# Patient Record
Sex: MALE | Race: WHITE | NOT HISPANIC OR LATINO | Employment: UNEMPLOYED | URBAN - NONMETROPOLITAN AREA
[De-identification: names, ages, dates, MRNs, and addresses within clinical notes are randomized per-mention and may not be internally consistent; named-entity substitution may affect disease eponyms.]

---

## 2017-01-31 ENCOUNTER — APPOINTMENT (OUTPATIENT)
Dept: CT IMAGING | Facility: HOSPITAL | Age: 32
End: 2017-01-31

## 2017-01-31 ENCOUNTER — APPOINTMENT (OUTPATIENT)
Dept: GENERAL RADIOLOGY | Facility: HOSPITAL | Age: 32
End: 2017-01-31

## 2017-01-31 ENCOUNTER — HOSPITAL ENCOUNTER (EMERGENCY)
Facility: HOSPITAL | Age: 32
Discharge: HOME | End: 2017-01-31
Admitting: EMERGENCY MEDICINE

## 2017-01-31 VITALS
RESPIRATION RATE: 20 BRPM | BODY MASS INDEX: 17.71 KG/M2 | SYSTOLIC BLOOD PRESSURE: 105 MMHG | HEIGHT: 77 IN | WEIGHT: 150 LBS | DIASTOLIC BLOOD PRESSURE: 58 MMHG | HEART RATE: 77 BPM | OXYGEN SATURATION: 100 % | TEMPERATURE: 98.1 F

## 2017-01-31 DIAGNOSIS — K59.00 CONSTIPATION, UNSPECIFIED CONSTIPATION TYPE: Primary | ICD-10-CM

## 2017-01-31 LAB
AMPHET+METHAMPHET UR QL: NEGATIVE
AMYLASE SERPL-CCNC: 60 U/L (ref 30–110)
APTT PPP: 29.6 SECONDS (ref 24.1–34.8)
BARBITURATES UR QL SCN: NEGATIVE
BASOPHILS # BLD AUTO: 0.04 10*3/MM3 (ref 0–0.2)
BASOPHILS NFR BLD AUTO: 0.7 % (ref 0–2)
BENZODIAZ UR QL SCN: NEGATIVE
BILIRUB UR QL STRIP: NEGATIVE
CANNABINOIDS SERPL QL: POSITIVE
CLARITY UR: CLEAR
COCAINE UR QL: NEGATIVE
COLOR UR: YELLOW
CREAT BLDA-MCNC: 0.9 MG/DL (ref 0.6–1.3)
CRP SERPL-MCNC: <0.5 MG/DL (ref 0–0.99)
DEPRECATED RDW RBC AUTO: 42.8 FL (ref 40–54)
EOSINOPHIL # BLD AUTO: 0.31 10*3/MM3 (ref 0–0.7)
EOSINOPHIL NFR BLD AUTO: 5.1 % (ref 0–4)
ERYTHROCYTE [DISTWIDTH] IN BLOOD BY AUTOMATED COUNT: 13.2 % (ref 12–15)
FLUAV AG NPH QL: NEGATIVE
FLUBV AG NPH QL IA: NEGATIVE
GLUCOSE UR STRIP-MCNC: NEGATIVE MG/DL
HCT VFR BLD AUTO: 45.4 % (ref 40–52)
HGB BLD-MCNC: 15.7 G/DL (ref 14–18)
HGB UR QL STRIP.AUTO: NEGATIVE
IMM GRANULOCYTES # BLD: 0.04 10*3/MM3 (ref 0–0.03)
IMM GRANULOCYTES NFR BLD: 0.7 % (ref 0–5)
INR PPP: 1.03 (ref 0.91–1.09)
KETONES UR QL STRIP: NEGATIVE
LEUKOCYTE ESTERASE UR QL STRIP.AUTO: NEGATIVE
LIPASE SERPL-CCNC: 36 U/L (ref 23–203)
LYMPHOCYTES # BLD AUTO: 1.47 10*3/MM3 (ref 0.72–4.86)
LYMPHOCYTES NFR BLD AUTO: 24.2 % (ref 15–45)
MCH RBC QN AUTO: 31 PG (ref 28–32)
MCHC RBC AUTO-ENTMCNC: 34.6 G/DL (ref 33–36)
MCV RBC AUTO: 89.5 FL (ref 82–95)
METHADONE UR QL SCN: NEGATIVE
MONOCYTES # BLD AUTO: 0.61 10*3/MM3 (ref 0.19–1.3)
MONOCYTES NFR BLD AUTO: 10 % (ref 4–12)
NEUTROPHILS # BLD AUTO: 3.61 10*3/MM3 (ref 1.87–8.4)
NEUTROPHILS NFR BLD AUTO: 59.3 % (ref 39–78)
NITRITE UR QL STRIP: NEGATIVE
OPIATES UR QL: NEGATIVE
PCP UR QL SCN: NEGATIVE
PH UR STRIP.AUTO: <=5 [PH] (ref 5–8)
PLATELET # BLD AUTO: 224 10*3/MM3 (ref 130–400)
PMV BLD AUTO: 10.8 FL (ref 6–12)
PROT UR QL STRIP: NEGATIVE
PROTHROMBIN TIME: 13.8 SECONDS (ref 11.9–14.6)
RBC # BLD AUTO: 5.07 10*6/MM3 (ref 4.8–5.9)
SP GR UR STRIP: >1.03 (ref 1–1.03)
TROPONIN I SERPL-MCNC: 0 NG/ML (ref 0–0.07)
UROBILINOGEN UR QL STRIP: ABNORMAL
WBC NRBC COR # BLD: 6.08 10*3/MM3 (ref 4.8–10.8)

## 2017-01-31 PROCEDURE — 74177 CT ABD & PELVIS W/CONTRAST: CPT

## 2017-01-31 PROCEDURE — 93005 ELECTROCARDIOGRAM TRACING: CPT | Performed by: NURSE PRACTITIONER

## 2017-01-31 PROCEDURE — 84484 ASSAY OF TROPONIN QUANT: CPT

## 2017-01-31 PROCEDURE — 80307 DRUG TEST PRSMV CHEM ANLYZR: CPT | Performed by: NURSE PRACTITIONER

## 2017-01-31 PROCEDURE — 0 IOPAMIDOL PER 1 ML: Performed by: NURSE PRACTITIONER

## 2017-01-31 PROCEDURE — 82150 ASSAY OF AMYLASE: CPT | Performed by: NURSE PRACTITIONER

## 2017-01-31 PROCEDURE — 85025 COMPLETE CBC W/AUTO DIFF WBC: CPT | Performed by: NURSE PRACTITIONER

## 2017-01-31 PROCEDURE — 96376 TX/PRO/DX INJ SAME DRUG ADON: CPT

## 2017-01-31 PROCEDURE — 82565 ASSAY OF CREATININE: CPT

## 2017-01-31 PROCEDURE — 96374 THER/PROPH/DIAG INJ IV PUSH: CPT

## 2017-01-31 PROCEDURE — 85610 PROTHROMBIN TIME: CPT | Performed by: NURSE PRACTITIONER

## 2017-01-31 PROCEDURE — 71010 HC CHEST PA OR AP: CPT

## 2017-01-31 PROCEDURE — 87804 INFLUENZA ASSAY W/OPTIC: CPT | Performed by: NURSE PRACTITIONER

## 2017-01-31 PROCEDURE — 71275 CT ANGIOGRAPHY CHEST: CPT

## 2017-01-31 PROCEDURE — 81003 URINALYSIS AUTO W/O SCOPE: CPT | Performed by: NURSE PRACTITIONER

## 2017-01-31 PROCEDURE — 25010000002 HYDROMORPHONE PER 4 MG: Performed by: NURSE PRACTITIONER

## 2017-01-31 PROCEDURE — 85730 THROMBOPLASTIN TIME PARTIAL: CPT | Performed by: NURSE PRACTITIONER

## 2017-01-31 PROCEDURE — 86140 C-REACTIVE PROTEIN: CPT | Performed by: NURSE PRACTITIONER

## 2017-01-31 PROCEDURE — 96361 HYDRATE IV INFUSION ADD-ON: CPT

## 2017-01-31 PROCEDURE — 93010 ELECTROCARDIOGRAM REPORT: CPT | Performed by: INTERNAL MEDICINE

## 2017-01-31 PROCEDURE — 96375 TX/PRO/DX INJ NEW DRUG ADDON: CPT

## 2017-01-31 PROCEDURE — 99284 EMERGENCY DEPT VISIT MOD MDM: CPT

## 2017-01-31 PROCEDURE — 25010000002 ONDANSETRON PER 1 MG: Performed by: NURSE PRACTITIONER

## 2017-01-31 PROCEDURE — 83690 ASSAY OF LIPASE: CPT | Performed by: NURSE PRACTITIONER

## 2017-01-31 RX ORDER — ONDANSETRON 2 MG/ML
4 INJECTION INTRAMUSCULAR; INTRAVENOUS ONCE
Status: COMPLETED | OUTPATIENT
Start: 2017-01-31 | End: 2017-01-31

## 2017-01-31 RX ORDER — SODIUM CHLORIDE 0.9 % (FLUSH) 0.9 %
10 SYRINGE (ML) INJECTION AS NEEDED
Status: DISCONTINUED | OUTPATIENT
Start: 2017-01-31 | End: 2017-01-31 | Stop reason: HOSPADM

## 2017-01-31 RX ORDER — POLYETHYLENE GLYCOL 3350 17 G/17G
17 POWDER, FOR SOLUTION ORAL DAILY
Qty: 1 EACH | Refills: 0 | Status: SHIPPED | OUTPATIENT
Start: 2017-01-31 | End: 2017-02-07 | Stop reason: HOSPADM

## 2017-01-31 RX ADMIN — SODIUM CHLORIDE 1000 ML: 9 INJECTION, SOLUTION INTRAVENOUS at 14:37

## 2017-01-31 RX ADMIN — HYDROMORPHONE HYDROCHLORIDE 0.5 MG: 1 INJECTION, SOLUTION INTRAMUSCULAR; INTRAVENOUS; SUBCUTANEOUS at 18:21

## 2017-01-31 RX ADMIN — IOPAMIDOL 75 ML: 755 INJECTION, SOLUTION INTRAVENOUS at 14:44

## 2017-01-31 RX ADMIN — ONDANSETRON 4 MG: 2 INJECTION INTRAMUSCULAR; INTRAVENOUS at 14:36

## 2017-01-31 RX ADMIN — HYDROMORPHONE HYDROCHLORIDE 0.5 MG: 1 INJECTION, SOLUTION INTRAMUSCULAR; INTRAVENOUS; SUBCUTANEOUS at 14:35

## 2017-01-31 RX ADMIN — SODIUM CHLORIDE 1000 ML: 9 INJECTION, SOLUTION INTRAVENOUS at 18:19

## 2017-02-01 ENCOUNTER — APPOINTMENT (OUTPATIENT)
Dept: GENERAL RADIOLOGY | Facility: HOSPITAL | Age: 32
End: 2017-02-01

## 2017-02-01 ENCOUNTER — HOSPITAL ENCOUNTER (INPATIENT)
Facility: HOSPITAL | Age: 32
LOS: 6 days | Discharge: HOME | End: 2017-02-07
Attending: INTERNAL MEDICINE | Admitting: INTERNAL MEDICINE

## 2017-02-01 DIAGNOSIS — J93.12 SECONDARY SPONTANEOUS PNEUMOTHORAX: Primary | ICD-10-CM

## 2017-02-01 PROBLEM — J93.9 PNEUMOTHORAX: Status: ACTIVE | Noted: 2017-02-01

## 2017-02-01 PROCEDURE — 71010 HC CHEST PA OR AP: CPT

## 2017-02-01 PROCEDURE — 25010000002 HYDROMORPHONE PER 4 MG: Performed by: INTERNAL MEDICINE

## 2017-02-01 RX ORDER — ONDANSETRON 2 MG/ML
4 INJECTION INTRAMUSCULAR; INTRAVENOUS EVERY 6 HOURS PRN
Status: DISCONTINUED | OUTPATIENT
Start: 2017-02-01 | End: 2017-02-03 | Stop reason: HOSPADM

## 2017-02-01 RX ORDER — NICOTINE 21 MG/24HR
1 PATCH, TRANSDERMAL 24 HOURS TRANSDERMAL NIGHTLY
Status: DISCONTINUED | OUTPATIENT
Start: 2017-02-01 | End: 2017-02-02 | Stop reason: DRUGHIGH

## 2017-02-01 RX ORDER — PANTOPRAZOLE SODIUM 40 MG/10ML
40 INJECTION, POWDER, LYOPHILIZED, FOR SOLUTION INTRAVENOUS
Status: DISCONTINUED | OUTPATIENT
Start: 2017-02-01 | End: 2017-02-03 | Stop reason: HOSPADM

## 2017-02-01 RX ADMIN — NICOTINE 1 PATCH: 14 PATCH, EXTENDED RELEASE TRANSDERMAL at 23:06

## 2017-02-01 RX ADMIN — HYDROMORPHONE HYDROCHLORIDE 1 MG: 1 INJECTION, SOLUTION INTRAMUSCULAR; INTRAVENOUS; SUBCUTANEOUS at 22:20

## 2017-02-01 RX ADMIN — PANTOPRAZOLE SODIUM 40 MG: 40 INJECTION, POWDER, FOR SOLUTION INTRAVENOUS at 22:20

## 2017-02-01 NOTE — DISCHARGE INSTRUCTIONS
Follow-up to primary care provider in one to 2 days for recheck.  Please return to the ER if any new or worsening symptoms.

## 2017-02-02 ENCOUNTER — APPOINTMENT (OUTPATIENT)
Dept: GENERAL RADIOLOGY | Facility: HOSPITAL | Age: 32
End: 2017-02-02

## 2017-02-02 PROBLEM — J43.9 EMPHYSEMA OF LUNG: Chronic | Status: ACTIVE | Noted: 2017-02-02

## 2017-02-02 PROBLEM — F17.200 SMOKING: Chronic | Status: ACTIVE | Noted: 2017-02-02

## 2017-02-02 PROBLEM — IMO0001 SMOKING: Chronic | Status: ACTIVE | Noted: 2017-02-02

## 2017-02-02 LAB
ABO GROUP BLD: NORMAL
ANION GAP SERPL CALCULATED.3IONS-SCNC: 9 MMOL/L (ref 4–13)
APTT PPP: 30.7 SECONDS (ref 24.1–34.8)
BLD GP AB SCN SERPL QL: NEGATIVE
BUN BLD-MCNC: 11 MG/DL (ref 5–21)
BUN/CREAT SERPL: 11.6 (ref 7–25)
CALCIUM SPEC-SCNC: 8.9 MG/DL (ref 8.4–10.4)
CHLORIDE SERPL-SCNC: 102 MMOL/L (ref 98–110)
CO2 SERPL-SCNC: 28 MMOL/L (ref 24–31)
CREAT BLD-MCNC: 0.95 MG/DL (ref 0.5–1.4)
DEPRECATED RDW RBC AUTO: 44.2 FL (ref 40–54)
ERYTHROCYTE [DISTWIDTH] IN BLOOD BY AUTOMATED COUNT: 13.2 % (ref 12–15)
GFR SERPL CREATININE-BSD FRML MDRD: 93 ML/MIN/1.73
GLUCOSE BLD-MCNC: 80 MG/DL (ref 70–100)
GLUCOSE BLDC GLUCOMTR-MCNC: 130 MG/DL (ref 70–130)
GLUCOSE BLDC GLUCOMTR-MCNC: 142 MG/DL (ref 70–130)
HCT VFR BLD AUTO: 42.6 % (ref 40–52)
HGB BLD-MCNC: 14.3 G/DL (ref 14–18)
INR PPP: 1.04 (ref 0.91–1.09)
MCH RBC QN AUTO: 30.4 PG (ref 28–32)
MCHC RBC AUTO-ENTMCNC: 33.6 G/DL (ref 33–36)
MCV RBC AUTO: 90.6 FL (ref 82–95)
PLATELET # BLD AUTO: 195 10*3/MM3 (ref 130–400)
PMV BLD AUTO: 10.9 FL (ref 6–12)
POTASSIUM BLD-SCNC: 3.3 MMOL/L (ref 3.5–5.3)
PROTHROMBIN TIME: 13.9 SECONDS (ref 11.9–14.6)
RBC # BLD AUTO: 4.7 10*6/MM3 (ref 4.8–5.9)
RH BLD: POSITIVE
SODIUM BLD-SCNC: 139 MMOL/L (ref 135–145)
WBC NRBC COR # BLD: 6.45 10*3/MM3 (ref 4.8–10.8)

## 2017-02-02 PROCEDURE — 86900 BLOOD TYPING SEROLOGIC ABO: CPT

## 2017-02-02 PROCEDURE — 82962 GLUCOSE BLOOD TEST: CPT

## 2017-02-02 PROCEDURE — 86901 BLOOD TYPING SEROLOGIC RH(D): CPT

## 2017-02-02 PROCEDURE — 94640 AIRWAY INHALATION TREATMENT: CPT

## 2017-02-02 PROCEDURE — 82103 ALPHA-1-ANTITRYPSIN TOTAL: CPT | Performed by: FAMILY MEDICINE

## 2017-02-02 PROCEDURE — 25010000002 ONDANSETRON PER 1 MG: Performed by: INTERNAL MEDICINE

## 2017-02-02 PROCEDURE — 85730 THROMBOPLASTIN TIME PARTIAL: CPT | Performed by: NURSE PRACTITIONER

## 2017-02-02 PROCEDURE — 85027 COMPLETE CBC AUTOMATED: CPT | Performed by: NURSE PRACTITIONER

## 2017-02-02 PROCEDURE — 94799 UNLISTED PULMONARY SVC/PX: CPT

## 2017-02-02 PROCEDURE — 80048 BASIC METABOLIC PNL TOTAL CA: CPT | Performed by: NURSE PRACTITIONER

## 2017-02-02 PROCEDURE — 86850 RBC ANTIBODY SCREEN: CPT

## 2017-02-02 PROCEDURE — 71010 HC CHEST PA OR AP: CPT

## 2017-02-02 PROCEDURE — 93005 ELECTROCARDIOGRAM TRACING: CPT | Performed by: NURSE PRACTITIONER

## 2017-02-02 PROCEDURE — 25010000002 HYDROMORPHONE PER 4 MG: Performed by: INTERNAL MEDICINE

## 2017-02-02 PROCEDURE — 85610 PROTHROMBIN TIME: CPT | Performed by: NURSE PRACTITIONER

## 2017-02-02 PROCEDURE — 25010000002 HYDROMORPHONE 1 MG/ML SOLUTION: Performed by: FAMILY MEDICINE

## 2017-02-02 RX ORDER — ALBUTEROL SULFATE 1.25 MG/3ML
1.25 SOLUTION RESPIRATORY (INHALATION)
Status: DISCONTINUED | OUTPATIENT
Start: 2017-02-02 | End: 2017-02-03 | Stop reason: HOSPADM

## 2017-02-02 RX ORDER — NICOTINE 21 MG/24HR
1 PATCH, TRANSDERMAL 24 HOURS TRANSDERMAL EVERY 24 HOURS
Status: DISCONTINUED | OUTPATIENT
Start: 2017-02-02 | End: 2017-02-04 | Stop reason: HOSPADM

## 2017-02-02 RX ORDER — NALOXONE HCL 0.4 MG/ML
0.1 VIAL (ML) INJECTION
Status: DISCONTINUED | OUTPATIENT
Start: 2017-02-02 | End: 2017-02-03 | Stop reason: HOSPADM

## 2017-02-02 RX ADMIN — ONDANSETRON HYDROCHLORIDE 4 MG: 2 SOLUTION INTRAMUSCULAR; INTRAVENOUS at 18:59

## 2017-02-02 RX ADMIN — HYDROMORPHONE HYDROCHLORIDE 1 MG: 1 INJECTION, SOLUTION INTRAMUSCULAR; INTRAVENOUS; SUBCUTANEOUS at 02:20

## 2017-02-02 RX ADMIN — HYDROMORPHONE HYDROCHLORIDE 1 MG: 1 INJECTION, SOLUTION INTRAMUSCULAR; INTRAVENOUS; SUBCUTANEOUS at 07:27

## 2017-02-02 RX ADMIN — ALBUTEROL SULFATE 1.25 MG: 1.25 SOLUTION RESPIRATORY (INHALATION) at 20:55

## 2017-02-02 RX ADMIN — PANTOPRAZOLE SODIUM 40 MG: 40 INJECTION, POWDER, FOR SOLUTION INTRAVENOUS at 07:26

## 2017-02-02 RX ADMIN — ONDANSETRON HYDROCHLORIDE 4 MG: 2 SOLUTION INTRAMUSCULAR; INTRAVENOUS at 02:36

## 2017-02-02 RX ADMIN — Medication: at 10:55

## 2017-02-03 ENCOUNTER — APPOINTMENT (OUTPATIENT)
Dept: GENERAL RADIOLOGY | Facility: HOSPITAL | Age: 32
End: 2017-02-03

## 2017-02-03 ENCOUNTER — ANESTHESIA (OUTPATIENT)
Dept: PERIOP | Facility: HOSPITAL | Age: 32
End: 2017-02-03

## 2017-02-03 ENCOUNTER — ANESTHESIA EVENT (OUTPATIENT)
Dept: PERIOP | Facility: HOSPITAL | Age: 32
End: 2017-02-03

## 2017-02-03 LAB
A1AT SERPL-MCNC: 169 MG/DL (ref 90–200)
ANION GAP SERPL CALCULATED.3IONS-SCNC: 7 MMOL/L (ref 4–13)
ARTERIAL PATENCY WRIST A: ABNORMAL
ATMOSPHERIC PRESS: ABNORMAL MMHG
BASE EXCESS BLDA CALC-SCNC: -1.2 MMOL/L (ref -2–2)
BDY SITE: ABNORMAL
BUN BLD-MCNC: 11 MG/DL (ref 5–21)
BUN/CREAT SERPL: 12.4 (ref 7–25)
CALCIUM SPEC-SCNC: 8.4 MG/DL (ref 8.4–10.4)
CHLORIDE SERPL-SCNC: 101 MMOL/L (ref 98–110)
CO2 SERPL-SCNC: 25 MMOL/L (ref 24–31)
CREAT BLD-MCNC: 0.89 MG/DL (ref 0.5–1.4)
DEPRECATED RDW RBC AUTO: 43.5 FL (ref 40–54)
ERYTHROCYTE [DISTWIDTH] IN BLOOD BY AUTOMATED COUNT: 13.2 % (ref 12–15)
GFR SERPL CREATININE-BSD FRML MDRD: 100 ML/MIN/1.73
GLUCOSE BLD-MCNC: 144 MG/DL (ref 70–100)
HCO3 BLDA-SCNC: 22.2 MMOL/L (ref 22–26)
HCT VFR BLD AUTO: 38.3 % (ref 40–52)
HGB BLD-MCNC: 12.8 G/DL (ref 14–18)
MCH RBC QN AUTO: 30.1 PG (ref 28–32)
MCHC RBC AUTO-ENTMCNC: 33.4 G/DL (ref 33–36)
MCV RBC AUTO: 90.1 FL (ref 82–95)
MODALITY: ABNORMAL
PCO2 BLDA: 33.7 MM HG (ref 35–45)
PH BLDA: 7.44 PH UNITS (ref 7.35–7.45)
PLATELET # BLD AUTO: 171 10*3/MM3 (ref 130–400)
PMV BLD AUTO: 10.3 FL (ref 6–12)
PO2 BLDA: 72.9 MM HG (ref 80–100)
POTASSIUM BLD-SCNC: 3.6 MMOL/L (ref 3.5–5.3)
RBC # BLD AUTO: 4.25 10*6/MM3 (ref 4.8–5.9)
SAO2 % BLDCOA: 95.3 % (ref 94–100)
SAO2 % BLDCOA: 95.3 % (ref 94–100)
SODIUM BLD-SCNC: 133 MMOL/L (ref 135–145)
WBC NRBC COR # BLD: 12.76 10*3/MM3 (ref 4.8–10.8)

## 2017-02-03 PROCEDURE — 25010000002 HYDROMORPHONE PER 4 MG: Performed by: NURSE ANESTHETIST, CERTIFIED REGISTERED

## 2017-02-03 PROCEDURE — 71010 HC CHEST PA OR AP: CPT

## 2017-02-03 PROCEDURE — 32666 THORACOSCOPY W/WEDGE RESECT: CPT | Performed by: THORACIC SURGERY (CARDIOTHORACIC VASCULAR SURGERY)

## 2017-02-03 PROCEDURE — 94640 AIRWAY INHALATION TREATMENT: CPT

## 2017-02-03 PROCEDURE — 0BBC4ZZ EXCISION OF RIGHT UPPER LUNG LOBE, PERCUTANEOUS ENDOSCOPIC APPROACH: ICD-10-PCS | Performed by: THORACIC SURGERY (CARDIOTHORACIC VASCULAR SURGERY)

## 2017-02-03 PROCEDURE — 85027 COMPLETE CBC AUTOMATED: CPT | Performed by: THORACIC SURGERY (CARDIOTHORACIC VASCULAR SURGERY)

## 2017-02-03 PROCEDURE — C1729 CATH, DRAINAGE: HCPCS | Performed by: THORACIC SURGERY (CARDIOTHORACIC VASCULAR SURGERY)

## 2017-02-03 PROCEDURE — 25010000002 PROPOFOL 10 MG/ML EMULSION: Performed by: NURSE ANESTHETIST, CERTIFIED REGISTERED

## 2017-02-03 PROCEDURE — 0B5N4ZZ DESTRUCTION OF RIGHT PLEURA, PERCUTANEOUS ENDOSCOPIC APPROACH: ICD-10-PCS | Performed by: THORACIC SURGERY (CARDIOTHORACIC VASCULAR SURGERY)

## 2017-02-03 PROCEDURE — 25010000002 ONDANSETRON PER 1 MG: Performed by: ANESTHESIOLOGY

## 2017-02-03 PROCEDURE — 25010000002 DEXAMETHASONE PER 1 MG: Performed by: NURSE ANESTHETIST, CERTIFIED REGISTERED

## 2017-02-03 PROCEDURE — 87205 SMEAR GRAM STAIN: CPT | Performed by: THORACIC SURGERY (CARDIOTHORACIC VASCULAR SURGERY)

## 2017-02-03 PROCEDURE — 82803 BLOOD GASES ANY COMBINATION: CPT

## 2017-02-03 PROCEDURE — 31622 DX BRONCHOSCOPE/WASH: CPT | Performed by: THORACIC SURGERY (CARDIOTHORACIC VASCULAR SURGERY)

## 2017-02-03 PROCEDURE — 25010000002 FENTANYL CITRATE (PF) 100 MCG/2ML SOLUTION: Performed by: ANESTHESIOLOGY

## 2017-02-03 PROCEDURE — 32667 THORACOSCOPY W/W RESECT ADDL: CPT | Performed by: THORACIC SURGERY (CARDIOTHORACIC VASCULAR SURGERY)

## 2017-02-03 PROCEDURE — 36600 WITHDRAWAL OF ARTERIAL BLOOD: CPT

## 2017-02-03 PROCEDURE — 25010000002 ONDANSETRON PER 1 MG: Performed by: THORACIC SURGERY (CARDIOTHORACIC VASCULAR SURGERY)

## 2017-02-03 PROCEDURE — 87075 CULTR BACTERIA EXCEPT BLOOD: CPT | Performed by: THORACIC SURGERY (CARDIOTHORACIC VASCULAR SURGERY)

## 2017-02-03 PROCEDURE — 25010000002 NEOSTIGMINE PER 0.5 MG: Performed by: NURSE ANESTHETIST, CERTIFIED REGISTERED

## 2017-02-03 PROCEDURE — 25010000002 HYDROMORPHONE 1 MG/ML SOLUTION: Performed by: THORACIC SURGERY (CARDIOTHORACIC VASCULAR SURGERY)

## 2017-02-03 PROCEDURE — 94799 UNLISTED PULMONARY SVC/PX: CPT

## 2017-02-03 PROCEDURE — 87070 CULTURE OTHR SPECIMN AEROBIC: CPT | Performed by: THORACIC SURGERY (CARDIOTHORACIC VASCULAR SURGERY)

## 2017-02-03 PROCEDURE — 32650 THORACOSCOPY W/PLEURODESIS: CPT | Performed by: THORACIC SURGERY (CARDIOTHORACIC VASCULAR SURGERY)

## 2017-02-03 PROCEDURE — 25010000002 ONDANSETRON PER 1 MG: Performed by: INTERNAL MEDICINE

## 2017-02-03 PROCEDURE — 88307 TISSUE EXAM BY PATHOLOGIST: CPT | Performed by: THORACIC SURGERY (CARDIOTHORACIC VASCULAR SURGERY)

## 2017-02-03 PROCEDURE — 87015 SPECIMEN INFECT AGNT CONCNTJ: CPT | Performed by: THORACIC SURGERY (CARDIOTHORACIC VASCULAR SURGERY)

## 2017-02-03 PROCEDURE — 25010000002 HYDROMORPHONE PER 4 MG: Performed by: FAMILY MEDICINE

## 2017-02-03 PROCEDURE — 25010000003 MORPHINE PER 10 MG: Performed by: THORACIC SURGERY (CARDIOTHORACIC VASCULAR SURGERY)

## 2017-02-03 PROCEDURE — 25010000002 KETOROLAC TROMETHAMINE PER 15 MG: Performed by: NURSE ANESTHETIST, CERTIFIED REGISTERED

## 2017-02-03 PROCEDURE — 80048 BASIC METABOLIC PNL TOTAL CA: CPT | Performed by: THORACIC SURGERY (CARDIOTHORACIC VASCULAR SURGERY)

## 2017-02-03 PROCEDURE — 25010000002 MIDAZOLAM PER 1 MG: Performed by: ANESTHESIOLOGY

## 2017-02-03 PROCEDURE — 0BB64ZZ EXCISION OF RIGHT LOWER LOBE BRONCHUS, PERCUTANEOUS ENDOSCOPIC APPROACH: ICD-10-PCS | Performed by: THORACIC SURGERY (CARDIOTHORACIC VASCULAR SURGERY)

## 2017-02-03 RX ORDER — BUPIVACAINE HCL/0.9 % NACL/PF 0.1 %
2 PLASTIC BAG, INJECTION (ML) EPIDURAL EVERY 8 HOURS
Status: COMPLETED | OUTPATIENT
Start: 2017-02-03 | End: 2017-02-04

## 2017-02-03 RX ORDER — FENTANYL CITRATE 50 UG/ML
25 INJECTION, SOLUTION INTRAMUSCULAR; INTRAVENOUS AS NEEDED
Status: DISCONTINUED | OUTPATIENT
Start: 2017-02-03 | End: 2017-02-03 | Stop reason: HOSPADM

## 2017-02-03 RX ORDER — ONDANSETRON 2 MG/ML
4 INJECTION INTRAMUSCULAR; INTRAVENOUS AS NEEDED
Status: DISCONTINUED | OUTPATIENT
Start: 2017-02-03 | End: 2017-02-03 | Stop reason: HOSPADM

## 2017-02-03 RX ORDER — FENTANYL CITRATE 50 UG/ML
25 INJECTION, SOLUTION INTRAMUSCULAR; INTRAVENOUS
Status: DISCONTINUED | OUTPATIENT
Start: 2017-02-03 | End: 2017-02-03 | Stop reason: HOSPADM

## 2017-02-03 RX ORDER — DOCUSATE SODIUM 100 MG/1
100 CAPSULE, LIQUID FILLED ORAL 2 TIMES DAILY
Status: DISCONTINUED | OUTPATIENT
Start: 2017-02-03 | End: 2017-02-07 | Stop reason: HOSPADM

## 2017-02-03 RX ORDER — MORPHINE SULFATE 1 MG/ML
INJECTION, SOLUTION INTRAVENOUS CONTINUOUS
Status: DISCONTINUED | OUTPATIENT
Start: 2017-02-03 | End: 2017-02-04

## 2017-02-03 RX ORDER — POLYETHYLENE GLYCOL 3350 17 G/17G
17 POWDER, FOR SOLUTION ORAL DAILY
Status: DISCONTINUED | OUTPATIENT
Start: 2017-02-03 | End: 2017-02-07 | Stop reason: HOSPADM

## 2017-02-03 RX ORDER — METOCLOPRAMIDE HYDROCHLORIDE 5 MG/ML
5 INJECTION INTRAMUSCULAR; INTRAVENOUS
Status: DISCONTINUED | OUTPATIENT
Start: 2017-02-03 | End: 2017-02-03 | Stop reason: HOSPADM

## 2017-02-03 RX ORDER — PREGABALIN 25 MG/1
25 CAPSULE ORAL EVERY 12 HOURS SCHEDULED
Status: DISCONTINUED | OUTPATIENT
Start: 2017-02-03 | End: 2017-02-07 | Stop reason: HOSPADM

## 2017-02-03 RX ORDER — ATRACURIUM BESYLATE 10 MG/ML
INJECTION, SOLUTION INTRAVENOUS AS NEEDED
Status: DISCONTINUED | OUTPATIENT
Start: 2017-02-03 | End: 2017-02-03 | Stop reason: SURG

## 2017-02-03 RX ORDER — LIDOCAINE 50 MG/G
2 PATCH TOPICAL EVERY 24 HOURS
Status: DISCONTINUED | OUTPATIENT
Start: 2017-02-03 | End: 2017-02-07 | Stop reason: HOSPADM

## 2017-02-03 RX ORDER — KETOROLAC TROMETHAMINE 30 MG/ML
INJECTION, SOLUTION INTRAMUSCULAR; INTRAVENOUS AS NEEDED
Status: DISCONTINUED | OUTPATIENT
Start: 2017-02-03 | End: 2017-02-03 | Stop reason: SURG

## 2017-02-03 RX ORDER — DIPHENHYDRAMINE HYDROCHLORIDE 50 MG/ML
12.5 INJECTION INTRAMUSCULAR; INTRAVENOUS EVERY 4 HOURS PRN
Status: DISCONTINUED | OUTPATIENT
Start: 2017-02-03 | End: 2017-02-07 | Stop reason: HOSPADM

## 2017-02-03 RX ORDER — SODIUM CHLORIDE, SODIUM LACTATE, POTASSIUM CHLORIDE, CALCIUM CHLORIDE 600; 310; 30; 20 MG/100ML; MG/100ML; MG/100ML; MG/100ML
30 INJECTION, SOLUTION INTRAVENOUS CONTINUOUS
Status: DISCONTINUED | OUTPATIENT
Start: 2017-02-03 | End: 2017-02-05

## 2017-02-03 RX ORDER — OXYCODONE HYDROCHLORIDE AND ACETAMINOPHEN 5; 325 MG/1; MG/1
1 TABLET ORAL
Status: DISCONTINUED | OUTPATIENT
Start: 2017-02-03 | End: 2017-02-05

## 2017-02-03 RX ORDER — FLUMAZENIL 0.1 MG/ML
0.2 INJECTION INTRAVENOUS AS NEEDED
Status: DISCONTINUED | OUTPATIENT
Start: 2017-02-03 | End: 2017-02-03 | Stop reason: HOSPADM

## 2017-02-03 RX ORDER — SODIUM CHLORIDE 0.9 % (FLUSH) 0.9 %
1-10 SYRINGE (ML) INJECTION AS NEEDED
Status: DISCONTINUED | OUTPATIENT
Start: 2017-02-03 | End: 2017-02-03 | Stop reason: HOSPADM

## 2017-02-03 RX ORDER — ACETYLCYSTEINE 200 MG/ML
3 SOLUTION ORAL; RESPIRATORY (INHALATION)
Status: DISPENSED | OUTPATIENT
Start: 2017-02-03 | End: 2017-02-05

## 2017-02-03 RX ORDER — SODIUM CHLORIDE, SODIUM LACTATE, POTASSIUM CHLORIDE, CALCIUM CHLORIDE 600; 310; 30; 20 MG/100ML; MG/100ML; MG/100ML; MG/100ML
30 INJECTION, SOLUTION INTRAVENOUS CONTINUOUS
Status: DISCONTINUED | OUTPATIENT
Start: 2017-02-03 | End: 2017-02-03 | Stop reason: HOSPADM

## 2017-02-03 RX ORDER — PROPOFOL 10 MG/ML
VIAL (ML) INTRAVENOUS AS NEEDED
Status: DISCONTINUED | OUTPATIENT
Start: 2017-02-03 | End: 2017-02-03 | Stop reason: SURG

## 2017-02-03 RX ORDER — ONDANSETRON 2 MG/ML
4 INJECTION INTRAMUSCULAR; INTRAVENOUS EVERY 6 HOURS PRN
Status: DISCONTINUED | OUTPATIENT
Start: 2017-02-03 | End: 2017-02-07 | Stop reason: HOSPADM

## 2017-02-03 RX ORDER — HYDROMORPHONE HCL 110MG/55ML
PATIENT CONTROLLED ANALGESIA SYRINGE INTRAVENOUS AS NEEDED
Status: DISCONTINUED | OUTPATIENT
Start: 2017-02-03 | End: 2017-02-03 | Stop reason: SURG

## 2017-02-03 RX ORDER — NALOXONE HCL 0.4 MG/ML
0.1 VIAL (ML) INJECTION
Status: DISCONTINUED | OUTPATIENT
Start: 2017-02-03 | End: 2017-02-07 | Stop reason: HOSPADM

## 2017-02-03 RX ORDER — ONDANSETRON 2 MG/ML
4 INJECTION INTRAMUSCULAR; INTRAVENOUS ONCE AS NEEDED
Status: COMPLETED | OUTPATIENT
Start: 2017-02-03 | End: 2017-02-03

## 2017-02-03 RX ORDER — FAMOTIDINE 10 MG/ML
20 INJECTION, SOLUTION INTRAVENOUS ONCE
Status: COMPLETED | OUTPATIENT
Start: 2017-02-03 | End: 2017-02-03

## 2017-02-03 RX ORDER — GLYCOPYRROLATE 0.2 MG/ML
INJECTION INTRAMUSCULAR; INTRAVENOUS AS NEEDED
Status: DISCONTINUED | OUTPATIENT
Start: 2017-02-03 | End: 2017-02-03 | Stop reason: SURG

## 2017-02-03 RX ORDER — MIDAZOLAM HYDROCHLORIDE 1 MG/ML
2 INJECTION INTRAMUSCULAR; INTRAVENOUS
Status: DISCONTINUED | OUTPATIENT
Start: 2017-02-03 | End: 2017-02-03 | Stop reason: HOSPADM

## 2017-02-03 RX ORDER — IPRATROPIUM BROMIDE AND ALBUTEROL SULFATE 2.5; .5 MG/3ML; MG/3ML
3 SOLUTION RESPIRATORY (INHALATION) ONCE AS NEEDED
Status: DISCONTINUED | OUTPATIENT
Start: 2017-02-03 | End: 2017-02-03 | Stop reason: HOSPADM

## 2017-02-03 RX ORDER — MIDAZOLAM HYDROCHLORIDE 1 MG/ML
1 INJECTION INTRAMUSCULAR; INTRAVENOUS
Status: DISCONTINUED | OUTPATIENT
Start: 2017-02-03 | End: 2017-02-03 | Stop reason: HOSPADM

## 2017-02-03 RX ORDER — ONDANSETRON 4 MG/1
4 TABLET, FILM COATED ORAL EVERY 6 HOURS PRN
Status: DISCONTINUED | OUTPATIENT
Start: 2017-02-03 | End: 2017-02-07 | Stop reason: HOSPADM

## 2017-02-03 RX ORDER — DEXAMETHASONE SODIUM PHOSPHATE 4 MG/ML
INJECTION, SOLUTION INTRA-ARTICULAR; INTRALESIONAL; INTRAMUSCULAR; INTRAVENOUS; SOFT TISSUE AS NEEDED
Status: DISCONTINUED | OUTPATIENT
Start: 2017-02-03 | End: 2017-02-03 | Stop reason: SURG

## 2017-02-03 RX ORDER — SUFENTANIL CITRATE 50 UG/ML
INJECTION EPIDURAL; INTRAVENOUS AS NEEDED
Status: DISCONTINUED | OUTPATIENT
Start: 2017-02-03 | End: 2017-02-03 | Stop reason: SURG

## 2017-02-03 RX ORDER — MORPHINE SULFATE 4 MG/ML
4 INJECTION, SOLUTION INTRAMUSCULAR; INTRAVENOUS
Status: DISCONTINUED | OUTPATIENT
Start: 2017-02-03 | End: 2017-02-03 | Stop reason: HOSPADM

## 2017-02-03 RX ORDER — LABETALOL HYDROCHLORIDE 5 MG/ML
5 INJECTION, SOLUTION INTRAVENOUS
Status: DISCONTINUED | OUTPATIENT
Start: 2017-02-03 | End: 2017-02-03 | Stop reason: HOSPADM

## 2017-02-03 RX ORDER — MAGNESIUM HYDROXIDE 1200 MG/15ML
LIQUID ORAL AS NEEDED
Status: DISCONTINUED | OUTPATIENT
Start: 2017-02-03 | End: 2017-02-03 | Stop reason: HOSPADM

## 2017-02-03 RX ORDER — SODIUM CHLORIDE, SODIUM LACTATE, POTASSIUM CHLORIDE, CALCIUM CHLORIDE 600; 310; 30; 20 MG/100ML; MG/100ML; MG/100ML; MG/100ML
100 INJECTION, SOLUTION INTRAVENOUS CONTINUOUS
Status: DISCONTINUED | OUTPATIENT
Start: 2017-02-03 | End: 2017-02-03 | Stop reason: HOSPADM

## 2017-02-03 RX ORDER — BISACODYL 10 MG
10 SUPPOSITORY, RECTAL RECTAL DAILY PRN
Status: DISCONTINUED | OUTPATIENT
Start: 2017-02-03 | End: 2017-02-07 | Stop reason: HOSPADM

## 2017-02-03 RX ORDER — KETAMINE HYDROCHLORIDE 50 MG/ML
INJECTION, SOLUTION, CONCENTRATE INTRAMUSCULAR; INTRAVENOUS AS NEEDED
Status: DISCONTINUED | OUTPATIENT
Start: 2017-02-03 | End: 2017-02-03 | Stop reason: SURG

## 2017-02-03 RX ORDER — HYDRALAZINE HYDROCHLORIDE 20 MG/ML
5 INJECTION INTRAMUSCULAR; INTRAVENOUS
Status: DISCONTINUED | OUTPATIENT
Start: 2017-02-03 | End: 2017-02-03 | Stop reason: HOSPADM

## 2017-02-03 RX ORDER — IPRATROPIUM BROMIDE AND ALBUTEROL SULFATE 2.5; .5 MG/3ML; MG/3ML
3 SOLUTION RESPIRATORY (INHALATION)
Status: COMPLETED | OUTPATIENT
Start: 2017-02-03 | End: 2017-02-05

## 2017-02-03 RX ORDER — ONDANSETRON 4 MG/1
4 TABLET, ORALLY DISINTEGRATING ORAL EVERY 6 HOURS PRN
Status: DISCONTINUED | OUTPATIENT
Start: 2017-02-03 | End: 2017-02-07 | Stop reason: HOSPADM

## 2017-02-03 RX ORDER — SODIUM CHLORIDE 9 MG/ML
80 INJECTION, SOLUTION INTRAVENOUS CONTINUOUS
Status: DISCONTINUED | OUTPATIENT
Start: 2017-02-03 | End: 2017-02-07 | Stop reason: HOSPADM

## 2017-02-03 RX ORDER — LIDOCAINE HYDROCHLORIDE 20 MG/ML
INJECTION, SOLUTION INFILTRATION; PERINEURAL AS NEEDED
Status: DISCONTINUED | OUTPATIENT
Start: 2017-02-03 | End: 2017-02-03 | Stop reason: SURG

## 2017-02-03 RX ADMIN — Medication: at 13:36

## 2017-02-03 RX ADMIN — ATRACURIUM BESYLATE 25 MG: 10 INJECTION, SOLUTION INTRAVENOUS at 07:47

## 2017-02-03 RX ADMIN — PROPOFOL 150 MG: 10 INJECTION, EMULSION INTRAVENOUS at 07:43

## 2017-02-03 RX ADMIN — ATRACURIUM BESYLATE 10 MG: 10 INJECTION, SOLUTION INTRAVENOUS at 09:05

## 2017-02-03 RX ADMIN — GLYCOPYRROLATE 0.4 MG: 0.2 INJECTION, SOLUTION INTRAMUSCULAR; INTRAVENOUS at 09:40

## 2017-02-03 RX ADMIN — PREGABALIN 25 MG: 25 CAPSULE ORAL at 20:54

## 2017-02-03 RX ADMIN — METOPROLOL TARTRATE 12.5 MG: 25 TABLET, FILM COATED ORAL at 13:39

## 2017-02-03 RX ADMIN — Medication 2 G: at 16:57

## 2017-02-03 RX ADMIN — LIDOCAINE 2 PATCH: 50 PATCH CUTANEOUS at 18:35

## 2017-02-03 RX ADMIN — DOCUSATE SODIUM 100 MG: 100 CAPSULE ORAL at 18:35

## 2017-02-03 RX ADMIN — FAMOTIDINE 20 MG: 10 INJECTION, SOLUTION INTRAVENOUS at 07:04

## 2017-02-03 RX ADMIN — IPRATROPIUM BROMIDE AND ALBUTEROL SULFATE 3 ML: .5; 3 SOLUTION RESPIRATORY (INHALATION) at 23:11

## 2017-02-03 RX ADMIN — SODIUM CHLORIDE, POTASSIUM CHLORIDE, SODIUM LACTATE AND CALCIUM CHLORIDE: 600; 310; 30; 20 INJECTION, SOLUTION INTRAVENOUS at 10:15

## 2017-02-03 RX ADMIN — NICOTINE 1 PATCH: 21 PATCH, EXTENDED RELEASE TRANSDERMAL at 00:01

## 2017-02-03 RX ADMIN — KETAMINE HYDROCHLORIDE 10 MG: 50 INJECTION, SOLUTION, CONCENTRATE INTRAMUSCULAR; INTRAVENOUS at 08:00

## 2017-02-03 RX ADMIN — ACETYLCYSTEINE 4 ML: 200 SOLUTION ORAL; RESPIRATORY (INHALATION) at 23:12

## 2017-02-03 RX ADMIN — ATRACURIUM BESYLATE 10 MG: 10 INJECTION, SOLUTION INTRAVENOUS at 08:46

## 2017-02-03 RX ADMIN — LIDOCAINE HYDROCHLORIDE 0.5 ML: 10 INJECTION, SOLUTION EPIDURAL; INFILTRATION; INTRACAUDAL; PERINEURAL at 07:04

## 2017-02-03 RX ADMIN — METOPROLOL TARTRATE 12.5 MG: 25 TABLET, FILM COATED ORAL at 20:54

## 2017-02-03 RX ADMIN — LIDOCAINE HYDROCHLORIDE 100 MG: 20 INJECTION, SOLUTION INFILTRATION; PERINEURAL at 07:43

## 2017-02-03 RX ADMIN — Medication: at 17:36

## 2017-02-03 RX ADMIN — SUFENTANIL CITRATE 25 MCG: 50 INJECTION, SOLUTION EPIDURAL; INTRAVENOUS at 08:42

## 2017-02-03 RX ADMIN — ONDANSETRON HYDROCHLORIDE 4 MG: 2 SOLUTION INTRAMUSCULAR; INTRAVENOUS at 09:42

## 2017-02-03 RX ADMIN — SODIUM CHLORIDE, POTASSIUM CHLORIDE, SODIUM LACTATE AND CALCIUM CHLORIDE 30 ML/HR: 600; 310; 30; 20 INJECTION, SOLUTION INTRAVENOUS at 07:07

## 2017-02-03 RX ADMIN — IPRATROPIUM BROMIDE AND ALBUTEROL SULFATE 3 ML: .5; 3 SOLUTION RESPIRATORY (INHALATION) at 15:03

## 2017-02-03 RX ADMIN — SODIUM CHLORIDE 80 ML/HR: 9 INJECTION, SOLUTION INTRAVENOUS at 12:49

## 2017-02-03 RX ADMIN — MIDAZOLAM HYDROCHLORIDE 2 MG: 1 INJECTION, SOLUTION INTRAMUSCULAR; INTRAVENOUS at 07:05

## 2017-02-03 RX ADMIN — ONDANSETRON 4 MG: 2 INJECTION INTRAMUSCULAR; INTRAVENOUS at 07:05

## 2017-02-03 RX ADMIN — ATRACURIUM BESYLATE 5 MG: 10 INJECTION, SOLUTION INTRAVENOUS at 07:43

## 2017-02-03 RX ADMIN — DOCUSATE SODIUM 100 MG: 100 CAPSULE ORAL at 13:39

## 2017-02-03 RX ADMIN — ACETYLCYSTEINE 3 ML: 200 SOLUTION ORAL; RESPIRATORY (INHALATION) at 15:03

## 2017-02-03 RX ADMIN — SUFENTANIL CITRATE 30 MCG: 50 INJECTION, SOLUTION EPIDURAL; INTRAVENOUS at 07:43

## 2017-02-03 RX ADMIN — HYDROMORPHONE HYDROCHLORIDE 1 MG: 2 INJECTION, SOLUTION INTRAMUSCULAR; INTRAVENOUS; SUBCUTANEOUS at 10:10

## 2017-02-03 RX ADMIN — ONDANSETRON 4 MG: 2 INJECTION INTRAMUSCULAR; INTRAVENOUS at 14:53

## 2017-02-03 RX ADMIN — SUFENTANIL CITRATE 20 MCG: 50 INJECTION, SOLUTION EPIDURAL; INTRAVENOUS at 07:47

## 2017-02-03 RX ADMIN — DEXAMETHASONE SODIUM PHOSPHATE 4 MG: 4 INJECTION, SOLUTION INTRAMUSCULAR; INTRAVENOUS at 09:42

## 2017-02-03 RX ADMIN — KETAMINE HYDROCHLORIDE 10 MG: 50 INJECTION, SOLUTION, CONCENTRATE INTRAMUSCULAR; INTRAVENOUS at 09:32

## 2017-02-03 RX ADMIN — Medication 4 MG: at 09:40

## 2017-02-03 RX ADMIN — OXYCODONE HYDROCHLORIDE AND ACETAMINOPHEN 1 TABLET: 5; 325 TABLET ORAL at 16:57

## 2017-02-03 RX ADMIN — FENTANYL CITRATE 50 MCG: 50 INJECTION, SOLUTION INTRAMUSCULAR; INTRAVENOUS at 07:05

## 2017-02-03 RX ADMIN — KETAMINE HYDROCHLORIDE 30 MG: 50 INJECTION, SOLUTION, CONCENTRATE INTRAMUSCULAR; INTRAVENOUS at 07:50

## 2017-02-03 RX ADMIN — KETOROLAC TROMETHAMINE 60 MG: 30 INJECTION, SOLUTION INTRAMUSCULAR at 09:56

## 2017-02-03 RX ADMIN — SUFENTANIL CITRATE 25 MCG: 50 INJECTION, SOLUTION EPIDURAL; INTRAVENOUS at 08:00

## 2017-02-03 RX ADMIN — HYDROMORPHONE HYDROCHLORIDE 1 MG: 2 INJECTION, SOLUTION INTRAMUSCULAR; INTRAVENOUS; SUBCUTANEOUS at 10:05

## 2017-02-03 RX ADMIN — PANTOPRAZOLE SODIUM 40 MG: 40 INJECTION, POWDER, FOR SOLUTION INTRAVENOUS at 05:38

## 2017-02-03 RX ADMIN — HYDROMORPHONE HYDROCHLORIDE 0.25 MG: 1 INJECTION, SOLUTION INTRAMUSCULAR; INTRAVENOUS; SUBCUTANEOUS at 00:58

## 2017-02-03 RX ADMIN — PROPOFOL 50 MG: 10 INJECTION, EMULSION INTRAVENOUS at 07:44

## 2017-02-03 RX ADMIN — HYDROMORPHONE HYDROCHLORIDE 0.5 MG: 1 INJECTION, SOLUTION INTRAMUSCULAR; INTRAVENOUS; SUBCUTANEOUS at 11:06

## 2017-02-03 RX ADMIN — SODIUM CHLORIDE, POTASSIUM CHLORIDE, SODIUM LACTATE AND CALCIUM CHLORIDE 100 ML/HR: 600; 310; 30; 20 INJECTION, SOLUTION INTRAVENOUS at 07:04

## 2017-02-03 NOTE — ANESTHESIA POSTPROCEDURE EVALUATION
Patient: Ross Platt    Procedure Summary     Date Anesthesia Start Anesthesia Stop Room / Location    02/03/17 0733 1018 [facility] 15 / [facility]       Procedure Diagnosis Surgeon Provider    BRONCHOSCOPY, THORACOSCOPY RIGHT CHEST,  WEDGE RESECTION RIGHT UPPER AND LOWER LOBE OF LUNG, MECHANICAL PLEURODESIS (N/A Chest) Secondary spontaneous pneumothorax  (Secondary spontaneous pneumothorax [J93.12]) MD Sid Conn, MYNOR          Anesthesia Type: general  Last vitals  /77 (02/03/17 1035)    Temp 97.4 °F (36.3 °C) (02/03/17 1005)    Pulse 58 (02/03/17 1035)   Resp 10 (02/03/17 1035)    SpO2 98 % (02/03/17 1035)      Post Anesthesia Care and Evaluation    Patient location during evaluation: PACU  Patient participation: complete - patient participated  Level of consciousness: sleepy but conscious  Pain score: 0  Pain management: adequate  Airway patency: patent  Anesthetic complications: No anesthetic complications  PONV Status: none  Cardiovascular status: stable  Respiratory status: room air  Hydration status: stable

## 2017-02-03 NOTE — ANESTHESIA PROCEDURE NOTES
Airway  Urgency: elective    Airway not difficult    General Information and Staff    Patient location during procedure: OR  CRNA: NATALIE JIMENEZ    Indications and Patient Condition  Indications for airway management: airway protection    Preoxygenated: yes  Mask difficulty assessment: 1 - vent by mask    Final Airway Details  Final airway type: endotracheal airway      Successful airway: ETT and ETT - double lumen left  Cuffed: yes   Successful intubation technique: direct laryngoscopy  Endotracheal tube insertion site: oral  Blade: Núñez  Blade size: #2  ETT DL size: 41 fr  Cormack-Lehane Classification: grade IIa - partial view of glottis  Placement verified by: chest auscultation and capnometry   Measured from: lips  ETT to lips (cm): 29  Number of attempts at approach: 1    Additional Comments  Atraumatic intubation

## 2017-02-03 NOTE — ANESTHESIA PREPROCEDURE EVALUATION
Anesthesia Evaluation     Patient summary reviewed and Nursing notes reviewed    No history of anesthetic complications   Airway   Mallampati: II  TM distance: >3 FB  Neck ROM: full  no difficulty expected  Dental    (+) poor dentation        Pulmonary    (+) hx of smoking, COPD moderate, decreased breath sounds,     ROS comment: Spontaneous pneumothorax  Cardiovascular - normal exam  Exercise tolerance: excellent (>7 METS)    ECG reviewed  Rhythm: regular  Rate: normal  ROS comment: Marfan syndrome    Neuro/Psych  (+) seizures well controlled, headaches, dizziness/light headedness, psychiatric history Anxiety,    GI/Hepatic/Renal/Endo    (+)  GERD well controlled,     Musculoskeletal     (+) back pain,   Abdominal   (+) scaphoid    Abdomen: soft.  Bowel sounds: normal.   Substance History - negative use     OB/GYN          Other                           Anesthesia Plan    ASA 2     general   (A-line, double lumen ETT)  intravenous induction   Anesthetic plan and risks discussed with patient.  Use of blood products discussed with patient  Consented to blood products.

## 2017-02-04 ENCOUNTER — APPOINTMENT (OUTPATIENT)
Dept: GENERAL RADIOLOGY | Facility: HOSPITAL | Age: 32
End: 2017-02-04

## 2017-02-04 LAB
ANION GAP SERPL CALCULATED.3IONS-SCNC: 8 MMOL/L (ref 4–13)
BUN BLD-MCNC: 11 MG/DL (ref 5–21)
BUN/CREAT SERPL: 13.3 (ref 7–25)
CALCIUM SPEC-SCNC: 9.2 MG/DL (ref 8.4–10.4)
CHLORIDE SERPL-SCNC: 103 MMOL/L (ref 98–110)
CO2 SERPL-SCNC: 27 MMOL/L (ref 24–31)
CREAT BLD-MCNC: 0.83 MG/DL (ref 0.5–1.4)
DEPRECATED RDW RBC AUTO: 42.2 FL (ref 40–54)
ERYTHROCYTE [DISTWIDTH] IN BLOOD BY AUTOMATED COUNT: 13 % (ref 12–15)
GFR SERPL CREATININE-BSD FRML MDRD: 109 ML/MIN/1.73
GLUCOSE BLD-MCNC: 105 MG/DL (ref 70–100)
HCT VFR BLD AUTO: 38.5 % (ref 40–52)
HGB BLD-MCNC: 13.2 G/DL (ref 14–18)
MCH RBC QN AUTO: 30.5 PG (ref 28–32)
MCHC RBC AUTO-ENTMCNC: 34.3 G/DL (ref 33–36)
MCV RBC AUTO: 88.9 FL (ref 82–95)
PLATELET # BLD AUTO: 184 10*3/MM3 (ref 130–400)
PMV BLD AUTO: 10.8 FL (ref 6–12)
POTASSIUM BLD-SCNC: 4.1 MMOL/L (ref 3.5–5.3)
RBC # BLD AUTO: 4.33 10*6/MM3 (ref 4.8–5.9)
SODIUM BLD-SCNC: 138 MMOL/L (ref 135–145)
WBC NRBC COR # BLD: 7.94 10*3/MM3 (ref 4.8–10.8)

## 2017-02-04 PROCEDURE — 94762 N-INVAS EAR/PLS OXIMTRY CONT: CPT

## 2017-02-04 PROCEDURE — 85027 COMPLETE CBC AUTOMATED: CPT | Performed by: THORACIC SURGERY (CARDIOTHORACIC VASCULAR SURGERY)

## 2017-02-04 PROCEDURE — 80048 BASIC METABOLIC PNL TOTAL CA: CPT | Performed by: THORACIC SURGERY (CARDIOTHORACIC VASCULAR SURGERY)

## 2017-02-04 PROCEDURE — 25010000002 ENOXAPARIN PER 10 MG: Performed by: THORACIC SURGERY (CARDIOTHORACIC VASCULAR SURGERY)

## 2017-02-04 PROCEDURE — 94640 AIRWAY INHALATION TREATMENT: CPT

## 2017-02-04 PROCEDURE — 25010000002 ONDANSETRON PER 1 MG: Performed by: THORACIC SURGERY (CARDIOTHORACIC VASCULAR SURGERY)

## 2017-02-04 PROCEDURE — 99024 POSTOP FOLLOW-UP VISIT: CPT | Performed by: THORACIC SURGERY (CARDIOTHORACIC VASCULAR SURGERY)

## 2017-02-04 PROCEDURE — 71010 HC CHEST PA OR AP: CPT

## 2017-02-04 PROCEDURE — 94760 N-INVAS EAR/PLS OXIMETRY 1: CPT

## 2017-02-04 RX ORDER — MORPHINE SULFATE 1 MG/ML
INJECTION, SOLUTION INTRAVENOUS CONTINUOUS
Status: DISCONTINUED | OUTPATIENT
Start: 2017-02-04 | End: 2017-02-05

## 2017-02-04 RX ORDER — NICOTINE 21 MG/24HR
1 PATCH, TRANSDERMAL 24 HOURS TRANSDERMAL
Status: DISCONTINUED | OUTPATIENT
Start: 2017-02-04 | End: 2017-02-07 | Stop reason: HOSPADM

## 2017-02-04 RX ADMIN — OXYCODONE HYDROCHLORIDE AND ACETAMINOPHEN 1 TABLET: 5; 325 TABLET ORAL at 17:09

## 2017-02-04 RX ADMIN — IPRATROPIUM BROMIDE AND ALBUTEROL SULFATE 3 ML: .5; 3 SOLUTION RESPIRATORY (INHALATION) at 06:15

## 2017-02-04 RX ADMIN — NICOTINE 1 PATCH: 21 PATCH, EXTENDED RELEASE TRANSDERMAL at 06:48

## 2017-02-04 RX ADMIN — DOCUSATE SODIUM 100 MG: 100 CAPSULE ORAL at 17:09

## 2017-02-04 RX ADMIN — METOPROLOL TARTRATE 12.5 MG: 25 TABLET, FILM COATED ORAL at 08:20

## 2017-02-04 RX ADMIN — METOPROLOL TARTRATE 12.5 MG: 25 TABLET, FILM COATED ORAL at 20:21

## 2017-02-04 RX ADMIN — DOCUSATE SODIUM 100 MG: 100 CAPSULE ORAL at 08:20

## 2017-02-04 RX ADMIN — ACETYLCYSTEINE 3 ML: 200 SOLUTION ORAL; RESPIRATORY (INHALATION) at 22:59

## 2017-02-04 RX ADMIN — ENOXAPARIN SODIUM 30 MG: 30 INJECTION SUBCUTANEOUS at 20:22

## 2017-02-04 RX ADMIN — SODIUM CHLORIDE 80 ML/HR: 9 INJECTION, SOLUTION INTRAVENOUS at 14:10

## 2017-02-04 RX ADMIN — IPRATROPIUM BROMIDE AND ALBUTEROL SULFATE 3 ML: .5; 3 SOLUTION RESPIRATORY (INHALATION) at 15:09

## 2017-02-04 RX ADMIN — ENOXAPARIN SODIUM 30 MG: 30 INJECTION SUBCUTANEOUS at 08:19

## 2017-02-04 RX ADMIN — PREGABALIN 25 MG: 25 CAPSULE ORAL at 08:20

## 2017-02-04 RX ADMIN — LIDOCAINE 2 PATCH: 50 PATCH CUTANEOUS at 17:10

## 2017-02-04 RX ADMIN — SODIUM CHLORIDE 80 ML/HR: 9 INJECTION, SOLUTION INTRAVENOUS at 01:45

## 2017-02-04 RX ADMIN — OXYCODONE HYDROCHLORIDE AND ACETAMINOPHEN 1 TABLET: 5; 325 TABLET ORAL at 20:45

## 2017-02-04 RX ADMIN — ONDANSETRON 4 MG: 2 INJECTION INTRAMUSCULAR; INTRAVENOUS at 08:34

## 2017-02-04 RX ADMIN — PREGABALIN 25 MG: 25 CAPSULE ORAL at 20:23

## 2017-02-04 RX ADMIN — POLYETHYLENE GLYCOL 3350 17 G: 17 POWDER, FOR SOLUTION ORAL at 08:20

## 2017-02-04 RX ADMIN — IPRATROPIUM BROMIDE AND ALBUTEROL SULFATE 3 ML: .5; 3 SOLUTION RESPIRATORY (INHALATION) at 22:59

## 2017-02-04 RX ADMIN — Medication 2 G: at 00:28

## 2017-02-05 ENCOUNTER — APPOINTMENT (OUTPATIENT)
Dept: GENERAL RADIOLOGY | Facility: HOSPITAL | Age: 32
End: 2017-02-05

## 2017-02-05 PROCEDURE — 99024 POSTOP FOLLOW-UP VISIT: CPT | Performed by: THORACIC SURGERY (CARDIOTHORACIC VASCULAR SURGERY)

## 2017-02-05 PROCEDURE — 71010 HC CHEST PA OR AP: CPT

## 2017-02-05 PROCEDURE — 94760 N-INVAS EAR/PLS OXIMETRY 1: CPT

## 2017-02-05 PROCEDURE — 94799 UNLISTED PULMONARY SVC/PX: CPT

## 2017-02-05 PROCEDURE — 25010000003 MORPHINE PER 10 MG: Performed by: THORACIC SURGERY (CARDIOTHORACIC VASCULAR SURGERY)

## 2017-02-05 PROCEDURE — 25010000002 ENOXAPARIN PER 10 MG: Performed by: THORACIC SURGERY (CARDIOTHORACIC VASCULAR SURGERY)

## 2017-02-05 PROCEDURE — 94640 AIRWAY INHALATION TREATMENT: CPT

## 2017-02-05 PROCEDURE — 25010000002 ONDANSETRON PER 1 MG: Performed by: THORACIC SURGERY (CARDIOTHORACIC VASCULAR SURGERY)

## 2017-02-05 RX ORDER — OXYCODONE AND ACETAMINOPHEN 10; 325 MG/1; MG/1
1 TABLET ORAL EVERY 4 HOURS PRN
Status: DISCONTINUED | OUTPATIENT
Start: 2017-02-05 | End: 2017-02-07 | Stop reason: HOSPADM

## 2017-02-05 RX ADMIN — PREGABALIN 25 MG: 25 CAPSULE ORAL at 08:32

## 2017-02-05 RX ADMIN — NICOTINE 1 PATCH: 21 PATCH, EXTENDED RELEASE TRANSDERMAL at 08:32

## 2017-02-05 RX ADMIN — OXYCODONE HYDROCHLORIDE AND ACETAMINOPHEN 1 TABLET: 5; 325 TABLET ORAL at 11:54

## 2017-02-05 RX ADMIN — SODIUM CHLORIDE 80 ML/HR: 9 INJECTION, SOLUTION INTRAVENOUS at 02:25

## 2017-02-05 RX ADMIN — ACETYLCYSTEINE 3 ML: 200 SOLUTION ORAL; RESPIRATORY (INHALATION) at 06:30

## 2017-02-05 RX ADMIN — DOCUSATE SODIUM 100 MG: 100 CAPSULE ORAL at 17:14

## 2017-02-05 RX ADMIN — METOPROLOL TARTRATE 12.5 MG: 25 TABLET, FILM COATED ORAL at 08:33

## 2017-02-05 RX ADMIN — OXYCODONE HYDROCHLORIDE AND ACETAMINOPHEN 1 TABLET: 10; 325 TABLET ORAL at 18:16

## 2017-02-05 RX ADMIN — OXYCODONE HYDROCHLORIDE AND ACETAMINOPHEN 1 TABLET: 5; 325 TABLET ORAL at 04:19

## 2017-02-05 RX ADMIN — METOPROLOL TARTRATE 12.5 MG: 25 TABLET, FILM COATED ORAL at 20:30

## 2017-02-05 RX ADMIN — ONDANSETRON 4 MG: 2 INJECTION INTRAMUSCULAR; INTRAVENOUS at 19:10

## 2017-02-05 RX ADMIN — POLYETHYLENE GLYCOL 3350 17 G: 17 POWDER, FOR SOLUTION ORAL at 08:34

## 2017-02-05 RX ADMIN — LIDOCAINE 2 PATCH: 50 PATCH CUTANEOUS at 17:15

## 2017-02-05 RX ADMIN — ONDANSETRON 4 MG: 2 INJECTION INTRAMUSCULAR; INTRAVENOUS at 14:00

## 2017-02-05 RX ADMIN — ONDANSETRON 4 MG: 2 INJECTION INTRAMUSCULAR; INTRAVENOUS at 07:44

## 2017-02-05 RX ADMIN — IPRATROPIUM BROMIDE AND ALBUTEROL SULFATE 3 ML: .5; 3 SOLUTION RESPIRATORY (INHALATION) at 06:29

## 2017-02-05 RX ADMIN — OXYCODONE HYDROCHLORIDE AND ACETAMINOPHEN 1 TABLET: 5; 325 TABLET ORAL at 15:24

## 2017-02-05 RX ADMIN — ENOXAPARIN SODIUM 30 MG: 30 INJECTION SUBCUTANEOUS at 20:31

## 2017-02-05 RX ADMIN — OXYCODONE HYDROCHLORIDE AND ACETAMINOPHEN 1 TABLET: 10; 325 TABLET ORAL at 22:43

## 2017-02-05 RX ADMIN — PREGABALIN 25 MG: 25 CAPSULE ORAL at 20:31

## 2017-02-05 RX ADMIN — Medication: at 09:49

## 2017-02-05 RX ADMIN — DOCUSATE SODIUM 100 MG: 100 CAPSULE ORAL at 08:33

## 2017-02-05 RX ADMIN — ENOXAPARIN SODIUM 30 MG: 30 INJECTION SUBCUTANEOUS at 08:33

## 2017-02-06 ENCOUNTER — APPOINTMENT (OUTPATIENT)
Dept: GENERAL RADIOLOGY | Facility: HOSPITAL | Age: 32
End: 2017-02-06

## 2017-02-06 LAB
CYTO UR: NORMAL
LAB AP CASE REPORT: NORMAL
LAB AP CLINICAL INFORMATION: NORMAL
Lab: NORMAL
PATH REPORT.FINAL DX SPEC: NORMAL
PATH REPORT.GROSS SPEC: NORMAL

## 2017-02-06 PROCEDURE — 25010000002 ONDANSETRON PER 1 MG: Performed by: THORACIC SURGERY (CARDIOTHORACIC VASCULAR SURGERY)

## 2017-02-06 PROCEDURE — 71020 HC CHEST PA AND LATERAL: CPT

## 2017-02-06 PROCEDURE — 25010000002 ENOXAPARIN PER 10 MG: Performed by: THORACIC SURGERY (CARDIOTHORACIC VASCULAR SURGERY)

## 2017-02-06 PROCEDURE — 25010000002 HYDROMORPHONE PER 4 MG: Performed by: FAMILY MEDICINE

## 2017-02-06 PROCEDURE — 71010 HC CHEST PA OR AP: CPT

## 2017-02-06 PROCEDURE — 99024 POSTOP FOLLOW-UP VISIT: CPT | Performed by: NURSE PRACTITIONER

## 2017-02-06 RX ADMIN — POLYETHYLENE GLYCOL 3350 17 G: 17 POWDER, FOR SOLUTION ORAL at 08:11

## 2017-02-06 RX ADMIN — HYDROMORPHONE HYDROCHLORIDE 0.5 MG: 1 INJECTION, SOLUTION INTRAMUSCULAR; INTRAVENOUS; SUBCUTANEOUS at 10:06

## 2017-02-06 RX ADMIN — ONDANSETRON 4 MG: 4 TABLET, FILM COATED ORAL at 16:29

## 2017-02-06 RX ADMIN — PREGABALIN 25 MG: 25 CAPSULE ORAL at 20:32

## 2017-02-06 RX ADMIN — HYDROMORPHONE HYDROCHLORIDE 0.5 MG: 1 INJECTION, SOLUTION INTRAMUSCULAR; INTRAVENOUS; SUBCUTANEOUS at 00:48

## 2017-02-06 RX ADMIN — ENOXAPARIN SODIUM 30 MG: 30 INJECTION SUBCUTANEOUS at 20:32

## 2017-02-06 RX ADMIN — ENOXAPARIN SODIUM 30 MG: 30 INJECTION SUBCUTANEOUS at 08:11

## 2017-02-06 RX ADMIN — OXYCODONE HYDROCHLORIDE AND ACETAMINOPHEN 1 TABLET: 10; 325 TABLET ORAL at 22:10

## 2017-02-06 RX ADMIN — DOCUSATE SODIUM 100 MG: 100 CAPSULE ORAL at 17:17

## 2017-02-06 RX ADMIN — ONDANSETRON 4 MG: 2 INJECTION INTRAMUSCULAR; INTRAVENOUS at 06:41

## 2017-02-06 RX ADMIN — PREGABALIN 25 MG: 25 CAPSULE ORAL at 08:11

## 2017-02-06 RX ADMIN — OXYCODONE HYDROCHLORIDE AND ACETAMINOPHEN 1 TABLET: 10; 325 TABLET ORAL at 06:41

## 2017-02-06 RX ADMIN — ONDANSETRON 4 MG: 2 INJECTION INTRAMUSCULAR; INTRAVENOUS at 00:49

## 2017-02-06 RX ADMIN — LIDOCAINE 2 PATCH: 50 PATCH CUTANEOUS at 17:17

## 2017-02-06 RX ADMIN — OXYCODONE HYDROCHLORIDE AND ACETAMINOPHEN 1 TABLET: 10; 325 TABLET ORAL at 11:13

## 2017-02-06 RX ADMIN — OXYCODONE HYDROCHLORIDE AND ACETAMINOPHEN 1 TABLET: 10; 325 TABLET ORAL at 16:29

## 2017-02-06 RX ADMIN — DOCUSATE SODIUM 100 MG: 100 CAPSULE ORAL at 08:11

## 2017-02-06 RX ADMIN — ONDANSETRON 4 MG: 2 INJECTION INTRAMUSCULAR; INTRAVENOUS at 22:10

## 2017-02-06 RX ADMIN — NICOTINE 1 PATCH: 21 PATCH, EXTENDED RELEASE TRANSDERMAL at 08:11

## 2017-02-06 RX ADMIN — METOPROLOL TARTRATE 12.5 MG: 25 TABLET, FILM COATED ORAL at 08:12

## 2017-02-07 ENCOUNTER — APPOINTMENT (OUTPATIENT)
Dept: GENERAL RADIOLOGY | Facility: HOSPITAL | Age: 32
End: 2017-02-07

## 2017-02-07 VITALS
RESPIRATION RATE: 17 BRPM | BODY MASS INDEX: 17.52 KG/M2 | HEIGHT: 77 IN | OXYGEN SATURATION: 99 % | WEIGHT: 148.4 LBS | SYSTOLIC BLOOD PRESSURE: 103 MMHG | HEART RATE: 87 BPM | DIASTOLIC BLOOD PRESSURE: 67 MMHG | TEMPERATURE: 98.3 F

## 2017-02-07 PROBLEM — Q87.40 MARFAN SYNDROME: Status: ACTIVE | Noted: 2017-02-07

## 2017-02-07 PROCEDURE — 25010000002 ENOXAPARIN PER 10 MG: Performed by: THORACIC SURGERY (CARDIOTHORACIC VASCULAR SURGERY)

## 2017-02-07 PROCEDURE — 99024 POSTOP FOLLOW-UP VISIT: CPT | Performed by: NURSE PRACTITIONER

## 2017-02-07 PROCEDURE — 71020 HC CHEST PA AND LATERAL: CPT

## 2017-02-07 RX ORDER — PREGABALIN 25 MG/1
25 CAPSULE ORAL 2 TIMES DAILY
Qty: 14 CAPSULE | Refills: 0 | Status: SHIPPED | OUTPATIENT
Start: 2017-02-07 | End: 2017-06-20

## 2017-02-07 RX ORDER — OXYCODONE AND ACETAMINOPHEN 10; 325 MG/1; MG/1
1 TABLET ORAL EVERY 6 HOURS PRN
Qty: 20 TABLET | Refills: 0 | Status: SHIPPED | OUTPATIENT
Start: 2017-02-07 | End: 2017-02-07 | Stop reason: HOSPADM

## 2017-02-07 RX ORDER — NICOTINE 21 MG/24HR
1 PATCH, TRANSDERMAL 24 HOURS TRANSDERMAL
Qty: 28 PATCH | Refills: 0 | Status: SHIPPED | OUTPATIENT
Start: 2017-02-07 | End: 2017-06-20

## 2017-02-07 RX ORDER — OXYCODONE HYDROCHLORIDE AND ACETAMINOPHEN 5; 325 MG/1; MG/1
1 TABLET ORAL EVERY 6 HOURS PRN
Qty: 40 TABLET | Refills: 0 | Status: SHIPPED | OUTPATIENT
Start: 2017-02-07 | End: 2017-06-20

## 2017-02-07 RX ADMIN — ENOXAPARIN SODIUM 30 MG: 30 INJECTION SUBCUTANEOUS at 08:44

## 2017-02-07 RX ADMIN — NICOTINE 1 PATCH: 21 PATCH, EXTENDED RELEASE TRANSDERMAL at 08:27

## 2017-02-07 RX ADMIN — METOPROLOL TARTRATE 12.5 MG: 25 TABLET, FILM COATED ORAL at 08:27

## 2017-02-07 RX ADMIN — PREGABALIN 25 MG: 25 CAPSULE ORAL at 08:29

## 2017-02-07 RX ADMIN — DOCUSATE SODIUM 100 MG: 100 CAPSULE ORAL at 08:27

## 2017-02-07 RX ADMIN — POLYETHYLENE GLYCOL 3350 17 G: 17 POWDER, FOR SOLUTION ORAL at 08:27

## 2017-02-07 RX ADMIN — OXYCODONE HYDROCHLORIDE AND ACETAMINOPHEN 1 TABLET: 10; 325 TABLET ORAL at 11:14

## 2017-02-07 NOTE — ADDENDUM NOTE
Addendum  created 02/07/17 0900 by Sid Dickens CRNA    Visit Navigator Flowsheet section accepted

## 2017-02-08 ENCOUNTER — TELEPHONE (OUTPATIENT)
Dept: CARDIAC SURGERY | Facility: CLINIC | Age: 32
End: 2017-02-08

## 2017-02-08 LAB
BACTERIA FLD CULT: NORMAL
BACTERIA SPEC ANAEROBE CULT: NORMAL
GRAM STN SPEC: NORMAL
GRAM STN SPEC: NORMAL

## 2017-02-08 NOTE — TELEPHONE ENCOUNTER
I have prior auth form filled out just waiting on dr michaud to get out of surgery to sign so we can fax it

## 2017-02-09 NOTE — TELEPHONE ENCOUNTER
Dr michaud spoke with with patient ans recommended the use of OTC lidocain patches if this does not work then patient is to call us back and we will reassess

## 2017-02-09 NOTE — TELEPHONE ENCOUNTER
Update had to fax to another facility passport turned their PA's over to new company was sent his morning

## 2017-03-05 NOTE — OP NOTE
Pre-op diagnosis:   1. Right pneumothorax, secondary  2. Bullous emphysema  3. Chronic tobacco use  4. Marfan's disease    Post-op diagnosis:   same    Procedure:  1. Bronchoscopy  2. Right thoracoscopy  3. Right upper lobe wedge resection (blebectomy)  4. Right lower lobe wedge resection (blebectomy)  5. Mechanical pleurodesis       Surgeon: Kyle Heath M.D.    Anesthesia: GETA- double lumen    EBL: min    Specimens:  Right upper lobe wedge resection  Right lower lobe wedge resection     Operative indications:    Mr. Platt presents with chest pain in the setting of chronic tobacco use and a diagnosis of Marfan's disease.   His workup identified a right pneumothorax with bullous emphysema.  Given a previous pneumothorax on the left and his underlying bullous disease, recommendation was made to proceed forward with blebectomy and mechanical pleurodesis.  He has decided to now proceed forward with lung surgery.       Operative findings:  Extensive bullous emphysema      Operative description in detail:  The patient was taken to the operative suite where he was placed in a supine position. Induction of general anesthesia and placement of a double lumen endotracheal tube was placed by anesthesia. A timeout was performed. Perioperative antibiotics were administered.  With that bronchoscopy was performed demonstrating no endobronchial lesion, normal mucosa, and standard tracheobronchial anatomy. The double lumen was positioned appropriately. With that he was positioned in left lateral decubitus position. All pressure points are protected. Single lung ventilation was initiated following confirmation of a satisfactory position of the double lumen endotracheal tube once again. He was then prepped and draped in the usual and sterile fashion.    A limited incision was made at approximately the seventh intercostal space was made sharply. The chest was accessed.   With that, the thoracoport and then subsequently the  thoracoscope were advanced to surveyed the chest. To that end additional ports were placed just anterior to the anterior border of the latissimus dorsi at approximately the fifth intercostal space as well as  at approximately the auscultatory triangle inferior to the scapula. Thoracoscopy was performed.  He had extensive bullous emphysema involving the entirety of the lung to varying degrees.  Saline was instilled into the chest.  An airleak was identified involving the right upper lobe posteriorly to a discrete severely diseased area.  Using Ethicon staple loads a wedge resection was performed.  This was removed from the chest and submitted labelled as right upper lobe wedge resection.  Anteriorly, a discrete prominent bleb formation was identified and wedged out as well from the right lower lobe.  During this time we surveyed the staple lines and they were hemostatic and pneumostatic. Each access site was hemostatic.To that end, I then performed mechanical pleurodesis to the chest wall superiorly, laterally, anteriorly, and posteriorly.  The mediastinal and diaphragmatic surfaces were left untouched.  To that end I drained the chest with pleural chest tubes.  All access sites were closed in layered fashion with absorbable suture.  Instruments, sharps, and sponge counts were reported as correct at the completion of the case. Hemostasis was pristine.  Complications: None  Disposition: Transfer to PACU extubated and in stable condition.

## 2017-03-14 ENCOUNTER — HOSPITAL ENCOUNTER (OUTPATIENT)
Dept: GENERAL RADIOLOGY | Facility: HOSPITAL | Age: 32
Discharge: HOME | End: 2017-03-14
Admitting: NURSE PRACTITIONER

## 2017-03-14 ENCOUNTER — OFFICE VISIT (OUTPATIENT)
Dept: CARDIAC SURGERY | Facility: CLINIC | Age: 32
End: 2017-03-14

## 2017-03-14 VITALS
OXYGEN SATURATION: 95 % | WEIGHT: 156 LBS | HEART RATE: 70 BPM | BODY MASS INDEX: 18.5 KG/M2 | DIASTOLIC BLOOD PRESSURE: 70 MMHG | SYSTOLIC BLOOD PRESSURE: 115 MMHG

## 2017-03-14 DIAGNOSIS — J93.12 SECONDARY SPONTANEOUS PNEUMOTHORAX: ICD-10-CM

## 2017-03-14 DIAGNOSIS — J43.9 PULMONARY EMPHYSEMA, UNSPECIFIED EMPHYSEMA TYPE (HCC): Chronic | ICD-10-CM

## 2017-03-14 DIAGNOSIS — J93.12 SECONDARY SPONTANEOUS PNEUMOTHORAX: Primary | ICD-10-CM

## 2017-03-14 PROCEDURE — 71020 HC CHEST PA AND LATERAL: CPT

## 2017-03-14 PROCEDURE — 99024 POSTOP FOLLOW-UP VISIT: CPT | Performed by: THORACIC SURGERY (CARDIOTHORACIC VASCULAR SURGERY)

## 2017-03-27 NOTE — PROGRESS NOTES
Subjective   1. Patient ID: Ross Platt is a 31 y.o. male who is here for follow-up having had Bronchoscopy  2. Right thoracoscopy  3. Right upper lobe wedge resection (blebectomy)  4. Right lower lobe wedge resection (blebectomy)  Mechanical pleurodesis  On 2/3/2017      History of Present Illness  Post operative recovery was unevent.  Sleep habits are normal.  Pain control has been good.  No fevers/sweats/chills.   No drainage from incisions.  No sternal clicks.  No chest pain or shortness of breath.   His appetite is back to normal.      The following portions of the patient's history were reviewed and updated as appropriate: allergies, current medications, past family history, past medical history, past social history, past surgical history and problem list.         Objective   /70 (BP Location: Left arm, Patient Position: Sitting, Cuff Size: Adult)  Pulse 70  Wt 156 lb (70.8 kg)  SpO2 95%  BMI 18.5 kg/m2    Physical Exam   Constitutional: He is oriented to person, place, and time. He appears well-developed.   HENT:   Head: Normocephalic and atraumatic.   Mouth/Throat: Oropharynx is clear and moist.   Eyes: EOM are normal. Pupils are equal, round, and reactive to light.   Neck: Normal range of motion. Neck supple. No JVD present. No tracheal deviation present. No thyromegaly present.   Cardiovascular: Normal rate, regular rhythm, normal heart sounds and intact distal pulses.  Exam reveals no gallop and no friction rub.    No murmur heard.  Pulmonary/Chest: Effort normal and breath sounds normal. No respiratory distress. He has no wheezes. He has no rales. He exhibits no tenderness.   Thoracoscopy incisions are well healed.     Abdominal: Soft. He exhibits no distension. There is no tenderness.   Musculoskeletal: Normal range of motion. He exhibits no edema.   Lymphadenopathy:     He has no cervical adenopathy.   Neurological: He is alert and oriented to person, place, and time. No cranial nerve  deficit.   Skin: Skin is warm and dry.   Psychiatric: He has a normal mood and affect.         Xr Chest 2 View    Result Date: 3/14/2017  Narrative: EXAMINATION: XR CHEST 2 VW-  3/14/2017 8:50 AM CDT  HISTORY: pneumothorax; J93.12-Secondary spontaneous pneumothorax  2 view chest x-ray compared with 02/07/2017.  Normal heart and mediastinum.  Hyperexpanded lungs with chronic interstitial prominence.  Interval resolution of the small right basilar pneumothorax.  No pleural air is seen at this time.  Summary: 1. No acute abnormality.   This report was finalized on 03/14/2017 09:06 by Dr. Rob Sen MD.      Ross was seen today for post-op follow-up.    Diagnoses and all orders for this visit:    Secondary spontaneous pneumothorax    Pulmonary emphysema, unspecified emphysema type          Assessment/Plan        Overall, Ross Platt is doing well.  We discussed his restrictions of thoracoscopy.  Dietary education was provided today and all questions were answered.  We discussed his weight is acceptable for his age and height.  He was congratulated on his success.  We discussed this is not a diet but rather a change in lifestyle.  I have congratulated Ross Platt on successful smoking cessation.  We discussed the potential value of contralateral thoracoscopy, mechannical pleurodesis +/- blebectomy as able.  He is agreeable to this. Will give him time to recover and plan for surgery..   RTC 1 month.

## 2017-05-08 ENCOUNTER — HOSPITAL ENCOUNTER (EMERGENCY)
Facility: HOSPITAL | Age: 32
Discharge: HOME | End: 2017-05-09
Attending: EMERGENCY MEDICINE | Admitting: EMERGENCY MEDICINE

## 2017-05-08 ENCOUNTER — APPOINTMENT (OUTPATIENT)
Dept: GENERAL RADIOLOGY | Facility: HOSPITAL | Age: 32
End: 2017-05-08

## 2017-05-08 DIAGNOSIS — R09.1 PLEURISY: Primary | ICD-10-CM

## 2017-05-08 LAB
ALBUMIN SERPL-MCNC: 4.3 G/DL (ref 3.5–5)
ALBUMIN/GLOB SERPL: 1.4 G/DL (ref 1.1–2.5)
ALP SERPL-CCNC: 87 U/L (ref 24–120)
ALT SERPL W P-5'-P-CCNC: 17 U/L (ref 0–54)
ANION GAP SERPL CALCULATED.3IONS-SCNC: 14 MMOL/L (ref 4–13)
APTT PPP: 29.6 SECONDS (ref 24.1–34.8)
AST SERPL-CCNC: 41 U/L (ref 7–45)
BASOPHILS # BLD AUTO: 0.04 10*3/MM3 (ref 0–0.2)
BASOPHILS NFR BLD AUTO: 0.4 % (ref 0–2)
BILIRUB SERPL-MCNC: 0.5 MG/DL (ref 0.1–1)
BUN BLD-MCNC: 15 MG/DL (ref 5–21)
BUN/CREAT SERPL: 13.8 (ref 7–25)
CALCIUM SPEC-SCNC: 9.5 MG/DL (ref 8.4–10.4)
CHLORIDE SERPL-SCNC: 104 MMOL/L (ref 98–110)
CO2 SERPL-SCNC: 23 MMOL/L (ref 24–31)
CREAT BLD-MCNC: 1.09 MG/DL (ref 0.5–1.4)
D DIMER PPP FEU-MCNC: <0.22 MG/L (FEU) (ref 0–0.5)
DEPRECATED RDW RBC AUTO: 41.4 FL (ref 40–54)
EOSINOPHIL # BLD AUTO: 0.27 10*3/MM3 (ref 0–0.7)
EOSINOPHIL NFR BLD AUTO: 2.6 % (ref 0–4)
ERYTHROCYTE [DISTWIDTH] IN BLOOD BY AUTOMATED COUNT: 13 % (ref 12–15)
GFR SERPL CREATININE-BSD FRML MDRD: 79 ML/MIN/1.73
GLOBULIN UR ELPH-MCNC: 3.1 GM/DL
GLUCOSE BLD-MCNC: 86 MG/DL (ref 70–100)
HCT VFR BLD AUTO: 40.7 % (ref 40–52)
HGB BLD-MCNC: 14.3 G/DL (ref 14–18)
IMM GRANULOCYTES # BLD: 0.04 10*3/MM3 (ref 0–0.03)
IMM GRANULOCYTES NFR BLD: 0.4 % (ref 0–5)
INR PPP: 1.01 (ref 0.91–1.09)
LYMPHOCYTES # BLD AUTO: 1.96 10*3/MM3 (ref 0.72–4.86)
LYMPHOCYTES NFR BLD AUTO: 18.8 % (ref 15–45)
MCH RBC QN AUTO: 30.5 PG (ref 28–32)
MCHC RBC AUTO-ENTMCNC: 35.1 G/DL (ref 33–36)
MCV RBC AUTO: 86.8 FL (ref 82–95)
MONOCYTES # BLD AUTO: 0.88 10*3/MM3 (ref 0.19–1.3)
MONOCYTES NFR BLD AUTO: 8.4 % (ref 4–12)
NEUTROPHILS # BLD AUTO: 7.25 10*3/MM3 (ref 1.87–8.4)
NEUTROPHILS NFR BLD AUTO: 69.4 % (ref 39–78)
PLATELET # BLD AUTO: 233 10*3/MM3 (ref 130–400)
PMV BLD AUTO: 10.4 FL (ref 6–12)
POTASSIUM BLD-SCNC: 3.6 MMOL/L (ref 3.5–5.3)
PROT SERPL-MCNC: 7.4 G/DL (ref 6.3–8.7)
PROTHROMBIN TIME: 13.6 SECONDS (ref 11.9–14.6)
RBC # BLD AUTO: 4.69 10*6/MM3 (ref 4.8–5.9)
SODIUM BLD-SCNC: 141 MMOL/L (ref 135–145)
TROPONIN I SERPL-MCNC: 0 NG/ML (ref 0–0.07)
WBC NRBC COR # BLD: 10.44 10*3/MM3 (ref 4.8–10.8)

## 2017-05-08 PROCEDURE — 87040 BLOOD CULTURE FOR BACTERIA: CPT | Performed by: NURSE PRACTITIONER

## 2017-05-08 PROCEDURE — 85379 FIBRIN DEGRADATION QUANT: CPT | Performed by: NURSE PRACTITIONER

## 2017-05-08 PROCEDURE — 71010 HC CHEST PA OR AP: CPT

## 2017-05-08 PROCEDURE — 96375 TX/PRO/DX INJ NEW DRUG ADDON: CPT

## 2017-05-08 PROCEDURE — 80053 COMPREHEN METABOLIC PANEL: CPT | Performed by: NURSE PRACTITIONER

## 2017-05-08 PROCEDURE — 84484 ASSAY OF TROPONIN QUANT: CPT

## 2017-05-08 PROCEDURE — 25010000002 ONDANSETRON PER 1 MG: Performed by: NURSE PRACTITIONER

## 2017-05-08 PROCEDURE — 25010000002 HYDROMORPHONE PER 4 MG: Performed by: NURSE PRACTITIONER

## 2017-05-08 PROCEDURE — 85730 THROMBOPLASTIN TIME PARTIAL: CPT | Performed by: NURSE PRACTITIONER

## 2017-05-08 PROCEDURE — 85025 COMPLETE CBC W/AUTO DIFF WBC: CPT | Performed by: NURSE PRACTITIONER

## 2017-05-08 PROCEDURE — 96374 THER/PROPH/DIAG INJ IV PUSH: CPT

## 2017-05-08 PROCEDURE — 93005 ELECTROCARDIOGRAM TRACING: CPT | Performed by: NURSE PRACTITIONER

## 2017-05-08 PROCEDURE — 93010 ELECTROCARDIOGRAM REPORT: CPT | Performed by: INTERNAL MEDICINE

## 2017-05-08 PROCEDURE — 99284 EMERGENCY DEPT VISIT MOD MDM: CPT

## 2017-05-08 PROCEDURE — 85610 PROTHROMBIN TIME: CPT | Performed by: NURSE PRACTITIONER

## 2017-05-08 RX ORDER — ONDANSETRON 2 MG/ML
4 INJECTION INTRAMUSCULAR; INTRAVENOUS ONCE
Status: COMPLETED | OUTPATIENT
Start: 2017-05-08 | End: 2017-05-08

## 2017-05-08 RX ADMIN — ONDANSETRON 4 MG: 2 INJECTION INTRAMUSCULAR; INTRAVENOUS at 22:58

## 2017-05-08 RX ADMIN — HYDROMORPHONE HYDROCHLORIDE 1 MG: 1 INJECTION, SOLUTION INTRAMUSCULAR; INTRAVENOUS; SUBCUTANEOUS at 22:58

## 2017-05-09 VITALS
DIASTOLIC BLOOD PRESSURE: 59 MMHG | HEIGHT: 78 IN | BODY MASS INDEX: 17.36 KG/M2 | TEMPERATURE: 97.6 F | HEART RATE: 72 BPM | SYSTOLIC BLOOD PRESSURE: 108 MMHG | RESPIRATION RATE: 18 BRPM | OXYGEN SATURATION: 98 % | WEIGHT: 150 LBS

## 2017-05-09 LAB — TROPONIN I SERPL-MCNC: 0 NG/ML (ref 0–0.07)

## 2017-05-09 PROCEDURE — 25010000002 HYDROMORPHONE PER 4 MG: Performed by: EMERGENCY MEDICINE

## 2017-05-09 PROCEDURE — 93005 ELECTROCARDIOGRAM TRACING: CPT | Performed by: NURSE PRACTITIONER

## 2017-05-09 PROCEDURE — 84484 ASSAY OF TROPONIN QUANT: CPT

## 2017-05-09 PROCEDURE — 96376 TX/PRO/DX INJ SAME DRUG ADON: CPT

## 2017-05-09 PROCEDURE — 25010000002 ONDANSETRON PER 1 MG: Performed by: EMERGENCY MEDICINE

## 2017-05-09 RX ORDER — ONDANSETRON 2 MG/ML
4 INJECTION INTRAMUSCULAR; INTRAVENOUS ONCE
Status: COMPLETED | OUTPATIENT
Start: 2017-05-09 | End: 2017-05-09

## 2017-05-09 RX ORDER — INDOMETHACIN 50 MG/1
50 CAPSULE ORAL
Qty: 30 CAPSULE | Refills: 0 | Status: SHIPPED | OUTPATIENT
Start: 2017-05-09 | End: 2017-06-20

## 2017-05-09 RX ORDER — HYDROCODONE BITARTRATE AND ACETAMINOPHEN 7.5; 325 MG/1; MG/1
1 TABLET ORAL EVERY 4 HOURS PRN
Qty: 20 TABLET | Refills: 0 | Status: SHIPPED | OUTPATIENT
Start: 2017-05-09 | End: 2017-06-20

## 2017-05-09 RX ADMIN — ONDANSETRON 4 MG: 2 INJECTION INTRAMUSCULAR; INTRAVENOUS at 02:23

## 2017-05-09 RX ADMIN — HYDROMORPHONE HYDROCHLORIDE 1 MG: 1 INJECTION, SOLUTION INTRAMUSCULAR; INTRAVENOUS; SUBCUTANEOUS at 01:23

## 2017-05-10 ENCOUNTER — TELEPHONE (OUTPATIENT)
Dept: CARDIAC SURGERY | Facility: CLINIC | Age: 32
End: 2017-05-10

## 2017-05-13 LAB — BACTERIA SPEC AEROBE CULT: NORMAL

## 2017-05-14 LAB — BACTERIA SPEC AEROBE CULT: NORMAL

## 2017-06-20 ENCOUNTER — HOSPITAL ENCOUNTER (EMERGENCY)
Facility: HOSPITAL | Age: 32
Discharge: HOME | End: 2017-06-20
Attending: FAMILY MEDICINE | Admitting: FAMILY MEDICINE

## 2017-06-20 ENCOUNTER — APPOINTMENT (OUTPATIENT)
Dept: CT IMAGING | Facility: HOSPITAL | Age: 32
End: 2017-06-20

## 2017-06-20 VITALS
SYSTOLIC BLOOD PRESSURE: 96 MMHG | HEIGHT: 77 IN | BODY MASS INDEX: 17.78 KG/M2 | DIASTOLIC BLOOD PRESSURE: 61 MMHG | HEART RATE: 57 BPM | TEMPERATURE: 97.7 F | RESPIRATION RATE: 20 BRPM | OXYGEN SATURATION: 100 % | WEIGHT: 150.6 LBS

## 2017-06-20 DIAGNOSIS — J40 BRONCHITIS: ICD-10-CM

## 2017-06-20 DIAGNOSIS — R09.1 PLEURISY: Primary | ICD-10-CM

## 2017-06-20 LAB
ALBUMIN SERPL-MCNC: 4.4 G/DL (ref 3.5–5)
ALBUMIN/GLOB SERPL: 1.5 G/DL (ref 1.1–2.5)
ALP SERPL-CCNC: 82 U/L (ref 24–120)
ALT SERPL W P-5'-P-CCNC: 32 U/L (ref 0–54)
ANION GAP SERPL CALCULATED.3IONS-SCNC: 10 MMOL/L (ref 4–13)
AST SERPL-CCNC: 24 U/L (ref 7–45)
BASOPHILS # BLD AUTO: 0.07 10*3/MM3 (ref 0–0.2)
BASOPHILS NFR BLD AUTO: 1.3 % (ref 0–2)
BILIRUB SERPL-MCNC: 0.8 MG/DL (ref 0.1–1)
BUN BLD-MCNC: 10 MG/DL (ref 5–21)
BUN/CREAT SERPL: 10.6 (ref 7–25)
CALCIUM SPEC-SCNC: 9.9 MG/DL (ref 8.4–10.4)
CHLORIDE SERPL-SCNC: 104 MMOL/L (ref 98–110)
CK MB SERPL-CCNC: 0.32 NG/ML (ref 0–5)
CO2 SERPL-SCNC: 27 MMOL/L (ref 24–31)
CREAT BLD-MCNC: 0.94 MG/DL (ref 0.5–1.4)
DEPRECATED RDW RBC AUTO: 42.3 FL (ref 40–54)
EOSINOPHIL # BLD AUTO: 0.6 10*3/MM3 (ref 0–0.7)
EOSINOPHIL NFR BLD AUTO: 11 % (ref 0–4)
ERYTHROCYTE [DISTWIDTH] IN BLOOD BY AUTOMATED COUNT: 13 % (ref 12–15)
GFR SERPL CREATININE-BSD FRML MDRD: 94 ML/MIN/1.73
GLOBULIN UR ELPH-MCNC: 3 GM/DL
GLUCOSE BLD-MCNC: 92 MG/DL (ref 70–100)
HCT VFR BLD AUTO: 43.7 % (ref 40–52)
HGB BLD-MCNC: 15.1 G/DL (ref 14–18)
IMM GRANULOCYTES # BLD: 0.02 10*3/MM3 (ref 0–0.03)
IMM GRANULOCYTES NFR BLD: 0.4 % (ref 0–5)
LYMPHOCYTES # BLD AUTO: 1.59 10*3/MM3 (ref 0.72–4.86)
LYMPHOCYTES NFR BLD AUTO: 29.1 % (ref 15–45)
MCH RBC QN AUTO: 30.6 PG (ref 28–32)
MCHC RBC AUTO-ENTMCNC: 34.6 G/DL (ref 33–36)
MCV RBC AUTO: 88.5 FL (ref 82–95)
MONOCYTES # BLD AUTO: 0.55 10*3/MM3 (ref 0.19–1.3)
MONOCYTES NFR BLD AUTO: 10.1 % (ref 4–12)
MYOGLOBIN SERPL-MCNC: 35.3 NG/ML (ref 0–110)
NEUTROPHILS # BLD AUTO: 2.64 10*3/MM3 (ref 1.87–8.4)
NEUTROPHILS NFR BLD AUTO: 48.1 % (ref 39–78)
PLATELET # BLD AUTO: 221 10*3/MM3 (ref 130–400)
PMV BLD AUTO: 10.2 FL (ref 6–12)
POTASSIUM BLD-SCNC: 4.6 MMOL/L (ref 3.5–5.3)
PROT SERPL-MCNC: 7.4 G/DL (ref 6.3–8.7)
RBC # BLD AUTO: 4.94 10*6/MM3 (ref 4.8–5.9)
SODIUM BLD-SCNC: 141 MMOL/L (ref 135–145)
TROPONIN I SERPL-MCNC: 0 NG/ML (ref 0–0.07)
WBC NRBC COR # BLD: 5.47 10*3/MM3 (ref 4.8–10.8)

## 2017-06-20 PROCEDURE — 93010 ELECTROCARDIOGRAM REPORT: CPT | Performed by: INTERNAL MEDICINE

## 2017-06-20 PROCEDURE — 0 IOPAMIDOL PER 1 ML: Performed by: FAMILY MEDICINE

## 2017-06-20 PROCEDURE — 80053 COMPREHEN METABOLIC PANEL: CPT | Performed by: FAMILY MEDICINE

## 2017-06-20 PROCEDURE — 71275 CT ANGIOGRAPHY CHEST: CPT

## 2017-06-20 PROCEDURE — 96374 THER/PROPH/DIAG INJ IV PUSH: CPT

## 2017-06-20 PROCEDURE — 83874 ASSAY OF MYOGLOBIN: CPT | Performed by: FAMILY MEDICINE

## 2017-06-20 PROCEDURE — 82553 CREATINE MB FRACTION: CPT | Performed by: FAMILY MEDICINE

## 2017-06-20 PROCEDURE — 96361 HYDRATE IV INFUSION ADD-ON: CPT

## 2017-06-20 PROCEDURE — 36415 COLL VENOUS BLD VENIPUNCTURE: CPT

## 2017-06-20 PROCEDURE — 84484 ASSAY OF TROPONIN QUANT: CPT

## 2017-06-20 PROCEDURE — 85025 COMPLETE CBC W/AUTO DIFF WBC: CPT | Performed by: FAMILY MEDICINE

## 2017-06-20 PROCEDURE — 25010000002 ONDANSETRON PER 1 MG: Performed by: FAMILY MEDICINE

## 2017-06-20 PROCEDURE — 93005 ELECTROCARDIOGRAM TRACING: CPT | Performed by: FAMILY MEDICINE

## 2017-06-20 PROCEDURE — 96375 TX/PRO/DX INJ NEW DRUG ADDON: CPT

## 2017-06-20 PROCEDURE — 25010000002 HYDROMORPHONE PER 4 MG: Performed by: FAMILY MEDICINE

## 2017-06-20 PROCEDURE — 99284 EMERGENCY DEPT VISIT MOD MDM: CPT

## 2017-06-20 RX ORDER — BENZONATATE 100 MG/1
100 CAPSULE ORAL 3 TIMES DAILY PRN
Qty: 15 CAPSULE | Refills: 0 | Status: SHIPPED | OUTPATIENT
Start: 2017-06-20 | End: 2017-07-12

## 2017-06-20 RX ORDER — HYDROCODONE BITARTRATE AND ACETAMINOPHEN 7.5; 325 MG/1; MG/1
1 TABLET ORAL EVERY 4 HOURS PRN
Qty: 20 TABLET | Refills: 0 | Status: SHIPPED | OUTPATIENT
Start: 2017-06-20 | End: 2017-07-12

## 2017-06-20 RX ORDER — ONDANSETRON 2 MG/ML
4 INJECTION INTRAMUSCULAR; INTRAVENOUS ONCE
Status: COMPLETED | OUTPATIENT
Start: 2017-06-20 | End: 2017-06-20

## 2017-06-20 RX ORDER — AZITHROMYCIN 250 MG/1
250 TABLET, FILM COATED ORAL DAILY
Qty: 6 TABLET | Refills: 0 | Status: SHIPPED | OUTPATIENT
Start: 2017-06-20 | End: 2017-07-12

## 2017-06-20 RX ORDER — ALBUTEROL SULFATE 90 UG/1
1 AEROSOL, METERED RESPIRATORY (INHALATION) EVERY 4 HOURS PRN
Qty: 1 INHALER | Refills: 0 | Status: SHIPPED | OUTPATIENT
Start: 2017-06-20 | End: 2018-10-05

## 2017-06-20 RX ORDER — ONDANSETRON 4 MG/1
4 TABLET, ORALLY DISINTEGRATING ORAL 4 TIMES DAILY PRN
Qty: 20 TABLET | Refills: 0 | Status: SHIPPED | OUTPATIENT
Start: 2017-06-20 | End: 2017-07-12

## 2017-06-20 RX ORDER — SODIUM CHLORIDE 9 MG/ML
125 INJECTION, SOLUTION INTRAVENOUS CONTINUOUS
Status: DISCONTINUED | OUTPATIENT
Start: 2017-06-20 | End: 2017-06-20 | Stop reason: HOSPADM

## 2017-06-20 RX ADMIN — SODIUM CHLORIDE 125 ML/HR: 9 INJECTION, SOLUTION INTRAVENOUS at 14:04

## 2017-06-20 RX ADMIN — ONDANSETRON 4 MG: 2 SOLUTION INTRAMUSCULAR; INTRAVENOUS at 14:06

## 2017-06-20 RX ADMIN — HYDROMORPHONE HYDROCHLORIDE 1 MG: 1 INJECTION, SOLUTION INTRAMUSCULAR; INTRAVENOUS; SUBCUTANEOUS at 14:07

## 2017-06-20 RX ADMIN — IOPAMIDOL 150 ML: 755 INJECTION, SOLUTION INTRAVENOUS at 14:13

## 2017-06-20 NOTE — ED PROVIDER NOTES
Subjective   Patient is a 32 y.o. male presenting with shortness of breath.   Shortness of Breath   Severity:  Moderate  Onset quality:  Sudden  Duration:  15 hours  Timing:  Intermittent  Progression:  Waxing and waning  Chronicity:  Recurrent  Relieved by:  Nothing  Worsened by:  Deep breathing  Ineffective treatments:  Rest  Associated symptoms: chest pain and cough    Associated symptoms: no abdominal pain, no claudication, no diaphoresis, no ear pain, no fever, no headaches, no hemoptysis, no neck pain, no PND, no rash, no sore throat, no sputum production, no syncope, no swollen glands, no vomiting and no wheezing        Review of Systems   Constitutional: Negative for activity change, appetite change, chills, diaphoresis, fatigue and fever.   HENT: Negative for congestion, ear pain, nosebleeds, postnasal drip, sinus pressure and sore throat.    Eyes: Negative for photophobia and pain.   Respiratory: Positive for cough and shortness of breath. Negative for hemoptysis, sputum production, chest tightness and wheezing.    Cardiovascular: Positive for chest pain. Negative for claudication, syncope and PND.   Gastrointestinal: Negative for abdominal pain, blood in stool, diarrhea, nausea and vomiting.   Endocrine: Negative for cold intolerance and heat intolerance.   Genitourinary: Negative for difficulty urinating, dysuria and flank pain.   Musculoskeletal: Negative for arthralgias, back pain, neck pain and neck stiffness.   Skin: Negative for color change and rash.   Neurological: Negative for dizziness, weakness and headaches.   Hematological: Negative for adenopathy. Does not bruise/bleed easily.   Psychiatric/Behavioral: Negative for confusion and sleep disturbance. The patient is not nervous/anxious.        Past Medical History:   Diagnosis Date   • Back pain    • Chest pain    • Claustrophobia    • COPD (chronic obstructive pulmonary disease)    • Lung disease    • Marfan syndrome    • Pneumothorax    •  Seizures     when he was a child    • Smoking     3/4 pack a day   • Tobacco abuse        Allergies   Allergen Reactions   • Medrol [Methylprednisolone] Other (See Comments)     Patient states he gets violent on this medication        Past Surgical History:   Procedure Laterality Date   • LUNG LOBECTOMY Right 02/01/2017   • THORACOSCOPY N/A 2/3/2017    Procedure: BRONCHOSCOPY, THORACOSCOPY RIGHT CHEST,  WEDGE RESECTION RIGHT UPPER AND LOWER LOBE OF LUNG, MECHANICAL PLEURODESIS;  Surgeon: Kyle Heath MD;  Location: [facility];  Service:    • TYMPANOSTOMY  05/12/2016    Recorded       Family History   Problem Relation Age of Onset   • Heart attack Father    • Stroke Father    • Cancer Maternal Grandmother        Social History     Social History   • Marital status: Single     Spouse name: N/A   • Number of children: N/A   • Years of education: N/A     Social History Main Topics   • Smoking status: Current Every Day Smoker     Packs/day: 0.50     Years: 17.00     Types: Cigarettes   • Smokeless tobacco: Former User     Types: Chew      Comment: quit when he went to hospital   • Alcohol use No   • Drug use: No   • Sexual activity: Defer     Other Topics Concern   • None     Social History Narrative   • None           Objective   Physical Exam   Constitutional: He is oriented to person, place, and time. He appears well-developed and well-nourished. No distress.   HENT:   Head: Atraumatic.   Nose: Nose normal.   Mouth/Throat: Oropharynx is clear and moist.   Eyes: Conjunctivae are normal. Pupils are equal, round, and reactive to light. No scleral icterus.   Neck: Normal range of motion. Neck supple. No JVD present. No thyromegaly present.   Cardiovascular: Normal rate, regular rhythm, normal heart sounds and intact distal pulses.    No murmur heard.  Pulmonary/Chest: Effort normal. No respiratory distress. He has decreased breath sounds. He has no wheezes. He has no rales. He exhibits tenderness.       Abdominal:  Soft. Bowel sounds are normal. He exhibits no distension and no mass. There is no tenderness. There is no rebound and no guarding.   Musculoskeletal: Normal range of motion. He exhibits no edema.   Lymphadenopathy:     He has no cervical adenopathy.   Neurological: He is alert and oriented to person, place, and time. He has normal reflexes. No cranial nerve deficit. Coordination normal.   Skin: Skin is warm and dry. No rash noted. He is not diaphoretic. No erythema. No pallor.   Psychiatric: He has a normal mood and affect. His behavior is normal. Judgment and thought content normal.   Nursing note and vitals reviewed.      Procedures         ED Course  ED Course                  MDM  Number of Diagnoses or Management Options  Bronchitis: new and requires workup  Pleurisy: new and requires workup     Amount and/or Complexity of Data Reviewed  Clinical lab tests: ordered and reviewed  Tests in the radiology section of CPT®: ordered and reviewed  Decide to obtain previous medical records or to obtain history from someone other than the patient: yes  Review and summarize past medical records: yes  Independent visualization of images, tracings, or specimens: yes    Risk of Complications, Morbidity, and/or Mortality  Presenting problems: high  Diagnostic procedures: high  Management options: high    Patient Progress  Patient progress: improved      Final diagnoses:   Pleurisy   Bronchitis            Dar Proctor MD  06/20/17 9389

## 2017-06-21 NOTE — PROGRESS NOTES
Continued Stay Note   [place]     Patient Name: Ross Platt  MRN: [id number]  Today's Date: 6/21/2017    Admit Date: 6/20/2017          Discharge Plan       06/21/17 1044    Case Management/Social Work Plan    Additional Comments Pt has no PCP and will like to establish one.  Pt set up with   [facility] in [place].  Time is tomorrow 6/22 at 1430.  Pt informed and says he will make appt. [phone].              Discharge Codes     None            BHARATH Thomas

## 2017-06-22 ENCOUNTER — OFFICE VISIT (OUTPATIENT)
Dept: FAMILY MEDICINE CLINIC | Facility: CLINIC | Age: 32
End: 2017-06-22

## 2017-06-22 VITALS
DIASTOLIC BLOOD PRESSURE: 60 MMHG | WEIGHT: 151 LBS | BODY MASS INDEX: 17.83 KG/M2 | SYSTOLIC BLOOD PRESSURE: 92 MMHG | HEART RATE: 81 BPM | TEMPERATURE: 97.9 F | RESPIRATION RATE: 16 BRPM | HEIGHT: 77 IN | OXYGEN SATURATION: 96 %

## 2017-06-22 DIAGNOSIS — F17.200 SMOKING: Chronic | ICD-10-CM

## 2017-06-22 DIAGNOSIS — J43.9 PULMONARY EMPHYSEMA, UNSPECIFIED EMPHYSEMA TYPE (HCC): Chronic | ICD-10-CM

## 2017-06-22 DIAGNOSIS — R07.9 CHEST PAIN, UNSPECIFIED TYPE: ICD-10-CM

## 2017-06-22 DIAGNOSIS — Q87.40 MARFAN SYNDROME: ICD-10-CM

## 2017-06-22 DIAGNOSIS — M94.0 COSTOCHONDRITIS: Primary | ICD-10-CM

## 2017-06-22 PROCEDURE — 99406 BEHAV CHNG SMOKING 3-10 MIN: CPT | Performed by: FAMILY MEDICINE

## 2017-06-22 PROCEDURE — 99204 OFFICE O/P NEW MOD 45 MIN: CPT | Performed by: FAMILY MEDICINE

## 2017-06-22 RX ORDER — PREDNISONE 10 MG/1
10 TABLET ORAL DAILY
Qty: 7 TABLET | Refills: 0 | Status: SHIPPED | OUTPATIENT
Start: 2017-06-22 | End: 2017-07-12

## 2017-06-22 NOTE — PROGRESS NOTES
Chief Complaint   Patient presents with   • Pain With Breathing     ED follow up        History:  Ross Platt is a 32 y.o. male presents who today for evaluation of the above problems.  PCP currently listed as No Known Provider.   Present with Shu Perdomo, Girlfriend.  Permission to speak freely discussed and patient agreed.  Went to ED on 6/20/17 and referred here.  Dx with bronchitis and pleurisy.  Moderate pain, sudden onset 15 hours prior to Er evaluation, recurrent sx, intermittent, worse with deep breaths.  SOA and orthopnea.  He has intermittent PND.  He does smoke 1/2 ppd working on cessation.  He had ches tpain in the Er.   no abdominal pain, no claudication, no diaphoresis, no ear pain, no fever, no headaches, no hemoptysis, no neck pain, no PND, no rash, no sore throat, no sputum production, no syncope, no swollen glands, no vomiting and no wheezing.  The pt had CT angio and found No CT angiographic evidence of pulmonary embolus or acute aortic pathology. . Moderate to severe pulmonary emphysema with bullous changes. CXR reviewed from 02/2017 and normal.  Normal from 05/2017.   troponins were negative, CKMB was negative, myoglobin negative. Metabolic panel reviewed.  1. Kidney function is stable. Normal creatinine and GFR within acceptable range.  2. Liver enzymes stable and normal.  3. Electrolytes to include sodium, potassium, Calcium normal range.  Normal study.  CBC: Normal Study. Normal WBC, normal Hgb w/o evidence of anemia, normal platelets.    CTA essentially rules out thoracic aortic aneurysm at this point.  He is tender anterior chest 5/10.     He had lobectomy in 02/2017 form his marfan process.  He had collapsed lung and had to go in and fix the area.  He has had no issues with eyes so far.  He was given norco, tessalon, z jennifer and prednisone and albuterol.  On day 2 of z jennifer.  Pain is not getting any better. No steroids were used.  Pain in the outer part of the chest.  He has never had  stress test.  He has never had echocardiogram.        Ross Platt  has a past medical history of Back pain; Chest pain; Claustrophobia; COPD (chronic obstructive pulmonary disease); Lung disease; Marfan syndrome; Pneumothorax; Seizures; Smoking; and Tobacco abuse.    Allergies   Allergen Reactions   • Medrol [Methylprednisolone] Other (See Comments)     Patient states he gets violent on this medication      Past Medical History:   Diagnosis Date   • Back pain    • Chest pain    • Claustrophobia    • COPD (chronic obstructive pulmonary disease)    • Lung disease    • Marfan syndrome    • Pneumothorax    • Seizures     when he was a child    • Smoking     3/4 pack a day   • Tobacco abuse      Past Surgical History:   Procedure Laterality Date   • LUNG LOBECTOMY Right 02/01/2017   • THORACOSCOPY N/A 2/3/2017    Procedure: BRONCHOSCOPY, THORACOSCOPY RIGHT CHEST,  WEDGE RESECTION RIGHT UPPER AND LOWER LOBE OF LUNG, MECHANICAL PLEURODESIS;  Surgeon: Kyle Heath MD;  Location: [facility];  Service:    • TYMPANOSTOMY  05/12/2016    Recorded     Family History   Problem Relation Age of Onset   • Heart attack Father    • Stroke Father    • Cancer Maternal Grandmother    • No Known Problems Daughter        Current Outpatient Prescriptions on File Prior to Visit   Medication Sig Dispense Refill   • benzonatate (TESSALON) 100 MG capsule Take 1 capsule by mouth 3 (Three) Times a Day As Needed for Cough for up to 15 doses. 15 capsule 0   • HYDROcodone-acetaminophen (NORCO) 7.5-325 MG per tablet Take 1 tablet by mouth Every 4 (Four) Hours As Needed for Moderate Pain (4-6) for up to 20 doses. 20 tablet 0   • ondansetron ODT (ZOFRAN-ODT) 4 MG disintegrating tablet Take 1 tablet by mouth 4 (Four) Times a Day As Needed for Nausea or Vomiting. 20 tablet 0   • albuterol (PROVENTIL HFA;VENTOLIN HFA) 108 (90 BASE) MCG/ACT inhaler Inhale 1 puff Every 4 (Four) Hours As Needed for Wheezing. 1 inhaler 0   • azithromycin (ZITHROMAX)  "250 MG tablet Take 1 tablet by mouth Daily. 6 tablet 0     No current facility-administered medications on file prior to visit.        Family history, surgical history, past medical history, Allergies and meds reviewed with patient today and updated in [facility] EMR.     ROS:  Review of Systems   Constitutional: Negative for activity change, appetite change, chills, diaphoresis, fatigue and fever.   HENT: Negative for congestion, ear discharge, ear pain, facial swelling, hearing loss, rhinorrhea, sinus pressure, sneezing, sore throat and tinnitus.    Eyes: Negative for pain, discharge, redness and visual disturbance.   Respiratory: Positive for cough, chest tightness, shortness of breath and wheezing. Negative for apnea and choking.    Cardiovascular: Positive for chest pain. Negative for palpitations and leg swelling.   Gastrointestinal: Negative for abdominal pain, constipation, diarrhea, nausea and vomiting.   Genitourinary: Negative for difficulty urinating, flank pain, frequency, penile pain and urgency.   Musculoskeletal: Negative for arthralgias, back pain, gait problem, joint swelling, myalgias and neck pain.   Skin: Negative for color change, pallor and rash.   Allergic/Immunologic: Negative for environmental allergies and food allergies.   Neurological: Negative for dizziness, seizures, weakness, numbness and headaches.   Hematological: Negative for adenopathy. Does not bruise/bleed easily.   Psychiatric/Behavioral: Negative for agitation, behavioral problems, confusion, hallucinations, self-injury, sleep disturbance and suicidal ideas.       OBJECTIVE:  Vitals:    06/22/17 1424   BP: 92/60   Pulse: 81   Resp: 16   Temp: 97.9 °F (36.6 °C)   SpO2: 96%   Weight: 151 lb (68.5 kg)   Height: 77\" (195.6 cm)     Physical Exam   Constitutional: He is oriented to person, place, and time. He appears well-developed and well-nourished.   HENT:   Head: Normocephalic and atraumatic.   Right Ear: External ear normal.   Left " Ear: External ear normal.   Nose: Nose normal.   Mouth/Throat: Oropharynx is clear and moist.   Eyes: Conjunctivae and EOM are normal. Pupils are equal, round, and reactive to light.   Neck: Normal range of motion. Neck supple. No thyromegaly present.   Cardiovascular: Normal rate, regular rhythm and intact distal pulses.    Murmur heard.   Systolic murmur is present with a grade of 2/6   Pulmonary/Chest: Effort normal and breath sounds normal. No respiratory distress. He has no wheezes. He exhibits tenderness.   No pectus deformity.  He has tenderness about the enterie sternum, mostly ribs 3-6 on left along costochondral junction.   Abdominal: Soft. Bowel sounds are normal. There is no tenderness. There is no rebound and no guarding.   Musculoskeletal:        Right shoulder: He exhibits normal range of motion, no tenderness and no deformity.        Left shoulder: He exhibits normal range of motion, no tenderness and no deformity.        Right elbow: He exhibits normal range of motion and no swelling. No medial epicondyle, no lateral epicondyle and no olecranon process tenderness noted.        Left elbow: He exhibits normal range of motion and no swelling. No medial epicondyle, no lateral epicondyle and no olecranon process tenderness noted.        Right hip: He exhibits decreased range of motion. He exhibits normal strength, no tenderness, no bony tenderness and no swelling.        Left hip: He exhibits decreased range of motion. He exhibits normal strength, no tenderness and no bony tenderness.        Cervical back: He exhibits tenderness, pain and spasm. He exhibits no bony tenderness.        Thoracic back: He exhibits normal range of motion, no tenderness, no bony tenderness, no pain and no spasm.        Lumbar back: He exhibits decreased range of motion, tenderness, pain and spasm. He exhibits no bony tenderness.   Thumb sign negative, wrist sign +.  Arm length equal to or longer than height.  Consistent with  marfan.    Lymphadenopathy:     He has no cervical adenopathy.     He has no axillary adenopathy.        Right: No inguinal adenopathy present.        Left: No inguinal adenopathy present.   Neurological: He is alert and oriented to person, place, and time. He has normal strength. No cranial nerve deficit. Coordination and gait normal.   Skin: Skin is warm and dry. No rash noted.   Psychiatric: He has a normal mood and affect. His behavior is normal. Judgment and thought content normal.   Nursing note and vitals reviewed.      Assessment/Plan:  Costochondritis:  DDX for chest pain is vast.  We discussed typical examination in history findings for chest pain and heart attack today.  We discussed that multiple organ systems can be the cause for this complaint.  We reviewed possible causes to include cardiac etiologies: ACS, pericarditis, pericardial effusion, respiratory issues to include bronchitis/asthma/COPD/bronchospasm, PE (No SOA, no IZQUIERDO, LOW LIKELIHOOD OF PE/DVT based on Wells score 0 (1.3%)), GI problems to include esophageal spasm/achalasia, Hiatal hernia, GERD, PUD, Liver disease/pancreatic disease, renal disease.  Will check labs as listed.   We discussed risks and benefits to getting EKG today, we reviewed aspirin and nitroglycerin. His symptoms are reproducible and pain along chest wall is present.  Discussed medications with him and will use topical pain reliever as well as prednisone.   · Orders as listed below  -     NON FORMULARY; ibuprofren 5%, baclofen 2%, gabapentin 6%, lidocaine 2.5%, sarapin 5%.  -     predniSONE (DELTASONE) 10 MG tablet; Take 1 tablet by mouth Daily.    Smoking: Tobacco abuse: Tobacco Cessation discussed today for 4 minutes.   Ready to quit: no. The risks and hazards of continued tobacco abuse were discussed with the patient today and total tobacco cessation as recommended. It was clearly and unambiguously explained that continued tobacco usage will adversely affect overall  morbidity and mortality of the patient.  Patient was informed that tobacco use can lead to numerous cancers, worsening of cardiovascular and pulmonary systems and that lung damage is often permanent and irreversible. As tobacco addiction is often severe, cessation often seems insurmountable to many patients.  I discussed an incremental decrease in usage over time, with the ultimate goal of cessation.  I have offered Smoking Cessation classes through  for assistance.  I have discussed the possibility of lifestyle modifications to enhance the likelihood of successful tobacco cessation. Patient is aware of these services.  I advised the patient to inform me if any further assistance is requested, as we can offer counseling services, nicotine replacement inhaled, patch, lozenge, gum, or prescription medications to include Chantix or Wellbutrin for assistance.  I will reassess the interest in tobacco cessation at the next and all subsequent visits.  · Handouts were offered to the patient regarding tobacco cessation.   · Recommended to pick a quit date  · Lifestyle choices discussed.    · We discussed benefits/risks of oral medications to include Chantix/Wellbutrin.   · Discussed benefits and risks to nicotine patches, gum, lozenges, inhaler.   · Discussed no benefit and no recommendation at this time to switch to E. Cigarettes as health hazards are not yet known.    · Discussed  cessation class and handout offered to patient today.    · Discussed to consider ration technique to quit with time (Each day set out the number of cigarettes that you allow yourself to smoke.  Each day decrease this number by 1 cigarrette until you get to a point that you feel you need another cigarette.  You can hold at that level x 1 week.  Continue to decrease until you either are tobacco free or until you cannot decline any further). When you cannot decrease this any further you can return and we can discussed medication options again.   Initial goal with reduction technique is 25% in 3 months.   · http://smokefree.gov/smokefreetxt/  · www.heart.org Smoking cessation resources  · 1 800 QUIT NOW number d/w patient.     Marfan syndrome  -    Echocardiogram stress test; Future  -    Adult Transthoracic Echo Complete; Future    Pulmonary emphysema, unspecified emphysema type: Alpha 1 antitrypsin testing to be done today.  I will provide sample of incruse.  Return in 1 month for spirometry.  R/B/A to meds d/w patient, SE reviewed, handout offered regarding medications listed.    We talked about smoking cesstion.   -     Collection Capillary Blood Specimen  -     Umeclidinium Bromide (INCRUSE ELLIPTA) 62.5 MCG/INH aerosol powder ; Inhale 1 inhaler Daily.    Chest pain, unspecified type  -     Adult Transthoracic Echo Complete; Future    Risks/benefits of current and new medications discussed with the patient and or family today.  The patient/family are aware and accept that if there any side effects they should call or return to clinic as soon as possible.  Appropriate F/U discussed for topics addressed today. All questions were answered to the satisfactory state of patient/family.  Should symptoms fail to improve or worsen they agree to call or return to clinic or to go to the ER. Education handouts were offered on any new Rx if requested.  Discussed the importance of following up with any needed screening tests/labs/specialist appointments and any requested follow-up recommended by me today.  Importance of maintaining follow-up discussed and patient accepts that missed appointments can delay diagnosis and potentially lead to worsening of conditions.    An After Visit Summary was printed and given to the patient at discharge.  Follow-up: Return in about 1 week (around 6/29/2017).         Junior Cabello M.D. 6/22/2017

## 2017-06-22 NOTE — PROGRESS NOTES
Subjective   Ross Platt is a 32 y.o. male. Presents today for follow up from ER visit on 6/20/17for Pleurisy and bronchitis.  He does not have a PCP and needs to establish. Rates pain today 4/10.    History of Present Illness     {Common H&P Review Areas:42329}    Review of Systems    Objective   Physical Exam    Assessment/Plan   {Assess/PlanSmartLinks:01332}         [id number]

## 2017-06-28 ENCOUNTER — HOSPITAL ENCOUNTER (OUTPATIENT)
Dept: CARDIOLOGY | Facility: HOSPITAL | Age: 32
Discharge: HOME | End: 2017-06-28
Attending: FAMILY MEDICINE | Admitting: FAMILY MEDICINE

## 2017-06-28 ENCOUNTER — HOSPITAL ENCOUNTER (OUTPATIENT)
Dept: CARDIOLOGY | Facility: HOSPITAL | Age: 32
Discharge: HOME | End: 2017-06-28
Attending: FAMILY MEDICINE

## 2017-06-28 VITALS
SYSTOLIC BLOOD PRESSURE: 91 MMHG | WEIGHT: 151 LBS | HEIGHT: 77 IN | BODY MASS INDEX: 17.83 KG/M2 | DIASTOLIC BLOOD PRESSURE: 52 MMHG

## 2017-06-28 VITALS — DIASTOLIC BLOOD PRESSURE: 65 MMHG | SYSTOLIC BLOOD PRESSURE: 112 MMHG | HEART RATE: 60 BPM

## 2017-06-28 DIAGNOSIS — Q87.40 MARFAN SYNDROME: ICD-10-CM

## 2017-06-28 DIAGNOSIS — R07.9 CHEST PAIN, UNSPECIFIED TYPE: ICD-10-CM

## 2017-06-28 PROCEDURE — 25010000002 PERFLUTREN (DEFINITY) 8.476 MG IN SODIUM CHLORIDE 10 ML INJECTION: Performed by: INTERNAL MEDICINE

## 2017-06-28 PROCEDURE — 93350 STRESS TTE ONLY: CPT | Performed by: INTERNAL MEDICINE

## 2017-06-28 PROCEDURE — 93306 TTE W/DOPPLER COMPLETE: CPT | Performed by: INTERNAL MEDICINE

## 2017-06-28 PROCEDURE — C8928 TTE W OR W/O FOL W/CON,STRES: HCPCS

## 2017-06-28 PROCEDURE — 93018 CV STRESS TEST I&R ONLY: CPT | Performed by: INTERNAL MEDICINE

## 2017-06-28 PROCEDURE — 93017 CV STRESS TEST TRACING ONLY: CPT

## 2017-06-28 PROCEDURE — 93352 ADMIN ECG CONTRAST AGENT: CPT | Performed by: INTERNAL MEDICINE

## 2017-06-28 PROCEDURE — 93306 TTE W/DOPPLER COMPLETE: CPT

## 2017-06-28 RX ADMIN — SODIUM CHLORIDE 10 ML: 9 INJECTION INTRAMUSCULAR; INTRAVENOUS; SUBCUTANEOUS at 13:24

## 2017-06-29 LAB
BH CV ECHO MEAS - AO ROOT AREA (BSA CORRECTED): 1.7
BH CV ECHO MEAS - AO ROOT AREA: 9.1 CM^2
BH CV ECHO MEAS - AO ROOT DIAM: 3.4 CM
BH CV ECHO MEAS - BSA(HAYCOCK): 1.9 M^2
BH CV ECHO MEAS - BSA(HAYCOCK): 1.9 M^2
BH CV ECHO MEAS - BSA: 2 M^2
BH CV ECHO MEAS - BSA: 2 M^2
BH CV ECHO MEAS - BZI_BMI: 17.3 KILOGRAMS/M^2
BH CV ECHO MEAS - BZI_BMI: 17.9 KILOGRAMS/M^2
BH CV ECHO MEAS - BZI_METRIC_HEIGHT: 195.6 CM
BH CV ECHO MEAS - BZI_METRIC_HEIGHT: 198.1 CM
BH CV ECHO MEAS - BZI_METRIC_WEIGHT: 68 KG
BH CV ECHO MEAS - BZI_METRIC_WEIGHT: 68.5 KG
BH CV ECHO MEAS - EDV(CUBED): 68.9 ML
BH CV ECHO MEAS - EDV(TEICH): 74.2 ML
BH CV ECHO MEAS - EF(CUBED): 60.8 %
BH CV ECHO MEAS - EF(TEICH): 52.8 %
BH CV ECHO MEAS - ESV(CUBED): 27 ML
BH CV ECHO MEAS - ESV(TEICH): 35 ML
BH CV ECHO MEAS - FS: 26.8 %
BH CV ECHO MEAS - IVS/LVPW: 0.88
BH CV ECHO MEAS - IVSD: 0.7 CM
BH CV ECHO MEAS - LA DIMENSION: 2.7 CM
BH CV ECHO MEAS - LA/AO: 0.79
BH CV ECHO MEAS - LAT PEAK E' VEL: 11.6 CM/SEC
BH CV ECHO MEAS - LV MASS(C)D: 89.4 GRAMS
BH CV ECHO MEAS - LV MASS(C)DI: 45 GRAMS/M^2
BH CV ECHO MEAS - LVIDD: 4.1 CM
BH CV ECHO MEAS - LVIDS: 3 CM
BH CV ECHO MEAS - LVOT AREA (M): 3.1 CM^2
BH CV ECHO MEAS - LVOT AREA: 3.1 CM^2
BH CV ECHO MEAS - LVOT DIAM: 2 CM
BH CV ECHO MEAS - LVPWD: 0.8 CM
BH CV ECHO MEAS - MED PEAK E' VEL: 11.7 CM/SEC
BH CV ECHO MEAS - MV A MAX VEL: 40.3 CM/SEC
BH CV ECHO MEAS - MV DEC TIME: 0.3 SEC
BH CV ECHO MEAS - MV E MAX VEL: 66.7 CM/SEC
BH CV ECHO MEAS - MV E/A: 1.7
BH CV ECHO MEAS - RAP SYSTOLE: 5 MMHG
BH CV ECHO MEAS - RVSP: 16.3 MMHG
BH CV ECHO MEAS - SI(CUBED): 21.1 ML/M^2
BH CV ECHO MEAS - SI(TEICH): 19.8 ML/M^2
BH CV ECHO MEAS - SV(CUBED): 41.9 ML
BH CV ECHO MEAS - SV(TEICH): 39.2 ML
BH CV ECHO MEAS - TR MAX VEL: 168 CM/SEC
BH CV STRESS BP STAGE 1: NORMAL
BH CV STRESS BP STAGE 2: NORMAL
BH CV STRESS BP STAGE 3: NORMAL
BH CV STRESS DURATION MIN STAGE 1: 3
BH CV STRESS DURATION MIN STAGE 2: 3
BH CV STRESS DURATION MIN STAGE 3: 3
BH CV STRESS DURATION MIN STAGE 4: 0
BH CV STRESS DURATION SEC STAGE 1: 0
BH CV STRESS DURATION SEC STAGE 2: 0
BH CV STRESS DURATION SEC STAGE 3: 0
BH CV STRESS DURATION SEC STAGE 4: 40
BH CV STRESS GRADE STAGE 1: 10
BH CV STRESS GRADE STAGE 2: 12
BH CV STRESS GRADE STAGE 3: 14
BH CV STRESS GRADE STAGE 4: 16
BH CV STRESS HR STAGE 1: 92
BH CV STRESS HR STAGE 2: 109
BH CV STRESS HR STAGE 3: 131
BH CV STRESS HR STAGE 4: 156
BH CV STRESS METS STAGE 1: 5
BH CV STRESS METS STAGE 2: 7.5
BH CV STRESS METS STAGE 3: 10
BH CV STRESS METS STAGE 4: 13.5
BH CV STRESS PROTOCOL 1: NORMAL
BH CV STRESS RECOVERY BP: NORMAL MMHG
BH CV STRESS RECOVERY HR: 68 BPM
BH CV STRESS SPEED STAGE 1: 1.7
BH CV STRESS SPEED STAGE 2: 2.5
BH CV STRESS SPEED STAGE 3: 3.4
BH CV STRESS SPEED STAGE 4: 4.2
BH CV STRESS STAGE 1: 1
BH CV STRESS STAGE 2: 2
BH CV STRESS STAGE 3: 3
BH CV STRESS STAGE 4: 4
E/E' RATIO: 5.8
LEFT ATRIUM VOLUME INDEX: 23.8 ML/M2
LEFT ATRIUM VOLUME: 47.1 CM3
LV EF 2D ECHO EST: 60 %
MAXIMAL PREDICTED HEART RATE: 189 BPM
PERCENT MAX PREDICTED HR: 82.54 %
STRESS BASELINE BP: NORMAL MMHG
STRESS BASELINE HR: 60 BPM
STRESS PERCENT HR: 97 %
STRESS POST EXERCISE DUR MIN: 9 MIN
STRESS POST EXERCISE DUR SEC: 40 SEC
STRESS POST PEAK BP: NORMAL MMHG
STRESS POST PEAK HR: 156 BPM
STRESS TARGET HR: 161 BPM

## 2017-07-06 ENCOUNTER — TELEPHONE (OUTPATIENT)
Dept: FAMILY MEDICINE CLINIC | Facility: CLINIC | Age: 32
End: 2017-07-06

## 2017-07-12 ENCOUNTER — OFFICE VISIT (OUTPATIENT)
Dept: CARDIAC SURGERY | Facility: CLINIC | Age: 32
End: 2017-07-12

## 2017-07-12 VITALS
HEIGHT: 77 IN | SYSTOLIC BLOOD PRESSURE: 104 MMHG | OXYGEN SATURATION: 99 % | WEIGHT: 155 LBS | DIASTOLIC BLOOD PRESSURE: 54 MMHG | HEART RATE: 99 BPM | BODY MASS INDEX: 18.3 KG/M2

## 2017-07-12 DIAGNOSIS — F17.200 SMOKING: Chronic | ICD-10-CM

## 2017-07-12 DIAGNOSIS — Q87.40 MARFAN SYNDROME: ICD-10-CM

## 2017-07-12 DIAGNOSIS — J43.9 CHRONIC BULLOUS EMPHYSEMA (HCC): Primary | ICD-10-CM

## 2017-07-12 DIAGNOSIS — J93.12 SECONDARY SPONTANEOUS PNEUMOTHORAX: ICD-10-CM

## 2017-07-12 PROCEDURE — 99214 OFFICE O/P EST MOD 30 MIN: CPT | Performed by: THORACIC SURGERY (CARDIOTHORACIC VASCULAR SURGERY)

## 2017-07-26 ENCOUNTER — OFFICE VISIT (OUTPATIENT)
Dept: CARDIAC SURGERY | Facility: CLINIC | Age: 32
End: 2017-07-26

## 2017-07-26 ENCOUNTER — HOSPITAL ENCOUNTER (OUTPATIENT)
Dept: CT IMAGING | Facility: HOSPITAL | Age: 32
Discharge: HOME | End: 2017-07-26
Attending: THORACIC SURGERY (CARDIOTHORACIC VASCULAR SURGERY)

## 2017-07-26 ENCOUNTER — HOSPITAL ENCOUNTER (OUTPATIENT)
Dept: PULMONOLOGY | Facility: HOSPITAL | Age: 32
Discharge: HOME | End: 2017-07-26
Attending: THORACIC SURGERY (CARDIOTHORACIC VASCULAR SURGERY) | Admitting: THORACIC SURGERY (CARDIOTHORACIC VASCULAR SURGERY)

## 2017-07-26 VITALS
HEIGHT: 76 IN | DIASTOLIC BLOOD PRESSURE: 60 MMHG | SYSTOLIC BLOOD PRESSURE: 104 MMHG | HEART RATE: 95 BPM | BODY MASS INDEX: 18.39 KG/M2 | WEIGHT: 151 LBS | OXYGEN SATURATION: 100 %

## 2017-07-26 VITALS — BODY MASS INDEX: 18.27 KG/M2 | HEIGHT: 76 IN | WEIGHT: 150 LBS

## 2017-07-26 DIAGNOSIS — F17.200 SMOKING: Chronic | ICD-10-CM

## 2017-07-26 DIAGNOSIS — J43.9 CHRONIC BULLOUS EMPHYSEMA (HCC): ICD-10-CM

## 2017-07-26 DIAGNOSIS — J43.9 BULLOUS EMPHYSEMA (HCC): ICD-10-CM

## 2017-07-26 DIAGNOSIS — F17.200 TOBACCO USE DISORDER: ICD-10-CM

## 2017-07-26 DIAGNOSIS — Q87.40 MARFAN SYNDROME: ICD-10-CM

## 2017-07-26 DIAGNOSIS — R91.1 SOLITARY PULMONARY NODULE: Primary | ICD-10-CM

## 2017-07-26 LAB
ARTERIAL PATENCY WRIST A: ABNORMAL
ATMOSPHERIC PRESS: ABNORMAL MMHG
BASE EXCESS BLDA CALC-SCNC: -1.3 MMOL/L (ref -2–2)
BDY SITE: ABNORMAL
CREAT BLDA-MCNC: 1 MG/DL (ref 0.6–1.3)
HCO3 BLDA-SCNC: 22.8 MMOL/L (ref 22–26)
MODALITY: ABNORMAL
PCO2 BLDA: 36.3 MM HG (ref 35–45)
PH BLDA: 7.42 PH UNITS (ref 7.35–7.45)
PO2 BLDA: 103.8 MM HG (ref 80–100)
SAO2 % BLDCOA: 97.9 % (ref 94–100)
SAO2 % BLDCOA: 97.9 % (ref 94–100)

## 2017-07-26 PROCEDURE — 36600 WITHDRAWAL OF ARTERIAL BLOOD: CPT

## 2017-07-26 PROCEDURE — 0 IOPAMIDOL PER 1 ML: Performed by: THORACIC SURGERY (CARDIOTHORACIC VASCULAR SURGERY)

## 2017-07-26 PROCEDURE — 99213 OFFICE O/P EST LOW 20 MIN: CPT | Performed by: THORACIC SURGERY (CARDIOTHORACIC VASCULAR SURGERY)

## 2017-07-26 PROCEDURE — 82803 BLOOD GASES ANY COMBINATION: CPT

## 2017-07-26 PROCEDURE — 94726 PLETHYSMOGRAPHY LUNG VOLUMES: CPT

## 2017-07-26 PROCEDURE — 71275 CT ANGIOGRAPHY CHEST: CPT

## 2017-07-26 PROCEDURE — 82565 ASSAY OF CREATININE: CPT

## 2017-07-26 PROCEDURE — 94060 EVALUATION OF WHEEZING: CPT

## 2017-07-26 PROCEDURE — 94729 DIFFUSING CAPACITY: CPT

## 2017-07-26 RX ORDER — ALBUTEROL SULFATE 2.5 MG/3ML
2.5 SOLUTION RESPIRATORY (INHALATION) ONCE
Status: COMPLETED | OUTPATIENT
Start: 2017-07-26 | End: 2017-07-26

## 2017-07-26 RX ADMIN — IOPAMIDOL 150 ML: 755 INJECTION, SOLUTION INTRAVENOUS at 11:30

## 2017-07-26 RX ADMIN — ALBUTEROL SULFATE 2.5 MG: 2.5 SOLUTION RESPIRATORY (INHALATION) at 09:56

## 2017-09-07 PROBLEM — J43.9 BULLOUS EMPHYSEMA: Status: ACTIVE | Noted: 2017-09-07

## 2017-09-07 PROBLEM — R91.1 SOLITARY PULMONARY NODULE: Status: ACTIVE | Noted: 2017-09-07

## 2017-09-07 PROBLEM — F17.200 TOBACCO USE DISORDER: Status: ACTIVE | Noted: 2017-09-07

## 2017-09-07 NOTE — PROGRESS NOTES
"Subjective   Patient ID: Ross Platt is a 32 y.o. male who is here to discuss left blebectomy and mechanical pleurodesis.    Operation: Bronchoscopy, right thoracoscopy, right upper and right lower lobe wedge resection for the performance of blebectomy with mechanical pleurodesis performed on 02/03/2017      Chief Complaint   Patient presents with   • Pre-op Exam     patient states he is here to discduss surgery     History of Present Illness    Since his last office visit, he is making an effort to quit smoking but does continue to do so at this time.  He does continue to have some shortness of breath.  He is currently not working.  A CT scan the chest has not obtained to evaluate the left hemithorax status and his candidacy for possible left blebectomy and mechanical pleurodesis.  Additionally he has a history of Marfan disorder with this CAT scan also to evaluate the development of any possible aneurysmal disease.  His had no chest pain.  He denies unintended weight loss, hoarseness of voice, chest pain, hemoptysis, history of pneumonia, or cough.    The following portions of the patient's history were reviewed and updated as appropriate: allergies, current medications, past family history, past medical history, past social history, past surgical history and problem list.       Objective    Visit Vitals   • /60 (BP Location: Right arm, Patient Position: Sitting, Cuff Size: Adult)   • Pulse 95   • Ht 76\" (193 cm)   • Wt 151 lb (68.5 kg)   • SpO2 100%   • BMI 18.38 kg/m2         Physical Exam   Constitutional: He is oriented to person, place, and time. He appears well-developed.   HENT:   Head: Normocephalic and atraumatic.   Mouth/Throat: Oropharynx is clear and moist.   Eyes: EOM are normal. Pupils are equal, round, and reactive to light.   Neck: Normal range of motion. Neck supple. No JVD present. No tracheal deviation present. No thyromegaly present.   Cardiovascular: Normal rate, regular rhythm, " normal heart sounds and intact distal pulses.  Exam reveals no gallop and no friction rub.    No murmur heard.  Pulmonary/Chest: Effort normal and breath sounds normal. No respiratory distress. He has no wheezes. He has no rales. He exhibits no tenderness.   Abdominal: Soft. He exhibits no distension. There is no tenderness.   Musculoskeletal: Normal range of motion. He exhibits no edema.   Lymphadenopathy:     He has no cervical adenopathy.   Neurological: He is alert and oriented to person, place, and time. No cranial nerve deficit.   Skin: Skin is warm and dry.   Psychiatric: He has a normal mood and affect.     EXAMINATION: CT ANGIOGRAM CHEST W WO CONTRAST-  7/26/2017 1:47 PM CDT      HISTORY:Marfan's syndrome.      COMPARISON: 06/20/2017 CT angiogram chest      TECHNIQUE:      CT evaluation of the chest was performed with intravenous contrast. 2 mm  transaxial images were obtained. 3-D reconstruction angiographic images  were generated using a volume rendering technique. Coronal  reconstruction maximum intensity projection images of the pulmonary  arteries and aorta were also generated.      Radiation dose equals  mGy-cm.  Automated exposure control dose  reduction technique was implemented.          FINDINGS:      The aorta is normal in caliber without aneurysm or dissection.      The pulmonary arteries are adequately opacified with iodinated contrast  without CT angiographic evidence of pulmonary embolus.      There is no mediastinal or hilar lymphadenopathy observed.      Minimal pericardial thickening versus effusion observed measured  anteriorly near the apex, 5 mm in thickness.      This moderate to severe pulmonary emphysema appreciated with an upper  lobe predominance with a large bulla appreciated in the right middle  lobe.      In the right lung base there is a 13 mm rounded nodular opacity that was  not observed previously in focal area of atelectasis or inflammatory  change suspected.  Follow-up suggested to assure benignity.      Linear opacities again identified in the right lung base anterolaterally  were observed previously compatible with scarring chronic change.      No additional parenchymal infiltrates observed.      The abdominal aorta appreciated on this examination is normal in caliber  without aneurysm or dissection.      Limited imaging of the upper abdomen is unremarkable.      The bony structures are intact.      IMPRESSION:      1. Negative CT angiography of the chest without aortic aneurysm or  dissection.  2. No CT angiographic evidence of pulmonary embolus.  3. Bullous pulmonary emphysema.  4. 13 mm Nodular opacity right lung base not observed on the recent  study, small area of inflammatory change or atelectasis considered.  Follow-up suggested to assure benign changes.  This report was finalized on 07/26/2017 13:52 by Dr. Isidoro Fernandez MD.            Ross was seen today for pre-op exam.    Diagnoses and all orders for this visit:    Solitary pulmonary nodule  -     CT Chest With & Without Contrast; Future    Bullous emphysema    Tobacco use disorder          Assessment/Plan       We discussed the differential diagnoses possible given a lung nodule.    We discussed options for care including continued surveillance verses efforts towards diagnosis and treatment.  We discussed options for diagnosis including bronchoscopy, CT-guided needle biopsy, or surgical biopsy with either cervical mediastinoscopy or Thoracoscopy with resection.  Surveillance is probably the best option at this time.  I favor infectious etiology.  Based Fleischner criteria, it is recommended 3 months follow with CT scan of the chest.  The possibility of delay of diagnosis was discussed.  Pros/cons of each reviewed. He is agreeable to surveillance.  We discussed his weight is acceptable for his age and height.  He was congratulated on his success.  I did  extensively on smoking cessation, and the  patient was advised of the continued risks of smoking. I provided  <10 mintutes counseling on this matter. This was all discussed in full with complete understanding.  RTC 3 months with CT scan of the chest

## 2017-09-17 ENCOUNTER — HOSPITAL ENCOUNTER (EMERGENCY)
Age: 32
Discharge: HOME | End: 2017-09-18
Attending: EMERGENCY MEDICINE
Payer: COMMERCIAL

## 2017-09-17 ENCOUNTER — APPOINTMENT (OUTPATIENT)
Dept: GENERAL RADIOLOGY | Age: 32
End: 2017-09-17
Payer: COMMERCIAL

## 2017-09-17 ENCOUNTER — APPOINTMENT (OUTPATIENT)
Dept: CT IMAGING | Age: 32
End: 2017-09-17
Payer: COMMERCIAL

## 2017-09-17 DIAGNOSIS — R07.89 ATYPICAL CHEST PAIN: Primary | ICD-10-CM

## 2017-09-17 LAB
ALBUMIN SERPL-MCNC: 4.1 G/DL (ref 3.5–5.2)
ALP BLD-CCNC: 76 U/L (ref 40–130)
ALT SERPL-CCNC: 23 U/L (ref 5–41)
ANION GAP SERPL CALCULATED.3IONS-SCNC: 14 MMOL/L (ref 7–19)
AST SERPL-CCNC: 25 U/L (ref 5–40)
BASOPHILS ABSOLUTE: 0.1 K/UL (ref 0–0.2)
BASOPHILS RELATIVE PERCENT: 0.7 % (ref 0–1)
BILIRUB SERPL-MCNC: <0.2 MG/DL (ref 0.2–1.2)
BUN BLDV-MCNC: 11 MG/DL (ref 6–20)
CALCIUM SERPL-MCNC: 9.1 MG/DL (ref 8.6–10)
CHLORIDE BLD-SCNC: 103 MMOL/L (ref 98–111)
CO2: 25 MMOL/L (ref 22–29)
CREAT SERPL-MCNC: 0.8 MG/DL (ref 0.5–1.2)
D DIMER: <0.27 UG/ML FEU (ref 0–0.48)
EOSINOPHILS ABSOLUTE: 0.3 K/UL (ref 0–0.6)
EOSINOPHILS RELATIVE PERCENT: 3.7 % (ref 0–5)
GFR NON-AFRICAN AMERICAN: >60
GLUCOSE BLD-MCNC: 99 MG/DL (ref 74–109)
HCT VFR BLD CALC: 45.3 % (ref 42–52)
HEMOGLOBIN: 14.7 G/DL (ref 14–18)
LYMPHOCYTES ABSOLUTE: 2.4 K/UL (ref 1.1–4.5)
LYMPHOCYTES RELATIVE PERCENT: 31.6 % (ref 20–40)
MCH RBC QN AUTO: 30.4 PG (ref 27–31)
MCHC RBC AUTO-ENTMCNC: 32.5 G/DL (ref 33–37)
MCV RBC AUTO: 93.8 FL (ref 80–94)
MONOCYTES ABSOLUTE: 0.7 K/UL (ref 0–0.9)
MONOCYTES RELATIVE PERCENT: 9.3 % (ref 0–10)
NEUTROPHILS ABSOLUTE: 4.1 K/UL (ref 1.5–7.5)
NEUTROPHILS RELATIVE PERCENT: 53.9 % (ref 50–65)
PDW BLD-RTO: 13.2 % (ref 11.5–14.5)
PERFORMED ON: NORMAL
PLATELET # BLD: 218 K/UL (ref 130–400)
PMV BLD AUTO: 10 FL (ref 9.4–12.4)
POC TROPONIN I: 0 NG/ML (ref 0–0.08)
POTASSIUM SERPL-SCNC: 3.9 MMOL/L (ref 3.5–5)
PRO-BNP: 285 PG/ML (ref 0–450)
RBC # BLD: 4.83 M/UL (ref 4.7–6.1)
SODIUM BLD-SCNC: 142 MMOL/L (ref 136–145)
TOTAL PROTEIN: 6.8 G/DL (ref 6.6–8.7)
WBC # BLD: 7.5 K/UL (ref 4.8–10.8)

## 2017-09-17 PROCEDURE — 84484 ASSAY OF TROPONIN QUANT: CPT

## 2017-09-17 PROCEDURE — 71010 XR CHEST PORTABLE: CPT

## 2017-09-17 PROCEDURE — 71275 CT ANGIOGRAPHY CHEST: CPT

## 2017-09-17 PROCEDURE — 99285 EMERGENCY DEPT VISIT HI MDM: CPT

## 2017-09-17 PROCEDURE — 36415 COLL VENOUS BLD VENIPUNCTURE: CPT

## 2017-09-17 PROCEDURE — 80053 COMPREHEN METABOLIC PANEL: CPT

## 2017-09-17 PROCEDURE — 85379 FIBRIN DEGRADATION QUANT: CPT

## 2017-09-17 PROCEDURE — 85025 COMPLETE CBC W/AUTO DIFF WBC: CPT

## 2017-09-17 PROCEDURE — 83880 ASSAY OF NATRIURETIC PEPTIDE: CPT

## 2017-09-17 PROCEDURE — 96374 THER/PROPH/DIAG INJ IV PUSH: CPT

## 2017-09-17 PROCEDURE — 93005 ELECTROCARDIOGRAM TRACING: CPT

## 2017-09-17 PROCEDURE — 6360000004 HC RX CONTRAST MEDICATION: Performed by: EMERGENCY MEDICINE

## 2017-09-17 PROCEDURE — 6360000002 HC RX W HCPCS: Performed by: EMERGENCY MEDICINE

## 2017-09-17 RX ORDER — MORPHINE SULFATE 4 MG/ML
2 INJECTION, SOLUTION INTRAMUSCULAR; INTRAVENOUS ONCE
Status: COMPLETED | OUTPATIENT
Start: 2017-09-17 | End: 2017-09-17

## 2017-09-17 RX ADMIN — IOPAMIDOL 90 ML: 755 INJECTION, SOLUTION INTRAVENOUS at 23:09

## 2017-09-17 RX ADMIN — MORPHINE SULFATE 2 MG: 4 INJECTION, SOLUTION INTRAMUSCULAR; INTRAVENOUS at 23:57

## 2017-09-17 ASSESSMENT — PAIN DESCRIPTION - DESCRIPTORS: DESCRIPTORS: PRESSURE

## 2017-09-17 ASSESSMENT — PAIN DESCRIPTION - ORIENTATION: ORIENTATION: MID;LEFT

## 2017-09-17 ASSESSMENT — PAIN DESCRIPTION - LOCATION: LOCATION: CHEST

## 2017-09-17 ASSESSMENT — ENCOUNTER SYMPTOMS
ABDOMINAL PAIN: 0
SHORTNESS OF BREATH: 0
COUGH: 0
CHEST TIGHTNESS: 1

## 2017-09-17 ASSESSMENT — PAIN SCALES - GENERAL: PAINLEVEL_OUTOF10: 6

## 2017-09-18 VITALS
WEIGHT: 150 LBS | SYSTOLIC BLOOD PRESSURE: 96 MMHG | HEART RATE: 69 BPM | DIASTOLIC BLOOD PRESSURE: 68 MMHG | BODY MASS INDEX: 18.27 KG/M2 | TEMPERATURE: 97.7 F | OXYGEN SATURATION: 100 % | RESPIRATION RATE: 16 BRPM | HEIGHT: 76 IN

## 2017-09-18 LAB
EKG P AXIS: 75 DEGREES
EKG P AXIS: 77 DEGREES
EKG P-R INTERVAL: 160 MS
EKG P-R INTERVAL: 186 MS
EKG Q-T INTERVAL: 338 MS
EKG Q-T INTERVAL: 376 MS
EKG QRS DURATION: 74 MS
EKG QRS DURATION: 84 MS
EKG QTC CALCULATION (BAZETT): 384 MS
EKG QTC CALCULATION (BAZETT): 392 MS
EKG T AXIS: 82 DEGREES
EKG T AXIS: 83 DEGREES
PERFORMED ON: NORMAL
POC TROPONIN I: 0 NG/ML (ref 0–0.08)

## 2017-09-18 PROCEDURE — 99284 EMERGENCY DEPT VISIT MOD MDM: CPT | Performed by: EMERGENCY MEDICINE

## 2017-09-18 PROCEDURE — 96375 TX/PRO/DX INJ NEW DRUG ADDON: CPT

## 2017-09-18 PROCEDURE — 84484 ASSAY OF TROPONIN QUANT: CPT

## 2017-09-18 PROCEDURE — 6360000002 HC RX W HCPCS: Performed by: EMERGENCY MEDICINE

## 2017-09-18 RX ORDER — KETOROLAC TROMETHAMINE 30 MG/ML
30 INJECTION, SOLUTION INTRAMUSCULAR; INTRAVENOUS ONCE
Status: COMPLETED | OUTPATIENT
Start: 2017-09-18 | End: 2017-09-18

## 2017-09-18 RX ADMIN — KETOROLAC TROMETHAMINE 30 MG: 30 INJECTION, SOLUTION INTRAMUSCULAR at 02:25

## 2017-09-18 ASSESSMENT — PAIN SCALES - GENERAL
PAINLEVEL_OUTOF10: 5
PAINLEVEL_OUTOF10: 2

## 2017-10-05 ENCOUNTER — TELEPHONE (OUTPATIENT)
Dept: CARDIAC SURGERY | Facility: CLINIC | Age: 32
End: 2017-10-05

## 2017-10-05 NOTE — TELEPHONE ENCOUNTER
Tried calling patient to inform him he has an appt for a CT at main entrance of [facility] on 10/23/2017.  Needs to arrive at 12 for 12:30 appt.  Has follow up with Dr. Heath the next day.  Insurance wouldn't cover [facility] doing the CT for some reason so it had to be changed to the day before his follow up appt.

## 2017-10-10 PROCEDURE — 99283 EMERGENCY DEPT VISIT LOW MDM: CPT

## 2017-10-11 ENCOUNTER — TELEPHONE (OUTPATIENT)
Dept: CARDIAC SURGERY | Facility: CLINIC | Age: 32
End: 2017-10-11

## 2017-10-11 ENCOUNTER — HOSPITAL ENCOUNTER (EMERGENCY)
Facility: HOSPITAL | Age: 32
Discharge: HOME | End: 2017-10-11
Attending: EMERGENCY MEDICINE | Admitting: EMERGENCY MEDICINE

## 2017-10-11 ENCOUNTER — APPOINTMENT (OUTPATIENT)
Dept: CT IMAGING | Facility: HOSPITAL | Age: 32
End: 2017-10-11

## 2017-10-11 VITALS
WEIGHT: 150 LBS | HEART RATE: 98 BPM | OXYGEN SATURATION: 100 % | TEMPERATURE: 98 F | BODY MASS INDEX: 18.27 KG/M2 | SYSTOLIC BLOOD PRESSURE: 127 MMHG | DIASTOLIC BLOOD PRESSURE: 80 MMHG | HEIGHT: 76 IN | RESPIRATION RATE: 18 BRPM

## 2017-10-11 VITALS
DIASTOLIC BLOOD PRESSURE: 78 MMHG | OXYGEN SATURATION: 100 % | HEIGHT: 76 IN | BODY MASS INDEX: 18.27 KG/M2 | WEIGHT: 150 LBS | RESPIRATION RATE: 20 BRPM | TEMPERATURE: 98.1 F | SYSTOLIC BLOOD PRESSURE: 102 MMHG | HEART RATE: 78 BPM

## 2017-10-11 DIAGNOSIS — M62.830 MUSCLE SPASM OF BACK: Primary | ICD-10-CM

## 2017-10-11 DIAGNOSIS — M62.830 BACK MUSCLE SPASM: Primary | ICD-10-CM

## 2017-10-11 LAB
ALBUMIN SERPL-MCNC: 4.8 G/DL (ref 3.5–5)
ALBUMIN/GLOB SERPL: 1.6 G/DL (ref 1.1–2.5)
ALP SERPL-CCNC: 70 U/L (ref 24–120)
ALT SERPL W P-5'-P-CCNC: 28 U/L (ref 0–54)
ANION GAP SERPL CALCULATED.3IONS-SCNC: 13 MMOL/L (ref 4–13)
AST SERPL-CCNC: 23 U/L (ref 7–45)
BASOPHILS # BLD AUTO: 0.03 10*3/MM3 (ref 0–0.2)
BASOPHILS NFR BLD AUTO: 0.3 % (ref 0–2)
BILIRUB SERPL-MCNC: 0.4 MG/DL (ref 0.1–1)
BUN BLD-MCNC: 14 MG/DL (ref 5–21)
BUN/CREAT SERPL: 15.1 (ref 7–25)
CALCIUM SPEC-SCNC: 9.9 MG/DL (ref 8.4–10.4)
CHLORIDE SERPL-SCNC: 104 MMOL/L (ref 98–110)
CO2 SERPL-SCNC: 24 MMOL/L (ref 24–31)
CREAT BLD-MCNC: 0.93 MG/DL (ref 0.5–1.4)
DEPRECATED RDW RBC AUTO: 43 FL (ref 40–54)
EOSINOPHIL # BLD AUTO: 0.1 10*3/MM3 (ref 0–0.7)
EOSINOPHIL NFR BLD AUTO: 1 % (ref 0–4)
ERYTHROCYTE [DISTWIDTH] IN BLOOD BY AUTOMATED COUNT: 13.3 % (ref 12–15)
GFR SERPL CREATININE-BSD FRML MDRD: 95 ML/MIN/1.73
GLOBULIN UR ELPH-MCNC: 3 GM/DL
GLUCOSE BLD-MCNC: 102 MG/DL (ref 70–100)
HCT VFR BLD AUTO: 43.5 % (ref 40–52)
HGB BLD-MCNC: 14.9 G/DL (ref 14–18)
IMM GRANULOCYTES # BLD: 0.05 10*3/MM3 (ref 0–0.03)
IMM GRANULOCYTES NFR BLD: 0.5 % (ref 0–5)
LYMPHOCYTES # BLD AUTO: 1.8 10*3/MM3 (ref 0.72–4.86)
LYMPHOCYTES NFR BLD AUTO: 17.1 % (ref 15–45)
MCH RBC QN AUTO: 30.6 PG (ref 28–32)
MCHC RBC AUTO-ENTMCNC: 34.3 G/DL (ref 33–36)
MCV RBC AUTO: 89.3 FL (ref 82–95)
MONOCYTES # BLD AUTO: 1 10*3/MM3 (ref 0.19–1.3)
MONOCYTES NFR BLD AUTO: 9.5 % (ref 4–12)
NEUTROPHILS # BLD AUTO: 7.52 10*3/MM3 (ref 1.87–8.4)
NEUTROPHILS NFR BLD AUTO: 71.6 % (ref 39–78)
PLATELET # BLD AUTO: 230 10*3/MM3 (ref 130–400)
PMV BLD AUTO: 10 FL (ref 6–12)
POTASSIUM BLD-SCNC: 4.2 MMOL/L (ref 3.5–5.3)
PROT SERPL-MCNC: 7.8 G/DL (ref 6.3–8.7)
RBC # BLD AUTO: 4.87 10*6/MM3 (ref 4.8–5.9)
SODIUM BLD-SCNC: 141 MMOL/L (ref 135–145)
WBC NRBC COR # BLD: 10.5 10*3/MM3 (ref 4.8–10.8)

## 2017-10-11 PROCEDURE — 25010000002 MORPHINE PER 10 MG: Performed by: EMERGENCY MEDICINE

## 2017-10-11 PROCEDURE — 96375 TX/PRO/DX INJ NEW DRUG ADDON: CPT

## 2017-10-11 PROCEDURE — 85025 COMPLETE CBC W/AUTO DIFF WBC: CPT | Performed by: EMERGENCY MEDICINE

## 2017-10-11 PROCEDURE — 80053 COMPREHEN METABOLIC PANEL: CPT | Performed by: EMERGENCY MEDICINE

## 2017-10-11 PROCEDURE — 99284 EMERGENCY DEPT VISIT MOD MDM: CPT

## 2017-10-11 PROCEDURE — 74177 CT ABD & PELVIS W/CONTRAST: CPT

## 2017-10-11 PROCEDURE — 96374 THER/PROPH/DIAG INJ IV PUSH: CPT

## 2017-10-11 PROCEDURE — 25010000002 KETOROLAC TROMETHAMINE PER 15 MG

## 2017-10-11 PROCEDURE — 96372 THER/PROPH/DIAG INJ SC/IM: CPT

## 2017-10-11 PROCEDURE — 0 IOPAMIDOL 61 % SOLUTION: Performed by: EMERGENCY MEDICINE

## 2017-10-11 PROCEDURE — 25010000002 DIPHENHYDRAMINE PER 50 MG: Performed by: EMERGENCY MEDICINE

## 2017-10-11 RX ORDER — DIAZEPAM 5 MG/ML
10 INJECTION, SOLUTION INTRAMUSCULAR; INTRAVENOUS ONCE
Status: DISCONTINUED | OUTPATIENT
Start: 2017-10-11 | End: 2017-10-11

## 2017-10-11 RX ORDER — MORPHINE SULFATE 4 MG/ML
4 INJECTION, SOLUTION INTRAMUSCULAR; INTRAVENOUS ONCE
Status: COMPLETED | OUTPATIENT
Start: 2017-10-11 | End: 2017-10-11

## 2017-10-11 RX ORDER — HYDROCODONE BITARTRATE AND ACETAMINOPHEN 10; 325 MG/1; MG/1
1 TABLET ORAL ONCE
Status: COMPLETED | OUTPATIENT
Start: 2017-10-11 | End: 2017-10-11

## 2017-10-11 RX ORDER — HYDROCODONE BITARTRATE AND ACETAMINOPHEN 10; 325 MG/1; MG/1
1 TABLET ORAL EVERY 4 HOURS PRN
Qty: 18 TABLET | Refills: 0 | Status: SHIPPED | OUTPATIENT
Start: 2017-10-11 | End: 2017-10-30

## 2017-10-11 RX ORDER — KETOROLAC TROMETHAMINE 15 MG/ML
60 INJECTION, SOLUTION INTRAMUSCULAR; INTRAVENOUS ONCE
Status: DISCONTINUED | OUTPATIENT
Start: 2017-10-11 | End: 2017-10-11 | Stop reason: HOSPADM

## 2017-10-11 RX ORDER — DIAZEPAM 10 MG/1
10 TABLET ORAL ONCE
Status: COMPLETED | OUTPATIENT
Start: 2017-10-11 | End: 2017-10-11

## 2017-10-11 RX ORDER — DIAZEPAM 5 MG/1
10 TABLET ORAL NIGHTLY PRN
Qty: 10 TABLET | Refills: 0 | Status: SHIPPED | OUTPATIENT
Start: 2017-10-11 | End: 2017-10-30

## 2017-10-11 RX ORDER — DIPHENHYDRAMINE HYDROCHLORIDE 50 MG/ML
25 INJECTION INTRAMUSCULAR; INTRAVENOUS ONCE
Status: COMPLETED | OUTPATIENT
Start: 2017-10-11 | End: 2017-10-11

## 2017-10-11 RX ORDER — KETOROLAC TROMETHAMINE 10 MG/1
10 TABLET, FILM COATED ORAL EVERY 6 HOURS PRN
Qty: 12 TABLET | Refills: 0 | Status: SHIPPED | OUTPATIENT
Start: 2017-10-11 | End: 2017-10-30

## 2017-10-11 RX ORDER — KETOROLAC TROMETHAMINE 30 MG/ML
INJECTION, SOLUTION INTRAMUSCULAR; INTRAVENOUS
Status: COMPLETED
Start: 2017-10-11 | End: 2017-10-11

## 2017-10-11 RX ORDER — HYDROCODONE BITARTRATE AND ACETAMINOPHEN 7.5; 325 MG/1; MG/1
1 TABLET ORAL ONCE
Status: COMPLETED | OUTPATIENT
Start: 2017-10-11 | End: 2017-10-11

## 2017-10-11 RX ADMIN — HYDROCODONE BITARTRATE AND ACETAMINOPHEN 1 TABLET: 7.5; 325 TABLET ORAL at 01:18

## 2017-10-11 RX ADMIN — DIAZEPAM 10 MG: 10 TABLET ORAL at 01:19

## 2017-10-11 RX ADMIN — IOPAMIDOL 100 ML: 612 INJECTION, SOLUTION INTRAVENOUS at 19:59

## 2017-10-11 RX ADMIN — KETOROLAC TROMETHAMINE 60 MG: 30 INJECTION, SOLUTION INTRAMUSCULAR at 01:19

## 2017-10-11 RX ADMIN — HYDROCODONE BITARTRATE AND ACETAMINOPHEN 1 TABLET: 10; 325 TABLET ORAL at 20:55

## 2017-10-11 RX ADMIN — MORPHINE SULFATE 4 MG: 4 INJECTION, SOLUTION INTRAMUSCULAR; INTRAVENOUS at 19:33

## 2017-10-11 RX ADMIN — DIPHENHYDRAMINE HYDROCHLORIDE 25 MG: 50 INJECTION, SOLUTION INTRAMUSCULAR; INTRAVENOUS at 19:40

## 2017-10-11 NOTE — TELEPHONE ENCOUNTER
Patient called and stated [facility] and they would not give him injections for the pain due to them believing the lower back pain could be contributed to a dissection in aorta.     Dr. Heath stated to call patient back and send him to ER due to the history of Marfans and back pain.    Called patient states he went to ER last night and that is how [facility] was [facility] and they told him to call us to handle the situation due to his aorta.    Dr. Heath told me to call patient and tell him option one is go to ER to be evaluated, he did not have a Ct scan yesterday so he cant be for sure if it is aortic but if that is what Orthopedic thinks it is and why they wont give him injections he needs to be evaluated. If patient refused we would set up an outpatient scan but it would take awhile due to the approval process of insurance. And he shouldn't wait due to his history as if it was Aorta it could be bad outcome.     Patient states he will go to ER and be evaluated and let them know that the [facility] did evaluate him today and would not give him injections for his back based on his history of Marfans and Aorta.

## 2017-10-11 NOTE — ED PROVIDER NOTES
Subjective   HPI Comments: Patient returns this evening for a lower back pain.  He states that he went to the orthopedic [facility] today where he had x-rays performed.  Because of the intensity of his pain he states that they were going to give him an injection, but he was advised to return to the ED for further evaluation.  The orthopedic institute is concerned that due to patient's history of Marfan's that he is having a aortic dissection as to the source of his lower back pain.  Patient states that he tried to the physical therapy techniques as well as the medication I gave him last night in an attempt to help relive the back pain.      History provided by:  Patient      Review of Systems   Constitutional: Negative for activity change, appetite change, chills, diaphoresis, fatigue and fever.   HENT: Negative for congestion, ear pain, nosebleeds, postnasal drip and sinus pressure.    Eyes: Negative for photophobia and pain.   Respiratory: Negative for cough, chest tightness, shortness of breath and wheezing.    Cardiovascular: Negative for chest pain.   Gastrointestinal: Negative for abdominal pain, blood in stool, diarrhea, nausea and vomiting.   Endocrine: Negative for cold intolerance and heat intolerance.   Genitourinary: Negative for difficulty urinating, dysuria and flank pain.   Musculoskeletal: Positive for back pain. Negative for arthralgias, neck pain and neck stiffness.   Skin: Negative for color change and rash.   Neurological: Negative for dizziness, weakness and headaches.   Hematological: Negative for adenopathy. Does not bruise/bleed easily.   Psychiatric/Behavioral: Negative for confusion and sleep disturbance. The patient is not nervous/anxious.        Past Medical History:   Diagnosis Date   • Back pain    • Chest pain    • Claustrophobia    • COPD (chronic obstructive pulmonary disease)    • Lung disease    • Marfan syndrome    • Pneumothorax    • Seizures     when he was a child    • Smoking      3/4 pack a day   • Tobacco abuse    • Underweight        Allergies   Allergen Reactions   • Medrol [Methylprednisolone] Other (See Comments)     Patient states he gets violent on this medication        Past Surgical History:   Procedure Laterality Date   • LUNG LOBECTOMY Right 02/01/2017   • THORACOSCOPY N/A 2/3/2017    Procedure: BRONCHOSCOPY, THORACOSCOPY RIGHT CHEST,  WEDGE RESECTION RIGHT UPPER AND LOWER LOBE OF LUNG, MECHANICAL PLEURODESIS;  Surgeon: Kyle Heath MD;  Location: [facility];  Service:    • TYMPANOSTOMY  05/12/2016    Recorded       Family History   Problem Relation Age of Onset   • Heart attack Father    • Stroke Father    • No Known Problems Mother    • Cancer Maternal Grandmother      breast   • Breast cancer Maternal Grandmother    • No Known Problems Daughter    • Prostate cancer Neg Hx    • Colon cancer Neg Hx    • Alcohol abuse Neg Hx    • Drug abuse Neg Hx    • Diabetes Neg Hx    • Thyroid cancer Neg Hx        Social History     Social History   • Marital status: Single     Spouse name: N/A   • Number of children: N/A   • Years of education: N/A     Social History Main Topics   • Smoking status: Current Every Day Smoker     Packs/day: 0.25     Years: 17.00     Types: Cigarettes   • Smokeless tobacco: Former User     Types: Chew   • Alcohol use No   • Drug use: No   • Sexual activity: Yes     Partners: Female     Other Topics Concern   • None     Social History Narrative    Trying for disability.            Objective   Physical Exam   Constitutional: He is oriented to person, place, and time. He appears well-developed and well-nourished. No distress.   HENT:   Head: Normocephalic and atraumatic.   Mouth/Throat: Oropharynx is clear and moist.   Eyes: Conjunctivae and EOM are normal. Pupils are equal, round, and reactive to light.   Neck: Normal range of motion. Neck supple.   Cardiovascular: Normal rate, regular rhythm and normal heart sounds.  Exam reveals no friction rub.    No  murmur heard.  Pulmonary/Chest: Effort normal and breath sounds normal. He has no wheezes. He has no rales.   Abdominal: Soft. Bowel sounds are normal. He exhibits no distension. There is no tenderness.   Musculoskeletal: He exhibits tenderness.        Thoracic back: He exhibits tenderness, pain and spasm. He exhibits no bony tenderness.        Lumbar back: He exhibits tenderness, pain and spasm. He exhibits no bony tenderness.   Patient has tenderness to palpation along the bilateral paraspinous region as well as along the lumbar sacral region in the same place that he was hurting last night.   Lymphadenopathy:     He has no cervical adenopathy.   Neurological: He is alert and oriented to person, place, and time. No cranial nerve deficit.   Skin: Skin is warm and dry. No erythema.   Psychiatric: He has a normal mood and affect. His behavior is normal. Judgment and thought content normal.   Nursing note and vitals reviewed.      Procedures         ED Course  ED Course      Lab Results (last 24 hours)     Procedure Component Value Units Date/Time    CBC & Differential [[id number]] Collected:  10/11/17 1932    Specimen:  Blood Updated:  10/11/17 1941    Narrative:       The following orders were created for panel order CBC & Differential.  Procedure                               Abnormality         Status                     ---------                               -----------         ------                     CBC Auto Differential[[id number]]        Abnormal            Final result                 Please view results for these tests on the individual orders.    Comprehensive Metabolic Panel [[id number]]  (Abnormal) Collected:  10/11/17 1932    Specimen:  Blood Updated:  10/11/17 1953     Glucose 102 (H) mg/dL      BUN 14 mg/dL      Creatinine 0.93 mg/dL      Sodium 141 mmol/L      Potassium 4.2 mmol/L      Chloride 104 mmol/L      CO2 24.0 mmol/L      Calcium 9.9 mg/dL      Total Protein 7.8 g/dL      Albumin 4.80 g/dL       ALT (SGPT) 28 U/L      AST (SGOT) 23 U/L      Alkaline Phosphatase 70 U/L      Total Bilirubin 0.4 mg/dL      eGFR Non African Amer 95 mL/min/1.73      Globulin 3.0 gm/dL      A/G Ratio 1.6 g/dL      BUN/Creatinine Ratio 15.1     Anion Gap 13.0 mmol/L     CBC Auto Differential [[id number]]  (Abnormal) Collected:  10/11/17 1932    Specimen:  Blood Updated:  10/11/17 1941     WBC 10.50 10*3/mm3      RBC 4.87 10*6/mm3      Hemoglobin 14.9 g/dL      Hematocrit 43.5 %      MCV 89.3 fL      MCH 30.6 pg      MCHC 34.3 g/dL      RDW 13.3 %      RDW-SD 43.0 fl      MPV 10.0 fL      Platelets 230 10*3/mm3      Neutrophil % 71.6 %      Lymphocyte % 17.1 %      Monocyte % 9.5 %      Eosinophil % 1.0 %      Basophil % 0.3 %      Immature Grans % 0.5 %      Neutrophils, Absolute 7.52 10*3/mm3      Lymphocytes, Absolute 1.80 10*3/mm3      Monocytes, Absolute 1.00 10*3/mm3      Eosinophils, Absolute 0.10 10*3/mm3      Basophils, Absolute 0.03 10*3/mm3      Immature Grans, Absolute 0.05 (H) 10*3/mm3                 MDM  Number of Diagnoses or Management Options  Back muscle spasm:   Diagnosis management comments: I discussed with patient about the results of labs and imaging.  His back pain appears to be more musculoskeletal in nature.  He was given anti-inflammatories and muscle relaxers last night.  We will add Lortab this evening.  I continue to use a physical therapy techniques explained to him that these muscle spasms or not something that and in 24 hours and may take several weeks.  We will have him follow-up with his primary care.  Told to return to the ED if he has any further issues or new complaints.       Amount and/or Complexity of Data Reviewed  Clinical lab tests: ordered and reviewed  Tests in the radiology section of CPT®: ordered and reviewed  Independent visualization of images, tracings, or specimens: yes    Risk of Complications, Morbidity, and/or Mortality  Presenting problems: moderate  Diagnostic  procedures: moderate  Management options: moderate    Patient Progress  Patient progress: stable      Final diagnoses:   Back muscle spasm            Andrea Hernandez MD  10/12/17 0612

## 2017-10-11 NOTE — ED PROVIDER NOTES
Subjective   HPI Comments: Patient complaining of lower back pain.  He is unsure as to what happened to cause the pain.  [facility] given a steroid shot and Zanaflex.  He states that the muscle relaxer has not significantly helped.  Patient was told that he needed to come back fast-paced or come to the ED to be further evaluated since fast-paced closed patient came here for evaluation.  He is not having any issues with bowel or bladder.  He is not having trouble walking.  He is not having weakness in extremity.      History provided by:  Patient      Review of Systems   Constitutional: Negative for activity change, appetite change, chills, diaphoresis, fatigue and fever.   HENT: Negative for congestion, ear pain, nosebleeds, postnasal drip and sinus pressure.    Eyes: Negative for photophobia and pain.   Respiratory: Negative for cough, chest tightness, shortness of breath and wheezing.    Cardiovascular: Negative for chest pain.   Gastrointestinal: Negative for abdominal pain, blood in stool, diarrhea, nausea and vomiting.   Endocrine: Negative for cold intolerance and heat intolerance.   Genitourinary: Negative for difficulty urinating, dysuria and flank pain.   Musculoskeletal: Positive for back pain. Negative for arthralgias, neck pain and neck stiffness.   Skin: Negative for color change and rash.   Neurological: Negative for dizziness, weakness and headaches.   Hematological: Negative for adenopathy. Does not bruise/bleed easily.   Psychiatric/Behavioral: Negative for confusion and sleep disturbance. The patient is not nervous/anxious.        Past Medical History:   Diagnosis Date   • Back pain    • Chest pain    • Claustrophobia    • COPD (chronic obstructive pulmonary disease)    • Lung disease    • Marfan syndrome    • Pneumothorax    • Seizures     when he was a child    • Smoking     3/4 pack a day   • Tobacco abuse    • Underweight        Allergies   Allergen  Reactions   • Medrol [Methylprednisolone] Other (See Comments)     Patient states he gets violent on this medication        Past Surgical History:   Procedure Laterality Date   • LUNG LOBECTOMY Right 02/01/2017   • THORACOSCOPY N/A 2/3/2017    Procedure: BRONCHOSCOPY, THORACOSCOPY RIGHT CHEST,  WEDGE RESECTION RIGHT UPPER AND LOWER LOBE OF LUNG, MECHANICAL PLEURODESIS;  Surgeon: Kyle Heath MD;  Location: [facility];  Service:    • TYMPANOSTOMY  05/12/2016    Recorded       Family History   Problem Relation Age of Onset   • Heart attack Father    • Stroke Father    • No Known Problems Mother    • Cancer Maternal Grandmother      breast   • Breast cancer Maternal Grandmother    • No Known Problems Daughter    • Prostate cancer Neg Hx    • Colon cancer Neg Hx    • Alcohol abuse Neg Hx    • Drug abuse Neg Hx    • Diabetes Neg Hx    • Thyroid cancer Neg Hx        Social History     Social History   • Marital status: Single     Spouse name: N/A   • Number of children: N/A   • Years of education: N/A     Social History Main Topics   • Smoking status: Current Every Day Smoker     Packs/day: 0.25     Years: 17.00     Types: Cigarettes   • Smokeless tobacco: Former User     Types: Chew   • Alcohol use No   • Drug use: No   • Sexual activity: Yes     Partners: Female     Other Topics Concern   • None     Social History Narrative    Trying for disability.            Objective   Physical Exam   Constitutional: He is oriented to person, place, and time. He appears well-developed and well-nourished. No distress.   Musculoskeletal: He exhibits tenderness.        Lumbar back: He exhibits tenderness, pain and spasm. He exhibits normal range of motion and no bony tenderness.   Patient has palpable spasm over the lower left back.  There is no midline tenderness.   Neurological: He is alert and oriented to person, place, and time. No cranial nerve deficit.   Skin: Skin is warm and dry. No erythema.   Psychiatric: He has a normal  mood and affect. His behavior is normal. Judgment and thought content normal.   Nursing note and vitals reviewed.      Procedures         ED Course  ED Course              MDM  Number of Diagnoses or Management Options  Muscle spasm of back: new and requires workup  Diagnosis management comments: Pt has muscle spasm.  Will start him on a different muscle relaxer and NSAID.  Instructed on a few PT techniques.  Told to return if he has any further issues or new complaints.     Risk of Complications, Morbidity, and/or Mortality  Presenting problems: low  Diagnostic procedures: low  Management options: low    Patient Progress  Patient progress: stable      Final diagnoses:   Muscle spasm of back            Andrea Hernandez MD  10/11/17 0102

## 2017-10-23 ENCOUNTER — HOSPITAL ENCOUNTER (OUTPATIENT)
Dept: CT IMAGING | Facility: HOSPITAL | Age: 32
Discharge: HOME | End: 2017-10-23
Attending: THORACIC SURGERY (CARDIOTHORACIC VASCULAR SURGERY) | Admitting: THORACIC SURGERY (CARDIOTHORACIC VASCULAR SURGERY)

## 2017-10-23 DIAGNOSIS — R91.1 SOLITARY PULMONARY NODULE: ICD-10-CM

## 2017-10-23 PROCEDURE — 0 IOPAMIDOL PER 1 ML: Performed by: THORACIC SURGERY (CARDIOTHORACIC VASCULAR SURGERY)

## 2017-10-23 PROCEDURE — 71270 CT THORAX DX C-/C+: CPT

## 2017-10-23 RX ADMIN — IOPAMIDOL 100 ML: 755 INJECTION, SOLUTION INTRAVENOUS at 12:30

## 2017-10-24 ENCOUNTER — OFFICE VISIT (OUTPATIENT)
Dept: CARDIAC SURGERY | Facility: CLINIC | Age: 32
End: 2017-10-24

## 2017-10-24 VITALS
SYSTOLIC BLOOD PRESSURE: 110 MMHG | DIASTOLIC BLOOD PRESSURE: 64 MMHG | HEIGHT: 76 IN | OXYGEN SATURATION: 100 % | WEIGHT: 155 LBS | HEART RATE: 85 BPM | BODY MASS INDEX: 18.88 KG/M2

## 2017-10-24 DIAGNOSIS — F17.200 SMOKING: Chronic | ICD-10-CM

## 2017-10-24 DIAGNOSIS — Q87.40 MARFAN SYNDROME: ICD-10-CM

## 2017-10-24 DIAGNOSIS — J43.9 BULLOUS EMPHYSEMA (HCC): Primary | ICD-10-CM

## 2017-10-24 PROCEDURE — 99213 OFFICE O/P EST LOW 20 MIN: CPT | Performed by: THORACIC SURGERY (CARDIOTHORACIC VASCULAR SURGERY)

## 2017-10-30 ENCOUNTER — OFFICE VISIT (OUTPATIENT)
Dept: NEUROSURGERY | Facility: CLINIC | Age: 32
End: 2017-10-30

## 2017-10-30 VITALS
BODY MASS INDEX: 17.71 KG/M2 | WEIGHT: 150 LBS | HEIGHT: 77 IN | SYSTOLIC BLOOD PRESSURE: 98 MMHG | DIASTOLIC BLOOD PRESSURE: 65 MMHG

## 2017-10-30 DIAGNOSIS — F17.200 SMOKING: Chronic | ICD-10-CM

## 2017-10-30 DIAGNOSIS — M51.26 DISPLACEMENT OF LUMBAR INTERVERTEBRAL DISC WITHOUT MYELOPATHY: Primary | ICD-10-CM

## 2017-10-30 DIAGNOSIS — R63.6 UNDERWEIGHT: ICD-10-CM

## 2017-10-30 PROCEDURE — 99204 OFFICE O/P NEW MOD 45 MIN: CPT | Performed by: NURSE PRACTITIONER

## 2017-10-30 RX ORDER — CYCLOBENZAPRINE HCL 10 MG
10 TABLET ORAL 3 TIMES DAILY PRN
Qty: 90 TABLET | Refills: 2 | Status: SHIPPED | OUTPATIENT
Start: 2017-10-30 | End: 2018-10-05

## 2017-10-30 RX ORDER — DICLOFENAC SODIUM 75 MG/1
75 TABLET, DELAYED RELEASE ORAL 2 TIMES DAILY
Qty: 60 TABLET | Refills: 2 | Status: SHIPPED | OUTPATIENT
Start: 2017-10-30 | End: 2018-10-05

## 2017-10-30 NOTE — PROGRESS NOTES
Chief complaint:   Chief Complaint   Patient presents with   • Back Pain     Patient is referred here today with back pain, he has some left leg pain, but not all the time. He has not brought his films with him today for review, but states he thinks he has had CT scan about 3 weeks ago.  He also states he has no physical therapy.        Subjective     HPI: This is a 32-year-old male gentleman who was referred to us by the emergency room for back and leg pain.  He is here to be evaluated today.  He says the pain is back is been going on for several years.  There was no accident or injury associated with this.  Says the pain in his back is constant.  He says it is better when he is lying down  And its worse when is walking..  He has pain that radiates down into his left lower extremity in a radicular fashion to his knee.  This pain is intermittent.  Its outer with heat.  Its worse when walking.  He says the pain in his back is the biggest issue.  Denies any bowel or bladder issues.  He has not done any recent physical therapy, chiropractic care, or pain management injections.  Rates the pain on scale 0-10 at a 9.  He states that he is not working as he is applying for disability for his Marfan syndrome.    Review of Systems   Constitutional: Positive for chills.   Respiratory: Positive for shortness of breath.    Genitourinary: Positive for frequency.   Musculoskeletal: Positive for arthralgias and back pain.   All other systems reviewed and are negative.       Past Medical History:   Diagnosis Date   • Back pain    • Chest pain    • Claustrophobia    • COPD (chronic obstructive pulmonary disease)    • Lung disease    • Marfan syndrome    • Pneumothorax    • Seizures     when he was a child    • Smoking     3/4 pack a day   • Tobacco abuse    • Underweight      Past Surgical History:   Procedure Laterality Date   • LUNG LOBECTOMY Right 02/01/2017   • THORACOSCOPY N/A 2/3/2017    Procedure: BRONCHOSCOPY,  "THORACOSCOPY RIGHT CHEST,  WEDGE RESECTION RIGHT UPPER AND LOWER LOBE OF LUNG, MECHANICAL PLEURODESIS;  Surgeon: Kyle Heath MD;  Location: [facility];  Service:    • TYMPANOSTOMY  05/12/2016    Recorded     Family History   Problem Relation Age of Onset   • Heart attack Father    • Stroke Father    • No Known Problems Mother    • Cancer Maternal Grandmother      breast   • Breast cancer Maternal Grandmother    • No Known Problems Daughter    • Prostate cancer Neg Hx    • Colon cancer Neg Hx    • Alcohol abuse Neg Hx    • Drug abuse Neg Hx    • Diabetes Neg Hx    • Thyroid cancer Neg Hx      Social History   Substance Use Topics   • Smoking status: Current Every Day Smoker     Packs/day: 0.25     Years: 17.00     Types: Cigarettes   • Smokeless tobacco: Former User     Types: Chew   • Alcohol use No       (Not in a hospital admission)  Allergies:  Medrol [methylprednisolone]    Objective      Vital Signs  BP 98/65 (BP Location: Right arm, Patient Position: Sitting)  Ht 77\" (195.6 cm)  Wt 150 lb (68 kg)  BMI 17.79 kg/m2    Physical Exam   Constitutional: He is oriented to person, place, and time. He appears well-developed and well-nourished.   HENT:   Head: Normocephalic.   Eyes: Conjunctivae, EOM and lids are normal. Pupils are equal, round, and reactive to light.   Neck: Normal range of motion.   Cardiovascular: Normal rate, regular rhythm and normal heart sounds.    Pulmonary/Chest: Effort normal and breath sounds normal.   Abdominal: Normal appearance.   Musculoskeletal: Normal range of motion.        Lumbar back: He exhibits pain.   Neurological: He is alert and oriented to person, place, and time. He has normal strength. He displays normal reflexes. No cranial nerve deficit or sensory deficit. GCS eye subscore is 4. GCS verbal subscore is 5. GCS motor subscore is 6.   Reflex Scores:       Patellar reflexes are 3+ on the right side and 2+ on the left side.       Achilles reflexes are 2+ on the right side " and 2+ on the left side.  Skin: Skin is warm.   Psychiatric: He has a normal mood and affect. His speech is normal and behavior is normal. Thought content normal. Cognition and memory are normal.       Results Review: CT scan of abdomen and pelvis did reveal that there does appear to be a small disc herniation at L5-S1.  It is not sure if there is pressure on the exiting nerve root or not.  No disc degeneration or fracture that I could appreciate.          Assessment/Plan: At this point the patient is here to have a lumbar disc displacement.  At this point I am going to start him in a dedicated course of physical therapy consisting of 2-3 times a week for 4-6 weeks.  I will also start him on diclofenac and Flexeril to see how this will help with his pain.  We will follow-up again with him in 6 weeks to see how is doing.  Should he not have any improvement in his pain we will consider sending the patient for an MRI of his lumbar spine.  BMI shows that is under weight.  BMI chart was given the patient.  He is a smoker.  Smoking cessation classes were given to the patient.      Ross was seen today for back pain.    Diagnoses and all orders for this visit:    Displacement of lumbar intervertebral disc without myelopathy  -     Ambulatory Referral to Physical Therapy Evaluate and treat    Smoking    Underweight    Other orders  -     cyclobenzaprine (FLEXERIL) 10 MG tablet; Take 1 tablet by mouth 3 (Three) Times a Day As Needed for Muscle Spasms.  -     diclofenac (VOLTAREN) 75 MG EC tablet; Take 1 tablet by mouth 2 (Two) Times a Day.        I discussed the patients findings and my recommendations with patient    Junior Javier, JANESSA  10/30/17  10:18 AM

## 2017-11-13 DIAGNOSIS — M51.26 DISPLACEMENT OF LUMBAR INTERVERTEBRAL DISC WITHOUT MYELOPATHY: Primary | ICD-10-CM

## 2017-11-13 NOTE — PROGRESS NOTES
Patient has lost order from PT and has also moved to the [place] area. Have created new order for [facility] to contact patient with appointment date/time.

## 2017-11-14 ENCOUNTER — APPOINTMENT (OUTPATIENT)
Dept: PHYSICAL THERAPY | Facility: HOSPITAL | Age: 32
End: 2017-11-14

## 2017-11-17 ENCOUNTER — HOSPITAL ENCOUNTER (EMERGENCY)
Facility: HOSPITAL | Age: 32
Discharge: HOME | End: 2017-11-17
Attending: EMERGENCY MEDICINE | Admitting: EMERGENCY MEDICINE

## 2017-11-17 VITALS
HEIGHT: 77 IN | OXYGEN SATURATION: 96 % | RESPIRATION RATE: 18 BRPM | WEIGHT: 162 LBS | SYSTOLIC BLOOD PRESSURE: 111 MMHG | BODY MASS INDEX: 19.13 KG/M2 | TEMPERATURE: 97.7 F | HEART RATE: 86 BPM | DIASTOLIC BLOOD PRESSURE: 66 MMHG

## 2017-11-17 DIAGNOSIS — K08.89 PAIN, DENTAL: Primary | ICD-10-CM

## 2017-11-17 PROCEDURE — 25010000002 KETOROLAC TROMETHAMINE PER 15 MG: Performed by: PHYSICIAN ASSISTANT

## 2017-11-17 PROCEDURE — 99283 EMERGENCY DEPT VISIT LOW MDM: CPT

## 2017-11-17 PROCEDURE — 96372 THER/PROPH/DIAG INJ SC/IM: CPT

## 2017-11-17 RX ORDER — KETOROLAC TROMETHAMINE 30 MG/ML
30 INJECTION, SOLUTION INTRAMUSCULAR; INTRAVENOUS ONCE
Status: COMPLETED | OUTPATIENT
Start: 2017-11-17 | End: 2017-11-17

## 2017-11-17 RX ORDER — DICLOFENAC SODIUM 75 MG/1
75 TABLET, DELAYED RELEASE ORAL 2 TIMES DAILY
Qty: 14 TABLET | Refills: 0 | Status: SHIPPED | OUTPATIENT
Start: 2017-11-17 | End: 2018-10-05

## 2017-11-17 RX ORDER — BUPIVACAINE HYDROCHLORIDE 2.5 MG/ML
10 INJECTION, SOLUTION INFILTRATION; PERINEURAL ONCE
Status: COMPLETED | OUTPATIENT
Start: 2017-11-17 | End: 2017-11-17

## 2017-11-17 RX ADMIN — KETOROLAC TROMETHAMINE 30 MG: 30 INJECTION, SOLUTION INTRAMUSCULAR at 01:44

## 2017-11-17 RX ADMIN — BUPIVACAINE HYDROCHLORIDE 10 ML: 2.5 INJECTION, SOLUTION INFILTRATION; PERINEURAL at 01:14

## 2017-11-17 RX ADMIN — LIDOCAINE HYDROCHLORIDE 10 ML: 20 SOLUTION ORAL; TOPICAL at 01:45

## 2017-11-17 NOTE — ED PROVIDER NOTES
Subjective   History of Present Illness  32-year-old male presents a chief complaint of tooth pain since mid afternoon today.  Patient reports his symptoms have progressively got worse throughout the day and reports this emergency room for relief.  He had called his dentist today and is unable to be seen for 2-1/2 weeks.  Denies fevers chills nausea vomiting diarrhea chest pain or shortness of breath  Review of Systems   All other systems reviewed and are negative.      Past Medical History:   Diagnosis Date   • Back pain    • Chest pain    • Claustrophobia    • COPD (chronic obstructive pulmonary disease)    • Lung disease    • Marfan syndrome    • Pneumothorax    • Seizures     when he was a child    • Smoking     3/4 pack a day   • Tobacco abuse    • Underweight        Allergies   Allergen Reactions   • Medrol [Methylprednisolone] Other (See Comments)     Patient states he gets violent on this medication        Past Surgical History:   Procedure Laterality Date   • LUNG LOBECTOMY Right 02/01/2017   • THORACOSCOPY N/A 2/3/2017    Procedure: BRONCHOSCOPY, THORACOSCOPY RIGHT CHEST,  WEDGE RESECTION RIGHT UPPER AND LOWER LOBE OF LUNG, MECHANICAL PLEURODESIS;  Surgeon: Kyle Heath MD;  Location: [facility] OR;  Service:    • TYMPANOSTOMY  05/12/2016    Recorded       Family History   Problem Relation Age of Onset   • Heart attack Father    • Stroke Father    • No Known Problems Mother    • Cancer Maternal Grandmother      breast   • Breast cancer Maternal Grandmother    • No Known Problems Daughter    • Prostate cancer Neg Hx    • Colon cancer Neg Hx    • Alcohol abuse Neg Hx    • Drug abuse Neg Hx    • Diabetes Neg Hx    • Thyroid cancer Neg Hx        Social History     Social History   • Marital status: Single     Spouse name: N/A   • Number of children: N/A   • Years of education: N/A     Social History Main Topics   • Smoking status: Current Every Day Smoker     Packs/day: 0.50     Years: 17.00     Types:  Cigarettes   • Smokeless tobacco: Former User     Types: Chew   • Alcohol use No   • Drug use: No   • Sexual activity: Yes     Partners: Female     Other Topics Concern   • None     Social History Narrative    Trying for disability.            Objective   Physical Exam   Constitutional: He is oriented to person, place, and time. He appears well-developed and well-nourished.   HENT:   Head: Normocephalic.   Left lateral incisor with notable decay no evidence of erythema or swelling, there is a small ulcer noted superior to left lateral incisor   Eyes: Pupils are equal, round, and reactive to light.   Neck: Normal range of motion.   Cardiovascular: Normal rate and regular rhythm.    Pulmonary/Chest: Effort normal.   Musculoskeletal: Normal range of motion.   Lymphadenopathy:     He has no cervical adenopathy.   Neurological: He is alert and oriented to person, place, and time.   Skin: Skin is warm.   Psychiatric: He has a normal mood and affect. His behavior is normal.   Nursing note and vitals reviewed.      Nerve Block  Date/Time: 11/17/2017 1:20 AM  Performed by: YOBANI CLIFFORD  Authorized by: ALAN BARROSO     Consent:     Consent obtained:  Verbal    Alternatives discussed:  No treatment  Indications:     Indications:  Pain relief  Location:     Body area:  Head    Laterality:  Left  Skin anesthesia (see MAR for exact dosages):     Skin anesthesia method:  None  Procedure details (see MAR for exact dosages):     Block needle gauge:  25 G    Anesthetic injected:  Bupivacaine 0.25% w/o epi    Steroid injected:  None    Additive injected:  None    Injection procedure:  Anatomic landmarks identified  Post-procedure details:     Dressing:  None    Outcome:  Pain improved    Patient tolerance of procedure:  Tolerated well, no immediate complications  Comments:      Interiors superior alveolar nerve block performed.  Patient tolerated well             ED Course  ED Course                  MDM  Number of  Diagnoses or Management Options  Diagnosis management comments: We will recommend this patient to outpatient dentist office clinic in [facility].  Patient will follow up there.  No evidence of abscess.    Patient had mild improvement in symptoms, pain went from an 8 to a 6.    Risk of Complications, Morbidity, and/or Mortality  Presenting problems: moderate  Diagnostic procedures: moderate  Management options: moderate    Patient Progress  Patient progress: improved      Final diagnoses:   Pain, dental            Travis George Cordova PA-C  11/17/17 0228

## 2017-11-18 ENCOUNTER — HOSPITAL ENCOUNTER (EMERGENCY)
Age: 32
Discharge: HOME | End: 2017-11-18
Payer: COMMERCIAL

## 2017-11-18 VITALS
BODY MASS INDEX: 18.3 KG/M2 | HEART RATE: 126 BPM | WEIGHT: 155 LBS | RESPIRATION RATE: 18 BRPM | TEMPERATURE: 98.1 F | HEIGHT: 77 IN | OXYGEN SATURATION: 98 % | SYSTOLIC BLOOD PRESSURE: 127 MMHG | DIASTOLIC BLOOD PRESSURE: 74 MMHG

## 2017-11-18 DIAGNOSIS — K08.89 PAIN, DENTAL: Primary | ICD-10-CM

## 2017-11-18 PROCEDURE — 99282 EMERGENCY DEPT VISIT SF MDM: CPT

## 2017-11-18 PROCEDURE — 64400 NJX AA&/STRD TRIGEMINAL NRV: CPT

## 2017-11-18 PROCEDURE — 64400 NJX AA&/STRD TRIGEMINAL NRV: CPT | Performed by: NURSE PRACTITIONER

## 2017-11-18 RX ORDER — HYDROCODONE BITARTRATE AND ACETAMINOPHEN 5; 325 MG/1; MG/1
1 TABLET ORAL EVERY 6 HOURS PRN
Qty: 10 TABLET | Refills: 0 | Status: SHIPPED | OUTPATIENT
Start: 2017-11-18 | End: 2017-12-09 | Stop reason: ALTCHOICE

## 2017-11-18 RX ORDER — PENICILLIN V POTASSIUM 500 MG/1
500 TABLET ORAL 3 TIMES DAILY
Qty: 30 TABLET | Refills: 0 | Status: SHIPPED | OUTPATIENT
Start: 2017-11-18 | End: 2017-11-28

## 2017-11-18 NOTE — ED PROVIDER NOTES
hours on, 12 hours off.     methylPREDNISolone 4 MG tablet  Commonly known as:  MEDROL DOSEPACK     naproxen 500 MG tablet  Commonly known as:  NAPROSYN  Take 1 tablet by mouth 2 times daily (with meals)     ondansetron 4 MG disintegrating tablet  Commonly known as:  ZOFRAN ODT  Take 1 tablet by mouth every 8 hours as needed for Nausea or Vomiting           Where to Get Your Medications      You can get these medications from any pharmacy    Bring a paper prescription for each of these medications  · HYDROcodone-acetaminophen 5-325 MG per tablet  · penicillin v potassium 500 MG tablet           (Please note that portions of this note were completed with a voice recognition program.  Efforts were made to edit the dictations but occasionally words are mis-transcribed.)              Blanca Arenas, APRN  11/18/17 4475

## 2017-12-09 ENCOUNTER — HOSPITAL ENCOUNTER (EMERGENCY)
Age: 32
Discharge: HOME | End: 2017-12-09
Attending: EMERGENCY MEDICINE
Payer: COMMERCIAL

## 2017-12-09 VITALS
DIASTOLIC BLOOD PRESSURE: 80 MMHG | BODY MASS INDEX: 18.27 KG/M2 | TEMPERATURE: 98.1 F | HEART RATE: 79 BPM | WEIGHT: 150 LBS | OXYGEN SATURATION: 98 % | RESPIRATION RATE: 16 BRPM | SYSTOLIC BLOOD PRESSURE: 119 MMHG | HEIGHT: 76 IN

## 2017-12-09 DIAGNOSIS — K02.9 DENTAL CARIES: Primary | ICD-10-CM

## 2017-12-09 PROCEDURE — 96372 THER/PROPH/DIAG INJ SC/IM: CPT

## 2017-12-09 PROCEDURE — 99282 EMERGENCY DEPT VISIT SF MDM: CPT

## 2017-12-09 PROCEDURE — 2500000003 HC RX 250 WO HCPCS

## 2017-12-09 PROCEDURE — 64400 NJX AA&/STRD TRIGEMINAL NRV: CPT | Performed by: EMERGENCY MEDICINE

## 2017-12-09 PROCEDURE — 64400 NJX AA&/STRD TRIGEMINAL NRV: CPT

## 2017-12-09 PROCEDURE — 6360000002 HC RX W HCPCS: Performed by: EMERGENCY MEDICINE

## 2017-12-09 RX ORDER — LIDOCAINE HYDROCHLORIDE 10 MG/ML
INJECTION, SOLUTION INFILTRATION; PERINEURAL
Status: COMPLETED
Start: 2017-12-09 | End: 2017-12-09

## 2017-12-09 RX ORDER — MORPHINE SULFATE 10 MG/ML
4 INJECTION, SOLUTION INTRAMUSCULAR; INTRAVENOUS ONCE
Status: COMPLETED | OUTPATIENT
Start: 2017-12-09 | End: 2017-12-09

## 2017-12-09 RX ORDER — LIDOCAINE HYDROCHLORIDE 10 MG/ML
5 INJECTION, SOLUTION INFILTRATION; PERINEURAL ONCE
Status: COMPLETED | OUTPATIENT
Start: 2017-12-09 | End: 2017-12-09

## 2017-12-09 RX ADMIN — LIDOCAINE HYDROCHLORIDE 5 ML: 10 INJECTION, SOLUTION INFILTRATION; PERINEURAL at 04:58

## 2017-12-09 RX ADMIN — MORPHINE SULFATE 4 MG: 10 INJECTION INTRAVENOUS at 03:20

## 2017-12-09 ASSESSMENT — PAIN SCALES - GENERAL
PAINLEVEL_OUTOF10: 10
PAINLEVEL_OUTOF10: 6
PAINLEVEL_OUTOF10: 6

## 2017-12-09 ASSESSMENT — PAIN DESCRIPTION - LOCATION: LOCATION: TEETH

## 2017-12-09 ASSESSMENT — PAIN DESCRIPTION - ORIENTATION: ORIENTATION: LEFT;UPPER

## 2017-12-09 NOTE — ED NOTES
Pt stating the shot has not helped yet. Pt requesting a dental block. MD notified and verbal order for Lidocaine 1%.      Lucita Roa RN  12/09/17 3654

## 2017-12-09 NOTE — ED NOTES
Assessment:    Pt respirations even and unlabored, skin color within normal limits. Skin is warm and dry. Capillary refill less than 2 seconds. Alert and oriented x 4. Pt is in no acute distress. Pain to left upper molar. Pt has dental decay. Pain radiates to left jaw and ear.             Isaías Roy RN  12/09/17 0279

## 2017-12-09 NOTE — ED PROVIDER NOTES
[facility] EMERGENCY DEPT  eMERGENCY dEPARTMENT eNCOUnter      Pt Name: Marisol Pinto  MRN: [id number]  [place] 1985  Date of evaluation: 12/9/2017  Provider: Jarek Armenta MD    CHIEF COMPLAINT       Chief Complaint   Patient presents with    Dental Pain     since yesterday to left upper molar. pt is suppose to get the teeth removed but not till Dec 28th. HISTORY OF PRESENT ILLNESS   (Location/Symptom, Timing/Onset, Context/Setting, Quality, Duration, Modifying Factors, Severity)  Note limiting factors. Marisol Pitno is a 33 y.o. male who presents to the emergency department For evaluation of dental pain. Patient reports that the symptoms began acutely yesterday, located over his left upper molars. He reports that he has had previous difficulty with these teeth and he is anxious scheduled to have them extracted however this is not scheduled until December 28 he reports that he has not had any recent fevers, chills or vomiting. States that he has not experienced any recent change in voice or difficulty swallowing or controlling secretions. HPI    Nursing Notes were reviewed. REVIEW OF SYSTEMS    (2-9 systems for level 4, 10 or more for level 5)     Review of Systems   Constitutional: Negative for chills and fever. PAST MEDICAL HISTORY     Past Medical History:   Diagnosis Date    Marfan's disease     Pneumothorax          SURGICAL HISTORY     History reviewed. No pertinent surgical history. CURRENT MEDICATIONS       There are no discharge medications for this patient. ALLERGIES     Review of patient's allergies indicates no known allergies. FAMILY HISTORY     History reviewed. No pertinent family history.        SOCIAL HISTORY       Social History     Social History    Marital status: Single     Spouse name: N/A    Number of children: N/A    Years of education: N/A     Social History Main Topics    Smoking status: Current Every Day Smoker     Packs/day: 1.00     Years:

## 2018-01-24 RX ORDER — CYCLOBENZAPRINE HCL 10 MG
TABLET ORAL
Qty: 90 TABLET | Refills: 0 | OUTPATIENT
Start: 2018-01-24

## 2018-01-24 NOTE — TELEPHONE ENCOUNTER
Patient hasn't been seen since Oct 2017 and cancelled follow up b/c he hasn't done PT.  I have notified Angely and spoke w/Juanis.  She is going to make a note that we have denied and patient will have to be seen before refills can be given.    georgina faria CMA

## 2018-02-17 ENCOUNTER — APPOINTMENT (OUTPATIENT)
Dept: GENERAL RADIOLOGY | Facility: HOSPITAL | Age: 33
End: 2018-02-17

## 2018-02-17 ENCOUNTER — HOSPITAL ENCOUNTER (EMERGENCY)
Facility: HOSPITAL | Age: 33
Discharge: HOME | End: 2018-02-17
Admitting: NURSE PRACTITIONER

## 2018-02-17 VITALS
BODY MASS INDEX: 18.65 KG/M2 | WEIGHT: 150 LBS | HEIGHT: 75 IN | DIASTOLIC BLOOD PRESSURE: 68 MMHG | OXYGEN SATURATION: 98 % | TEMPERATURE: 98.2 F | RESPIRATION RATE: 16 BRPM | SYSTOLIC BLOOD PRESSURE: 122 MMHG | HEART RATE: 91 BPM

## 2018-02-17 DIAGNOSIS — S86.911A STRAIN OF RIGHT KNEE, INITIAL ENCOUNTER: Primary | ICD-10-CM

## 2018-02-17 PROCEDURE — 73562 X-RAY EXAM OF KNEE 3: CPT

## 2018-02-17 PROCEDURE — 99283 EMERGENCY DEPT VISIT LOW MDM: CPT

## 2018-02-17 RX ORDER — NAPROXEN 500 MG/1
500 TABLET ORAL 2 TIMES DAILY PRN
Qty: 10 TABLET | Refills: 0 | Status: SHIPPED | OUTPATIENT
Start: 2018-02-17 | End: 2018-02-22

## 2018-02-17 RX ORDER — HYDROCODONE BITARTRATE AND ACETAMINOPHEN 5; 325 MG/1; MG/1
1 TABLET ORAL ONCE
Status: COMPLETED | OUTPATIENT
Start: 2018-02-17 | End: 2018-02-17

## 2018-02-17 RX ADMIN — HYDROCODONE BITARTRATE AND ACETAMINOPHEN 1 TABLET: 5; 325 TABLET ORAL at 21:35

## 2018-02-18 NOTE — ED PROVIDER NOTES
Subjective   HPI Comments: Patient is a 32-year-old  male who presents ER today with complaint of right knee pain.  Patient reports that yesterday he was walking into the gas station when he slipped on the floor and fell.  He states he landed on his right knee.  Patient presents she has had pain to the anterior aspect of the right knee since that time.  Patient presents ER today for further evaluation.    Patient is a 32 y.o. male presenting with knee pain.   History provided by:  Patient   used: No    Knee Pain   Location:  Knee  Time since incident:  1 day  Injury: yes    Knee location:  R knee  Chronicity:  New  Dislocation: no    Foreign body present:  No foreign bodies  Prior injury to area:  No  Relieved by:  Nothing  Worsened by:  Nothing  Ineffective treatments:  None tried  Associated symptoms: stiffness    Associated symptoms: no back pain, no decreased ROM, no fatigue, no fever, no itching, no muscle weakness, no neck pain, no numbness, no swelling and no tingling    Risk factors: no concern for non-accidental trauma, no frequent fractures, no known bone disorder, no obesity and no recent illness        Review of Systems   Constitutional: Negative for fatigue and fever.   Musculoskeletal: Positive for stiffness. Negative for back pain and neck pain.   Skin: Negative for itching.   All other systems reviewed and are negative.      Past Medical History:   Diagnosis Date   • Back pain    • Chest pain    • Claustrophobia    • COPD (chronic obstructive pulmonary disease)    • Lung disease    • Marfan syndrome    • Pneumothorax    • Seizures     when he was a child    • Smoking     3/4 pack a day   • Tobacco abuse    • Underweight        Allergies   Allergen Reactions   • Medrol [Methylprednisolone] Other (See Comments)     Patient states he gets violent on this medication        Past Surgical History:   Procedure Laterality Date   • LUNG LOBECTOMY Right 02/01/2017   • THORACOSCOPY  N/A 2/3/2017    Procedure: BRONCHOSCOPY, THORACOSCOPY RIGHT CHEST,  WEDGE RESECTION RIGHT UPPER AND LOWER LOBE OF LUNG, MECHANICAL PLEURODESIS;  Surgeon: Kyle Heath MD;  Location: [facility] OR;  Service:    • TYMPANOSTOMY  05/12/2016    Recorded       Family History   Problem Relation Age of Onset   • Heart attack Father    • Stroke Father    • No Known Problems Mother    • Cancer Maternal Grandmother      breast   • Breast cancer Maternal Grandmother    • No Known Problems Daughter    • Prostate cancer Neg Hx    • Colon cancer Neg Hx    • Alcohol abuse Neg Hx    • Drug abuse Neg Hx    • Diabetes Neg Hx    • Thyroid cancer Neg Hx        Social History     Social History   • Marital status: Single     Spouse name: N/A   • Number of children: N/A   • Years of education: N/A     Social History Main Topics   • Smoking status: Current Every Day Smoker     Packs/day: 0.50     Years: 17.00     Types: Cigarettes   • Smokeless tobacco: Former User     Types: Chew   • Alcohol use No   • Drug use: No   • Sexual activity: Yes     Partners: Female     Other Topics Concern   • None     Social History Narrative    Trying for disability.            Objective   Physical Exam   Constitutional: He is oriented to person, place, and time.   Cardiovascular: Normal rate.    Pulmonary/Chest: Effort normal.   Musculoskeletal:        Legs:  Neurological: He is alert and oriented to person, place, and time.   Skin: Skin is warm and dry.   Psychiatric: He has a normal mood and affect.   Nursing note and vitals reviewed.      Procedures         ED Course  ED Course   Comment By Time   Xray is unremarkable; pt will be placed in knee immobilizer; given crutches. Advised to f/u with orthopedics next week. Pt able to ambulate on RLE without difficulty. Pt will be DC home at this time in stable cond, advised to r/t to the ER as needed. Wen Mitchell, APRN 02/17 3476        XR Knee 3 View Right   Final Result   1. No acute bony abnormality.    This report was finalized on 02/17/2018 21:31 by Dr. Isidoro Fernandez MD.                  MDM  Number of Diagnoses or Management Options  Strain of right knee, initial encounter: new and requires workup     Amount and/or Complexity of Data Reviewed  Tests in the radiology section of CPT®: ordered and reviewed    Patient Progress  Patient progress: stable      Final diagnoses:   Strain of right knee, initial encounter            Wen Mitchell, APRN  02/17/18 2133

## 2018-02-19 NOTE — ED NOTES
"ED Call Back Questions    1. How are you doing since leaving the Emergency Department?    Knee is hurting bad  2. Do you have any questions about your discharge instructions? No     3. Have you filled your new prescriptions yet? Yes   a. Do you have any questions about those medications? No     4. Were you able to make a follow-up appointment with the physician? Yes     5. Do you have a primary care physician? No   a. If No, would you like for me to set you up with one? N/A  i. If Yes, “I will have our ED  give you a call right back at this number to work with you on the best time for an appointment.”    6. We are always looking to get better at what we do. Do you have any suggestions for what we can do to be even better? N/A  a. If Yes, \"Thank you for sharing your concerns. I apologize. I will follow up with our manager and patient . Would you like someone to call you back?\" No     7. Is there anything else I can do for you? No   Visit was good     Nirav Obrien  02/19/18 9308    "

## 2018-02-20 ENCOUNTER — HOSPITAL ENCOUNTER (EMERGENCY)
Facility: HOSPITAL | Age: 33
Discharge: HOME | End: 2018-02-20
Admitting: NURSE PRACTITIONER

## 2018-02-20 ENCOUNTER — APPOINTMENT (OUTPATIENT)
Dept: GENERAL RADIOLOGY | Facility: HOSPITAL | Age: 33
End: 2018-02-20

## 2018-02-20 VITALS
HEART RATE: 103 BPM | WEIGHT: 150 LBS | SYSTOLIC BLOOD PRESSURE: 141 MMHG | BODY MASS INDEX: 18.27 KG/M2 | HEIGHT: 76 IN | OXYGEN SATURATION: 100 % | TEMPERATURE: 97.8 F | RESPIRATION RATE: 16 BRPM | DIASTOLIC BLOOD PRESSURE: 79 MMHG

## 2018-02-20 DIAGNOSIS — S86.911S KNEE STRAIN, RIGHT, SEQUELA: Primary | ICD-10-CM

## 2018-02-20 PROCEDURE — 73562 X-RAY EXAM OF KNEE 3: CPT

## 2018-02-20 PROCEDURE — 99283 EMERGENCY DEPT VISIT LOW MDM: CPT

## 2018-02-20 RX ORDER — MELOXICAM 15 MG/1
15 TABLET ORAL DAILY
Qty: 30 TABLET | Refills: 0 | Status: SHIPPED | OUTPATIENT
Start: 2018-02-20 | End: 2018-10-05

## 2018-02-20 NOTE — ED NOTES
C/O Sunday slipped in the rain falling directly onto right knee and when he went to stand up he felt a pop. Vascular checks intact to RLE. Very tender to palpation to knee and posterior knee.     Alycia Manrique RN  02/20/18 9505

## 2018-02-20 NOTE — DISCHARGE INSTRUCTIONS
Return to ER if symptoms worsen   Ice to for 24 hours, then moist heat compresses three times a day for 15-20 min intervals   Wear brace that was provided for you with last er visit

## 2018-05-06 ENCOUNTER — HOSPITAL ENCOUNTER (EMERGENCY)
Facility: HOSPITAL | Age: 33
Discharge: HOME | End: 2018-05-06
Admitting: EMERGENCY MEDICINE

## 2018-05-06 ENCOUNTER — HOSPITAL ENCOUNTER (EMERGENCY)
Age: 33
Discharge: HOME | End: 2018-05-06
Payer: COMMERCIAL

## 2018-05-06 ENCOUNTER — APPOINTMENT (OUTPATIENT)
Dept: GENERAL RADIOLOGY | Age: 33
End: 2018-05-06

## 2018-05-06 VITALS
HEIGHT: 78 IN | BODY MASS INDEX: 17.93 KG/M2 | HEART RATE: 104 BPM | TEMPERATURE: 98.7 F | SYSTOLIC BLOOD PRESSURE: 115 MMHG | WEIGHT: 155 LBS | OXYGEN SATURATION: 99 % | DIASTOLIC BLOOD PRESSURE: 72 MMHG | RESPIRATION RATE: 18 BRPM

## 2018-05-06 VITALS
TEMPERATURE: 98.2 F | RESPIRATION RATE: 15 BRPM | OXYGEN SATURATION: 99 % | SYSTOLIC BLOOD PRESSURE: 103 MMHG | WEIGHT: 156 LBS | DIASTOLIC BLOOD PRESSURE: 61 MMHG | BODY MASS INDEX: 18.05 KG/M2 | HEIGHT: 78 IN | HEART RATE: 87 BPM

## 2018-05-06 DIAGNOSIS — S83.92XA SPRAIN OF LEFT KNEE, UNSPECIFIED LIGAMENT, INITIAL ENCOUNTER: Primary | ICD-10-CM

## 2018-05-06 DIAGNOSIS — M25.562 ACUTE PAIN OF LEFT KNEE: Primary | ICD-10-CM

## 2018-05-06 PROCEDURE — 73560 X-RAY EXAM OF KNEE 1 OR 2: CPT

## 2018-05-06 PROCEDURE — 6370000000 HC RX 637 (ALT 250 FOR IP): Performed by: NURSE PRACTITIONER

## 2018-05-06 PROCEDURE — 99283 EMERGENCY DEPT VISIT LOW MDM: CPT | Performed by: NURSE PRACTITIONER

## 2018-05-06 PROCEDURE — 99283 EMERGENCY DEPT VISIT LOW MDM: CPT

## 2018-05-06 RX ORDER — HYDROCODONE BITARTRATE AND ACETAMINOPHEN 5; 325 MG/1; MG/1
1 TABLET ORAL EVERY 6 HOURS PRN
Qty: 10 TABLET | Refills: 0 | Status: SHIPPED | OUTPATIENT
Start: 2018-05-06 | End: 2018-05-09

## 2018-05-06 RX ORDER — HYDROCODONE BITARTRATE AND ACETAMINOPHEN 7.5; 325 MG/1; MG/1
1 TABLET ORAL ONCE
Status: COMPLETED | OUTPATIENT
Start: 2018-05-06 | End: 2018-05-06

## 2018-05-06 RX ADMIN — HYDROCODONE BITARTRATE AND ACETAMINOPHEN 1 TABLET: 7.5; 325 TABLET ORAL at 15:53

## 2018-05-06 ASSESSMENT — PAIN SCALES - GENERAL: PAINLEVEL_OUTOF10: 8

## 2018-05-06 NOTE — ED NOTES
Patient reports increased pain following provider exam and requesting pain medication. Quentin HULL notified.      Americo Núñez RN  05/06/18 2095

## 2018-05-06 NOTE — ED NOTES
Patient discharged with AVS. AVS reviewed and patient given RX x0. All questions answered at this time. Vitals stable at discharge.        Adrián Garg RN  05/06/18 4157

## 2018-05-06 NOTE — ED PROVIDER NOTES
Subjective     History provided by:  Patient  Knee Pain   Location:  Knee  Time since incident:  1 day  Injury: yes    Mechanism of injury comment:  Playing with kids and turn while accidently steped in hole  Knee location:  L knee  Pain details:     Quality:  Aching    Radiates to:  Does not radiate    Severity:  Moderate    Onset quality:  Sudden    Duration:  1 day    Timing:  Constant    Progression:  Unchanged  Chronicity:  New  Dislocation: no    Worsened by:  Bearing weight, extension and activity  Ineffective treatments:  Acetaminophen  Associated symptoms: decreased ROM    Associated symptoms: no back pain, no fatigue, no fever, no itching, no muscle weakness, no numbness, no stiffness and no swelling    Risk factors: no obesity and no recent illness        Review of Systems   Constitutional: Negative for fatigue and fever.   HENT: Negative for congestion.    Respiratory: Negative for cough and shortness of breath.    Cardiovascular: Negative for chest pain and palpitations.   Gastrointestinal: Negative for abdominal pain.   Musculoskeletal: Positive for arthralgias (left knee pain). Negative for back pain and stiffness.   Skin: Negative for itching.   Neurological: Negative for dizziness and syncope.   Psychiatric/Behavioral: Negative for agitation.       Past Medical History:   Diagnosis Date   • Back pain    • Chest pain    • Claustrophobia    • COPD (chronic obstructive pulmonary disease)    • Lung disease    • Marfan syndrome    • Pneumothorax    • Seizures     when he was a child    • Smoking     3/4 pack a day   • Tobacco abuse    • Underweight        Allergies   Allergen Reactions   • Medrol [Methylprednisolone] Other (See Comments)     Patient states he gets violent on this medication        Past Surgical History:   Procedure Laterality Date   • LUNG LOBECTOMY Right 02/01/2017   • THORACOSCOPY N/A 2/3/2017    Procedure: BRONCHOSCOPY, THORACOSCOPY RIGHT CHEST,  WEDGE RESECTION RIGHT UPPER AND LOWER  LOBE OF LUNG, MECHANICAL PLEURODESIS;  Surgeon: Klye Heath MD;  Location: [facility] OR;  Service:    • TYMPANOSTOMY  05/12/2016    Recorded       Family History   Problem Relation Age of Onset   • Heart attack Father    • Stroke Father    • No Known Problems Mother    • Cancer Maternal Grandmother      breast   • Breast cancer Maternal Grandmother    • No Known Problems Daughter    • Prostate cancer Neg Hx    • Colon cancer Neg Hx    • Alcohol abuse Neg Hx    • Drug abuse Neg Hx    • Diabetes Neg Hx    • Thyroid cancer Neg Hx        Social History     Social History   • Marital status: Single     Social History Main Topics   • Smoking status: Current Every Day Smoker     Packs/day: 0.50     Years: 17.00     Types: Cigarettes   • Smokeless tobacco: Former User     Types: Chew   • Alcohol use No   • Drug use: No   • Sexual activity: Yes     Partners: Female     Other Topics Concern   • Not on file     Social History Narrative    Trying for disability. single       Lab Results (last 24 hours)     ** No results found for the last 24 hours. **          Objective   Physical Exam   Constitutional: He is oriented to person, place, and time. He appears well-developed and well-nourished. No distress.   HENT:   Head: Normocephalic and atraumatic.   Neck: Normal range of motion.   Cardiovascular: Normal rate and intact distal pulses.    Pulmonary/Chest: Effort normal and breath sounds normal. No respiratory distress.   Musculoskeletal:        Left knee: He exhibits decreased range of motion and swelling (minimal). He exhibits no ecchymosis, no deformity, no laceration, no erythema, normal alignment, no LCL laxity, normal patellar mobility and no MCL laxity.        Legs:  Neurological: He is alert and oriented to person, place, and time.   Nursing note and vitals reviewed.      Procedures         No orders to display       /61 (BP Location: Left arm, Patient Position: Lying)   Pulse 87   Temp 98.2 °F (36.8 °C)  "(Temporal Artery )   Resp 15   Ht 198.1 cm (78\")   Wt 70.8 kg (156 lb)   SpO2 99%   BMI 18.03 kg/m²     ED Course    ED Course   Comment By Time   Pt requesting pain medication after evaluation of knee.  Henri Fink PA-C 05/06 1252       Medications - No data to display         MDM  Number of Diagnoses or Management Options  Acute pain of left knee:   Diagnosis management comments: 32-year-old male with left knee pain onset 24 hours prior to arrival patient states he was playing with kids and slipped in a hole.  [place] knee rule is negative so we did not obtain x-rays.  He does have medial tenderness to palpation along the meniscus area.  However he is able to bear weight, is ambulatory in the ER, there is no patella tenderness, there is no fibular tenderness.  I put patient in a knee immobilizer and told him light duty for 48 hours and have close follow-up.  Patient repeatedly asked for pain medication throughout his stay here.  I informed him that the best way to deal with this as with ibuprofen elevation and ice.  We also discussed the importance of continuing to bear weight as tolerated to prevent atrophy.  I have arranged him to connect with the orthopedic [facility] for follow-up.  I discussed this case with Dr. Marie who agrees with plan to discharge with knee immobilizer crutches and to follow-up with primary care provider.      Final diagnoses:   Acute pain of left knee          Henri Fink PA-C  05/06/18 1504    "

## 2018-05-06 NOTE — DISCHARGE INSTRUCTIONS
Return to ER:   The pain changes or gets worse.  You have a fever along with knee pain.  Your knee gives out or locks up.  Your knee swells, and becomes worse.    Otherwise follow-up with the orthopedic [facility] as discussed in the next 48 hours.  Please keep your knee immobilizer on at all times except for while showering.  You are able to ambulate.  However do not stand on the leg fully extended periods of time.  You may elevate the leg and ice the area of pain 15 minutes 4 times per day.  You may take ibuprofen for pain medication as needed.

## 2018-05-06 NOTE — ED NOTES
Pt c/o left knee pain 7.5/10. Pt states playing with kids in the yard yesterday and felt pop in back of knee and pain is radiating to the front. Left knee appears slightly bigger than right.      Adrián Garg RN  05/06/18 5379

## 2018-06-15 ENCOUNTER — HOSPITAL ENCOUNTER (EMERGENCY)
Facility: HOSPITAL | Age: 33
Discharge: HOME | End: 2018-06-15
Admitting: EMERGENCY MEDICINE

## 2018-06-15 VITALS
HEIGHT: 78 IN | OXYGEN SATURATION: 100 % | DIASTOLIC BLOOD PRESSURE: 66 MMHG | TEMPERATURE: 98.9 F | SYSTOLIC BLOOD PRESSURE: 91 MMHG | RESPIRATION RATE: 18 BRPM | HEART RATE: 65 BPM | WEIGHT: 160 LBS | BODY MASS INDEX: 18.51 KG/M2

## 2018-06-15 DIAGNOSIS — E86.0 DEHYDRATION: ICD-10-CM

## 2018-06-15 DIAGNOSIS — T67.5XXA HEAT EXHAUSTION, INITIAL ENCOUNTER: Primary | ICD-10-CM

## 2018-06-15 DIAGNOSIS — M62.830 LUMBAR PARASPINAL MUSCLE SPASM: ICD-10-CM

## 2018-06-15 LAB
ALBUMIN SERPL-MCNC: 4.8 G/DL (ref 3.5–5)
ALBUMIN/GLOB SERPL: 1.5 G/DL (ref 1.1–2.5)
ALP SERPL-CCNC: 89 U/L (ref 24–120)
ALT SERPL W P-5'-P-CCNC: 38 U/L (ref 0–54)
ANION GAP SERPL CALCULATED.3IONS-SCNC: 13 MMOL/L (ref 4–13)
AST SERPL-CCNC: 78 U/L (ref 7–45)
BACTERIA UR QL AUTO: ABNORMAL /HPF
BASOPHILS # BLD AUTO: 0.05 10*3/MM3 (ref 0–0.2)
BASOPHILS NFR BLD AUTO: 0.7 % (ref 0–2)
BILIRUB SERPL-MCNC: 0.9 MG/DL (ref 0.1–1)
BILIRUB UR QL STRIP: NEGATIVE
BUN BLD-MCNC: 20 MG/DL (ref 5–21)
BUN/CREAT SERPL: 19.6 (ref 7–25)
CALCIUM SPEC-SCNC: 9.8 MG/DL (ref 8.4–10.4)
CHLORIDE SERPL-SCNC: 101 MMOL/L (ref 98–110)
CK SERPL-CCNC: 1184 U/L (ref 0–203)
CLARITY UR: CLEAR
CO2 SERPL-SCNC: 29 MMOL/L (ref 24–31)
COLOR UR: ABNORMAL
CREAT BLD-MCNC: 1.02 MG/DL (ref 0.5–1.4)
DEPRECATED RDW RBC AUTO: 39.8 FL (ref 40–54)
EOSINOPHIL # BLD AUTO: 0.18 10*3/MM3 (ref 0–0.7)
EOSINOPHIL NFR BLD AUTO: 2.4 % (ref 0–4)
ERYTHROCYTE [DISTWIDTH] IN BLOOD BY AUTOMATED COUNT: 12.4 % (ref 12–15)
GFR SERPL CREATININE-BSD FRML MDRD: 85 ML/MIN/1.73
GLOBULIN UR ELPH-MCNC: 3.2 GM/DL
GLUCOSE BLD-MCNC: 98 MG/DL (ref 70–100)
GLUCOSE UR STRIP-MCNC: NEGATIVE MG/DL
HCT VFR BLD AUTO: 44.7 % (ref 40–52)
HGB BLD-MCNC: 15.5 G/DL (ref 14–18)
HGB UR QL STRIP.AUTO: NEGATIVE
HYALINE CASTS UR QL AUTO: ABNORMAL /LPF
IMM GRANULOCYTES # BLD: 0.04 10*3/MM3 (ref 0–0.03)
IMM GRANULOCYTES NFR BLD: 0.5 % (ref 0–5)
KETONES UR QL STRIP: ABNORMAL
LEUKOCYTE ESTERASE UR QL STRIP.AUTO: ABNORMAL
LYMPHOCYTES # BLD AUTO: 2.18 10*3/MM3 (ref 0.72–4.86)
LYMPHOCYTES NFR BLD AUTO: 28.8 % (ref 15–45)
MCH RBC QN AUTO: 30.3 PG (ref 28–32)
MCHC RBC AUTO-ENTMCNC: 34.7 G/DL (ref 33–36)
MCV RBC AUTO: 87.5 FL (ref 82–95)
MONOCYTES # BLD AUTO: 0.77 10*3/MM3 (ref 0.19–1.3)
MONOCYTES NFR BLD AUTO: 10.2 % (ref 4–12)
MYOGLOBIN SERPL-MCNC: 163.4 NG/ML (ref 0–110)
NEUTROPHILS # BLD AUTO: 4.35 10*3/MM3 (ref 1.87–8.4)
NEUTROPHILS NFR BLD AUTO: 57.4 % (ref 39–78)
NITRITE UR QL STRIP: NEGATIVE
NRBC BLD MANUAL-RTO: 0 /100 WBC (ref 0–0)
PH UR STRIP.AUTO: <=5 [PH] (ref 5–8)
PLATELET # BLD AUTO: 283 10*3/MM3 (ref 130–400)
PMV BLD AUTO: 9.7 FL (ref 6–12)
POTASSIUM BLD-SCNC: 3.9 MMOL/L (ref 3.5–5.3)
PROT SERPL-MCNC: 8 G/DL (ref 6.3–8.7)
PROT UR QL STRIP: NEGATIVE
RBC # BLD AUTO: 5.11 10*6/MM3 (ref 4.8–5.9)
RBC # UR: ABNORMAL /HPF
REF LAB TEST METHOD: ABNORMAL
SODIUM BLD-SCNC: 143 MMOL/L (ref 135–145)
SP GR UR STRIP: >=1.03 (ref 1–1.03)
SQUAMOUS #/AREA URNS HPF: ABNORMAL /HPF
URINE MYOGLOBIN, QUALITATIVE: NEGATIVE
UROBILINOGEN UR QL STRIP: ABNORMAL
WBC NRBC COR # BLD: 7.57 10*3/MM3 (ref 4.8–10.8)
WBC UR QL AUTO: ABNORMAL /HPF

## 2018-06-15 PROCEDURE — 25010000002 ORPHENADRINE CITRATE PER 60 MG: Performed by: PHYSICIAN ASSISTANT

## 2018-06-15 PROCEDURE — 82550 ASSAY OF CK (CPK): CPT | Performed by: PHYSICIAN ASSISTANT

## 2018-06-15 PROCEDURE — 96374 THER/PROPH/DIAG INJ IV PUSH: CPT

## 2018-06-15 PROCEDURE — 85025 COMPLETE CBC W/AUTO DIFF WBC: CPT | Performed by: PHYSICIAN ASSISTANT

## 2018-06-15 PROCEDURE — 80053 COMPREHEN METABOLIC PANEL: CPT | Performed by: PHYSICIAN ASSISTANT

## 2018-06-15 PROCEDURE — 96375 TX/PRO/DX INJ NEW DRUG ADDON: CPT

## 2018-06-15 PROCEDURE — 83874 ASSAY OF MYOGLOBIN: CPT | Performed by: PHYSICIAN ASSISTANT

## 2018-06-15 PROCEDURE — 25010000002 ONDANSETRON PER 1 MG: Performed by: PHYSICIAN ASSISTANT

## 2018-06-15 PROCEDURE — 81001 URINALYSIS AUTO W/SCOPE: CPT | Performed by: PHYSICIAN ASSISTANT

## 2018-06-15 PROCEDURE — 99283 EMERGENCY DEPT VISIT LOW MDM: CPT

## 2018-06-15 RX ORDER — CYCLOBENZAPRINE HCL 10 MG
10 TABLET ORAL 3 TIMES DAILY PRN
Qty: 10 TABLET | Refills: 0 | Status: SHIPPED | OUTPATIENT
Start: 2018-06-15 | End: 2018-10-05

## 2018-06-15 RX ORDER — SODIUM CHLORIDE 0.9 % (FLUSH) 0.9 %
10 SYRINGE (ML) INJECTION AS NEEDED
Status: DISCONTINUED | OUTPATIENT
Start: 2018-06-15 | End: 2018-06-15 | Stop reason: HOSPADM

## 2018-06-15 RX ORDER — ORPHENADRINE CITRATE 30 MG/ML
60 INJECTION INTRAMUSCULAR; INTRAVENOUS ONCE
Status: COMPLETED | OUTPATIENT
Start: 2018-06-15 | End: 2018-06-15

## 2018-06-15 RX ORDER — ONDANSETRON 2 MG/ML
4 INJECTION INTRAMUSCULAR; INTRAVENOUS ONCE
Status: COMPLETED | OUTPATIENT
Start: 2018-06-15 | End: 2018-06-15

## 2018-06-15 RX ORDER — ONDANSETRON 4 MG/1
4 TABLET, ORALLY DISINTEGRATING ORAL EVERY 6 HOURS PRN
Qty: 12 TABLET | Refills: 0 | Status: SHIPPED | OUTPATIENT
Start: 2018-06-15 | End: 2018-10-05

## 2018-06-15 RX ADMIN — ONDANSETRON 4 MG: 2 INJECTION, SOLUTION INTRAMUSCULAR; INTRAVENOUS at 12:13

## 2018-06-15 RX ADMIN — SODIUM CHLORIDE 1000 ML: 9 INJECTION, SOLUTION INTRAVENOUS at 12:12

## 2018-06-15 RX ADMIN — SODIUM CHLORIDE 1000 ML: 9 INJECTION, SOLUTION INTRAVENOUS at 13:39

## 2018-06-15 RX ADMIN — ORPHENADRINE CITRATE 60 MG: 30 INJECTION INTRAMUSCULAR; INTRAVENOUS at 13:53

## 2018-06-15 NOTE — ED PROVIDER NOTES
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  He states that he drinks no water and only soda.  He states that he has few weak and tired for the past week or so and has vomited the past two mornings.  He states that he started having low back pain in the past few days and now has cramping on the left lower abdomen as well.  He states that he drank about a 2 liter of soda this morning and has only urinated once.  He feels he may be getting dehydrated.        History provided by:  Patient   used: No    Fatigue   Severity:  Moderate  Onset quality:  Gradual  Duration:  1 week  Timing:  Constant  Progression:  Worsening  Chronicity:  New  Associated symptoms: abdominal pain, diarrhea, fatigue, myalgias, nausea and vomiting    Associated symptoms: no chest pain, no ear pain, no fever, no headaches, no loss of consciousness, no rash, no rhinorrhea and no shortness of breath        Review of Systems   Constitutional: Positive for fatigue. Negative for fever.   HENT: Negative for ear pain and rhinorrhea.    Eyes: Negative.    Respiratory: Negative for shortness of breath.    Cardiovascular: Negative for chest pain.   Gastrointestinal: Positive for abdominal pain, diarrhea, nausea and vomiting.   Genitourinary: Negative for dysuria, hematuria, scrotal swelling and testicular pain.   Musculoskeletal: Positive for myalgias.   Skin: Negative for rash.   Neurological: Positive for weakness and light-headedness. Negative for dizziness, seizures, loss of consciousness, numbness and headaches.   Hematological: Negative.    Psychiatric/Behavioral: Negative.        Past Medical History:   Diagnosis Date   • Back pain    • Chest pain    • Claustrophobia    • COPD (chronic obstructive pulmonary disease)    • Lung disease    • Marfan syndrome    • Pneumothorax    • Seizures     when he was a child    • Smoking     3/4 pack a day   • Tobacco abuse    • Underweight         Allergies   Allergen Reactions   • Medrol [Methylprednisolone] Other (See Comments)     Patient states he gets violent on this medication        Past Surgical History:   Procedure Laterality Date   • LUNG LOBECTOMY Right 02/01/2017   • THORACOSCOPY N/A 2/3/2017    Procedure: BRONCHOSCOPY, THORACOSCOPY RIGHT CHEST,  WEDGE RESECTION RIGHT UPPER AND LOWER LOBE OF LUNG, MECHANICAL PLEURODESIS;  Surgeon: Kyle Heath MD;  Location: [facility];  Service:    • TYMPANOSTOMY  05/12/2016    Recorded       Family History   Problem Relation Age of Onset   • Heart attack Father    • Stroke Father    • No Known Problems Mother    • Cancer Maternal Grandmother         breast   • Breast cancer Maternal Grandmother    • No Known Problems Daughter    • Prostate cancer Neg Hx    • Colon cancer Neg Hx    • Alcohol abuse Neg Hx    • Drug abuse Neg Hx    • Diabetes Neg Hx    • Thyroid cancer Neg Hx        Social History     Social History   • Marital status: Single     Social History Main Topics   • Smoking status: Current Every Day Smoker     Packs/day: 0.50     Years: 17.00     Types: Cigarettes   • Smokeless tobacco: Former User     Types: Chew   • Alcohol use No   • Drug use: No   • Sexual activity: Yes     Partners: Female     Other Topics Concern   • Not on file     Social History Narrative    Trying for disability. single           Objective   Physical Exam   Constitutional: He is oriented to person, place, and time. He appears well-developed and well-nourished. No distress.   Eyes: EOM are normal. Pupils are equal, round, and reactive to light.   Cardiovascular: Normal rate and regular rhythm.  Exam reveals no friction rub.    No murmur heard.  Pulmonary/Chest: Effort normal and breath sounds normal. No respiratory distress. He has no wheezes. He has no rales.   Abdominal: Soft. Bowel sounds are normal. He exhibits no mass. There is tenderness. There is no guarding.   Mild tenderness to LLQ without guarding or rebound.   Reproduce with movement   Musculoskeletal: He exhibits tenderness. He exhibits no edema or deformity.        Lumbar back: He exhibits tenderness and pain. He exhibits normal range of motion, no bony tenderness, no swelling, no edema and no spasm.        Back:    Neurological: He is alert and oriented to person, place, and time. No sensory deficit. He exhibits normal muscle tone.   Skin: Skin is warm and dry. He is not diaphoretic. No erythema. No pallor.   Psychiatric: He has a normal mood and affect. His behavior is normal.   Nursing note and vitals reviewed.      Procedures           ED Course  ED Course as of Daryl 15 1419   Fri Daryl 15, 2018   1416 No vomiting throughout ED stay.  Discussed case with Dr. Kauffman.  Will give 2 L bolus prior to discharge.  Advised patient to stay out of the heat for 3 days.  He voiced understanding  [TH]      ED Course User Index  [TH] Kulwinder Guo PA-C                  [facility]      Final diagnoses:   Heat exhaustion, initial encounter   Dehydration   Lumbar paraspinal muscle spasm            Kulwinder uGo PA-C  06/15/18 1420

## 2018-06-15 NOTE — DISCHARGE INSTRUCTIONS
Sty indoors for 3 days and increase fluid intake.  Drink water and Gatorade/Pedialyte while working outdoors.

## 2018-07-19 ENCOUNTER — HOSPITAL ENCOUNTER (EMERGENCY)
Age: 33
Discharge: HOME | End: 2018-07-19
Payer: COMMERCIAL

## 2018-07-19 ENCOUNTER — APPOINTMENT (OUTPATIENT)
Dept: GENERAL RADIOLOGY | Age: 33
End: 2018-07-19

## 2018-07-19 VITALS
TEMPERATURE: 98.4 F | HEIGHT: 75 IN | DIASTOLIC BLOOD PRESSURE: 82 MMHG | SYSTOLIC BLOOD PRESSURE: 115 MMHG | HEART RATE: 82 BPM | BODY MASS INDEX: 19.89 KG/M2 | RESPIRATION RATE: 18 BRPM | OXYGEN SATURATION: 98 % | WEIGHT: 160 LBS

## 2018-07-19 DIAGNOSIS — S86.912A STRAIN OF LEFT KNEE, INITIAL ENCOUNTER: Primary | ICD-10-CM

## 2018-07-19 PROCEDURE — 99283 EMERGENCY DEPT VISIT LOW MDM: CPT | Performed by: NURSE PRACTITIONER

## 2018-07-19 PROCEDURE — 73562 X-RAY EXAM OF KNEE 3: CPT

## 2018-07-19 PROCEDURE — 73560 X-RAY EXAM OF KNEE 1 OR 2: CPT

## 2018-07-19 PROCEDURE — 6370000000 HC RX 637 (ALT 250 FOR IP): Performed by: NURSE PRACTITIONER

## 2018-07-19 PROCEDURE — 99283 EMERGENCY DEPT VISIT LOW MDM: CPT

## 2018-07-19 RX ORDER — MELOXICAM 15 MG/1
15 TABLET ORAL DAILY
Qty: 30 TABLET | Refills: 0 | Status: SHIPPED | OUTPATIENT
Start: 2018-07-19 | End: 2019-01-21 | Stop reason: ALTCHOICE

## 2018-07-19 RX ORDER — HYDROCODONE BITARTRATE AND ACETAMINOPHEN 7.5; 325 MG/1; MG/1
1 TABLET ORAL ONCE
Status: COMPLETED | OUTPATIENT
Start: 2018-07-19 | End: 2018-07-19

## 2018-07-19 RX ADMIN — HYDROCODONE BITARTRATE AND ACETAMINOPHEN 1 TABLET: 7.5; 325 TABLET ORAL at 23:10

## 2018-07-19 ASSESSMENT — ENCOUNTER SYMPTOMS
CONSTIPATION: 0
COUGH: 0
ABDOMINAL PAIN: 0
NAUSEA: 0
TROUBLE SWALLOWING: 0
SORE THROAT: 0
SHORTNESS OF BREATH: 0
VOMITING: 0
RHINORRHEA: 0
DIARRHEA: 0

## 2018-07-19 ASSESSMENT — PAIN SCALES - GENERAL
PAINLEVEL_OUTOF10: 9
PAINLEVEL_OUTOF10: 9

## 2018-07-20 NOTE — ED PROVIDER NOTES
[address] [facility] EMERGENCY DEPT  eMERGENCY dEPARTMENT eNCOUnter      Pt Name: Nery Fishman  MRN: [id number]  [place] 1985  Date of evaluation: 7/19/2018  Provider: JO ANN Carranza    CHIEF COMPLAINT       Chief Complaint   Patient presents with    Knee Pain     left, reinjured today         HISTORY OF PRESENT ILLNESS   (Location/Symptom, Timing/Onset, Context/Setting, Quality, Duration, Modifying Factors, Severity)  Note limiting factors. Nery Fishman is a 29 y.o. male who presents to the emergency department with complaints of left knee pain. Pt states last week he stepped off a trailer and hurt his left knee. He has been wearing a brace and today he stepped in a hole while weed eating. He states he fell one way and his knee went the other. He has not taken anything for his symptoms. HPI    Nursing Notes were reviewed. REVIEW OF SYSTEMS    (2-9 systems for level 4, 10 or more for level 5)     Review of Systems   Constitutional: Negative for activity change, appetite change, fatigue, fever and unexpected weight change. HENT: Negative for ear pain, rhinorrhea, sore throat and trouble swallowing. Eyes: Negative for visual disturbance. Respiratory: Negative for cough and shortness of breath. Cardiovascular: Negative for chest pain, palpitations and leg swelling. Gastrointestinal: Negative for abdominal pain, constipation, diarrhea, nausea and vomiting. Genitourinary: Negative for flank pain. Musculoskeletal: Negative for arthralgias, myalgias, neck pain and neck stiffness. Left knee pain   Neurological: Negative for headaches. Psychiatric/Behavioral: Negative for decreased concentration and sleep disturbance. The patient is not nervous/anxious. A complete review of systems was performed and is negative except as noted above in the HPI.        PAST MEDICAL HISTORY     Past Medical History:   Diagnosis Date    Marfan's disease     Pneumothorax          SURGICAL HISTORY Past Surgical History:   Procedure Laterality Date    LUNG REMOVAL, PARTIAL Right          CURRENT MEDICATIONS       Previous Medications    No medications on file       ALLERGIES     Methylprednisolone    FAMILY HISTORY     History reviewed. No pertinent family history. SOCIAL HISTORY       Social History     Social History    Marital status: Single     Spouse name: N/A    Number of children: N/A    Years of education: N/A     Social History Main Topics    Smoking status: Current Every Day Smoker     Packs/day: 1.00     Years: 10.00     Types: Cigarettes    Smokeless tobacco: Never Used    Alcohol use No    Drug use: No    Sexual activity: Not Asked     Other Topics Concern    None     Social History Narrative    None       SCREENINGS             PHYSICAL EXAM    (up to 7 for level 4, 8 or more for level 5)     ED Triage Vitals [07/19/18 2201]   BP Temp Temp src Pulse Resp SpO2 Height Weight   109/67 98.2 °F (36.8 °C) -- 84 18 97 % 6' 3\" (1.905 m) 160 lb (72.6 kg)       Physical Exam   Constitutional: He is oriented to person, place, and time. He appears well-developed and well-nourished. HENT:   Head: Normocephalic and atraumatic. Neck: Normal range of motion. Neck supple. Cardiovascular: Normal rate, regular rhythm, normal heart sounds and intact distal pulses. Pulmonary/Chest: Effort normal.   Musculoskeletal:        Left knee: He exhibits decreased range of motion (secondary to pain). He exhibits no swelling and no effusion. Tenderness found. Medial joint line tenderness noted. Neurological: He is alert and oriented to person, place, and time. Skin: Skin is dry.        DIAGNOSTIC RESULTS     EKG: All EKG's are interpreted by the Emergency Department Physician who either signs or Co-signs this chart in the absence of a cardiologist.        RADIOLOGY:   Non-plain film images such as CT, Ultrasound and MRI are read by the radiologist. Plain radiographic images are visualized and

## 2018-07-20 NOTE — ED NOTES
Left knee pain after twisting x 1 day. Old injury to same site. Able to ambulate  Breathing unlabored.   Friend at bedside       Lisbeth Burgos RN  07/19/18 0849

## 2018-07-24 PROCEDURE — 99283 EMERGENCY DEPT VISIT LOW MDM: CPT

## 2018-07-24 PROCEDURE — 96374 THER/PROPH/DIAG INJ IV PUSH: CPT

## 2018-07-24 PROCEDURE — 96361 HYDRATE IV INFUSION ADD-ON: CPT

## 2018-07-24 PROCEDURE — 96375 TX/PRO/DX INJ NEW DRUG ADDON: CPT

## 2018-07-25 ENCOUNTER — APPOINTMENT (OUTPATIENT)
Dept: CT IMAGING | Facility: HOSPITAL | Age: 33
End: 2018-07-25

## 2018-07-25 ENCOUNTER — HOSPITAL ENCOUNTER (EMERGENCY)
Facility: HOSPITAL | Age: 33
Discharge: HOME | End: 2018-07-25
Admitting: EMERGENCY MEDICINE

## 2018-07-25 VITALS
OXYGEN SATURATION: 99 % | HEART RATE: 72 BPM | HEIGHT: 77 IN | SYSTOLIC BLOOD PRESSURE: 99 MMHG | BODY MASS INDEX: 17.71 KG/M2 | RESPIRATION RATE: 18 BRPM | DIASTOLIC BLOOD PRESSURE: 65 MMHG | WEIGHT: 150 LBS | TEMPERATURE: 97.5 F

## 2018-07-25 DIAGNOSIS — E87.6 HYPOKALEMIA: ICD-10-CM

## 2018-07-25 DIAGNOSIS — R51.9 NONINTRACTABLE HEADACHE, UNSPECIFIED CHRONICITY PATTERN, UNSPECIFIED HEADACHE TYPE: Primary | ICD-10-CM

## 2018-07-25 LAB
ALBUMIN SERPL-MCNC: 4.2 G/DL (ref 3.5–5)
ALBUMIN/GLOB SERPL: 1.5 G/DL (ref 1.1–2.5)
ALP SERPL-CCNC: 78 U/L (ref 24–120)
ALT SERPL W P-5'-P-CCNC: 29 U/L (ref 0–54)
ANION GAP SERPL CALCULATED.3IONS-SCNC: 11 MMOL/L (ref 4–13)
APTT PPP: 32.4 SECONDS (ref 24.1–34.8)
AST SERPL-CCNC: 22 U/L (ref 7–45)
BASOPHILS # BLD AUTO: 0.06 10*3/MM3 (ref 0–0.2)
BASOPHILS NFR BLD AUTO: 0.5 % (ref 0–2)
BILIRUB SERPL-MCNC: 0.4 MG/DL (ref 0.1–1)
BUN BLD-MCNC: 13 MG/DL (ref 5–21)
BUN/CREAT SERPL: 16.3 (ref 7–25)
CALCIUM SPEC-SCNC: 9.6 MG/DL (ref 8.4–10.4)
CHLORIDE SERPL-SCNC: 104 MMOL/L (ref 98–110)
CO2 SERPL-SCNC: 28 MMOL/L (ref 24–31)
CREAT BLD-MCNC: 0.8 MG/DL (ref 0.5–1.4)
DEPRECATED RDW RBC AUTO: 40 FL (ref 40–54)
EOSINOPHIL # BLD AUTO: 0.14 10*3/MM3 (ref 0–0.7)
EOSINOPHIL NFR BLD AUTO: 1.2 % (ref 0–4)
ERYTHROCYTE [DISTWIDTH] IN BLOOD BY AUTOMATED COUNT: 12.6 % (ref 12–15)
GFR SERPL CREATININE-BSD FRML MDRD: 112 ML/MIN/1.73
GLOBULIN UR ELPH-MCNC: 2.8 GM/DL
GLUCOSE BLD-MCNC: 122 MG/DL (ref 70–100)
HCT VFR BLD AUTO: 41.3 % (ref 40–52)
HGB BLD-MCNC: 14.3 G/DL (ref 14–18)
IMM GRANULOCYTES # BLD: 0.07 10*3/MM3 (ref 0–0.03)
IMM GRANULOCYTES NFR BLD: 0.6 % (ref 0–5)
INR PPP: 1.04 (ref 0.91–1.09)
LYMPHOCYTES # BLD AUTO: 1.24 10*3/MM3 (ref 0.72–4.86)
LYMPHOCYTES NFR BLD AUTO: 10.6 % (ref 15–45)
MCH RBC QN AUTO: 30.2 PG (ref 28–32)
MCHC RBC AUTO-ENTMCNC: 34.6 G/DL (ref 33–36)
MCV RBC AUTO: 87.3 FL (ref 82–95)
MONOCYTES # BLD AUTO: 0.8 10*3/MM3 (ref 0.19–1.3)
MONOCYTES NFR BLD AUTO: 6.8 % (ref 4–12)
NEUTROPHILS # BLD AUTO: 9.43 10*3/MM3 (ref 1.87–8.4)
NEUTROPHILS NFR BLD AUTO: 80.3 % (ref 39–78)
NRBC BLD MANUAL-RTO: 0 /100 WBC (ref 0–0)
PLATELET # BLD AUTO: 217 10*3/MM3 (ref 130–400)
PMV BLD AUTO: 9.9 FL (ref 6–12)
POTASSIUM BLD-SCNC: 3.3 MMOL/L (ref 3.5–5.3)
PROT SERPL-MCNC: 7 G/DL (ref 6.3–8.7)
PROTHROMBIN TIME: 13.9 SECONDS (ref 11.9–14.6)
RBC # BLD AUTO: 4.73 10*6/MM3 (ref 4.8–5.9)
SODIUM BLD-SCNC: 143 MMOL/L (ref 135–145)
WBC NRBC COR # BLD: 11.74 10*3/MM3 (ref 4.8–10.8)

## 2018-07-25 PROCEDURE — 25010000002 DIPHENHYDRAMINE PER 50 MG: Performed by: NURSE PRACTITIONER

## 2018-07-25 PROCEDURE — 25010000002 METOCLOPRAMIDE PER 10 MG: Performed by: NURSE PRACTITIONER

## 2018-07-25 PROCEDURE — 85025 COMPLETE CBC W/AUTO DIFF WBC: CPT | Performed by: NURSE PRACTITIONER

## 2018-07-25 PROCEDURE — 85610 PROTHROMBIN TIME: CPT | Performed by: NURSE PRACTITIONER

## 2018-07-25 PROCEDURE — 70450 CT HEAD/BRAIN W/O DYE: CPT

## 2018-07-25 PROCEDURE — 80053 COMPREHEN METABOLIC PANEL: CPT | Performed by: NURSE PRACTITIONER

## 2018-07-25 PROCEDURE — 85730 THROMBOPLASTIN TIME PARTIAL: CPT | Performed by: NURSE PRACTITIONER

## 2018-07-25 RX ORDER — DIPHENHYDRAMINE HYDROCHLORIDE 50 MG/ML
12.5 INJECTION INTRAMUSCULAR; INTRAVENOUS ONCE
Status: COMPLETED | OUTPATIENT
Start: 2018-07-25 | End: 2018-07-25

## 2018-07-25 RX ORDER — METOCLOPRAMIDE HYDROCHLORIDE 5 MG/ML
10 INJECTION INTRAMUSCULAR; INTRAVENOUS ONCE
Status: COMPLETED | OUTPATIENT
Start: 2018-07-25 | End: 2018-07-25

## 2018-07-25 RX ADMIN — METOCLOPRAMIDE 10 MG: 5 INJECTION, SOLUTION INTRAMUSCULAR; INTRAVENOUS at 01:47

## 2018-07-25 RX ADMIN — SODIUM CHLORIDE 500 ML: 9 INJECTION, SOLUTION INTRAVENOUS at 01:53

## 2018-07-25 RX ADMIN — DIPHENHYDRAMINE HYDROCHLORIDE 12.5 MG: 50 INJECTION, SOLUTION INTRAMUSCULAR; INTRAVENOUS at 01:49

## 2018-09-24 ENCOUNTER — APPOINTMENT (OUTPATIENT)
Dept: GENERAL RADIOLOGY | Age: 33
End: 2018-09-24

## 2018-09-24 ENCOUNTER — APPOINTMENT (OUTPATIENT)
Dept: CT IMAGING | Age: 33
End: 2018-09-24

## 2018-09-24 ENCOUNTER — HOSPITAL ENCOUNTER (EMERGENCY)
Age: 33
Discharge: HOME | End: 2018-09-24

## 2018-09-24 VITALS
DIASTOLIC BLOOD PRESSURE: 68 MMHG | HEART RATE: 62 BPM | HEIGHT: 76 IN | SYSTOLIC BLOOD PRESSURE: 103 MMHG | BODY MASS INDEX: 18.27 KG/M2 | RESPIRATION RATE: 18 BRPM | OXYGEN SATURATION: 94 % | WEIGHT: 150 LBS | TEMPERATURE: 99 F

## 2018-09-24 DIAGNOSIS — R07.9 CHEST PAIN, UNSPECIFIED TYPE: ICD-10-CM

## 2018-09-24 DIAGNOSIS — R93.89 ABNORMAL CHEST CT: Primary | ICD-10-CM

## 2018-09-24 LAB
ALBUMIN SERPL-MCNC: 4.3 G/DL (ref 3.5–5.2)
ALP BLD-CCNC: 84 U/L (ref 40–130)
ALT SERPL-CCNC: 11 U/L (ref 5–41)
ANION GAP SERPL CALCULATED.3IONS-SCNC: 10 MMOL/L (ref 7–19)
AST SERPL-CCNC: 16 U/L (ref 5–40)
BASOPHILS ABSOLUTE: 0 K/UL (ref 0–0.2)
BASOPHILS RELATIVE PERCENT: 0.4 % (ref 0–1)
BILIRUB SERPL-MCNC: 0.5 MG/DL (ref 0.2–1.2)
BUN BLDV-MCNC: 10 MG/DL (ref 6–20)
CALCIUM SERPL-MCNC: 9.5 MG/DL (ref 8.6–10)
CHLORIDE BLD-SCNC: 104 MMOL/L (ref 98–111)
CO2: 28 MMOL/L (ref 22–29)
CREAT SERPL-MCNC: 0.9 MG/DL (ref 0.5–1.2)
EOSINOPHILS ABSOLUTE: 0.1 K/UL (ref 0–0.6)
EOSINOPHILS RELATIVE PERCENT: 1.6 % (ref 0–5)
GFR NON-AFRICAN AMERICAN: >60
GLUCOSE BLD-MCNC: 110 MG/DL (ref 74–109)
HCT VFR BLD CALC: 39.6 % (ref 42–52)
HEMOGLOBIN: 13.3 G/DL (ref 14–18)
LYMPHOCYTES ABSOLUTE: 1.8 K/UL (ref 1.1–4.5)
LYMPHOCYTES RELATIVE PERCENT: 25.9 % (ref 20–40)
MCH RBC QN AUTO: 30.5 PG (ref 27–31)
MCHC RBC AUTO-ENTMCNC: 33.6 G/DL (ref 33–37)
MCV RBC AUTO: 90.8 FL (ref 80–94)
MONOCYTES ABSOLUTE: 0.7 K/UL (ref 0–0.9)
MONOCYTES RELATIVE PERCENT: 10 % (ref 0–10)
NEUTROPHILS ABSOLUTE: 4.4 K/UL (ref 1.5–7.5)
NEUTROPHILS RELATIVE PERCENT: 61.7 % (ref 50–65)
PDW BLD-RTO: 12.6 % (ref 11.5–14.5)
PLATELET # BLD: 222 K/UL (ref 130–400)
PMV BLD AUTO: 9.9 FL (ref 9.4–12.4)
POTASSIUM SERPL-SCNC: 3.2 MMOL/L (ref 3.5–5)
RBC # BLD: 4.36 M/UL (ref 4.7–6.1)
SODIUM BLD-SCNC: 142 MMOL/L (ref 136–145)
TOTAL PROTEIN: 6.9 G/DL (ref 6.6–8.7)
TROPONIN: <0.01 NG/ML (ref 0–0.03)
TROPONIN: <0.01 NG/ML (ref 0–0.03)
WBC # BLD: 7.1 K/UL (ref 4.8–10.8)

## 2018-09-24 PROCEDURE — 71275 CT ANGIOGRAPHY CHEST: CPT

## 2018-09-24 PROCEDURE — 71046 X-RAY EXAM CHEST 2 VIEWS: CPT

## 2018-09-24 PROCEDURE — 6360000002 HC RX W HCPCS: Performed by: PHYSICIAN ASSISTANT

## 2018-09-24 PROCEDURE — 96374 THER/PROPH/DIAG INJ IV PUSH: CPT

## 2018-09-24 PROCEDURE — 99285 EMERGENCY DEPT VISIT HI MDM: CPT

## 2018-09-24 PROCEDURE — 36415 COLL VENOUS BLD VENIPUNCTURE: CPT

## 2018-09-24 PROCEDURE — 85025 COMPLETE CBC W/AUTO DIFF WBC: CPT

## 2018-09-24 PROCEDURE — 6360000004 HC RX CONTRAST MEDICATION: Performed by: PHYSICIAN ASSISTANT

## 2018-09-24 PROCEDURE — 80053 COMPREHEN METABOLIC PANEL: CPT

## 2018-09-24 PROCEDURE — 84484 ASSAY OF TROPONIN QUANT: CPT

## 2018-09-24 PROCEDURE — 99284 EMERGENCY DEPT VISIT MOD MDM: CPT | Performed by: PHYSICIAN ASSISTANT

## 2018-09-24 PROCEDURE — 6360000002 HC RX W HCPCS

## 2018-09-24 PROCEDURE — 93005 ELECTROCARDIOGRAM TRACING: CPT

## 2018-09-24 PROCEDURE — 2580000003 HC RX 258: Performed by: PHYSICIAN ASSISTANT

## 2018-09-24 RX ORDER — 0.9 % SODIUM CHLORIDE 0.9 %
1000 INTRAVENOUS SOLUTION INTRAVENOUS ONCE
Status: COMPLETED | OUTPATIENT
Start: 2018-09-24 | End: 2018-09-24

## 2018-09-24 RX ORDER — MORPHINE SULFATE/0.9% NACL/PF 1 MG/ML
4 SYRINGE (ML) INJECTION ONCE
Status: COMPLETED | OUTPATIENT
Start: 2018-09-24 | End: 2018-09-24

## 2018-09-24 RX ORDER — MORPHINE SULFATE/0.9% NACL/PF 1 MG/ML
SYRINGE (ML) INJECTION
Status: COMPLETED
Start: 2018-09-24 | End: 2018-09-24

## 2018-09-24 RX ADMIN — SODIUM CHLORIDE 1000 ML: 9 INJECTION, SOLUTION INTRAVENOUS at 18:07

## 2018-09-24 RX ADMIN — Medication 2 MG: at 21:41

## 2018-09-24 RX ADMIN — Medication 4 MG: at 19:06

## 2018-09-24 RX ADMIN — IOPAMIDOL 90 ML: 755 INJECTION, SOLUTION INTRAVENOUS at 19:26

## 2018-09-24 ASSESSMENT — ENCOUNTER SYMPTOMS
RHINORRHEA: 0
COUGH: 0
ABDOMINAL DISTENTION: 0
BACK PAIN: 0
CHEST TIGHTNESS: 0
STRIDOR: 0
SORE THROAT: 0
SHORTNESS OF BREATH: 0
COLOR CHANGE: 0
ABDOMINAL PAIN: 0
NAUSEA: 0
CONSTIPATION: 0
VOMITING: 0
WHEEZING: 0

## 2018-09-24 ASSESSMENT — PAIN SCALES - GENERAL
PAINLEVEL_OUTOF10: 5
PAINLEVEL_OUTOF10: 7
PAINLEVEL_OUTOF10: 7
PAINLEVEL_OUTOF10: 6

## 2018-09-24 NOTE — ED TRIAGE NOTES
Pt to ED with c/o right sided chest pain that began today Pt reports Hx of Marfan's syndrome and collapse lung

## 2018-09-24 NOTE — ED NOTES
Patient states Dr. Angel Ch did partial lobectomy of right lung at [facility].      Ankita Becerra RN  09/24/18 1878

## 2018-09-25 LAB
EKG P AXIS: 73 DEGREES
EKG P AXIS: 80 DEGREES
EKG P-R INTERVAL: 186 MS
EKG P-R INTERVAL: 264 MS
EKG Q-T INTERVAL: 338 MS
EKG Q-T INTERVAL: 432 MS
EKG QRS DURATION: 86 MS
EKG QRS DURATION: 88 MS
EKG QTC CALCULATION (BAZETT): 402 MS
EKG QTC CALCULATION (BAZETT): 417 MS
EKG T AXIS: 71 DEGREES
EKG T AXIS: 80 DEGREES

## 2018-09-25 NOTE — ED PROVIDER NOTES
eMERGENCY dEPARTMENT eNCOUnter      Pt Name: Adrienne Berman  MRN: [id number]  [place] 1985  Date of evaluation: 9/24/2018  Provider: Rubi Cruz Dr       Chief Complaint   Patient presents with    Chest Pain     Pt to ED with c/o right sided chest pain that began today Pt reports Hx of Marfans Syndrome          HISTORY OF PRESENT ILLNESS  (Location/Symptom, Timing/Onset, Context/Setting, Quality, Duration, Modifying Factors, Severity.)   Adrienne Berman is a 29 y.o. male who presents to the emergency department With chest pain. This is been going on since early this morning. Right sided. Associated shortness of breath. No nausea vomiting or syncope. He has a history of Marfan syndrome. Patient states he's had a pneumothorax in the past.  He denies any trauma or injury. No prior cardiac history. No abdominal pain. States the pain radiates into the right shoulder. Nursing Notes were reviewed and I agree. REVIEW OF SYSTEMS    (2-9 systems for level 4, 10 or more for level 5)     Review of Systems   Constitutional: Negative for chills, fatigue and fever. HENT: Negative for congestion, rhinorrhea, sneezing and sore throat. Respiratory: Negative for cough, chest tightness, shortness of breath, wheezing and stridor. Cardiovascular: Positive for chest pain. Gastrointestinal: Negative for abdominal distention, abdominal pain, constipation, nausea and vomiting. Genitourinary: Negative for dysuria, flank pain and hematuria. Musculoskeletal: Negative for arthralgias, back pain, joint swelling and neck pain. Skin: Negative for color change and rash. Neurological: Negative for dizziness, syncope and headaches. Psychiatric/Behavioral: Negative for confusion. Except as noted above the remainder of the review of systems was reviewed and negative.        PAST MEDICAL HISTORY     Past Medical History:   Diagnosis Date    Marfan's disease     Pneumothorax SURGICAL HISTORY       Past Surgical History:   Procedure Laterality Date    LUNG REMOVAL, PARTIAL Right          CURRENT MEDICATIONS       Discharge Medication List as of 9/24/2018  8:56 PM      CONTINUE these medications which have NOT CHANGED    Details   meloxicam (MOBIC) 15 MG tablet Take 1 tablet by mouth daily, Disp-30 tablet, R-0Print             ALLERGIES     Methylprednisolone    FAMILY HISTORY     No family history on file. SOCIAL HISTORY       Social History     Social History    Marital status: Single     Spouse name: N/A    Number of children: N/A    Years of education: N/A     Social History Main Topics    Smoking status: Current Every Day Smoker     Packs/day: 1.00     Years: 10.00     Types: Cigarettes    Smokeless tobacco: Never Used    Alcohol use No    Drug use: No    Sexual activity: Not on file     Other Topics Concern    Not on file     Social History Narrative    No narrative on file       SCREENINGS    Leeds Coma Scale  Eye Opening: Spontaneous  Best Verbal Response: Oriented  Best Motor Response: Obeys commands  Leeds Coma Scale Score: 15      PHYSICAL EXAM    (up to 7 for level 4, 8 or more for level 5)     ED Triage Vitals [09/24/18 1659]   BP Temp Temp src Pulse Resp SpO2 Height Weight   105/67 99.1 °F (37.3 °C) -- 95 20 97 % 6' 4\" (1.93 m) 150 lb (68 kg)       Physical Exam   Constitutional: He is oriented to person, place, and time. He appears well-developed and well-nourished. No distress. HENT:   Head: Normocephalic and atraumatic. Neck: Normal range of motion. Cardiovascular: Normal rate, regular rhythm and normal heart sounds. Exam reveals no gallop and no friction rub. No murmur heard. Pulmonary/Chest: Effort normal and breath sounds normal. No respiratory distress. He has no wheezes. He has no rales. He exhibits no tenderness. Abdominal: Soft. Bowel sounds are normal. He exhibits no distension. There is no tenderness.  There is no

## 2018-10-24 ENCOUNTER — HOSPITAL ENCOUNTER (EMERGENCY)
Facility: HOSPITAL | Age: 33
Discharge: HOME | End: 2018-10-24
Admitting: EMERGENCY MEDICINE

## 2018-10-24 ENCOUNTER — APPOINTMENT (OUTPATIENT)
Dept: GENERAL RADIOLOGY | Facility: HOSPITAL | Age: 33
End: 2018-10-24

## 2018-10-24 VITALS
RESPIRATION RATE: 18 BRPM | BODY MASS INDEX: 18.75 KG/M2 | SYSTOLIC BLOOD PRESSURE: 104 MMHG | HEART RATE: 69 BPM | OXYGEN SATURATION: 99 % | TEMPERATURE: 97.7 F | DIASTOLIC BLOOD PRESSURE: 63 MMHG | WEIGHT: 154 LBS | HEIGHT: 76 IN

## 2018-10-24 DIAGNOSIS — S30.0XXA CONTUSION OF LOWER BACK, INITIAL ENCOUNTER: Primary | ICD-10-CM

## 2018-10-24 LAB
BILIRUB UR QL STRIP: NEGATIVE
CLARITY UR: CLEAR
COLOR UR: YELLOW
GLUCOSE UR STRIP-MCNC: NEGATIVE MG/DL
HGB UR QL STRIP.AUTO: NEGATIVE
KETONES UR QL STRIP: ABNORMAL
LEUKOCYTE ESTERASE UR QL STRIP.AUTO: NEGATIVE
NITRITE UR QL STRIP: NEGATIVE
PH UR STRIP.AUTO: 6 [PH] (ref 5–8)
PROT UR QL STRIP: NEGATIVE
SP GR UR STRIP: 1.01 (ref 1–1.03)
UROBILINOGEN UR QL STRIP: ABNORMAL

## 2018-10-24 PROCEDURE — 25010000002 KETOROLAC TROMETHAMINE PER 15 MG: Performed by: NURSE PRACTITIONER

## 2018-10-24 PROCEDURE — 96372 THER/PROPH/DIAG INJ SC/IM: CPT

## 2018-10-24 PROCEDURE — 81003 URINALYSIS AUTO W/O SCOPE: CPT | Performed by: NURSE PRACTITIONER

## 2018-10-24 PROCEDURE — 99284 EMERGENCY DEPT VISIT MOD MDM: CPT

## 2018-10-24 PROCEDURE — 72110 X-RAY EXAM L-2 SPINE 4/>VWS: CPT

## 2018-10-24 PROCEDURE — 72220 X-RAY EXAM SACRUM TAILBONE: CPT

## 2018-10-24 RX ORDER — CYCLOBENZAPRINE HCL 10 MG
10 TABLET ORAL EVERY 8 HOURS PRN
Qty: 10 TABLET | Refills: 0 | Status: SHIPPED | OUTPATIENT
Start: 2018-10-24 | End: 2019-04-11

## 2018-10-24 RX ORDER — DIAZEPAM 10 MG/1
10 TABLET ORAL ONCE
Status: COMPLETED | OUTPATIENT
Start: 2018-10-24 | End: 2018-10-24

## 2018-10-24 RX ORDER — KETOROLAC TROMETHAMINE 30 MG/ML
30 INJECTION, SOLUTION INTRAMUSCULAR; INTRAVENOUS ONCE
Status: COMPLETED | OUTPATIENT
Start: 2018-10-24 | End: 2018-10-24

## 2018-10-24 RX ORDER — KETOROLAC TROMETHAMINE 10 MG/1
10 TABLET, FILM COATED ORAL EVERY 6 HOURS PRN
Qty: 15 TABLET | Refills: 0 | Status: SHIPPED | OUTPATIENT
Start: 2018-10-24 | End: 2018-10-29

## 2018-10-24 RX ORDER — ONDANSETRON 4 MG/1
4 TABLET, ORALLY DISINTEGRATING ORAL ONCE
Status: COMPLETED | OUTPATIENT
Start: 2018-10-24 | End: 2018-10-24

## 2018-10-24 RX ORDER — OXYCODONE AND ACETAMINOPHEN 7.5; 325 MG/1; MG/1
1 TABLET ORAL ONCE
Status: COMPLETED | OUTPATIENT
Start: 2018-10-24 | End: 2018-10-24

## 2018-10-24 RX ADMIN — DIAZEPAM 10 MG: 10 TABLET ORAL at 11:09

## 2018-10-24 RX ADMIN — ONDANSETRON 4 MG: 4 TABLET, ORALLY DISINTEGRATING ORAL at 10:17

## 2018-10-24 RX ADMIN — KETOROLAC TROMETHAMINE 30 MG: 30 INJECTION, SOLUTION INTRAMUSCULAR at 11:49

## 2018-10-24 RX ADMIN — OXYCODONE HYDROCHLORIDE AND ACETAMINOPHEN 1 TABLET: 7.5; 325 TABLET ORAL at 11:46

## 2018-10-24 RX ADMIN — OXYCODONE HYDROCHLORIDE AND ACETAMINOPHEN 1 TABLET: 7.5; 325 TABLET ORAL at 10:17

## 2018-10-24 NOTE — ED PROVIDER NOTES
Subjective   Mr. Platt is a 33-year-old male patient who presents today with complaints of tailbone injury that occurred last night.  Patient states that he was walking with his daughter down the steps of his second-floor apartment when her foot tripped his and he fell onto his tailbone and slid down the staircase.  Patient states that he has been using warm soaks, heating pad, Tylenol at home with some pain control.  Patient states he tends to go to work today and was not able to perform his job due to the pain and injury.  Patient denies any numbness, tingling or weakness.  He denies any saddle anesthesia or incontinence.  Patient states he does have a history of Marfan syndrome and chronic back pain.        History provided by:  Patient   used: No        Review of Systems   Constitutional: Negative for chills, fatigue and fever.   HENT: Negative for congestion, rhinorrhea, sore throat and voice change.    Eyes: Negative for discharge and visual disturbance.   Respiratory: Negative for cough, shortness of breath and wheezing.    Cardiovascular: Negative for chest pain.   Gastrointestinal: Negative for abdominal pain, diarrhea, nausea and vomiting.   Endocrine: Negative.    Genitourinary: Negative.  Negative for decreased urine volume and difficulty urinating.   Musculoskeletal: Positive for back pain (low back pain).   Skin: Negative.  Negative for wound.   Allergic/Immunologic: Negative.    Neurological: Negative for weakness and headaches.   Hematological: Negative.    Psychiatric/Behavioral: Negative.        Past Medical History:   Diagnosis Date   • Back pain    • Chest pain    • Claustrophobia    • COPD (chronic obstructive pulmonary disease) (CMS/HCC)    • Lung disease    • Marfan syndrome    • Pneumothorax    • Seizures (CMS/HCC)     when he was a child    • Smoking     3/4 pack a day   • Tobacco abuse    • Underweight        Allergies   Allergen Reactions   • Medrol  "[Methylprednisolone] Other (See Comments)     Patient states he gets violent on this medication        Past Surgical History:   Procedure Laterality Date   • LUNG LOBECTOMY Right 02/01/2017   • THORACOSCOPY N/A 2/3/2017    Procedure: BRONCHOSCOPY, THORACOSCOPY RIGHT CHEST,  WEDGE RESECTION RIGHT UPPER AND LOWER LOBE OF LUNG, MECHANICAL PLEURODESIS;  Surgeon: Kyle Heath MD;  Location: [facility];  Service:    • TYMPANOSTOMY  05/12/2016    Recorded       Family History   Problem Relation Age of Onset   • Heart attack Father    • Stroke Father    • No Known Problems Mother    • Cancer Maternal Grandmother         breast   • Breast cancer Maternal Grandmother    • No Known Problems Daughter    • Prostate cancer Neg Hx    • Colon cancer Neg Hx    • Alcohol abuse Neg Hx    • Drug abuse Neg Hx    • Diabetes Neg Hx    • Thyroid cancer Neg Hx        Social History     Social History   • Marital status: Single     Social History Main Topics   • Smoking status: Current Every Day Smoker     Packs/day: 0.50     Years: 17.00     Types: Cigarettes   • Smokeless tobacco: Former User     Types: Chew   • Alcohol use No   • Drug use: No   • Sexual activity: Yes     Partners: Female     Other Topics Concern   • Not on file     Social History Narrative    Trying for disability. single       /63 (BP Location: Right arm, Patient Position: Sitting)   Pulse 69   Temp 97.7 °F (36.5 °C) (Oral)   Resp 18   Ht 193 cm (76\")   Wt 69.9 kg (154 lb)   SpO2 99%   BMI 18.75 kg/m²       Objective   Physical Exam   Constitutional: He is oriented to person, place, and time. He appears well-developed and well-nourished.   HENT:   Head: Normocephalic and atraumatic.   Right Ear: External ear normal.   Left Ear: External ear normal.   Nose: Nose normal.   Mouth/Throat: Oropharynx is clear and moist.   Eyes: Pupils are equal, round, and reactive to light. EOM are normal.   Neck: Normal range of motion. Neck supple.   Cardiovascular: Normal " rate and regular rhythm.    Pulmonary/Chest: Effort normal and breath sounds normal.   Abdominal: Soft. Bowel sounds are normal.   Musculoskeletal: Normal range of motion.        Lumbar back: He exhibits tenderness (bilateral paraspinal muscle), bony tenderness (coccyx) and spasm (bilateral paraspinal).   Neurological: He is alert and oriented to person, place, and time.   Skin: Skin is warm and dry.   Nursing note and vitals reviewed.      Procedures  Labs Reviewed   URINALYSIS W/ MICROSCOPIC IF INDICATED (NO CULTURE) - Abnormal; Notable for the following:        Result Value    Ketones, UA Trace (*)     All other components within normal limits    Narrative:     Urine microscopic not indicated.     XR Sacrum & Coccyx   Final Result      XR Spine Lumbar 4+ View   Final Result                 ED Course  ED Course as of Oct 24 1205   Wed Oct 24, 2018   1203 Patient reports improved symptoms with medication.  Home care instructions discussed.  Return per cautions given.  Patient follow-up with primary care provider.  Any new or worsening symptoms patient may return to emergency room.  [BA]      ED Course User Index  [BA] Gemma Garcia APRN                  [facility]      Final diagnoses:   Contusion of lower back, initial encounter            Gemma Garcia APRN  10/24/18 1205

## 2018-10-24 NOTE — DISCHARGE INSTRUCTIONS
I recommend avoiding heavy weights/exacerbating manuvers x 1 week. I recommend  therapeutic stretches/ Range of motion exercises twice daily. Try to take the NSAID (toradol) regularly x 2-3 days than as needed. Alternate heat and ice at least 3 times daily. Take NSAID as directed and take as food, do not take OTC anti-inflammatories while taking prescription strength NSAID. Monitor for worsening symptoms like numbness and weakness (loss of strength not due to pain). Follow up with primary care provider. For any new or worsening symptoms you may return to ED

## 2019-01-21 ENCOUNTER — APPOINTMENT (OUTPATIENT)
Dept: GENERAL RADIOLOGY | Age: 34
End: 2019-01-21

## 2019-01-21 ENCOUNTER — HOSPITAL ENCOUNTER (EMERGENCY)
Facility: HOSPITAL | Age: 34
Discharge: LEFT BEFORE DISCHARGE | End: 2019-01-21

## 2019-01-21 ENCOUNTER — APPOINTMENT (OUTPATIENT)
Dept: CT IMAGING | Age: 34
End: 2019-01-21

## 2019-01-21 ENCOUNTER — HOSPITAL ENCOUNTER (EMERGENCY)
Age: 34
Discharge: HOME | End: 2019-01-22

## 2019-01-21 VITALS
OXYGEN SATURATION: 100 % | BODY MASS INDEX: 18.9 KG/M2 | SYSTOLIC BLOOD PRESSURE: 116 MMHG | HEIGHT: 75 IN | WEIGHT: 152 LBS | DIASTOLIC BLOOD PRESSURE: 62 MMHG | RESPIRATION RATE: 18 BRPM | HEART RATE: 110 BPM | TEMPERATURE: 98.7 F

## 2019-01-21 DIAGNOSIS — R07.9 CHEST PAIN, UNSPECIFIED TYPE: Primary | ICD-10-CM

## 2019-01-21 LAB
ALBUMIN SERPL-MCNC: 4.7 G/DL (ref 3.5–5.2)
ALP BLD-CCNC: 84 U/L (ref 40–130)
ALT SERPL-CCNC: 11 U/L (ref 5–41)
ANION GAP SERPL CALCULATED.3IONS-SCNC: 11 MMOL/L (ref 7–19)
AST SERPL-CCNC: 15 U/L (ref 5–40)
BASOPHILS ABSOLUTE: 0.1 K/UL (ref 0–0.2)
BASOPHILS RELATIVE PERCENT: 0.7 % (ref 0–1)
BILIRUB SERPL-MCNC: 0.5 MG/DL (ref 0.2–1.2)
BUN BLDV-MCNC: 9 MG/DL (ref 6–20)
CALCIUM SERPL-MCNC: 9.6 MG/DL (ref 8.6–10)
CHLORIDE BLD-SCNC: 102 MMOL/L (ref 98–111)
CO2: 27 MMOL/L (ref 22–29)
CREAT SERPL-MCNC: 1 MG/DL (ref 0.5–1.2)
EOSINOPHILS ABSOLUTE: 0.2 K/UL (ref 0–0.6)
EOSINOPHILS RELATIVE PERCENT: 1.9 % (ref 0–5)
GFR NON-AFRICAN AMERICAN: >60
GLUCOSE BLD-MCNC: 87 MG/DL (ref 74–109)
HCT VFR BLD CALC: 46.3 % (ref 42–52)
HEMOGLOBIN: 15.4 G/DL (ref 14–18)
LIPASE: 31 U/L (ref 13–60)
LYMPHOCYTES ABSOLUTE: 2.4 K/UL (ref 1.1–4.5)
LYMPHOCYTES RELATIVE PERCENT: 29.1 % (ref 20–40)
MCH RBC QN AUTO: 30.4 PG (ref 27–31)
MCHC RBC AUTO-ENTMCNC: 33.3 G/DL (ref 33–37)
MCV RBC AUTO: 91.5 FL (ref 80–94)
MONOCYTES ABSOLUTE: 0.8 K/UL (ref 0–0.9)
MONOCYTES RELATIVE PERCENT: 8.9 % (ref 0–10)
NEUTROPHILS ABSOLUTE: 5 K/UL (ref 1.5–7.5)
NEUTROPHILS RELATIVE PERCENT: 59.2 % (ref 50–65)
PDW BLD-RTO: 12.7 % (ref 11.5–14.5)
PLATELET # BLD: 220 K/UL (ref 130–400)
PMV BLD AUTO: 9.8 FL (ref 9.4–12.4)
POTASSIUM SERPL-SCNC: 3.9 MMOL/L (ref 3.5–5)
RBC # BLD: 5.06 M/UL (ref 4.7–6.1)
SODIUM BLD-SCNC: 140 MMOL/L (ref 136–145)
TOTAL PROTEIN: 7.5 G/DL (ref 6.6–8.7)
TROPONIN: <0.01 NG/ML (ref 0–0.03)
WBC # BLD: 8.4 K/UL (ref 4.8–10.8)

## 2019-01-21 PROCEDURE — 93005 ELECTROCARDIOGRAM TRACING: CPT

## 2019-01-21 PROCEDURE — 80053 COMPREHEN METABOLIC PANEL: CPT

## 2019-01-21 PROCEDURE — 96374 THER/PROPH/DIAG INJ IV PUSH: CPT

## 2019-01-21 PROCEDURE — 36415 COLL VENOUS BLD VENIPUNCTURE: CPT

## 2019-01-21 PROCEDURE — 71045 X-RAY EXAM CHEST 1 VIEW: CPT

## 2019-01-21 PROCEDURE — 96375 TX/PRO/DX INJ NEW DRUG ADDON: CPT

## 2019-01-21 PROCEDURE — 83690 ASSAY OF LIPASE: CPT

## 2019-01-21 PROCEDURE — 6360000004 HC RX CONTRAST MEDICATION: Performed by: NURSE PRACTITIONER

## 2019-01-21 PROCEDURE — 85025 COMPLETE CBC W/AUTO DIFF WBC: CPT

## 2019-01-21 PROCEDURE — 93010 ELECTROCARDIOGRAM REPORT: CPT | Performed by: INTERNAL MEDICINE

## 2019-01-21 PROCEDURE — 99211 OFF/OP EST MAY X REQ PHY/QHP: CPT

## 2019-01-21 PROCEDURE — 6360000002 HC RX W HCPCS: Performed by: NURSE PRACTITIONER

## 2019-01-21 PROCEDURE — 71275 CT ANGIOGRAPHY CHEST: CPT

## 2019-01-21 PROCEDURE — 99285 EMERGENCY DEPT VISIT HI MDM: CPT

## 2019-01-21 PROCEDURE — 84484 ASSAY OF TROPONIN QUANT: CPT

## 2019-01-21 RX ORDER — MORPHINE SULFATE/0.9% NACL/PF 1 MG/ML
4 SYRINGE (ML) INJECTION ONCE
Status: COMPLETED | OUTPATIENT
Start: 2019-01-21 | End: 2019-01-21

## 2019-01-21 RX ORDER — ONDANSETRON 2 MG/ML
4 INJECTION INTRAMUSCULAR; INTRAVENOUS ONCE
Status: COMPLETED | OUTPATIENT
Start: 2019-01-21 | End: 2019-01-21

## 2019-01-21 RX ADMIN — ONDANSETRON 4 MG: 2 INJECTION INTRAMUSCULAR; INTRAVENOUS at 23:18

## 2019-01-21 RX ADMIN — IOPAMIDOL 95 ML: 755 INJECTION, SOLUTION INTRAVENOUS at 23:27

## 2019-01-21 RX ADMIN — Medication 4 MG: at 23:18

## 2019-01-21 ASSESSMENT — PAIN SCALES - GENERAL
PAINLEVEL_OUTOF10: 8
PAINLEVEL_OUTOF10: 6

## 2019-01-21 ASSESSMENT — PAIN DESCRIPTION - ORIENTATION: ORIENTATION: LEFT

## 2019-01-21 ASSESSMENT — PAIN DESCRIPTION - LOCATION: LOCATION: CHEST

## 2019-01-21 ASSESSMENT — ENCOUNTER SYMPTOMS
VOMITING: 0
ABDOMINAL PAIN: 0
COUGH: 1
NAUSEA: 0

## 2019-01-21 ASSESSMENT — PAIN DESCRIPTION - PAIN TYPE: TYPE: ACUTE PAIN

## 2019-01-22 VITALS
BODY MASS INDEX: 18.27 KG/M2 | DIASTOLIC BLOOD PRESSURE: 60 MMHG | RESPIRATION RATE: 16 BRPM | TEMPERATURE: 97.8 F | WEIGHT: 150 LBS | HEIGHT: 76 IN | SYSTOLIC BLOOD PRESSURE: 98 MMHG | OXYGEN SATURATION: 96 % | HEART RATE: 68 BPM

## 2019-01-22 LAB — TROPONIN: <0.01 NG/ML (ref 0–0.03)

## 2019-01-22 PROCEDURE — 36415 COLL VENOUS BLD VENIPUNCTURE: CPT

## 2019-01-22 PROCEDURE — 96375 TX/PRO/DX INJ NEW DRUG ADDON: CPT

## 2019-01-22 PROCEDURE — 6360000002 HC RX W HCPCS: Performed by: NURSE PRACTITIONER

## 2019-01-22 PROCEDURE — 84484 ASSAY OF TROPONIN QUANT: CPT

## 2019-01-22 PROCEDURE — 99284 EMERGENCY DEPT VISIT MOD MDM: CPT | Performed by: NURSE PRACTITIONER

## 2019-01-22 RX ORDER — KETOROLAC TROMETHAMINE 30 MG/ML
30 INJECTION, SOLUTION INTRAMUSCULAR; INTRAVENOUS ONCE
Status: COMPLETED | OUTPATIENT
Start: 2019-01-22 | End: 2019-01-22

## 2019-01-22 RX ADMIN — KETOROLAC TROMETHAMINE 30 MG: 30 INJECTION, SOLUTION INTRAMUSCULAR; INTRAVENOUS at 00:53

## 2019-01-22 ASSESSMENT — PAIN SCALES - GENERAL: PAINLEVEL_OUTOF10: 9

## 2019-01-22 ASSESSMENT — HEART SCORE: ECG: 0

## 2019-01-23 LAB
EKG P AXIS: 79 DEGREES
EKG P AXIS: 80 DEGREES
EKG P-R INTERVAL: 186 MS
EKG P-R INTERVAL: 220 MS
EKG Q-T INTERVAL: 348 MS
EKG Q-T INTERVAL: 388 MS
EKG QRS DURATION: 78 MS
EKG QRS DURATION: 86 MS
EKG QTC CALCULATION (BAZETT): 391 MS
EKG QTC CALCULATION (BAZETT): 395 MS
EKG T AXIS: 81 DEGREES
EKG T AXIS: 81 DEGREES

## 2019-04-11 ENCOUNTER — HOSPITAL ENCOUNTER (OUTPATIENT)
Dept: GENERAL RADIOLOGY | Facility: HOSPITAL | Age: 34
Discharge: HOME | End: 2019-04-11
Admitting: NURSE PRACTITIONER

## 2019-04-11 PROCEDURE — 74018 RADEX ABDOMEN 1 VIEW: CPT

## 2019-07-12 ENCOUNTER — HOSPITAL ENCOUNTER (EMERGENCY)
Facility: HOSPITAL | Age: 34
Discharge: HOME | End: 2019-07-12
Admitting: EMERGENCY MEDICINE

## 2019-07-12 ENCOUNTER — APPOINTMENT (OUTPATIENT)
Dept: CT IMAGING | Facility: HOSPITAL | Age: 34
End: 2019-07-12

## 2019-07-12 ENCOUNTER — APPOINTMENT (OUTPATIENT)
Dept: GENERAL RADIOLOGY | Facility: HOSPITAL | Age: 34
End: 2019-07-12

## 2019-07-12 VITALS
RESPIRATION RATE: 18 BRPM | SYSTOLIC BLOOD PRESSURE: 110 MMHG | HEART RATE: 70 BPM | BODY MASS INDEX: 17.29 KG/M2 | TEMPERATURE: 98 F | WEIGHT: 142 LBS | HEIGHT: 76 IN | OXYGEN SATURATION: 99 % | DIASTOLIC BLOOD PRESSURE: 70 MMHG

## 2019-07-12 DIAGNOSIS — J44.1 COPD EXACERBATION (HCC): ICD-10-CM

## 2019-07-12 DIAGNOSIS — R07.9 CHEST PAIN, UNSPECIFIED TYPE: Primary | ICD-10-CM

## 2019-07-12 LAB
ALBUMIN SERPL-MCNC: 4 G/DL (ref 3.5–5)
ALBUMIN/GLOB SERPL: 1.5 G/DL (ref 1.1–2.5)
ALP SERPL-CCNC: 68 U/L (ref 24–120)
ALT SERPL W P-5'-P-CCNC: 20 U/L (ref 0–54)
ANION GAP SERPL CALCULATED.3IONS-SCNC: 7 MMOL/L (ref 4–13)
APTT PPP: 29.2 SECONDS (ref 24.1–35)
AST SERPL-CCNC: 21 U/L (ref 7–45)
BASOPHILS # BLD AUTO: 0.02 10*3/MM3 (ref 0–0.2)
BASOPHILS NFR BLD AUTO: 0.3 % (ref 0–2)
BILIRUB SERPL-MCNC: 0.4 MG/DL (ref 0.1–1)
BUN BLD-MCNC: 15 MG/DL (ref 5–21)
BUN/CREAT SERPL: 17.4 (ref 7–25)
CALCIUM SPEC-SCNC: 9.4 MG/DL (ref 8.4–10.4)
CHLORIDE SERPL-SCNC: 108 MMOL/L (ref 98–110)
CO2 SERPL-SCNC: 26 MMOL/L (ref 24–31)
CREAT BLD-MCNC: 0.86 MG/DL (ref 0.5–1.4)
DEPRECATED RDW RBC AUTO: 40.9 FL (ref 40–54)
EOSINOPHIL # BLD AUTO: 0.12 10*3/MM3 (ref 0–0.7)
EOSINOPHIL NFR BLD AUTO: 1.5 % (ref 0–4)
ERYTHROCYTE [DISTWIDTH] IN BLOOD BY AUTOMATED COUNT: 12.3 % (ref 12–15)
GFR SERPL CREATININE-BSD FRML MDRD: 102 ML/MIN/1.73
GLOBULIN UR ELPH-MCNC: 2.7 GM/DL
GLUCOSE BLD-MCNC: 103 MG/DL (ref 70–100)
HCT VFR BLD AUTO: 38.5 % (ref 40–52)
HGB BLD-MCNC: 13.1 G/DL (ref 14–18)
IMM GRANULOCYTES # BLD AUTO: 0.02 10*3/MM3 (ref 0–0.05)
IMM GRANULOCYTES NFR BLD AUTO: 0.3 % (ref 0–5)
INR PPP: 1.05 (ref 0.91–1.09)
LYMPHOCYTES # BLD AUTO: 1.63 10*3/MM3 (ref 0.72–4.86)
LYMPHOCYTES NFR BLD AUTO: 21 % (ref 15–45)
MCH RBC QN AUTO: 31 PG (ref 28–32)
MCHC RBC AUTO-ENTMCNC: 34 G/DL (ref 33–36)
MCV RBC AUTO: 91 FL (ref 82–95)
MONOCYTES # BLD AUTO: 0.73 10*3/MM3 (ref 0.19–1.3)
MONOCYTES NFR BLD AUTO: 9.4 % (ref 4–12)
NEUTROPHILS # BLD AUTO: 5.23 10*3/MM3 (ref 1.87–8.4)
NEUTROPHILS NFR BLD AUTO: 67.5 % (ref 39–78)
NRBC BLD AUTO-RTO: 0 /100 WBC (ref 0–0.2)
PLATELET # BLD AUTO: 190 10*3/MM3 (ref 130–400)
PMV BLD AUTO: 9.7 FL (ref 6–12)
POTASSIUM BLD-SCNC: 3.6 MMOL/L (ref 3.5–5.3)
PROT SERPL-MCNC: 6.7 G/DL (ref 6.3–8.7)
PROTHROMBIN TIME: 14 SECONDS (ref 11.9–14.6)
RBC # BLD AUTO: 4.23 10*6/MM3 (ref 4.8–5.9)
SODIUM BLD-SCNC: 141 MMOL/L (ref 135–145)
TROPONIN I SERPL-MCNC: <0.012 NG/ML (ref 0–0.03)
TROPONIN I SERPL-MCNC: <0.012 NG/ML (ref 0–0.03)
WBC NRBC COR # BLD: 7.75 10*3/MM3 (ref 4.8–10.8)

## 2019-07-12 PROCEDURE — 84484 ASSAY OF TROPONIN QUANT: CPT | Performed by: NURSE PRACTITIONER

## 2019-07-12 PROCEDURE — 85730 THROMBOPLASTIN TIME PARTIAL: CPT | Performed by: NURSE PRACTITIONER

## 2019-07-12 PROCEDURE — 0 IOPAMIDOL PER 1 ML: Performed by: NURSE PRACTITIONER

## 2019-07-12 PROCEDURE — 93005 ELECTROCARDIOGRAM TRACING: CPT | Performed by: NURSE PRACTITIONER

## 2019-07-12 PROCEDURE — 25010000002 HYDROMORPHONE PER 4 MG: Performed by: NURSE PRACTITIONER

## 2019-07-12 PROCEDURE — 71275 CT ANGIOGRAPHY CHEST: CPT

## 2019-07-12 PROCEDURE — 85025 COMPLETE CBC W/AUTO DIFF WBC: CPT | Performed by: NURSE PRACTITIONER

## 2019-07-12 PROCEDURE — 80053 COMPREHEN METABOLIC PANEL: CPT | Performed by: NURSE PRACTITIONER

## 2019-07-12 PROCEDURE — 96374 THER/PROPH/DIAG INJ IV PUSH: CPT

## 2019-07-12 PROCEDURE — 93010 ELECTROCARDIOGRAM REPORT: CPT | Performed by: INTERNAL MEDICINE

## 2019-07-12 PROCEDURE — 96375 TX/PRO/DX INJ NEW DRUG ADDON: CPT

## 2019-07-12 PROCEDURE — 85610 PROTHROMBIN TIME: CPT | Performed by: NURSE PRACTITIONER

## 2019-07-12 PROCEDURE — 71045 X-RAY EXAM CHEST 1 VIEW: CPT

## 2019-07-12 PROCEDURE — 25010000002 ONDANSETRON PER 1 MG: Performed by: NURSE PRACTITIONER

## 2019-07-12 PROCEDURE — 99284 EMERGENCY DEPT VISIT MOD MDM: CPT

## 2019-07-12 RX ORDER — AZITHROMYCIN 250 MG/1
TABLET, FILM COATED ORAL
Qty: 6 TABLET | Refills: 0 | Status: ON HOLD | OUTPATIENT
Start: 2019-07-12 | End: 2020-06-01

## 2019-07-12 RX ORDER — ONDANSETRON 2 MG/ML
4 INJECTION INTRAMUSCULAR; INTRAVENOUS ONCE
Status: COMPLETED | OUTPATIENT
Start: 2019-07-12 | End: 2019-07-12

## 2019-07-12 RX ORDER — HYDROMORPHONE HYDROCHLORIDE 1 MG/ML
0.5 INJECTION, SOLUTION INTRAMUSCULAR; INTRAVENOUS; SUBCUTANEOUS ONCE
Status: COMPLETED | OUTPATIENT
Start: 2019-07-12 | End: 2019-07-12

## 2019-07-12 RX ORDER — ACETAMINOPHEN 500 MG
1000 TABLET ORAL ONCE
Status: COMPLETED | OUTPATIENT
Start: 2019-07-12 | End: 2019-07-12

## 2019-07-12 RX ORDER — TRAMADOL HYDROCHLORIDE 50 MG/1
50 TABLET ORAL EVERY 6 HOURS PRN
Qty: 8 TABLET | Refills: 0 | Status: SHIPPED | OUTPATIENT
Start: 2019-07-12 | End: 2019-07-14

## 2019-07-12 RX ORDER — ALBUTEROL SULFATE 90 UG/1
2 AEROSOL, METERED RESPIRATORY (INHALATION) EVERY 4 HOURS PRN
Qty: 1 INHALER | Refills: 0 | Status: SHIPPED | OUTPATIENT
Start: 2019-07-12 | End: 2019-07-19

## 2019-07-12 RX ORDER — SODIUM CHLORIDE 0.9 % (FLUSH) 0.9 %
10 SYRINGE (ML) INJECTION AS NEEDED
Status: DISCONTINUED | OUTPATIENT
Start: 2019-07-12 | End: 2019-07-12 | Stop reason: HOSPADM

## 2019-07-12 RX ADMIN — HYDROMORPHONE HYDROCHLORIDE 0.5 MG: 1 INJECTION, SOLUTION INTRAMUSCULAR; INTRAVENOUS; SUBCUTANEOUS at 09:17

## 2019-07-12 RX ADMIN — IOPAMIDOL 100 ML: 755 INJECTION, SOLUTION INTRAVENOUS at 10:42

## 2019-07-12 RX ADMIN — ACETAMINOPHEN 1000 MG: 500 TABLET, FILM COATED ORAL at 11:58

## 2019-07-12 RX ADMIN — ONDANSETRON HYDROCHLORIDE 4 MG: 2 SOLUTION INTRAMUSCULAR; INTRAVENOUS at 09:16

## 2019-07-12 NOTE — ED PROVIDER NOTES
Subjective   Patient is a 34-year-old male that presents ER today with complaint of chest pain.  Patient has a history of Marfan's disease as well as COPD.  He states that his pain began yesterday evening while at work.  He states that it is in the right side of his chest.  He states that it is worse when raising his right arm.  States that he does feel short of breath with this.  Patient states he is previously had a pneumothorax.  He states that he had a right lobectomy due to this.  The patient denies any abdominal pain.  He denies any nausea vomiting with this.  Denies any diaphoresis with this.  Patient presents here today for further evaluation.        History provided by:  Patient   used: No    Chest Pain   Pain location:  R chest  Pain quality: aching and dull    Pain radiates to:  Does not radiate  Pain severity:  Mild  Onset quality:  Sudden  Duration:  1 day  Timing:  Constant  Progression:  Unchanged  Chronicity:  New  Context: breathing, movement and raising an arm    Context: not drug use, not eating, not intercourse, not lifting, not at rest, not stress and not trauma    Relieved by:  Nothing  Worsened by:  Nothing  Ineffective treatments:  None tried  Associated symptoms: shortness of breath    Associated symptoms: no abdominal pain, no AICD problem, no altered mental status, no anorexia, no anxiety, no back pain, no claudication, no cough, no diaphoresis, no dizziness, no dysphagia, no fatigue, no fever, no headache, no heartburn, no lower extremity edema, no nausea, no near-syncope, no numbness, no orthopnea, no palpitations, no PND, no syncope, no vomiting and no weakness    Risk factors: male sex, Marfan's syndrome and smoking    Risk factors: no aortic disease, no birth control, no coronary artery disease, no diabetes mellitus, no high cholesterol, no hypertension, no immobilization, not obese, not pregnant, no prior DVT/PE and no surgery        Review of Systems    Constitutional: Negative for diaphoresis, fatigue and fever.   HENT: Negative for trouble swallowing.    Respiratory: Positive for shortness of breath. Negative for cough.    Cardiovascular: Positive for chest pain. Negative for palpitations, orthopnea, claudication, syncope, PND and near-syncope.   Gastrointestinal: Negative for abdominal pain, anorexia, heartburn, nausea and vomiting.   Musculoskeletal: Negative for back pain.   Neurological: Negative for dizziness, weakness, numbness and headaches.   All other systems reviewed and are negative.      Past Medical History:   Diagnosis Date   • Back pain    • Chest pain    • Claustrophobia    • COPD (chronic obstructive pulmonary disease) (CMS/HCC)    • Lung disease    • Marfan syndrome    • Pneumothorax    • Seizures (CMS/HCC)     when he was a child    • Smoking     3/4 pack a day   • Tobacco abuse    • Underweight        Allergies   Allergen Reactions   • Medrol [Methylprednisolone] Other (See Comments)     Patient states he gets violent on this medication        Past Surgical History:   Procedure Laterality Date   • LUNG LOBECTOMY Right 02/01/2017   • THORACOSCOPY N/A 2/3/2017    Procedure: BRONCHOSCOPY, THORACOSCOPY RIGHT CHEST,  WEDGE RESECTION RIGHT UPPER AND LOWER LOBE OF LUNG, MECHANICAL PLEURODESIS;  Surgeon: Kyle Heath MD;  Location: [facility];  Service:    • TYMPANOSTOMY  05/12/2016    Recorded       Family History   Problem Relation Age of Onset   • Heart attack Father    • Stroke Father    • No Known Problems Mother    • Cancer Maternal Grandmother         breast   • Breast cancer Maternal Grandmother    • No Known Problems Daughter    • Prostate cancer Neg Hx    • Colon cancer Neg Hx    • Alcohol abuse Neg Hx    • Drug abuse Neg Hx    • Diabetes Neg Hx    • Thyroid cancer Neg Hx        Social History     Socioeconomic History   • Marital status: Single     Spouse name: Not on file   • Number of children: Not on file   • Years of education:  Not on file   • Highest education level: Not on file   Tobacco Use   • Smoking status: Current Every Day Smoker     Packs/day: 0.50     Years: 17.00     Pack years: 8.50     Types: Cigarettes   • Smokeless tobacco: Never Used   Substance and Sexual Activity   • Alcohol use: No   • Drug use: No   • Sexual activity: Yes     Partners: Female   Social History Narrative    Trying for disability. single           Objective   Physical Exam   Constitutional: He is oriented to person, place, and time. He appears well-developed and well-nourished.   HENT:   Head: Normocephalic and atraumatic.   Eyes: Conjunctivae are normal. Pupils are equal, round, and reactive to light.   Cardiovascular: Normal rate, regular rhythm and normal heart sounds.   Pulmonary/Chest: Effort normal and breath sounds normal.   Abdominal: Soft. Bowel sounds are normal.   Neurological: He is alert and oriented to person, place, and time.   Skin: Skin is warm and dry. Capillary refill takes less than 2 seconds.   Psychiatric: He has a normal mood and affect.   Nursing note and vitals reviewed.      Procedures           ED Course  ED Course as of Jul 12 1313 Fri Jul 12, 2019   1302 Patient is 34-year-old male that presented the ER today with complaint of chest pain.  Patient had a history of Marfan's disease.  Patient's troponin x2 is negative.  CBC shows normal white blood cell count.  CMP unremarkable.  [LF]   1302 Chest x-ray showed changes associated with pulmonary emphysema.  No other acute findings.  [LF]   1303 CTA of the chest was ordered.  There is no evidence of PE.  The thoracic aorta was unremarkable.  [LF]   1304 Patient's EKG x2 was unremarkable.  This is compared to previous EKG from January 2019 shows no acute changes.  [LF]   1305 Bartolo report # [id number] reviewd; no suspicious findings noted.   [LF]   1307 Patient will be discharged home at this time stable condition.  The patient is advised to follow-up with primary care provider 1 to  "2 days for recheck.  I have encouraged him to stop smoking.  Patient be discharged home in stable condition must return here for any new or worsening symptoms.  [LF]   5027 The pt reports that he is unable to take steroids as this makes him \"violent\". Will treat as COPD exacerbation. Will give short course of ABX and proair inhaler. Will give short course of ultram for pain control. DEANDRE query complete. Treatment plan to include limited course of prescribed  controlled substance. Risks including addiction, benefits, and alternatives presented to patient.     [LF]      ED Course User Index  [LF] Paula Wen M, APRN        CT Angiogram Chest With Contrast   Final Result   1. No evidence of PE or acute aortic pathology.       2. Advanced emphysematous changes.               This report was finalized on 07/12/2019 11:46 by Dr. Sid Núñez MD.      XR Chest 1 View   Final Result   1. Pulmonary emphysema/chronic lung changes.   2. No acute cardiopulmonary process.       This report was finalized on 07/12/2019 10:01 by Dr. Isidoro Fernandez MD.        Labs Reviewed   COMPREHENSIVE METABOLIC PANEL - Abnormal; Notable for the following components:       Result Value    Glucose 103 (*)     All other components within normal limits    Narrative:     GFR Normal >60  Chronic Kidney Disease <60  Kidney Failure <15   CBC WITH AUTO DIFFERENTIAL - Abnormal; Notable for the following components:    RBC 4.23 (*)     Hemoglobin 13.1 (*)     Hematocrit 38.5 (*)     All other components within normal limits   PROTIME-INR - Normal   APTT - Normal   TROPONIN (IN-HOUSE) - Normal   TROPONIN (IN-HOUSE) - Normal   CBC AND DIFFERENTIAL    Narrative:     The following orders were created for panel order CBC & Differential.  Procedure                               Abnormality         Status                     ---------                               -----------         ------                     CBC Auto Differential[[id number]]        " Abnormal            Final result                 Please view results for these tests on the individual orders.             HEART Score (for prediction of 6-week risk of major adverse cardiac event) reviewed and/or performed as part of the patient evaluation and treatment planning process.  The result associated with this review/performance is: 1       MDM  Number of Diagnoses or Management Options  Chest pain, unspecified type: new and requires workup  COPD exacerbation (CMS/HCC): new and requires workup     Amount and/or Complexity of Data Reviewed  Clinical lab tests: ordered and reviewed  Tests in the radiology section of CPT®: ordered and reviewed  Tests in the medicine section of CPT®: ordered and reviewed  Decide to obtain previous medical records or to obtain history from someone other than the patient: yes  Discuss the patient with other providers: yes          Final diagnoses:   Chest pain, unspecified type   COPD exacerbation (CMS/HCC)            Wne Mitchell, APRN  07/12/19 5926

## 2019-08-18 NOTE — ED NOTES
Pt reports right side abd pain and lower back pain x 1 week. Pt reports he works outside and thinks he may be dehydrated     Alicia Williamson RN  06/15/18 0024    
yes

## 2019-12-18 ENCOUNTER — HOSPITAL ENCOUNTER (EMERGENCY)
Facility: HOSPITAL | Age: 34
Discharge: HOME | End: 2019-12-18
Attending: EMERGENCY MEDICINE | Admitting: EMERGENCY MEDICINE

## 2019-12-18 ENCOUNTER — APPOINTMENT (OUTPATIENT)
Dept: GENERAL RADIOLOGY | Facility: HOSPITAL | Age: 34
End: 2019-12-18

## 2019-12-18 VITALS
OXYGEN SATURATION: 95 % | SYSTOLIC BLOOD PRESSURE: 96 MMHG | BODY MASS INDEX: 16.89 KG/M2 | RESPIRATION RATE: 18 BRPM | HEIGHT: 78 IN | HEART RATE: 75 BPM | TEMPERATURE: 97.7 F | WEIGHT: 146 LBS | DIASTOLIC BLOOD PRESSURE: 48 MMHG

## 2019-12-18 DIAGNOSIS — R07.89 CHEST WALL PAIN: Primary | ICD-10-CM

## 2019-12-18 LAB
ANION GAP SERPL CALCULATED.3IONS-SCNC: 12 MMOL/L (ref 5–15)
APTT PPP: 31.5 SECONDS (ref 24.1–35)
BASOPHILS # BLD AUTO: 0.05 10*3/MM3 (ref 0–0.2)
BASOPHILS NFR BLD AUTO: 0.6 % (ref 0–1.5)
BUN BLD-MCNC: 10 MG/DL (ref 6–20)
BUN/CREAT SERPL: 13.7 (ref 7–25)
CALCIUM SPEC-SCNC: 9.3 MG/DL (ref 8.6–10.5)
CHLORIDE SERPL-SCNC: 104 MMOL/L (ref 98–107)
CO2 SERPL-SCNC: 26 MMOL/L (ref 22–29)
CREAT BLD-MCNC: 0.73 MG/DL (ref 0.76–1.27)
DEPRECATED RDW RBC AUTO: 43.2 FL (ref 37–54)
EOSINOPHIL # BLD AUTO: 0.18 10*3/MM3 (ref 0–0.4)
EOSINOPHIL NFR BLD AUTO: 2.1 % (ref 0.3–6.2)
ERYTHROCYTE [DISTWIDTH] IN BLOOD BY AUTOMATED COUNT: 13.1 % (ref 12.3–15.4)
GFR SERPL CREATININE-BSD FRML MDRD: 124 ML/MIN/1.73
GLUCOSE BLD-MCNC: 122 MG/DL (ref 65–99)
HCT VFR BLD AUTO: 40.9 % (ref 37.5–51)
HGB BLD-MCNC: 14.2 G/DL (ref 13–17.7)
IMM GRANULOCYTES # BLD AUTO: 0.04 10*3/MM3 (ref 0–0.05)
IMM GRANULOCYTES NFR BLD AUTO: 0.5 % (ref 0–0.5)
INR PPP: 0.96 (ref 0.91–1.09)
LYMPHOCYTES # BLD AUTO: 2.32 10*3/MM3 (ref 0.7–3.1)
LYMPHOCYTES NFR BLD AUTO: 26.6 % (ref 19.6–45.3)
MCH RBC QN AUTO: 31.4 PG (ref 26.6–33)
MCHC RBC AUTO-ENTMCNC: 34.7 G/DL (ref 31.5–35.7)
MCV RBC AUTO: 90.5 FL (ref 79–97)
MONOCYTES # BLD AUTO: 0.68 10*3/MM3 (ref 0.1–0.9)
MONOCYTES NFR BLD AUTO: 7.8 % (ref 5–12)
NEUTROPHILS # BLD AUTO: 5.44 10*3/MM3 (ref 1.7–7)
NEUTROPHILS NFR BLD AUTO: 62.4 % (ref 42.7–76)
NRBC BLD AUTO-RTO: 0 /100 WBC (ref 0–0.2)
PLATELET # BLD AUTO: 278 10*3/MM3 (ref 140–450)
PMV BLD AUTO: 9.4 FL (ref 6–12)
POTASSIUM BLD-SCNC: 3.6 MMOL/L (ref 3.5–5.2)
PROTHROMBIN TIME: 13.1 SECONDS (ref 11.9–14.6)
RBC # BLD AUTO: 4.52 10*6/MM3 (ref 4.14–5.8)
SODIUM BLD-SCNC: 142 MMOL/L (ref 136–145)
WBC NRBC COR # BLD: 8.71 10*3/MM3 (ref 3.4–10.8)

## 2019-12-18 PROCEDURE — 96374 THER/PROPH/DIAG INJ IV PUSH: CPT

## 2019-12-18 PROCEDURE — 80048 BASIC METABOLIC PNL TOTAL CA: CPT | Performed by: EMERGENCY MEDICINE

## 2019-12-18 PROCEDURE — 85610 PROTHROMBIN TIME: CPT | Performed by: EMERGENCY MEDICINE

## 2019-12-18 PROCEDURE — 25010000002 MORPHINE SULFATE (PF) 2 MG/ML SOLUTION: Performed by: EMERGENCY MEDICINE

## 2019-12-18 PROCEDURE — 71046 X-RAY EXAM CHEST 2 VIEWS: CPT

## 2019-12-18 PROCEDURE — 96375 TX/PRO/DX INJ NEW DRUG ADDON: CPT

## 2019-12-18 PROCEDURE — 85025 COMPLETE CBC W/AUTO DIFF WBC: CPT | Performed by: EMERGENCY MEDICINE

## 2019-12-18 PROCEDURE — 85730 THROMBOPLASTIN TIME PARTIAL: CPT | Performed by: EMERGENCY MEDICINE

## 2019-12-18 PROCEDURE — 99284 EMERGENCY DEPT VISIT MOD MDM: CPT

## 2019-12-18 PROCEDURE — 25010000002 MORPHINE PER 10 MG: Performed by: EMERGENCY MEDICINE

## 2019-12-18 PROCEDURE — 25010000002 ONDANSETRON PER 1 MG: Performed by: EMERGENCY MEDICINE

## 2019-12-18 RX ORDER — ONDANSETRON 2 MG/ML
4 INJECTION INTRAMUSCULAR; INTRAVENOUS ONCE
Status: COMPLETED | OUTPATIENT
Start: 2019-12-18 | End: 2019-12-18

## 2019-12-18 RX ORDER — CYCLOBENZAPRINE HCL 10 MG
5 TABLET ORAL 2 TIMES DAILY PRN
Qty: 10 TABLET | Refills: 0 | Status: ON HOLD | OUTPATIENT
Start: 2019-12-18 | End: 2020-06-01

## 2019-12-18 RX ORDER — MORPHINE SULFATE 2 MG/ML
6 INJECTION, SOLUTION INTRAMUSCULAR; INTRAVENOUS ONCE
Status: COMPLETED | OUTPATIENT
Start: 2019-12-18 | End: 2019-12-18

## 2019-12-18 RX ADMIN — ONDANSETRON HYDROCHLORIDE 4 MG: 2 SOLUTION INTRAMUSCULAR; INTRAVENOUS at 21:19

## 2019-12-18 RX ADMIN — MORPHINE SULFATE 6 MG: 2 INJECTION, SOLUTION INTRAMUSCULAR; INTRAVENOUS at 21:27

## 2019-12-19 ENCOUNTER — TELEPHONE (OUTPATIENT)
Dept: CARDIAC SURGERY | Facility: CLINIC | Age: 34
End: 2019-12-19

## 2019-12-19 NOTE — TELEPHONE ENCOUNTER
"Pt calling to request appt with Dr Heath.  Pt has seen him in the past (last seen 2017) and had \"R lung removed and now he is having trouble with his L lung\".  Was in ER at [facility] last night and they told him to call Dr Heath's office today re: getting appt.  Pt requests call back today.  Can reach him at #[phone]/[email or web address]  "

## 2019-12-19 NOTE — DISCHARGE INSTRUCTIONS
Chest Wall Pain  Chest wall pain is pain in or around the bones and muscles of your chest. Sometimes, an injury causes this pain. Excessive coughing or overuse of arm and chest muscles may also cause chest wall pain. Sometimes, the cause may not be known. This pain may take several weeks or longer to get better.  Follow these instructions at home:  Managing pain, stiffness, and swelling    · If directed, put ice on the painful area:  ? Put ice in a plastic bag.  ? Place a towel between your skin and the bag.  ? Leave the ice on for 20 minutes, 2-3 times per day.  Activity  · Rest as told by your health care provider.  · Avoid activities that cause pain. These include any activities that use your chest muscles or your abdominal and side muscles to lift heavy items. Ask your health care provider what activities are safe for you.  General instructions    · Take over-the-counter and prescription medicines only as told by your health care provider.  · Do not use any products that contain nicotine or tobacco, such as cigarettes, e-cigarettes, and chewing tobacco. These can delay healing after injury. If you need help quitting, ask your health care provider.  · Keep all follow-up visits as told by your health care provider. This is important.  Contact a health care provider if:  · You have a fever.  · Your chest pain becomes worse.  · You have new symptoms.  Get help right away if:  · You have nausea or vomiting.  · You feel sweaty or light-headed.  · You have a cough with mucus from your lungs (sputum) or you cough up blood.  · You develop shortness of breath.  These symptoms may represent a serious problem that is an emergency. Do not wait to see if the symptoms will go away. Get medical help right away. Call your local emergency services (911 in the [place] Do not drive yourself to the hospital.  Summary  · Chest wall pain is pain in or around the bones and muscles of your chest.  · Depending on the cause, it may be  treated with ice, rest, medicines, and avoiding activities that cause pain.  · Contact a health care provider if you have a fever, worsening chest pain, or new symptoms.  · Get help right away if you feel light-headed or you develop shortness of breath. These symptoms may be an emergency.  This information is not intended to replace advice given to you by your health care provider. Make sure you discuss any questions you have with your health care provider.  Document Released: 12/18/2006 Document Revised: 06/20/2019 Document Reviewed: 06/20/2019  [employer] Patient Education © 2019 [employer]

## 2019-12-19 NOTE — ED PROVIDER NOTES
"Subjective   35 y/o male with history of Marfans with history of pneumothorax in the past with repair by Dr. Heath was out digging posts and picking up 80 lb bags of cement when felt a \"pop\" in his anterior left chest with associated cp and thus presents here. He denies fevers, chills, falls, trauma, history of PE or DVT, recent surgeries or LE pain or swelling, nausea, vomiting, dairrhea, flank or back pain or other issues. He arrives in [facility].         Family, social and past history reviewed as below, prior documentation of H and Ps and other documentation are reviewed:    Past Medical History:  No date: Back pain  No date: Chest pain  No date: Claustrophobia  No date: COPD (chronic obstructive pulmonary disease) (CMS/[facility])  No date: Lung disease  No date: Marfan syndrome  No date: Pneumothorax  No date: Seizures (CMS/[facility])      Comment:  when he was a child   No date: Smoking      Comment:  3/4 pack a day  No date: Tobacco abuse  No date: Underweight    Past Surgical History:  02/01/2017: LUNG LOBECTOMY; Right  2/3/2017: THORACOSCOPY; N/A      Comment:  Procedure: BRONCHOSCOPY, THORACOSCOPY RIGHT CHEST,                 WEDGE RESECTION RIGHT UPPER AND LOWER LOBE OF LUNG,                MECHANICAL PLEURODESIS;  Surgeon: Kyle Heath MD;                 Location: [facility] OR;  Service:   05/12/2016: TYMPANOSTOMY      Comment:  Recorded    Social History    Socioeconomic History      Marital status: Single      Spouse name: Not on file      Number of children: Not on file      Years of education: Not on file      Highest education level: Not on file    Tobacco Use      Smoking status: Current Every Day Smoker        Packs/day: 0.50        Years: 17.00        Pack years: 8.5        Types: Cigarettes      Smokeless tobacco: Never Used    Substance and Sexual Activity      Alcohol use: No      Drug use: No      Sexual activity: Yes        Partners: Female    Social History Narrative      Trying for disability. " single      Family history: reviewed and noncontributory           Review of Systems   All other systems reviewed and are negative.      Past Medical History:   Diagnosis Date   • Back pain    • Chest pain    • Claustrophobia    • COPD (chronic obstructive pulmonary disease) (CMS/HCC)    • Lung disease    • Marfan syndrome    • Pneumothorax    • Seizures (CMS/HCC)     when he was a child    • Smoking     3/4 pack a day   • Tobacco abuse    • Underweight        Allergies   Allergen Reactions   • Medrol [Methylprednisolone] Other (See Comments)     Patient states he gets violent on this medication        Past Surgical History:   Procedure Laterality Date   • LUNG LOBECTOMY Right 02/01/2017   • THORACOSCOPY N/A 2/3/2017    Procedure: BRONCHOSCOPY, THORACOSCOPY RIGHT CHEST,  WEDGE RESECTION RIGHT UPPER AND LOWER LOBE OF LUNG, MECHANICAL PLEURODESIS;  Surgeon: Kyle Heath MD;  Location: [facility];  Service:    • TYMPANOSTOMY  05/12/2016    Recorded       Family History   Problem Relation Age of Onset   • Heart attack Father    • Stroke Father    • No Known Problems Mother    • Cancer Maternal Grandmother         breast   • Breast cancer Maternal Grandmother    • No Known Problems Daughter    • Prostate cancer Neg Hx    • Colon cancer Neg Hx    • Alcohol abuse Neg Hx    • Drug abuse Neg Hx    • Diabetes Neg Hx    • Thyroid cancer Neg Hx        Social History     Socioeconomic History   • Marital status: Single     Spouse name: Not on file   • Number of children: Not on file   • Years of education: Not on file   • Highest education level: Not on file   Tobacco Use   • Smoking status: Current Every Day Smoker     Packs/day: 0.50     Years: 17.00     Pack years: 8.50     Types: Cigarettes   • Smokeless tobacco: Never Used   Substance and Sexual Activity   • Alcohol use: No   • Drug use: No   • Sexual activity: Yes     Partners: Female   Social History Narrative    Trying for disability. single           Objective    Physical Exam   Constitutional: He is oriented to person, place, and time. He appears well-developed and well-nourished.   HENT:   Head: Normocephalic and atraumatic.   Eyes: Pupils are equal, round, and reactive to light. Conjunctivae and EOM are normal.   Neck: Normal range of motion. Neck supple.   Cardiovascular: Normal rate, regular rhythm, normal heart sounds and intact distal pulses. Exam reveals no gallop and no friction rub.   No murmur heard.  Pulmonary/Chest: Effort normal and breath sounds normal. No stridor. No respiratory distress. He has no wheezes. He has no rales. He exhibits tenderness.   reproducalbe pain on palpation over the left anterior chest wall.    Abdominal: Soft. Bowel sounds are normal.   Neurological: He is alert and oriented to person, place, and time. No cranial nerve deficit. Coordination normal.   Skin: Skin is warm. Capillary refill takes less than 2 seconds.   Psychiatric: He has a normal mood and affect. His behavior is normal.   Vitals reviewed.      Procedures           ED Course      No pneumo on xray, labs unrevealing. I do feel this is more muscleosketal in nature and after pain medication he is seen resting in NAD. I do not feel this is cardiac given 1 the story of how it occurred with lifting and 2 it is fully reproducable. Will place on flexeril and encourage no heavy lifting.       XR Chest 2 View   Final Result   1. No radiographic evidence of acute cardiopulmonary process.           This report was finalized on 12/18/2019 21:14 by Dr. Ramana Campbell MD.        Labs Reviewed   BASIC METABOLIC PANEL - Abnormal; Notable for the following components:       Result Value    Glucose 122 (*)     Creatinine 0.73 (*)     All other components within normal limits    Narrative:     GFR Normal >60  Chronic Kidney Disease <60  Kidney Failure <15     PROTIME-INR - Normal   APTT - Normal   CBC WITH AUTO DIFFERENTIAL - Normal   CBC AND DIFFERENTIAL    Narrative:     The following  orders were created for panel order CBC & Differential.  Procedure                               Abnormality         Status                     ---------                               -----------         ------                     CBC Auto Differential[[id number]]        Normal              Final result                 Please view results for these tests on the individual orders.                     No data recorded                        MDM    Final diagnoses:   Chest wall pain              Yoan Ratliff MD  12/18/19 1859

## 2020-05-31 ENCOUNTER — HOSPITAL ENCOUNTER (OUTPATIENT)
Facility: HOSPITAL | Age: 35
Setting detail: OBSERVATION
Discharge: HOME | End: 2020-06-01
Attending: FAMILY MEDICINE | Admitting: FAMILY MEDICINE

## 2020-05-31 DIAGNOSIS — I21.4 NSTEMI (NON-ST ELEVATED MYOCARDIAL INFARCTION) (HCC): Primary | ICD-10-CM

## 2020-05-31 PROBLEM — I25.9 CHEST PAIN DUE TO MYOCARDIAL ISCHEMIA: Status: ACTIVE | Noted: 2020-05-31

## 2020-05-31 LAB
HOLD SPECIMEN: NORMAL
HOLD SPECIMEN: NORMAL
TROPONIN T SERPL-MCNC: 0.05 NG/ML (ref 0–0.03)
WHOLE BLOOD HOLD SPECIMEN: NORMAL
WHOLE BLOOD HOLD SPECIMEN: NORMAL

## 2020-05-31 PROCEDURE — 96361 HYDRATE IV INFUSION ADD-ON: CPT

## 2020-05-31 PROCEDURE — 96374 THER/PROPH/DIAG INJ IV PUSH: CPT

## 2020-05-31 PROCEDURE — 93005 ELECTROCARDIOGRAM TRACING: CPT | Performed by: FAMILY MEDICINE

## 2020-05-31 PROCEDURE — G0378 HOSPITAL OBSERVATION PER HR: HCPCS

## 2020-05-31 PROCEDURE — 84100 ASSAY OF PHOSPHORUS: CPT | Performed by: INTERNAL MEDICINE

## 2020-05-31 PROCEDURE — 83735 ASSAY OF MAGNESIUM: CPT | Performed by: INTERNAL MEDICINE

## 2020-05-31 PROCEDURE — 25010000002 MORPHINE SULFATE (PF) 2 MG/ML SOLUTION: Performed by: FAMILY MEDICINE

## 2020-05-31 PROCEDURE — 99285 EMERGENCY DEPT VISIT HI MDM: CPT

## 2020-05-31 PROCEDURE — 84484 ASSAY OF TROPONIN QUANT: CPT | Performed by: FAMILY MEDICINE

## 2020-05-31 PROCEDURE — 36415 COLL VENOUS BLD VENIPUNCTURE: CPT

## 2020-05-31 PROCEDURE — 93010 ELECTROCARDIOGRAM REPORT: CPT | Performed by: INTERNAL MEDICINE

## 2020-05-31 RX ORDER — SODIUM CHLORIDE 9 MG/ML
125 INJECTION, SOLUTION INTRAVENOUS CONTINUOUS
Status: DISCONTINUED | OUTPATIENT
Start: 2020-05-31 | End: 2020-06-01

## 2020-05-31 RX ORDER — MORPHINE SULFATE 2 MG/ML
2 INJECTION, SOLUTION INTRAMUSCULAR; INTRAVENOUS ONCE
Status: COMPLETED | OUTPATIENT
Start: 2020-05-31 | End: 2020-05-31

## 2020-05-31 RX ORDER — SODIUM CHLORIDE 0.9 % (FLUSH) 0.9 %
10 SYRINGE (ML) INJECTION AS NEEDED
Status: DISCONTINUED | OUTPATIENT
Start: 2020-05-31 | End: 2020-06-01 | Stop reason: HOSPADM

## 2020-05-31 RX ADMIN — MORPHINE SULFATE 2 MG: 2 INJECTION, SOLUTION INTRAMUSCULAR; INTRAVENOUS at 22:28

## 2020-05-31 RX ADMIN — SODIUM CHLORIDE 500 ML: 9 INJECTION, SOLUTION INTRAVENOUS at 21:55

## 2020-05-31 RX ADMIN — SODIUM CHLORIDE 125 ML/HR: 9 INJECTION, SOLUTION INTRAVENOUS at 21:55

## 2020-06-01 ENCOUNTER — APPOINTMENT (OUTPATIENT)
Dept: CARDIOLOGY | Facility: HOSPITAL | Age: 35
End: 2020-06-01

## 2020-06-01 VITALS
BODY MASS INDEX: 18.89 KG/M2 | RESPIRATION RATE: 16 BRPM | SYSTOLIC BLOOD PRESSURE: 103 MMHG | HEART RATE: 75 BPM | HEIGHT: 75 IN | OXYGEN SATURATION: 98 % | TEMPERATURE: 97.9 F | DIASTOLIC BLOOD PRESSURE: 73 MMHG | WEIGHT: 151.9 LBS

## 2020-06-01 PROBLEM — F19.20 POLYSUBSTANCE (EXCLUDING OPIOIDS) DEPENDENCE: Status: ACTIVE | Noted: 2020-06-01

## 2020-06-01 LAB
ALBUMIN SERPL-MCNC: 4.1 G/DL (ref 3.5–5.2)
ALBUMIN/GLOB SERPL: 1.7 G/DL
ALP SERPL-CCNC: 87 U/L (ref 39–117)
ALT SERPL W P-5'-P-CCNC: 8 U/L (ref 1–41)
AMPHET+METHAMPHET UR QL: POSITIVE
AMPHETAMINES UR QL: POSITIVE
ANION GAP SERPL CALCULATED.3IONS-SCNC: 13 MMOL/L (ref 5–15)
AST SERPL-CCNC: 13 U/L (ref 1–40)
BARBITURATES UR QL SCN: NEGATIVE
BASOPHILS # BLD AUTO: 0.06 10*3/MM3 (ref 0–0.2)
BASOPHILS NFR BLD AUTO: 0.9 % (ref 0–1.5)
BENZODIAZ UR QL SCN: NEGATIVE
BH CV ECHO MEAS - AO MAX PG (FULL): 0.44 MMHG
BH CV ECHO MEAS - AO MAX PG: 4.2 MMHG
BH CV ECHO MEAS - AO MEAN PG (FULL): 0 MMHG
BH CV ECHO MEAS - AO MEAN PG: 2 MMHG
BH CV ECHO MEAS - AO ROOT AREA (BSA CORRECTED): 1.5
BH CV ECHO MEAS - AO ROOT AREA: 6.6 CM^2
BH CV ECHO MEAS - AO ROOT DIAM: 2.9 CM
BH CV ECHO MEAS - AO V2 MAX: 103 CM/SEC
BH CV ECHO MEAS - AO V2 MEAN: 66.1 CM/SEC
BH CV ECHO MEAS - AO V2 VTI: 21.8 CM
BH CV ECHO MEAS - AVA(I,A): 3.7 CM^2
BH CV ECHO MEAS - AVA(I,D): 3.7 CM^2
BH CV ECHO MEAS - AVA(V,A): 3.3 CM^2
BH CV ECHO MEAS - AVA(V,D): 3.3 CM^2
BH CV ECHO MEAS - BSA(HAYCOCK): 1.9 M^2
BH CV ECHO MEAS - BSA: 2 M^2
BH CV ECHO MEAS - BZI_BMI: 19 KILOGRAMS/M^2
BH CV ECHO MEAS - BZI_METRIC_HEIGHT: 190.5 CM
BH CV ECHO MEAS - BZI_METRIC_WEIGHT: 68.9 KG
BH CV ECHO MEAS - EDV(CUBED): 72.5 ML
BH CV ECHO MEAS - EDV(MOD-SP4): 69.8 ML
BH CV ECHO MEAS - EDV(TEICH): 77.3 ML
BH CV ECHO MEAS - EF(CUBED): 72.2 %
BH CV ECHO MEAS - EF(MOD-SP4): 71.3 %
BH CV ECHO MEAS - EF(TEICH): 64.4 %
BH CV ECHO MEAS - ESV(CUBED): 20.1 ML
BH CV ECHO MEAS - ESV(MOD-SP4): 20 ML
BH CV ECHO MEAS - ESV(TEICH): 27.5 ML
BH CV ECHO MEAS - FS: 34.8 %
BH CV ECHO MEAS - IVS/LVPW: 1
BH CV ECHO MEAS - IVSD: 0.85 CM
BH CV ECHO MEAS - LA DIMENSION: 2.5 CM
BH CV ECHO MEAS - LA/AO: 0.86
BH CV ECHO MEAS - LAT PEAK E' VEL: 14.3 CM/SEC
BH CV ECHO MEAS - LV DIASTOLIC VOL/BSA (35-75): 35.7 ML/M^2
BH CV ECHO MEAS - LV MASS(C)D: 105.1 GRAMS
BH CV ECHO MEAS - LV MASS(C)DI: 53.8 GRAMS/M^2
BH CV ECHO MEAS - LV MAX PG: 3.8 MMHG
BH CV ECHO MEAS - LV MEAN PG: 2 MMHG
BH CV ECHO MEAS - LV SYSTOLIC VOL/BSA (12-30): 10.2 ML/M^2
BH CV ECHO MEAS - LV V1 MAX: 97.5 CM/SEC
BH CV ECHO MEAS - LV V1 MEAN: 68 CM/SEC
BH CV ECHO MEAS - LV V1 VTI: 23.5 CM
BH CV ECHO MEAS - LVIDD: 4.2 CM
BH CV ECHO MEAS - LVIDS: 2.7 CM
BH CV ECHO MEAS - LVLD AP4: 8.8 CM
BH CV ECHO MEAS - LVLS AP4: 6.7 CM
BH CV ECHO MEAS - LVOT AREA (M): 3.5 CM^2
BH CV ECHO MEAS - LVOT AREA: 3.5 CM^2
BH CV ECHO MEAS - LVOT DIAM: 2.1 CM
BH CV ECHO MEAS - LVPWD: 0.81 CM
BH CV ECHO MEAS - MED PEAK E' VEL: 12.5 CM/SEC
BH CV ECHO MEAS - MV A MAX VEL: 39.8 CM/SEC
BH CV ECHO MEAS - MV DEC TIME: 0.15 SEC
BH CV ECHO MEAS - MV E MAX VEL: 70.3 CM/SEC
BH CV ECHO MEAS - MV E/A: 1.8
BH CV ECHO MEAS - RAP SYSTOLE: 5 MMHG
BH CV ECHO MEAS - RVSP: 22.6 MMHG
BH CV ECHO MEAS - SI(AO): 73.7 ML/M^2
BH CV ECHO MEAS - SI(CUBED): 26.8 ML/M^2
BH CV ECHO MEAS - SI(LVOT): 41.7 ML/M^2
BH CV ECHO MEAS - SI(MOD-SP4): 25.5 ML/M^2
BH CV ECHO MEAS - SI(TEICH): 25.5 ML/M^2
BH CV ECHO MEAS - SV(AO): 144 ML
BH CV ECHO MEAS - SV(CUBED): 52.4 ML
BH CV ECHO MEAS - SV(LVOT): 81.4 ML
BH CV ECHO MEAS - SV(MOD-SP4): 49.8 ML
BH CV ECHO MEAS - SV(TEICH): 49.7 ML
BH CV ECHO MEAS - TR MAX VEL: 210 CM/SEC
BH CV ECHO MEASUREMENTS AVERAGE E/E' RATIO: 5.25
BILIRUB SERPL-MCNC: 0.3 MG/DL (ref 0.2–1.2)
BUN BLD-MCNC: 9 MG/DL (ref 6–20)
BUN/CREAT SERPL: 10.3 (ref 7–25)
BUPRENORPHINE SERPL-MCNC: NEGATIVE NG/ML
CALCIUM SPEC-SCNC: 9 MG/DL (ref 8.6–10.5)
CANNABINOIDS SERPL QL: POSITIVE
CHLORIDE SERPL-SCNC: 103 MMOL/L (ref 98–107)
CHOLEST SERPL-MCNC: 128 MG/DL (ref 0–200)
CO2 SERPL-SCNC: 26 MMOL/L (ref 22–29)
COCAINE UR QL: NEGATIVE
CREAT BLD-MCNC: 0.87 MG/DL (ref 0.76–1.27)
DEPRECATED RDW RBC AUTO: 42.3 FL (ref 37–54)
EOSINOPHIL # BLD AUTO: 0.14 10*3/MM3 (ref 0–0.4)
EOSINOPHIL NFR BLD AUTO: 2.1 % (ref 0.3–6.2)
ERYTHROCYTE [DISTWIDTH] IN BLOOD BY AUTOMATED COUNT: 12.8 % (ref 12.3–15.4)
GFR SERPL CREATININE-BSD FRML MDRD: 100 ML/MIN/1.73
GLOBULIN UR ELPH-MCNC: 2.4 GM/DL
GLUCOSE BLD-MCNC: 79 MG/DL (ref 65–99)
HCT VFR BLD AUTO: 41.8 % (ref 37.5–51)
HDLC SERPL-MCNC: 32 MG/DL (ref 40–60)
HGB BLD-MCNC: 14.1 G/DL (ref 13–17.7)
IMM GRANULOCYTES # BLD AUTO: 0.04 10*3/MM3 (ref 0–0.05)
IMM GRANULOCYTES NFR BLD AUTO: 0.6 % (ref 0–0.5)
LDLC SERPL CALC-MCNC: 76 MG/DL (ref 0–100)
LDLC/HDLC SERPL: 2.39 {RATIO}
LEFT ATRIUM VOLUME INDEX: 19.6 ML/M2
LEFT ATRIUM VOLUME: 38.3 CM3
LYMPHOCYTES # BLD AUTO: 2.07 10*3/MM3 (ref 0.7–3.1)
LYMPHOCYTES NFR BLD AUTO: 30.8 % (ref 19.6–45.3)
MAGNESIUM SERPL-MCNC: 2 MG/DL (ref 1.6–2.6)
MAXIMAL PREDICTED HEART RATE: 186 BPM
MCH RBC QN AUTO: 30.7 PG (ref 26.6–33)
MCHC RBC AUTO-ENTMCNC: 33.7 G/DL (ref 31.5–35.7)
MCV RBC AUTO: 91.1 FL (ref 79–97)
METHADONE UR QL SCN: NEGATIVE
MONOCYTES # BLD AUTO: 0.7 10*3/MM3 (ref 0.1–0.9)
MONOCYTES NFR BLD AUTO: 10.4 % (ref 5–12)
NEUTROPHILS # BLD AUTO: 3.7 10*3/MM3 (ref 1.7–7)
NEUTROPHILS NFR BLD AUTO: 55.2 % (ref 42.7–76)
NRBC BLD AUTO-RTO: 0 /100 WBC (ref 0–0.2)
OPIATES UR QL: POSITIVE
OXYCODONE UR QL SCN: NEGATIVE
PCP UR QL SCN: NEGATIVE
PHOSPHATE SERPL-MCNC: 4.3 MG/DL (ref 2.5–4.5)
PLATELET # BLD AUTO: 226 10*3/MM3 (ref 140–450)
PMV BLD AUTO: 9.6 FL (ref 6–12)
POTASSIUM BLD-SCNC: 4.1 MMOL/L (ref 3.5–5.2)
PROPOXYPH UR QL: NEGATIVE
PROT SERPL-MCNC: 6.5 G/DL (ref 6–8.5)
RBC # BLD AUTO: 4.59 10*6/MM3 (ref 4.14–5.8)
SODIUM BLD-SCNC: 142 MMOL/L (ref 136–145)
STRESS TARGET HR: 158 BPM
TRICYCLICS UR QL SCN: NEGATIVE
TRIGL SERPL-MCNC: 98 MG/DL (ref 0–150)
TROPONIN T SERPL-MCNC: 0.04 NG/ML (ref 0–0.03)
TROPONIN T SERPL-MCNC: 0.04 NG/ML (ref 0–0.03)
TSH SERPL DL<=0.05 MIU/L-ACNC: 1.84 UIU/ML (ref 0.27–4.2)
VLDLC SERPL-MCNC: 19.6 MG/DL
WBC NRBC COR # BLD: 6.71 10*3/MM3 (ref 3.4–10.8)

## 2020-06-01 PROCEDURE — 93010 ELECTROCARDIOGRAM REPORT: CPT | Performed by: INTERNAL MEDICINE

## 2020-06-01 PROCEDURE — 80050 GENERAL HEALTH PANEL: CPT | Performed by: INTERNAL MEDICINE

## 2020-06-01 PROCEDURE — 84484 ASSAY OF TROPONIN QUANT: CPT | Performed by: INTERNAL MEDICINE

## 2020-06-01 PROCEDURE — 96375 TX/PRO/DX INJ NEW DRUG ADDON: CPT

## 2020-06-01 PROCEDURE — 25010000002 MORPHINE PER 10 MG: Performed by: INTERNAL MEDICINE

## 2020-06-01 PROCEDURE — G0378 HOSPITAL OBSERVATION PER HR: HCPCS

## 2020-06-01 PROCEDURE — 80061 LIPID PANEL: CPT | Performed by: INTERNAL MEDICINE

## 2020-06-01 PROCEDURE — 25010000002 ENOXAPARIN PER 10 MG: Performed by: INTERNAL MEDICINE

## 2020-06-01 PROCEDURE — 99203 OFFICE O/P NEW LOW 30 MIN: CPT | Performed by: INTERNAL MEDICINE

## 2020-06-01 PROCEDURE — 93005 ELECTROCARDIOGRAM TRACING: CPT | Performed by: INTERNAL MEDICINE

## 2020-06-01 PROCEDURE — 25010000002 KETOROLAC TROMETHAMINE PER 15 MG: Performed by: INTERNAL MEDICINE

## 2020-06-01 PROCEDURE — 96372 THER/PROPH/DIAG INJ SC/IM: CPT

## 2020-06-01 PROCEDURE — 80306 DRUG TEST PRSMV INSTRMNT: CPT | Performed by: INTERNAL MEDICINE

## 2020-06-01 PROCEDURE — 96376 TX/PRO/DX INJ SAME DRUG ADON: CPT

## 2020-06-01 PROCEDURE — 93306 TTE W/DOPPLER COMPLETE: CPT

## 2020-06-01 PROCEDURE — 93306 TTE W/DOPPLER COMPLETE: CPT | Performed by: INTERNAL MEDICINE

## 2020-06-01 RX ORDER — ONDANSETRON 4 MG/1
4 TABLET, FILM COATED ORAL EVERY 6 HOURS PRN
Status: DISCONTINUED | OUTPATIENT
Start: 2020-06-01 | End: 2020-06-01 | Stop reason: HOSPADM

## 2020-06-01 RX ORDER — NICOTINE 21 MG/24HR
1 PATCH, TRANSDERMAL 24 HOURS TRANSDERMAL
Status: DISCONTINUED | OUTPATIENT
Start: 2020-06-01 | End: 2020-06-01 | Stop reason: HOSPADM

## 2020-06-01 RX ORDER — ASPIRIN 81 MG/1
81 TABLET ORAL DAILY
Status: DISCONTINUED | OUTPATIENT
Start: 2020-06-02 | End: 2020-06-01 | Stop reason: HOSPADM

## 2020-06-01 RX ORDER — SODIUM CHLORIDE 0.9 % (FLUSH) 0.9 %
10 SYRINGE (ML) INJECTION EVERY 12 HOURS SCHEDULED
Status: DISCONTINUED | OUTPATIENT
Start: 2020-06-01 | End: 2020-06-01 | Stop reason: HOSPADM

## 2020-06-01 RX ORDER — SODIUM CHLORIDE 0.9 % (FLUSH) 0.9 %
10 SYRINGE (ML) INJECTION AS NEEDED
Status: DISCONTINUED | OUTPATIENT
Start: 2020-05-31 | End: 2020-06-01 | Stop reason: HOSPADM

## 2020-06-01 RX ORDER — IBUPROFEN 600 MG/1
600 TABLET ORAL EVERY 6 HOURS PRN
Qty: 10 TABLET | Refills: 0 | Status: SHIPPED | OUTPATIENT
Start: 2020-06-01 | End: 2021-08-12

## 2020-06-01 RX ORDER — ASPIRIN 81 MG/1
81 TABLET, CHEWABLE ORAL ONCE
Status: COMPLETED | OUTPATIENT
Start: 2020-06-01 | End: 2020-06-01

## 2020-06-01 RX ORDER — KETOROLAC TROMETHAMINE 30 MG/ML
30 INJECTION, SOLUTION INTRAMUSCULAR; INTRAVENOUS EVERY 6 HOURS PRN
Status: DISCONTINUED | OUTPATIENT
Start: 2020-06-01 | End: 2020-06-01 | Stop reason: HOSPADM

## 2020-06-01 RX ORDER — NALOXONE HCL 0.4 MG/ML
0.4 VIAL (ML) INJECTION
Status: DISCONTINUED | OUTPATIENT
Start: 2020-06-01 | End: 2020-06-01 | Stop reason: HOSPADM

## 2020-06-01 RX ORDER — NITROGLYCERIN 0.4 MG/1
0.4 TABLET SUBLINGUAL
Status: DISCONTINUED | OUTPATIENT
Start: 2020-05-31 | End: 2020-06-01 | Stop reason: HOSPADM

## 2020-06-01 RX ORDER — ONDANSETRON 2 MG/ML
4 INJECTION INTRAMUSCULAR; INTRAVENOUS EVERY 6 HOURS PRN
Status: DISCONTINUED | OUTPATIENT
Start: 2020-06-01 | End: 2020-06-01 | Stop reason: HOSPADM

## 2020-06-01 RX ADMIN — NICOTINE 1 PATCH: 21 PATCH, EXTENDED RELEASE TRANSDERMAL at 08:47

## 2020-06-01 RX ADMIN — KETOROLAC TROMETHAMINE 30 MG: 30 INJECTION, SOLUTION INTRAMUSCULAR; INTRAVENOUS at 10:53

## 2020-06-01 RX ADMIN — SODIUM CHLORIDE, PRESERVATIVE FREE 10 ML: 5 INJECTION INTRAVENOUS at 00:48

## 2020-06-01 RX ADMIN — MORPHINE SULFATE 4 MG: 4 INJECTION, SOLUTION INTRAMUSCULAR; INTRAVENOUS at 08:55

## 2020-06-01 RX ADMIN — ENOXAPARIN SODIUM 40 MG: 40 INJECTION SUBCUTANEOUS at 08:52

## 2020-06-01 RX ADMIN — ASPIRIN 81 MG: 81 TABLET, CHEWABLE ORAL at 00:48

## 2020-06-01 NOTE — CONSULTS
"[facility] HEART GROUP CONSULT NOTE    Referring Provider: Dr. Bose    Reason for Consultation: elevated troponin    Chief Complaint   Patient presents with   • Chest Pain       Subjective .     History of present illness:  Ross Platt is a 35 y.o. male with a known PMH significant for Marfan syndrome, tobacco abuse, polysubstance abuse.  He presents as a transfer from [facility] for further evaluation after presenting with complaints of chest pain and finding to have an elevated troponin.  Due to his history of Marfan syndrome the patient underwent a CT scan at [facility] prior to transfer.  This was unremarkable.  Upon arrival to our emergency department he continued to complain of constant chest pain to his left side.  Troponin checks here are elevated with a flattened trend.  He has a history of drug abuse including methamphetamine use.  He adamantly denies any recent drug use other than \" maybe taking a couple pills.\"  He reports constant short of breath on account of his history of smoking stating he has smoked \"all of his life\".  He reports that his chest pain began 2 days ago and has been constant since that time.  He denies any progression of the pain just a constant pain in the left side of his chest, no worse with palpation.  He denies any associated symptoms.  He denies any interventions or actions which make this pain worse or better.    On my arrival in the room he was sleeping in the bed.  His mother was in the chair at the bedside.  The discussion of any test results asked the patient if he was okay with discussing test results with his mother in the room.  He was agreeable.  We discussed his urine drug screen test results and he became very defensive denying any recent drug use.    History  Past Medical History:   Diagnosis Date   • Back pain    • Chest pain    • Claustrophobia    • COPD (chronic obstructive pulmonary disease) (CMS/[facility])  "   • Lung disease    • Marfan syndrome    • Pneumothorax    • Seizures (CMS/HCC)     when he was a child    • Smoking     3/4 pack a day   • Tobacco abuse    • Underweight    ,   Past Surgical History:   Procedure Laterality Date   • LUNG LOBECTOMY Right 02/01/2017   • THORACOSCOPY N/A 2/3/2017    Procedure: BRONCHOSCOPY, THORACOSCOPY RIGHT CHEST,  WEDGE RESECTION RIGHT UPPER AND LOWER LOBE OF LUNG, MECHANICAL PLEURODESIS;  Surgeon: Kyle Heath MD;  Location: [facility];  Service:    • TYMPANOSTOMY  05/12/2016    Recorded   ,   Family History   Problem Relation Age of Onset   • Heart attack Father    • Stroke Father    • No Known Problems Mother    • Cancer Maternal Grandmother         breast   • Breast cancer Maternal Grandmother    • No Known Problems Daughter    • Prostate cancer Neg Hx    • Colon cancer Neg Hx    • Alcohol abuse Neg Hx    • Drug abuse Neg Hx    • Diabetes Neg Hx    • Thyroid cancer Neg Hx    ,   Social History     Tobacco Use   • Smoking status: Current Every Day Smoker     Packs/day: 0.50     Years: 17.00     Pack years: 8.50     Types: Cigarettes   • Smokeless tobacco: Never Used   Substance Use Topics   • Alcohol use: No   • Drug use: No     Comment: CLEAN X 1 YEAR   ,     Medications  Current Facility-Administered Medications   Medication Dose Route Frequency Provider Last Rate Last Dose   • [START ON 6/2/2020] aspirin EC tablet 81 mg  81 mg Oral Daily Andrea Bose MD       • enoxaparin (LOVENOX) syringe 40 mg  40 mg Subcutaneous Q24H Andrea Bose MD       • morphine injection 4 mg  4 mg Intravenous Q4H PRN Andrea Bose MD        And   • naloxone (NARCAN) injection 0.4 mg  0.4 mg Intravenous Q5 Min PRN Andrea Bose MD       • nicotine (NICODERM CQ) 21 MG/24HR patch 1 patch  1 patch Transdermal Q24H Andrea Bose MD       • nitroglycerin (NITROSTAT) SL tablet 0.4 mg  0.4 mg Sublingual Q5 Min PRN Andrea Bose MD       • ondansetron (ZOFRAN) tablet 4 mg  4  "mg Oral Q6H PRN Andrea Bose MD        Or   • ondansetron (ZOFRAN) injection 4 mg  4 mg Intravenous Q6H PRN Andrea Bose MD       • sodium chloride 0.9 % flush 10 mL  10 mL Intravenous PRN Andrea Bose MD       • sodium chloride 0.9 % flush 10 mL  10 mL Intravenous Q12H Andrea Bose MD   10 mL at 06/01/20 0048   • sodium chloride 0.9 % flush 10 mL  10 mL Intravenous PRN Andrea Bose MD       • sodium chloride 0.9 % infusion  125 mL/hr Intravenous Continuous Andrea Bose MD   Stopped at 05/31/20 2300       Allergies:  Medrol [methylprednisolone]    Review of Systems  Review of Systems   Constitution: Negative for diaphoresis, fever and malaise/fatigue.   Cardiovascular: Positive for chest pain. Negative for dyspnea on exertion, leg swelling, orthopnea, palpitations, paroxysmal nocturnal dyspnea and syncope.   Respiratory: Positive for shortness of breath. Negative for wheezing.    Gastrointestinal: Negative for nausea and vomiting.   Psychiatric/Behavioral: Positive for substance abuse ( denies).       Objective     Physical Exam:  Patient Vitals for the past 24 hrs:   BP Temp Temp src Pulse Resp SpO2 Height Weight   06/01/20 0734 109/53 -- -- -- -- -- 190.5 cm (75\") 68.9 kg (151 lb 14.4 oz)   06/01/20 0308 109/53 97.6 °F (36.4 °C) Oral 69 16 95 % -- --   05/31/20 2241 112/76 97.9 °F (36.6 °C) Oral 59 18 95 % 190.5 cm (75\") 68.9 kg (152 lb)   05/31/20 2231 102/74 -- -- -- -- -- -- --   05/31/20 2231 -- -- -- 79 -- 100 % -- --   05/31/20 2228 -- 98.1 °F (36.7 °C) Oral -- -- -- -- --   05/31/20 2227 -- -- -- 72 18 99 % -- --   05/31/20 2226 -- -- -- 74 -- 100 % -- --   05/31/20 2203 -- -- -- 71 -- 100 % -- --   05/31/20 2145 107/78 -- -- 71 -- 97 % -- --   05/31/20 2132 106/71 -- -- 79 18 100 % -- --   05/31/20 2101 -- -- -- 92 -- -- -- --   05/31/20 2100 113/81 -- -- 89 18 100 % -- --   05/31/20 2045 104/69 -- -- 74 18 95 % -- --   05/31/20 2030 107/74 -- -- 84 -- 100 % -- -- " "  05/31/20 2019 -- -- -- 78 -- 100 % -- --   05/31/20 2015 132/79 -- -- -- -- -- -- --   05/31/20 2008 118/80 98.9 °F (37.2 °C) Oral 86 16 99 % 190.5 cm (75\") 69.9 kg (154 lb)   05/31/20 1900 -- -- -- -- -- -- -- 72 kg (158 lb 11.7 oz)     Physical Exam   Constitutional: He is oriented to person, place, and time. He appears well-developed and well-nourished. No distress.   HENT:   Head: Normocephalic and atraumatic.   Eyes: Conjunctivae are normal.   Neck: Normal range of motion. Neck supple.   Cardiovascular: Normal rate, regular rhythm and normal heart sounds.   Pulmonary/Chest: Effort normal and breath sounds normal. No respiratory distress. He has no wheezes. He has no rales.   Abdominal: Soft. Normal appearance. He exhibits no distension. There is no tenderness.   Musculoskeletal: Normal range of motion. He exhibits no edema.   Neurological: He is alert and oriented to person, place, and time.   Skin: Skin is warm, dry and intact. No rash noted. He is not diaphoretic. No erythema. No pallor.   Psychiatric: He has a normal mood and affect. His behavior is normal.   Vitals reviewed.      Results Review:   I reviewed the patient's new clinical results.    Lab Results (last 72 hours)     Procedure Component Value Units Date/Time    Troponin [[id number]]  (Abnormal) Collected:  06/01/20 0529    Specimen:  Blood Updated:  06/01/20 0647     Troponin T 0.037 ng/mL     Narrative:       Troponin T Reference Range:  <= 0.03 ng/mL-   Negative for AMI  >0.03 ng/mL-     Abnormal for myocardial necrosis.  Clinicians would have to utilize clinical acumen, EKG, Troponin and serial changes to determine if it is an Acute Myocardial Infarction or myocardial injury due to an underlying chronic condition.       Results may be falsely decreased if patient taking Biotin.      Urine Drug Screen - Urine, Clean Catch [[id number]]  (Abnormal) Collected:  06/01/20 0612    Specimen:  Urine, Clean Catch Updated:  06/01/20 0632     THC, Screen, " Urine Positive     Phencyclidine (PCP), Urine Negative     Cocaine Screen, Urine Negative     Methamphetamine, Ur Positive     Opiate Screen Positive     Amphetamine Screen, Urine Positive     Benzodiazepine Screen, Urine Negative     Tricyclic Antidepressants Screen Negative     Methadone Screen, Urine Negative     Barbiturates Screen, Urine Negative     Oxycodone Screen, Urine Negative     Propoxyphene Screen Negative     Buprenorphine, Screen, Urine Negative    Narrative:       Cutoff For Drugs Screened:    Amphetamines               500 ng/ml  Barbiturates               200 ng/ml  Benzodiazepines            150 ng/ml  Cocaine                    150 ng/ml  Methadone                  200 ng/ml  Opiates                    100 ng/ml  Phencyclidine               25 ng/ml  THC                            50 ng/ml  Methamphetamine            500 ng/ml  Tricyclic Antidepressants  300 ng/ml  Oxycodone                  100 ng/ml  Propoxyphene               300 ng/ml  Buprenorphine               10 ng/ml    The normal value for all drugs tested is negative. This report includes unconfirmed screening results, with the cutoff values listed, to be used for medical treatment purposes only.  Unconfirmed results must not be used for non-medical purposes such as employment or legal testing.  Clinical consideration should be applied to any drug of abuse test, particularly when unconfirmed results are used.      CBC Auto Differential [[id number]]  (Abnormal) Collected:  06/01/20 0529    Specimen:  Blood Updated:  06/01/20 0620     WBC 6.71 10*3/mm3      RBC 4.59 10*6/mm3      Hemoglobin 14.1 g/dL      Hematocrit 41.8 %      MCV 91.1 fL      MCH 30.7 pg      MCHC 33.7 g/dL      RDW 12.8 %      RDW-SD 42.3 fl      MPV 9.6 fL      Platelets 226 10*3/mm3      Neutrophil % 55.2 %      Lymphocyte % 30.8 %      Monocyte % 10.4 %      Eosinophil % 2.1 %      Basophil % 0.9 %      Immature Grans % 0.6 %      Neutrophils, Absolute 3.70  10*3/mm3      Lymphocytes, Absolute 2.07 10*3/mm3      Monocytes, Absolute 0.70 10*3/mm3      Eosinophils, Absolute 0.14 10*3/mm3      Basophils, Absolute 0.06 10*3/mm3      Immature Grans, Absolute 0.04 10*3/mm3      nRBC 0.0 /100 WBC     TSH [[id number]]  (Normal) Collected:  06/01/20 0020    Specimen:  Blood Updated:  06/01/20 0135     TSH 1.840 uIU/mL      Comment: TSH results may be falsely decreased if patient taking Biotin.       Lipid Panel [[id number]]  (Abnormal) Collected:  06/01/20 0020    Specimen:  Blood Updated:  06/01/20 0131     Total Cholesterol 128 mg/dL      Triglycerides 98 mg/dL      HDL Cholesterol 32 mg/dL      LDL Cholesterol  76 mg/dL      VLDL Cholesterol 19.6 mg/dL      LDL/HDL Ratio 2.39    Narrative:       Cholesterol Reference Ranges  (U.S. Department of Health and Human Services ATP III Classifications)    Desirable          <200 mg/dL  Borderline High    200-239 mg/dL  High Risk          >240 mg/dL      Triglyceride Reference Ranges  (U.S. Department of Health and Human Services ATP III Classifications)    Normal           <150 mg/dL  Borderline High  150-199 mg/dL  High             200-499 mg/dL  Very High        >500 mg/dL    HDL Reference Ranges  (U.S. Department of Health and Human Services ATP III Classifcations)    Low     <40 mg/dl (major risk factor for CHD)  High    >60 mg/dl ('negative' risk factor for CHD)        LDL Reference Ranges  (U.S. Department of Health and Human Services ATP III Classifcations)    Optimal          <100 mg/dL  Near Optimal     100-129 mg/dL  Borderline High  130-159 mg/dL  High             160-189 mg/dL  Very High        >189 mg/dL    Troponin [[id number]]  (Abnormal) Collected:  06/01/20 0020    Specimen:  Blood Updated:  06/01/20 0130     Troponin T 0.036 ng/mL     Narrative:       Troponin T Reference Range:  <= 0.03 ng/mL-   Negative for AMI  >0.03 ng/mL-     Abnormal for myocardial necrosis.  Clinicians would have to utilize clinical acumen, EKG,  Troponin and serial changes to determine if it is an Acute Myocardial Infarction or myocardial injury due to an underlying chronic condition.       Results may be falsely decreased if patient taking Biotin.      Comprehensive Metabolic Panel [[id number]] Collected:  06/01/20 0020    Specimen:  Blood Updated:  06/01/20 0128     Glucose 79 mg/dL      BUN 9 mg/dL      Creatinine 0.87 mg/dL      Sodium 142 mmol/L      Potassium 4.1 mmol/L      Chloride 103 mmol/L      CO2 26.0 mmol/L      Calcium 9.0 mg/dL      Total Protein 6.5 g/dL      Albumin 4.10 g/dL      ALT (SGPT) 8 U/L      AST (SGOT) 13 U/L      Alkaline Phosphatase 87 U/L      Total Bilirubin 0.3 mg/dL      eGFR Non African Amer 100 mL/min/1.73      Globulin 2.4 gm/dL      A/G Ratio 1.7 g/dL      BUN/Creatinine Ratio 10.3     Anion Gap 13.0 mmol/L     Narrative:       GFR Normal >60  Chronic Kidney Disease <60  Kidney Failure <15      Magnesium [[id number]]  (Normal) Collected:  05/31/20 2020    Specimen:  Blood Updated:  06/01/20 0030     Magnesium 2.0 mg/dL     Phosphorus [[id number]]  (Normal) Collected:  05/31/20 2020    Specimen:  Blood Updated:  06/01/20 0030     Phosphorus 4.3 mg/dL     Troponin [[id number]]  (Abnormal) Collected:  05/31/20 2020    Specimen:  Blood Updated:  05/31/20 2119     Troponin T 0.046 ng/mL     Narrative:       Troponin T Reference Range:  <= 0.03 ng/mL-   Negative for AMI  >0.03 ng/mL-     Abnormal for myocardial necrosis.  Clinicians would have to utilize clinical acumen, EKG, Troponin and serial changes to determine if it is an Acute Myocardial Infarction or myocardial injury due to an underlying chronic condition.       Results may be falsely decreased if patient taking Biotin.            Assessment     1. Chest pain  2. Elevated troponin  3. Marfan Syndrome  4. Polysubstance Abuse  5. Tobacco Use    Plan     - echo was ordered by the primary service, results pending.  Previous echo that was obtained in 2017 was essentially  normal with normal LVEF.  If results of this echocardiogram appear to be grossly different than that of previous other recommendations may be made at that time based on those results.  - abstain from drug use  - smoking cessation  -Troponin elevation appear to be a flattened trend.  He has had no change in his symptoms.  He denies them to be any better or any worse.  Elevated troponin is likely due to polysubstance abuse.  Unsure of the last time of use as he adamantly denies and requests a repeat urine drug screen.  EKG is unremarkable and appears to be the patient's baseline.  At this time his troponin elevation is a type II elevation and does not require further cardiac work-up with stress testing.  He was seen for chest pain in 2017 with low risk for ischemia result on the stress echo.    Thank you for the consultation, cardiology will sign off. No further recommendations at this time. If echo results change recommendations this will certainly be communicated. Thank you.    JANESSA Alvarez  6/1/2020  08:38      Please note this cardiology consultation note is the result of a face to face consultation with the patient, in addition to reviewing medical records at length by myself, JANESSA Alvarez.       Time: Approximately 35 minutes

## 2020-06-01 NOTE — PAYOR COMM NOTE
"ADMIT INPT 5-31-20  NSTEMI    UR  [id number]  Bhavana Platt (35 y.o. Male)     Date of Birth Social Security Number Address Home Phone MRN    [id number]  [id number] [phone] [id number]    Scientologist Marital Status          None Single       Admission Date Admission Type Admitting Provider Attending Provider Department, Room/Bed    5/31/20 Emergency Marcin Epstein MD Fleming, John Eric, MD [facility] 4B, 435/1    Discharge Date Discharge Disposition Discharge Destination                       Attending Provider:  Marcin Epstein MD    Allergies:  Medrol [Methylprednisolone]    Isolation:  None   Infection:  None   Code Status:  CPR    Ht:  190.5 cm (75\")   Wt:  68.9 kg (152 lb)    Admission Cmt:  None   Principal Problem:  None                Active Insurance as of 5/31/2020     Primary Coverage     Payor Plan Insurance Group Employer/Plan Group    [employer] [place] [employer] [place]      Payor Plan Address Payor Plan Phone Number Payor Plan Fax Number Effective Dates    [address] [address] [phone]  12/18/2019 - None Entered    [facility] [id number]       Subscriber Name Subscriber Birth Date Member ID       BHAVANA PLATT 1985 [id number]                 Emergency Contacts      (Rel.) Home Phone Work Phone Mobile Phone    THERESE PLATT (Mother) -- -- [phone]               History & Physical      Andrea Bose MD at 05/31/20 2230              [facility]  HISTORY AND PHYSICAL    Date of Admission: 5/31/2020  Primary Care Physician: Provider, No Known    Subjective     Chief Complaint: Chest pain    History of Present Illness  Patient is a 35-year-old white male past medical history of Marfan syndrome with previous pneumothorax on the right also history of smoking and methamphetamine use claims to be clean for a year.  He presents with a 2-day history of chest pain and left arm " numbness.  Sound like it is been coming and going intermittently.  He was evaluated in outside ER EKGs were unremarkable however he did have an elevated troponin.  CT of the chest was obtained was negative for any type of aortic dissection or aneurysm.  He was transferred here patient claims that he has not used methamphetamines in a year no urine drug screen is been done yet.  He denies cough he denies significant shortness of breath he denies hemoptysis he denies nausea vomiting or any other symptoms.  He denies fevers or chills.  His troponin continues to remain elevated here the rest of his labs are relatively unremarkable.  He is being admitted for chest pain elevated troponin.        Review of Systems   14 point review of systems negative except for as per HPI    Otherwise complete ROS reviewed and negative except as mentioned in the HPI.    Past Medical History:   Past Medical History:   Diagnosis Date   • Back pain    • Chest pain    • Claustrophobia    • COPD (chronic obstructive pulmonary disease) (CMS/HCC)    • Lung disease    • Marfan syndrome    • Pneumothorax    • Seizures (CMS/HCC)     when he was a child    • Smoking     3/4 pack a day   • Tobacco abuse    • Underweight      Past Surgical History:  Past Surgical History:   Procedure Laterality Date   • LUNG LOBECTOMY Right 02/01/2017   • THORACOSCOPY N/A 2/3/2017    Procedure: BRONCHOSCOPY, THORACOSCOPY RIGHT CHEST,  WEDGE RESECTION RIGHT UPPER AND LOWER LOBE OF LUNG, MECHANICAL PLEURODESIS;  Surgeon: Kyle Heath MD;  Location: [facility];  Service:    • TYMPANOSTOMY  05/12/2016    Recorded     Social History:  reports that he has been smoking cigarettes. He has a 8.50 pack-year smoking history. He has never used smokeless tobacco. He reports that he does not drink alcohol or use drugs.    Family History: family history includes Breast cancer in his maternal grandmother; Cancer in his maternal grandmother; Heart attack in his father; No Known  "Problems in his daughter and mother; Stroke in his father.       Allergies:  Allergies   Allergen Reactions   • Medrol [Methylprednisolone] Other (See Comments)     Patient states he gets violent on this medication      Medications:  Prior to Admission medications    Medication Sig Start Date End Date Taking? Authorizing Provider   azithromycin (ZITHROMAX) 250 MG tablet Take 2 tablets the first day, then 1 tablet daily for 4 days. 7/12/19   Wen Mitchell APRN   cyclobenzaprine (FLEXERIL) 10 MG tablet Take 0.5 tablets by mouth 2 (Two) Times a Day As Needed for Muscle Spasms. 12/18/19   Yoan Ratliff MD     Objective     Vital Signs: /76 (BP Location: Right arm, Patient Position: Lying)   Pulse 59   Temp 97.9 °F (36.6 °C) (Oral)   Resp 18   Ht 190.5 cm (75\")   Wt 68.9 kg (152 lb)   SpO2 95%   BMI 19.00 kg/m²    Physical Exam  Gen.:  Well-nourished well-developed white male in no acute distress  HEENT: Atraumatic, normocephalic.  Pupils equal, round, and reactive to light. Extraocular movements intact.  Sclerae anicteric.  External ears negative.  Mucous membranes moist.  Neck is supple without lymphadenopathy.  No JVD is noted.  No carotid bruits are auscultated.  Oropharynx is without erythema or exudate.   Chest: Clear to auscultation and percussion.  CV: Regular rate and rhythm.  Normal S1-S2.  No gallops, murmurs. or rubs.  Abdomen: Soft, nontender, nondistended.  Active bowel sounds,  No hepatosplenomegaly.  No masses.  No hernias.  Extremities: No clubbing, edema, or cyanosis.  Capillary refill is normal.  Pulses are 2+ and symmetric at radial and dorsalis pedis.  Posterior tibial pulses are intact and 2+ palpable.  Neuro: Patient is awake, alert, and oriented ×3.  Cranial nerves II through XII are grossly intact.  Motor is 5 out of 5 bilaterally.  DTRs are 2+ and symmetric bilaterally. Sensory exam is nonfocal  Skin: Warm, dry, and intact.  No evidence of breakdown.  Sensorium: Normal " thought and affect    Nursing notes and vital signs reviewed        Results Reviewed:  Lab Results (last 24 hours)     Procedure Component Value Units Date/Time    [place] [facility] [[id number]] Collected:  05/31/20 2020    Specimen:  Blood Updated:  05/31/20 2130    Narrative:       The following orders were created for panel order [place] [facility].  Procedure                               Abnormality         Status                     ---------                               -----------         ------                     Light Blue Top[[id number]]                                   Final result               Green Top (Gel)[[id number]]                                  Final result               Lavender Top[[id number]]                                     Final result               Red Top[[id number]]                                          Final result                 Please view results for these tests on the individual orders.    Light Blue Top [[id number]] Collected:  05/31/20 2020    Specimen:  Blood Updated:  05/31/20 2130     Extra Tube hold for add-on     Comment: Auto resulted       Red Top [[id number]] Collected:  05/31/20 2020    Specimen:  Blood Updated:  05/31/20 2130     Extra Tube Hold for add-ons.     Comment: Auto resulted.       Green Top (Gel) [[id number]] Collected:  05/31/20 2020    Specimen:  Blood Updated:  05/31/20 2130     Extra Tube Hold for add-ons.     Comment: Auto resulted.       Lavender Top [[id number]] Collected:  05/31/20 2020    Specimen:  Blood Updated:  05/31/20 2130     Extra Tube hold for add-on     Comment: Auto resulted       Troponin [[id number]]  (Abnormal) Collected:  05/31/20 2020    Specimen:  Blood Updated:  05/31/20 2119     Troponin T 0.046 ng/mL     Narrative:       Troponin T Reference Range:  <= 0.03 ng/mL-   Negative for AMI  >0.03 ng/mL-     Abnormal for myocardial necrosis.  Clinicians would have to utilize clinical acumen, EKG, Troponin and serial changes to determine if it is an  Acute Myocardial Infarction or myocardial injury due to an underlying chronic condition.       Results may be falsely decreased if patient taking Biotin.          Imaging Results (Last 24 Hours)     ** No results found for the last 24 hours. **        I have personally reviewed and interpreted the radiology studies and ECG obtained at time of admission.     Assessment / Plan     Assessment:   Active Hospital Problems    Diagnosis   • NSTEMI (non-ST elevated myocardial infarction) (CMS/[facility])   • Chest pain due to myocardial ischemia   • Tobacco use disorder   • Marfan syndrome   1.  Chest pain elevated troponin questionable NSTEMI versus type II leak last admit patient monitor troponins trend.  We will also obtain serial EKGs.  Will obtain 2D echo check lipid panel in the morning.  Continue aspirin therapy Lovenox.  Consult cardiology for assistance.  2.  Tobacco abuse: Encourage smoking cessation, also offer nicotine patch.  3.  Marfan syndrome patient has had negative CT however, will obtain 2D echo.  4.  History of methamphetamine abuse check urine drug screen.  [facility] protocol.  5.  Alcohol abuse patient states that he drinks at least 4 beers a day uncertain exactly how much she drinks once again [facility] protocol PRN Ativan if needed.      Patient seen prior to midnight         Code Status: Full     I discussed the patient's findings and my recommendations with the patient.  He designates his mother as his surrogate healthcare decision maker.    Estimated length of stay 1-2 nights.    Patient seen and examined by me on May 31, 2020 at 10:30 PM.    Andrea Bose MD   06/01/20   00:04              Electronically signed by Andrea Bose MD at 06/01/20 0041          Emergency Department Notes      Kyle Kaye MD at 05/31/20 1155          Subjective   Mr. Velázquez is a 34-year-old gentleman with a history significant for Marfan syndrome.  He is also a heavy smoker.  Since yesterday has had feelings of  paresthesias in his left arm that evolved into left-sided chest pain radiating to his neck.  He went to [facility] today where their work-up revealed a positive troponin of 0.39 (upper range of normal 0.05), and a negative CT scan for aneurysmal dissection.  The patient presents after transfer here with continued left-sided chest pain.  He has no nausea, vomiting, fever or other systemic symptoms.          Review of Systems   Respiratory: Positive for chest tightness.    Neurological: Positive for numbness.   All other systems reviewed and are negative.      Past Medical History:   Diagnosis Date   • Back pain    • Chest pain    • Claustrophobia    • COPD (chronic obstructive pulmonary disease) (CMS/HCC)    • Lung disease    • Marfan syndrome    • Pneumothorax    • Seizures (CMS/HCC)     when he was a child    • Smoking     3/4 pack a day   • Tobacco abuse    • Underweight        Allergies   Allergen Reactions   • Medrol [Methylprednisolone] Other (See Comments)     Patient states he gets violent on this medication        Past Surgical History:   Procedure Laterality Date   • LUNG LOBECTOMY Right 02/01/2017   • THORACOSCOPY N/A 2/3/2017    Procedure: BRONCHOSCOPY, THORACOSCOPY RIGHT CHEST,  WEDGE RESECTION RIGHT UPPER AND LOWER LOBE OF LUNG, MECHANICAL PLEURODESIS;  Surgeon: Kyle Heath MD;  Location: [facility];  Service:    • TYMPANOSTOMY  05/12/2016    Recorded       Family History   Problem Relation Age of Onset   • Heart attack Father    • Stroke Father    • No Known Problems Mother    • Cancer Maternal Grandmother         breast   • Breast cancer Maternal Grandmother    • No Known Problems Daughter    • Prostate cancer Neg Hx    • Colon cancer Neg Hx    • Alcohol abuse Neg Hx    • Drug abuse Neg Hx    • Diabetes Neg Hx    • Thyroid cancer Neg Hx        Social History     Socioeconomic History   • Marital status: Single     Spouse name: Not on file   • Number of children: Not on file   •  Years of education: Not on file   • Highest education level: Not on file   Tobacco Use   • Smoking status: Current Every Day Smoker     Packs/day: 0.50     Years: 17.00     Pack years: 8.50     Types: Cigarettes   • Smokeless tobacco: Never Used   Substance and Sexual Activity   • Alcohol use: No   • Drug use: No     Comment: CLEAN X 1 YEAR   • Sexual activity: Yes     Partners: Female   Social History Narrative    Trying for disability. single           Objective   Physical Exam   Constitutional: He is oriented to person, place, and time. He appears well-developed and well-nourished.   He has marfanoid in appearance   HENT:   Head: Normocephalic and atraumatic.   Right Ear: External ear normal.   Left Ear: External ear normal.   Nose: Nose normal.   Mouth/Throat: Oropharynx is clear and moist.   Eyes: Conjunctivae and EOM are normal.   Neck: Normal range of motion. Neck supple.   Cardiovascular: Normal rate, regular rhythm, normal heart sounds and intact distal pulses.   Pulmonary/Chest: Effort normal and breath sounds normal.   Abdominal: Soft. Bowel sounds are normal.   Musculoskeletal: Normal range of motion.   Neurological: He is alert and oriented to person, place, and time.   Skin: Skin is warm and dry. Capillary refill takes less than 2 seconds.   Psychiatric: He has a normal mood and affect. His behavior is normal. Judgment and thought content normal.   Nursing note and vitals reviewed.      Procedures          ED Course                                         HEART Score (for prediction of 6-week risk of major adverse cardiac event) reviewed and/or performed as part of the patient evaluation and treatment planning process.  The result associated with this review/performance is: 5       MDM    Final diagnoses:   NSTEMI (non-ST elevated myocardial infarction) (CMS/[facility])     I discussed the case with Dr. Phoenix who suggested that as the troponin appears to be decreasing that we could treat him medically and  consult cardiology in the morning.  I discussed the case with Dr. Bose who kindly agreed to admit the patient under his care.  The patient is currently stable in the ED.       Kyle Kaye MD  05/31/20 2212      Electronically signed by Kyle Kaye MD at 05/31/20 2212       Vital Signs (last 2 days)     Date/Time   Temp   Temp src   Pulse   Resp   BP   Patient Position   SpO2    06/01/20 0308   97.6 (36.4)   Oral   69   16   109/53   Lying   95    05/31/20 2241   97.9 (36.6)   Oral   59   18   112/76   Lying   95    05/31/20 22:31:56   --   --   --   --   102/74   --   --    05/31/20 2231   --   --   79   --   --   --   100    05/31/20 22:28:58   98.1 (36.7)   Oral   --   --   --   --   --    05/31/20 2227   --   --   72   18   --   --   99    05/31/20 2226   --   --   74   --   --   --   100    05/31/20 2203   --   --   71   --   --   --   100    05/31/20 2145   --   --   71   --   107/78   --   97    05/31/20 2132   --   --   79   18   106/71   --   100    05/31/20 2101   --   --   92   --   --   --   --    05/31/20 2100   --   --   89   18   113/81   --   100    05/31/20 2045   --   --   74   18   104/69   --   95    05/31/20 2030   --   --   84   --   107/74   --   100    05/31/20 2019   --   --   78   --   --   --   100    05/31/20 2015   --   --   --   --   132/79   --   --    05/31/20 2008   98.9 (37.2)   Oral   86   16   118/80   Sitting   99            Oxygen Therapy (last 3 days)     Date/Time   SpO2   Device (Oxygen Therapy)   Flow (L/min)   Oxygen Concentration (%)   ETCO2 (mmHg)    06/01/20 0308   95   room air   --   --   --    05/31/20 2253   --   room air   --   --   --    05/31/20 2241   95   room air   --   --   --    05/31/20 2231   100   --   --   --   --    05/31/20 2227   99   --   --   --   --    05/31/20 2226   100   --   --   --   --    05/31/20 2203   100   --   --   --   --    05/31/20 2145   97   --   --   --   --    05/31/20 2132   100   --   --   --   --     05/31/20 2100   100   --   --   --   --    05/31/20 2045   95   --   --   --   --    05/31/20 2030   100   --   --   --   --    05/31/20 2019   100   --   --   --   --    05/31/20 2008   99   --   --   --   --            Intake & Output (last 2 days)       05/30 0701 - 05/31 0700 05/31 0701 - 06/01 0700    IV Piggyback  500    Total Intake(mL/kg)  500 (7.3)    Net  +500                Facility-Administered Medications as of 6/1/2020   Medication Dose Route Frequency Provider Last Rate Last Dose   • [COMPLETED] aspirin chewable tablet 81 mg  81 mg Oral Once Andrea Bose MD   81 mg at 06/01/20 0048    And   • [START ON 6/2/2020] aspirin EC tablet 81 mg  81 mg Oral Daily Andrea Bose MD       • enoxaparin (LOVENOX) syringe 40 mg  40 mg Subcutaneous Q24H Andrea Bose MD       • morphine injection 4 mg  4 mg Intravenous Q4H PRN Andrea Bose MD        And   • naloxone (NARCAN) injection 0.4 mg  0.4 mg Intravenous Q5 Min PRN Andrea Bose MD       • [COMPLETED] Morphine sulfate (PF) injection 2 mg  2 mg Intravenous Once Kyle Kaye MD   2 mg at 05/31/20 2228   • nicotine (NICODERM CQ) 21 MG/24HR patch 1 patch  1 patch Transdermal Q24H Andrea Bose MD       • nitroglycerin (NITROSTAT) SL tablet 0.4 mg  0.4 mg Sublingual Q5 Min PRN Andrea Bose MD       • ondansetron (ZOFRAN) tablet 4 mg  4 mg Oral Q6H PRN Andrea Bose MD        Or   • ondansetron (ZOFRAN) injection 4 mg  4 mg Intravenous Q6H PRN Andrea Bose MD       • [COMPLETED] sodium chloride 0.9 % bolus 500 mL  500 mL Intravenous Once Kyle Kaye MD   Stopped at 05/31/20 2229   • sodium chloride 0.9 % flush 10 mL  10 mL Intravenous PRN Andrea Bose MD       • sodium chloride 0.9 % flush 10 mL  10 mL Intravenous Q12H Andrea Bose MD   10 mL at 06/01/20 0048   • sodium chloride 0.9 % flush 10 mL  10 mL Intravenous PRN Andrea Bose MD       • sodium chloride 0.9 % infusion  125  mL/hr Intravenous Continuous Andrea Bose MD   Stopped at 05/31/20 2300     Lab Results (last 48 hours)     Procedure Component Value Units Date/Time    Urine Drug Screen - Urine, Clean Catch [[id number]]  (Abnormal) Collected:  06/01/20 0612    Specimen:  Urine, Clean Catch Updated:  06/01/20 0632     THC, Screen, Urine Positive     Phencyclidine (PCP), Urine Negative     Cocaine Screen, Urine Negative     Methamphetamine, Ur Positive     Opiate Screen Positive     Amphetamine Screen, Urine Positive     Benzodiazepine Screen, Urine Negative     Tricyclic Antidepressants Screen Negative     Methadone Screen, Urine Negative     Barbiturates Screen, Urine Negative     Oxycodone Screen, Urine Negative     Propoxyphene Screen Negative     Buprenorphine, Screen, Urine Negative    Narrative:       Cutoff For Drugs Screened:    Amphetamines               500 ng/ml  Barbiturates               200 ng/ml  Benzodiazepines            150 ng/ml  Cocaine                    150 ng/ml  Methadone                  200 ng/ml  Opiates                    100 ng/ml  Phencyclidine               25 ng/ml  THC                            50 ng/ml  Methamphetamine            500 ng/ml  Tricyclic Antidepressants  300 ng/ml  Oxycodone                  100 ng/ml  Propoxyphene               300 ng/ml  Buprenorphine               10 ng/ml    The normal value for all drugs tested is negative. This report includes unconfirmed screening results, with the cutoff values listed, to be used for medical treatment purposes only.  Unconfirmed results must not be used for non-medical purposes such as employment or legal testing.  Clinical consideration should be applied to any drug of abuse test, particularly when unconfirmed results are used.      CBC Auto Differential [[id number]]  (Abnormal) Collected:  06/01/20 0529    Specimen:  Blood Updated:  06/01/20 0620     WBC 6.71 10*3/mm3      RBC 4.59 10*6/mm3      Hemoglobin 14.1 g/dL      Hematocrit 41.8  %      MCV 91.1 fL      MCH 30.7 pg      MCHC 33.7 g/dL      RDW 12.8 %      RDW-SD 42.3 fl      MPV 9.6 fL      Platelets 226 10*3/mm3      Neutrophil % 55.2 %      Lymphocyte % 30.8 %      Monocyte % 10.4 %      Eosinophil % 2.1 %      Basophil % 0.9 %      Immature Grans % 0.6 %      Neutrophils, Absolute 3.70 10*3/mm3      Lymphocytes, Absolute 2.07 10*3/mm3      Monocytes, Absolute 0.70 10*3/mm3      Eosinophils, Absolute 0.14 10*3/mm3      Basophils, Absolute 0.06 10*3/mm3      Immature Grans, Absolute 0.04 10*3/mm3      nRBC 0.0 /100 WBC     Troponin [[id number]] Collected:  06/01/20 0529    Specimen:  Blood Updated:  06/01/20 0615    TSH [[id number]]  (Normal) Collected:  06/01/20 0020    Specimen:  Blood Updated:  06/01/20 0135     TSH 1.840 uIU/mL      Comment: TSH results may be falsely decreased if patient taking Biotin.       Lipid Panel [[id number]]  (Abnormal) Collected:  06/01/20 0020    Specimen:  Blood Updated:  06/01/20 0131     Total Cholesterol 128 mg/dL      Triglycerides 98 mg/dL      HDL Cholesterol 32 mg/dL      LDL Cholesterol  76 mg/dL      VLDL Cholesterol 19.6 mg/dL      LDL/HDL Ratio 2.39    Narrative:       Cholesterol Reference Ranges  (U.S. Department of Health and Human Services ATP III Classifications)    Desirable          <200 mg/dL  Borderline High    200-239 mg/dL  High Risk          >240 mg/dL      Triglyceride Reference Ranges  (U.S. Department of Health and Human Services ATP III Classifications)    Normal           <150 mg/dL  Borderline High  150-199 mg/dL  High             200-499 mg/dL  Very High        >500 mg/dL    HDL Reference Ranges  (U.S. Department of Health and Human Services ATP III Classifcations)    Low     <40 mg/dl (major risk factor for CHD)  High    >60 mg/dl ('negative' risk factor for CHD)        LDL Reference Ranges  (U.S. Department of Health and Human Services ATP III Classifcations)    Optimal          <100 mg/dL  Near Optimal     100-129  mg/dL  Borderline High  130-159 mg/dL  High             160-189 mg/dL  Very High        >189 mg/dL    Troponin [[id number]]  (Abnormal) Collected:  06/01/20 0020    Specimen:  Blood Updated:  06/01/20 0130     Troponin T 0.036 ng/mL     Narrative:       Troponin T Reference Range:  <= 0.03 ng/mL-   Negative for AMI  >0.03 ng/mL-     Abnormal for myocardial necrosis.  Clinicians would have to utilize clinical acumen, EKG, Troponin and serial changes to determine if it is an Acute Myocardial Infarction or myocardial injury due to an underlying chronic condition.       Results may be falsely decreased if patient taking Biotin.      Comprehensive Metabolic Panel [[id number]] Collected:  06/01/20 0020    Specimen:  Blood Updated:  06/01/20 0128     Glucose 79 mg/dL      BUN 9 mg/dL      Creatinine 0.87 mg/dL      Sodium 142 mmol/L      Potassium 4.1 mmol/L      Chloride 103 mmol/L      CO2 26.0 mmol/L      Calcium 9.0 mg/dL      Total Protein 6.5 g/dL      Albumin 4.10 g/dL      ALT (SGPT) 8 U/L      AST (SGOT) 13 U/L      Alkaline Phosphatase 87 U/L      Total Bilirubin 0.3 mg/dL      eGFR Non African Amer 100 mL/min/1.73      Globulin 2.4 gm/dL      A/G Ratio 1.7 g/dL      BUN/Creatinine Ratio 10.3     Anion Gap 13.0 mmol/L     Narrative:       GFR Normal >60  Chronic Kidney Disease <60  Kidney Failure <15      Magnesium [[id number]]  (Normal) Collected:  05/31/20 2020    Specimen:  Blood Updated:  06/01/20 0030     Magnesium 2.0 mg/dL     Phosphorus [[id number]]  (Normal) Collected:  05/31/20 2020    Specimen:  Blood Updated:  06/01/20 0030     Phosphorus 4.3 mg/dL     [place] Draw [[id number]] Collected:  05/31/20 2020    Specimen:  Blood Updated:  05/31/20 2130    Narrative:       The following orders were created for panel order [place] Draw.  Procedure                               Abnormality         Status                     ---------                               -----------         ------                     Light  Blue Top[[id number]]                                   Final result               Green Top (Gel)[[id number]]                                  Final result               Lavender Top[[id number]]                                     Final result               Red Top[[id number]]                                          Final result                 Please view results for these tests on the individual orders.    Light Blue Top [[id number]] Collected:  05/31/20 2020    Specimen:  Blood Updated:  05/31/20 2130     Extra Tube hold for add-on     Comment: Auto resulted       Red Top [[id number]] Collected:  05/31/20 2020    Specimen:  Blood Updated:  05/31/20 2130     Extra Tube Hold for add-ons.     Comment: Auto resulted.       Green Top (Gel) [[id number]] Collected:  05/31/20 2020    Specimen:  Blood Updated:  05/31/20 2130     Extra Tube Hold for add-ons.     Comment: Auto resulted.       Lavender Top [[id number]] Collected:  05/31/20 2020    Specimen:  Blood Updated:  05/31/20 2130     Extra Tube hold for add-on     Comment: Auto resulted       Troponin [[id number]]  (Abnormal) Collected:  05/31/20 2020    Specimen:  Blood Updated:  05/31/20 2119     Troponin T 0.046 ng/mL     Narrative:       Troponin T Reference Range:  <= 0.03 ng/mL-   Negative for AMI  >0.03 ng/mL-     Abnormal for myocardial necrosis.  Clinicians would have to utilize clinical acumen, EKG, Troponin and serial changes to determine if it is an Acute Myocardial Infarction or myocardial injury due to an underlying chronic condition.       Results may be falsely decreased if patient taking Biotin.            Imaging Results (Last 48 Hours)     ** No results found for the last 48 hours. **          Orders (last 48 hrs)      Start     Ordered    06/02/20 0900  aspirin EC tablet 81 mg  Daily      06/01/20 0003    06/02/20 0200  ECG 12 Lead  Now Then Every 6 Hours,   Status:  Canceled      06/01/20 0037    06/01/20 0900  enoxaparin (LOVENOX) syringe 40 mg  Every  24 Hours Scheduled      06/01/20 0003    06/01/20 0900  nicotine (NICODERM CQ) 21 MG/24HR patch 1 patch  Every 24 Hours Scheduled      06/01/20 0003    06/01/20 0800  Adult Transthoracic Echo Complete W/ Cont if Necessary Per Protocol  Once      06/01/20 0003    06/01/20 0702  Inpatient Cardiology Consult  IN AM     Specialty:  Cardiology  Provider:  (Not yet assigned)    06/01/20 0003    06/01/20 0600  Comprehensive Metabolic Panel  Morning Draw      06/01/20 0003    06/01/20 0600  Lipid Panel  Morning Draw      06/01/20 0003    06/01/20 0600  CBC Auto Differential  Morning Draw      06/01/20 0003    06/01/20 0600  TSH  Morning Draw      06/01/20 0003    06/01/20 0400  Clinical [place] Withdrawal Assessment  Every 4 Hours      06/01/20 0036    06/01/20 0201  ECG 12 Lead  Now Then Every 6 Hours      06/01/20 0201    06/01/20 0100  sodium chloride 0.9 % flush 10 mL  Every 12 Hours Scheduled      06/01/20 0003    06/01/20 0100  aspirin chewable tablet 81 mg  Once      06/01/20 0003    06/01/20 0036  Urine Drug Screen - Urine, Clean Catch  Once      06/01/20 0035    06/01/20 0004  Tobacco Cessation Education  Prior to Discharge      06/01/20 0003    06/01/20 0002  ondansetron (ZOFRAN) tablet 4 mg  Every 6 Hours PRN      06/01/20 0003    06/01/20 0002  ondansetron (ZOFRAN) injection 4 mg  Every 6 Hours PRN      06/01/20 0003    06/01/20 0002  morphine injection 4 mg  Every 4 Hours PRN      06/01/20 0003    06/01/20 0002  naloxone (NARCAN) injection 0.4 mg  Every 5 Minutes PRN      06/01/20 0003    06/01/20 0002  Phosphorus  STAT      06/01/20 0003    06/01/20 0001  NPO Diet  Diet Effective Now      06/01/20 0003    06/01/20 0001  Magnesium  STAT      06/01/20 0003    06/01/20 0000  Vital Signs  Every 4 Hours      06/01/20 0003    06/01/20 0000  Telemetry - Maintain IV Access  Continuous      06/01/20 0003    06/01/20 0000  Continuous Cardiac Monitoring  Continuous,   Status:  Canceled      06/01/20 0003    06/01/20  0000  May Be Off Telemetry for Tests  Continuous      06/01/20 0003    06/01/20 0000  ACLS Protocol For Life Threatening Dysrhythmias (Unless Code Status Indicates Otherwise)  Continuous      06/01/20 0003    06/01/20 0000  Notify Provider if ACLS Protocol Activated  Until Discontinued      06/01/20 0003    06/01/20 0000  Cardiac Monitoring  Continuous      06/01/20 0003    06/01/20 0000  Pulse Oximetry, Continuous  Continuous      06/01/20 0003    06/01/20 0000  Activity - Ad Lela  Until Discontinued      06/01/20 0003    06/01/20 0000  Neuro Checks  Every 4 Hours      06/01/20 0003    06/01/20 0000  Neurovascular Checks  Every 4 Hours      06/01/20 0003    05/31/20 2359  Telemetry - Pulse Oximetry  Continuous PRN,   Status:  Canceled     Comments:  If Patient Develops Unresponsiveness, Acute Dyspnea, Cyanosis or Suspected Hypoxemia Start Continuous Pulse Ox Monitoring, Apply Oxygen & Notify Provider    06/01/20 0003    05/31/20 2359  nitroglycerin (NITROSTAT) SL tablet 0.4 mg  Every 5 Minutes PRN      06/01/20 0003    05/31/20 2359  Code Status and Medical Interventions:  Continuous      06/01/20 0003    05/31/20 2359  Vital Signs Every 15 Minutes Until Stable, Then Every 4 Hours  Continuous      06/01/20 0003 05/31/20 2359  Cardiac Monitoring  Continuous,   Status:  Canceled      06/01/20 0003    05/31/20 2359  Intake & Output  Every Shift      06/01/20 0003    05/31/20 2359  Weigh Patient  Once      06/01/20 0003    05/31/20 2359  Notify Physician For Unrelieved Chest Pain  Until Discontinued      06/01/20 0003    05/31/20 2359  Oxygen Therapy- Nasal Cannula; Titrate for SPO2: 90% - 95%  Continuous      06/01/20 0003    05/31/20 2359  Troponin  Now Then Every 6 Hours     Comments:  Perform First Draw 6 Hours After Last Troponin in ED      06/01/20 0003    05/31/20 2359  Nurse to Order Troponin As Needed For Unrelieved or New Chest Pain  Continuous     Comments:  Nurse to Order Troponin As Needed For Unrelieved  or New Chest Pain    06/01/20 0003 05/31/20 2359  ECG 12 Lead  Now Then Every 6 Hours,   Status:  Canceled      06/01/20 0003 05/31/20 2359  Insert Peripheral IV  Once      06/01/20 0003 05/31/20 2359  Saline Lock & Maintain IV Access  Continuous,   Status:  Canceled      06/01/20 0003 05/31/20 2358  sodium chloride 0.9 % flush 10 mL  As Needed      06/01/20 0003 05/31/20 2213  Initiate Observation Status  Once      05/31/20 2212 05/31/20 2157  Morphine sulfate (PF) injection 2 mg  Once      05/31/20 2156 05/31/20 2147  sodium chloride 0.9 % bolus 500 mL  Once      05/31/20 2145 05/31/20 2147  sodium chloride 0.9 % infusion  Continuous      05/31/20 2145 05/31/20 2036  Troponin  Once      05/31/20 2035 05/31/20 2010  ECG 12 Lead  Once      05/31/20 2009 05/31/20 2010  Insert peripheral IV  Once      05/31/20 2009 05/31/20 2010  [place] Draw  Once      05/31/20 2009 05/31/20 2010  Light Blue Top  PROCEDURE ONCE      05/31/20 2010 05/31/20 2010  Green Top (Gel)  PROCEDURE ONCE      05/31/20 2010 05/31/20 2010  Lavender Top  PROCEDURE ONCE      05/31/20 2010 05/31/20 2010  Red Top  PROCEDURE ONCE      05/31/20 2010 05/31/20 2009  sodium chloride 0.9 % flush 10 mL  As Needed      05/31/20 2009    Unscheduled  ECG 12 Lead  As Needed     Comments:  Nurse to Release ECG Order for Unrelieved or New Chest Pain    06/01/20 0003    Unscheduled  Oxygen Therapy- Nasal Cannula; Titrate for SPO2: 90% - 95%  Continuous PRN     Comments:  If Patient Develops Unresponsiveness, Acute Dyspnea, Cyanosis or Suspected Hypoxemia Start Continuous Pulse Ox Monitoring, Apply Oxygen & Notify Provider    06/01/20 0003    Unscheduled  ECG 12 Lead  As Needed     Comments:  Nurse to Release if Patient Expericences Acute Chest Pain or Dysrhythmias    06/01/20 0003    Unscheduled  Potassium  As Needed     Comments:  For Ventricular Arrhythmias      06/01/20 0003    Unscheduled  Magnesium  As Needed      Comments:  For Ventricular Arrhythmias      06/01/20 0003    Unscheduled  Troponin  As Needed     Comments:  For Chest Pain      06/01/20 0003    Unscheduled  Digoxin Level  As Needed     Comments:  For Atrial Arrhythmias      06/01/20 0003    Unscheduled  Blood Gas, Arterial  As Needed     Comments:  Per O2 PolicyNotify Physician      06/01/20 0003    --  SCANNED - TELEMETRY        05/31/20 0000              Ventilator/Non-Invasive Ventilation Settings (From admission, onward)    None          Physician Progress Notes (last 48 hours) (Notes from 05/30/20 0644 through 06/01/20 0644)    No notes of this type exist for this encounter.       Consult Notes (last 48 hours) (Notes from 05/30/20 0644 through 06/01/20 0644)    No notes of this type exist for this encounter.

## 2020-06-01 NOTE — H&P
[facility] Medicine Services  HISTORY AND PHYSICAL    Date of Admission: 5/31/2020  Primary Care Physician: Provider, No Known    Subjective     Chief Complaint: Chest pain    History of Present Illness  Patient is a 35-year-old white male past medical history of Marfan syndrome with previous pneumothorax on the right also history of smoking and methamphetamine use claims to be clean for a year.  He presents with a 2-day history of chest pain and left arm numbness.  Sound like it is been coming and going intermittently.  He was evaluated in outside ER EKGs were unremarkable however he did have an elevated troponin.  CT of the chest was obtained was negative for any type of aortic dissection or aneurysm.  He was transferred here patient claims that he has not used methamphetamines in a year no urine drug screen is been done yet.  He denies cough he denies significant shortness of breath he denies hemoptysis he denies nausea vomiting or any other symptoms.  He denies fevers or chills.  His troponin continues to remain elevated here the rest of his labs are relatively unremarkable.  He is being admitted for chest pain elevated troponin.        Review of Systems   14 point review of systems negative except for as per HPI    Otherwise complete ROS reviewed and negative except as mentioned in the HPI.    Past Medical History:   Past Medical History:   Diagnosis Date   • Back pain    • Chest pain    • Claustrophobia    • COPD (chronic obstructive pulmonary disease) (CMS/HCC)    • Lung disease    • Marfan syndrome    • Pneumothorax    • Seizures (CMS/HCC)     when he was a child    • Smoking     3/4 pack a day   • Tobacco abuse    • Underweight      Past Surgical History:  Past Surgical History:   Procedure Laterality Date   • LUNG LOBECTOMY Right 02/01/2017   • THORACOSCOPY N/A 2/3/2017    Procedure: BRONCHOSCOPY, THORACOSCOPY RIGHT CHEST,  WEDGE RESECTION RIGHT UPPER AND LOWER LOBE OF  "LUNG, MECHANICAL PLEURODESIS;  Surgeon: Kyle Heath MD;  Location: [facility];  Service:    • TYMPANOSTOMY  05/12/2016    Recorded     Social History:  reports that he has been smoking cigarettes. He has a 8.50 pack-year smoking history. He has never used smokeless tobacco. He reports that he does not drink alcohol or use drugs.    Family History: family history includes Breast cancer in his maternal grandmother; Cancer in his maternal grandmother; Heart attack in his father; No Known Problems in his daughter and mother; Stroke in his father.       Allergies:  Allergies   Allergen Reactions   • Medrol [Methylprednisolone] Other (See Comments)     Patient states he gets violent on this medication      Medications:  Prior to Admission medications    Medication Sig Start Date End Date Taking? Authorizing Provider   azithromycin (ZITHROMAX) 250 MG tablet Take 2 tablets the first day, then 1 tablet daily for 4 days. 7/12/19   Wen Mitchell APRN   cyclobenzaprine (FLEXERIL) 10 MG tablet Take 0.5 tablets by mouth 2 (Two) Times a Day As Needed for Muscle Spasms. 12/18/19   Yoan Ratliff MD     Objective     Vital Signs: /76 (BP Location: Right arm, Patient Position: Lying)   Pulse 59   Temp 97.9 °F (36.6 °C) (Oral)   Resp 18   Ht 190.5 cm (75\")   Wt 68.9 kg (152 lb)   SpO2 95%   BMI 19.00 kg/m²   Physical Exam  Gen.:  Well-nourished well-developed white male in no acute distress  HEENT: Atraumatic, normocephalic.  Pupils equal, round, and reactive to light. Extraocular movements intact.  Sclerae anicteric.  External ears negative.  Mucous membranes moist.  Neck is supple without lymphadenopathy.  No JVD is noted.  No carotid bruits are auscultated.  Oropharynx is without erythema or exudate.   Chest: Clear to auscultation and percussion.  CV: Regular rate and rhythm.  Normal S1-S2.  No gallops, murmurs. or rubs.  Abdomen: Soft, nontender, nondistended.  Active bowel sounds,  No " hepatosplenomegaly.  No masses.  No hernias.  Extremities: No clubbing, edema, or cyanosis.  Capillary refill is normal.  Pulses are 2+ and symmetric at radial and dorsalis pedis.  Posterior tibial pulses are intact and 2+ palpable.  Neuro: Patient is awake, alert, and oriented ×3.  Cranial nerves II through XII are grossly intact.  Motor is 5 out of 5 bilaterally.  DTRs are 2+ and symmetric bilaterally. Sensory exam is nonfocal  Skin: Warm, dry, and intact.  No evidence of breakdown.  Sensorium: Normal thought and affect    Nursing notes and vital signs reviewed        Results Reviewed:  Lab Results (last 24 hours)     Procedure Component Value Units Date/Time    [place] Draw [[id number]] Collected:  05/31/20 2020    Specimen:  Blood Updated:  05/31/20 2130    Narrative:       The following orders were created for panel order [place] Draw.  Procedure                               Abnormality         Status                     ---------                               -----------         ------                     Light Blue Top[[id number]]                                   Final result               Green Top (Gel)[[id number]]                                  Final result               Lavender Top[[id number]]                                     Final result               Red Top[[id number]]                                          Final result                 Please view results for these tests on the individual orders.    Light Blue Top [[id number]] Collected:  05/31/20 2020    Specimen:  Blood Updated:  05/31/20 2130     Extra Tube hold for add-on     Comment: Auto resulted       Red Top [[id number]] Collected:  05/31/20 2020    Specimen:  Blood Updated:  05/31/20 2130     Extra Tube Hold for add-ons.     Comment: Auto resulted.       Green Top (Gel) [[id number]] Collected:  05/31/20 2020    Specimen:  Blood Updated:  05/31/20 2130     Extra Tube Hold for add-ons.     Comment: Auto resulted.       Lavender Top [[id number]]  Collected:  05/31/20 2020    Specimen:  Blood Updated:  05/31/20 2130     Extra Tube hold for add-on     Comment: Auto resulted       Troponin [[id number]]  (Abnormal) Collected:  05/31/20 2020    Specimen:  Blood Updated:  05/31/20 2119     Troponin T 0.046 ng/mL     Narrative:       Troponin T Reference Range:  <= 0.03 ng/mL-   Negative for AMI  >0.03 ng/mL-     Abnormal for myocardial necrosis.  Clinicians would have to utilize clinical acumen, EKG, Troponin and serial changes to determine if it is an Acute Myocardial Infarction or myocardial injury due to an underlying chronic condition.       Results may be falsely decreased if patient taking Biotin.          Imaging Results (Last 24 Hours)     ** No results found for the last 24 hours. **        I have personally reviewed and interpreted the radiology studies and ECG obtained at time of admission.     Assessment / Plan     Assessment:   Active Hospital Problems    Diagnosis   • NSTEMI (non-ST elevated myocardial infarction) (CMS/[facility])   • Chest pain due to myocardial ischemia   • Tobacco use disorder   • Marfan syndrome   1.  Chest pain elevated troponin questionable NSTEMI versus type II leak last admit patient monitor troponins trend.  We will also obtain serial EKGs.  Will obtain 2D echo check lipid panel in the morning.  Continue aspirin therapy Lovenox.  Consult cardiology for assistance.  2.  Tobacco abuse: Encourage smoking cessation, also offer nicotine patch.  3.  Marfan syndrome patient has had negative CT however, will obtain 2D echo.  4.  History of methamphetamine abuse check urine drug screen.  [facility] protocol.  5.  Alcohol abuse patient states that he drinks at least 4 beers a day uncertain exactly how much she drinks once again CIWA protocol PRN Ativan if needed.      Patient seen prior to midnight         Code Status: Full     I discussed the patient's findings and my recommendations with the patient.  He designates his mother as his surrogate  healthcare decision maker.    Estimated length of stay 1-2 nights.    Patient seen and examined by me on May 31, 2020 at 10:30 PM.    Andrea Bose MD   06/01/20   00:04

## 2020-06-01 NOTE — PLAN OF CARE
Problem: Patient Care Overview  Goal: Plan of Care Review  Outcome: Ongoing (interventions implemented as appropriate)  Flowsheets  Taken 6/1/2020 0553  Progress: no change  Outcome Summary: Admitted to  for c/o CP and elevated troponin. Pt. rates chest pain around a 3 out of 10 that radiates to L side of neck at times. Troponin 0.046, 0.036. NPO since midnight. Plans for cardiology consult and echo later today. Will continue to monitor.  Taken 5/31/2020 9386  Plan of Care Reviewed With: patient  Goal: Individualization and Mutuality  Outcome: Ongoing (interventions implemented as appropriate)  Goal: Discharge Needs Assessment  Outcome: Ongoing (interventions implemented as appropriate)  Goal: Interprofessional Rounds/Family Conf  Outcome: Ongoing (interventions implemented as appropriate)     Problem: Cardiac: ACS (Acute Coronary Syndrome) (Adult)  Goal: Signs and Symptoms of Listed Potential Problems Will be Absent, Minimized or Managed (Cardiac: ACS)  Outcome: Ongoing (interventions implemented as appropriate)

## 2020-06-01 NOTE — ED PROVIDER NOTES
Subjective   Mr. Velázquez is a 34-year-old gentleman with a history significant for Marfan syndrome.  He is also a heavy smoker.  Since yesterday has had feelings of paresthesias in his left arm that evolved into left-sided chest pain radiating to his neck.  He went to [facility] today where their work-up revealed a positive troponin of 0.39 (upper range of normal 0.05), and a negative CT scan for aneurysmal dissection.  The patient presents after transfer here with continued left-sided chest pain.  He has no nausea, vomiting, fever or other systemic symptoms.          Review of Systems   Respiratory: Positive for chest tightness.    Neurological: Positive for numbness.   All other systems reviewed and are negative.      Past Medical History:   Diagnosis Date   • Back pain    • Chest pain    • Claustrophobia    • COPD (chronic obstructive pulmonary disease) (CMS/HCC)    • Lung disease    • Marfan syndrome    • Pneumothorax    • Seizures (CMS/HCC)     when he was a child    • Smoking     3/4 pack a day   • Tobacco abuse    • Underweight        Allergies   Allergen Reactions   • Medrol [Methylprednisolone] Other (See Comments)     Patient states he gets violent on this medication        Past Surgical History:   Procedure Laterality Date   • LUNG LOBECTOMY Right 02/01/2017   • THORACOSCOPY N/A 2/3/2017    Procedure: BRONCHOSCOPY, THORACOSCOPY RIGHT CHEST,  WEDGE RESECTION RIGHT UPPER AND LOWER LOBE OF LUNG, MECHANICAL PLEURODESIS;  Surgeon: Kyle Heath MD;  Location: [facility];  Service:    • TYMPANOSTOMY  05/12/2016    Recorded       Family History   Problem Relation Age of Onset   • Heart attack Father    • Stroke Father    • No Known Problems Mother    • Cancer Maternal Grandmother         breast   • Breast cancer Maternal Grandmother    • No Known Problems Daughter    • Prostate cancer Neg Hx    • Colon cancer Neg Hx    • Alcohol abuse Neg Hx    • Drug abuse Neg Hx    • Diabetes Neg Hx    •  Thyroid cancer Neg Hx        Social History     Socioeconomic History   • Marital status: Single     Spouse name: Not on file   • Number of children: Not on file   • Years of education: Not on file   • Highest education level: Not on file   Tobacco Use   • Smoking status: Current Every Day Smoker     Packs/day: 0.50     Years: 17.00     Pack years: 8.50     Types: Cigarettes   • Smokeless tobacco: Never Used   Substance and Sexual Activity   • Alcohol use: No   • Drug use: No     Comment: CLEAN X 1 YEAR   • Sexual activity: Yes     Partners: Female   Social History Narrative    Trying for disability. single           Objective   Physical Exam   Constitutional: He is oriented to person, place, and time. He appears well-developed and well-nourished.   He has marfanoid in appearance   HENT:   Head: Normocephalic and atraumatic.   Right Ear: External ear normal.   Left Ear: External ear normal.   Nose: Nose normal.   Mouth/Throat: Oropharynx is clear and moist.   Eyes: Conjunctivae and EOM are normal.   Neck: Normal range of motion. Neck supple.   Cardiovascular: Normal rate, regular rhythm, normal heart sounds and intact distal pulses.   Pulmonary/Chest: Effort normal and breath sounds normal.   Abdominal: Soft. Bowel sounds are normal.   Musculoskeletal: Normal range of motion.   Neurological: He is alert and oriented to person, place, and time.   Skin: Skin is warm and dry. Capillary refill takes less than 2 seconds.   Psychiatric: He has a normal mood and affect. His behavior is normal. Judgment and thought content normal.   Nursing note and vitals reviewed.      Procedures           ED Course                                         HEART Score (for prediction of 6-week risk of major adverse cardiac event) reviewed and/or performed as part of the patient evaluation and treatment planning process.  The result associated with this review/performance is: 5       MDM    Final diagnoses:   NSTEMI (non-ST elevated  myocardial infarction) (CMS/[facility])     I discussed the case with Dr. Phoenix who suggested that as the troponin appears to be decreasing that we could treat him medically and consult cardiology in the morning.  I discussed the case with Dr. Bose who kindly agreed to admit the patient under his care.  The patient is currently stable in the ED.       Kyle Kaye MD  05/31/20 6634

## 2020-06-01 NOTE — DISCHARGE SUMMARY
[facility] Medicine Services  DISCHARGE SUMMARY       Date of Admission: 5/31/2020  Date of Discharge:  6/1/2020  Primary Care Physician: Provider, No Known    Presenting Problem/History of Present Illness:  Chest pain     Final Discharge Diagnoses:  Active Hospital Problems    Diagnosis   • Polysubstance abuse, including stimulants   • NSTEMI (non-ST elevated myocardial infarction) (CMS/[facility]), Type 2, due to stimulant abuse   • Tobacco use disorder   • Bullous emphysema (CMS/[facility])   • Marfan syndrome       Consults: Cardiology    Procedures Performed: None    Pertinent Test Results:   Imaging Results (Last 7 Days)     ** No results found for the last 168 hours. **        Lab Results (last 7 days)     Procedure Component Value Units Date/Time    Troponin [[id number]]  (Abnormal) Collected:  06/01/20 0529    Specimen:  Blood Updated:  06/01/20 0647     Troponin T 0.037 ng/mL     Narrative:       Troponin T Reference Range:  <= 0.03 ng/mL-   Negative for AMI  >0.03 ng/mL-     Abnormal for myocardial necrosis.  Clinicians would have to utilize clinical acumen, EKG, Troponin and serial changes to determine if it is an Acute Myocardial Infarction or myocardial injury due to an underlying chronic condition.       Results may be falsely decreased if patient taking Biotin.      Urine Drug Screen - Urine, Clean Catch [[id number]]  (Abnormal) Collected:  06/01/20 0612    Specimen:  Urine, Clean Catch Updated:  06/01/20 0632     THC, Screen, Urine Positive     Phencyclidine (PCP), Urine Negative     Cocaine Screen, Urine Negative     Methamphetamine, Ur Positive     Opiate Screen Positive     Amphetamine Screen, Urine Positive     Benzodiazepine Screen, Urine Negative     Tricyclic Antidepressants Screen Negative     Methadone Screen, Urine Negative     Barbiturates Screen, Urine Negative     Oxycodone Screen, Urine Negative     Propoxyphene Screen Negative     Buprenorphine, Screen, Urine  Negative    Narrative:       Cutoff For Drugs Screened:    Amphetamines               500 ng/ml  Barbiturates               200 ng/ml  Benzodiazepines            150 ng/ml  Cocaine                    150 ng/ml  Methadone                  200 ng/ml  Opiates                    100 ng/ml  Phencyclidine               25 ng/ml  THC                            50 ng/ml  Methamphetamine            500 ng/ml  Tricyclic Antidepressants  300 ng/ml  Oxycodone                  100 ng/ml  Propoxyphene               300 ng/ml  Buprenorphine               10 ng/ml    The normal value for all drugs tested is negative. This report includes unconfirmed screening results, with the cutoff values listed, to be used for medical treatment purposes only.  Unconfirmed results must not be used for non-medical purposes such as employment or legal testing.  Clinical consideration should be applied to any drug of abuse test, particularly when unconfirmed results are used.      CBC Auto Differential [[id number]]  (Abnormal) Collected:  06/01/20 0529    Specimen:  Blood Updated:  06/01/20 0620     WBC 6.71 10*3/mm3      RBC 4.59 10*6/mm3      Hemoglobin 14.1 g/dL      Hematocrit 41.8 %      MCV 91.1 fL      MCH 30.7 pg      MCHC 33.7 g/dL      RDW 12.8 %      RDW-SD 42.3 fl      MPV 9.6 fL      Platelets 226 10*3/mm3      Neutrophil % 55.2 %      Lymphocyte % 30.8 %      Monocyte % 10.4 %      Eosinophil % 2.1 %      Basophil % 0.9 %      Immature Grans % 0.6 %      Neutrophils, Absolute 3.70 10*3/mm3      Lymphocytes, Absolute 2.07 10*3/mm3      Monocytes, Absolute 0.70 10*3/mm3      Eosinophils, Absolute 0.14 10*3/mm3      Basophils, Absolute 0.06 10*3/mm3      Immature Grans, Absolute 0.04 10*3/mm3      nRBC 0.0 /100 WBC     TSH [[id number]]  (Normal) Collected:  06/01/20 0020    Specimen:  Blood Updated:  06/01/20 0135     TSH 1.840 uIU/mL      Comment: TSH results may be falsely decreased if patient taking Biotin.       Lipid Panel [[id number]]   (Abnormal) Collected:  06/01/20 0020    Specimen:  Blood Updated:  06/01/20 0131     Total Cholesterol 128 mg/dL      Triglycerides 98 mg/dL      HDL Cholesterol 32 mg/dL      LDL Cholesterol  76 mg/dL      VLDL Cholesterol 19.6 mg/dL      LDL/HDL Ratio 2.39    Narrative:       Cholesterol Reference Ranges  (U.S. Department of Health and Human Services ATP III Classifications)    Desirable          <200 mg/dL  Borderline High    200-239 mg/dL  High Risk          >240 mg/dL      Triglyceride Reference Ranges  (U.S. Department of Health and Human Services ATP III Classifications)    Normal           <150 mg/dL  Borderline High  150-199 mg/dL  High             200-499 mg/dL  Very High        >500 mg/dL    HDL Reference Ranges  (U.S. Department of Health and Human Services ATP III Classifcations)    Low     <40 mg/dl (major risk factor for CHD)  High    >60 mg/dl ('negative' risk factor for CHD)        LDL Reference Ranges  (U.S. Department of Health and Human Services ATP III Classifcations)    Optimal          <100 mg/dL  Near Optimal     100-129 mg/dL  Borderline High  130-159 mg/dL  High             160-189 mg/dL  Very High        >189 mg/dL    Troponin [[id number]]  (Abnormal) Collected:  06/01/20 0020    Specimen:  Blood Updated:  06/01/20 0130     Troponin T 0.036 ng/mL     Narrative:       Troponin T Reference Range:  <= 0.03 ng/mL-   Negative for AMI  >0.03 ng/mL-     Abnormal for myocardial necrosis.  Clinicians would have to utilize clinical acumen, EKG, Troponin and serial changes to determine if it is an Acute Myocardial Infarction or myocardial injury due to an underlying chronic condition.       Results may be falsely decreased if patient taking Biotin.      Comprehensive Metabolic Panel [[id number]] Collected:  06/01/20 0020    Specimen:  Blood Updated:  06/01/20 0128     Glucose 79 mg/dL      BUN 9 mg/dL      Creatinine 0.87 mg/dL      Sodium 142 mmol/L      Potassium 4.1 mmol/L      Chloride 103 mmol/L       CO2 26.0 mmol/L      Calcium 9.0 mg/dL      Total Protein 6.5 g/dL      Albumin 4.10 g/dL      ALT (SGPT) 8 U/L      AST (SGOT) 13 U/L      Alkaline Phosphatase 87 U/L      Total Bilirubin 0.3 mg/dL      eGFR Non African Amer 100 mL/min/1.73      Globulin 2.4 gm/dL      A/G Ratio 1.7 g/dL      BUN/Creatinine Ratio 10.3     Anion Gap 13.0 mmol/L     Narrative:       GFR Normal >60  Chronic Kidney Disease <60  Kidney Failure <15      Magnesium [[id number]]  (Normal) Collected:  05/31/20 2020    Specimen:  Blood Updated:  06/01/20 0030     Magnesium 2.0 mg/dL     Phosphorus [[id number]]  (Normal) Collected:  05/31/20 2020    Specimen:  Blood Updated:  06/01/20 0030     Phosphorus 4.3 mg/dL     [place] Draw [[id number]] Collected:  05/31/20 2020    Specimen:  Blood Updated:  05/31/20 2130    Narrative:       The following orders were created for panel order [place] Draw.  Procedure                               Abnormality         Status                     ---------                               -----------         ------                     Light Blue Top[[id number]]                                   Final result               Green Top (Gel)[[id number]]                                  Final result               Lavender Top[[id number]]                                     Final result               Red Top[[id number]]                                          Final result                 Please view results for these tests on the individual orders.    Light Blue Top [[id number]] Collected:  05/31/20 2020    Specimen:  Blood Updated:  05/31/20 2130     Extra Tube hold for add-on     Comment: Auto resulted       Red Top [[id number]] Collected:  05/31/20 2020    Specimen:  Blood Updated:  05/31/20 2130     Extra Tube Hold for add-ons.     Comment: Auto resulted.       Green Top (Gel) [[id number]] Collected:  05/31/20 2020    Specimen:  Blood Updated:  05/31/20 2130     Extra Tube Hold for add-ons.     Comment: Auto resulted.  "      Lavender Top [[id number]] Collected:  05/31/20 2020    Specimen:  Blood Updated:  05/31/20 2130     Extra Tube hold for add-on     Comment: Auto resulted       Troponin [[id number]]  (Abnormal) Collected:  05/31/20 2020    Specimen:  Blood Updated:  05/31/20 2119     Troponin T 0.046 ng/mL     Narrative:       Troponin T Reference Range:  <= 0.03 ng/mL-   Negative for AMI  >0.03 ng/mL-     Abnormal for myocardial necrosis.  Clinicians would have to utilize clinical acumen, EKG, Troponin and serial changes to determine if it is an Acute Myocardial Infarction or myocardial injury due to an underlying chronic condition.       Results may be falsely decreased if patient taking Biotin.            HPI 5/31/2020:  via Dr. Bose:  \"Patient is a 35-year-old white male past medical history of Marfan syndrome with previous pneumothorax on the right also history of smoking and methamphetamine use claims to be clean for a year.  He presents with a 2-day history of chest pain and left arm numbness.  Sound like it is been coming and going intermittently.  He was evaluated in outside ER EKGs were unremarkable however he did have an elevated troponin.  CT of the chest was obtained was negative for any type of aortic dissection or aneurysm.  He was transferred here patient claims that he has not used methamphetamines in a year no urine drug screen is been done yet.  He denies cough he denies significant shortness of breath he denies hemoptysis he denies nausea vomiting or any other symptoms.  He denies fevers or chills.  His troponin continues to remain elevated here the rest of his labs are relatively unremarkable.  He is being admitted for chest pain elevated troponin.\"      Hospital Course:    Patient was mated observation for his chest pain.  Patient was noted to have left-sided chest pain that was reproducible with palpation.  Patient also noted to have a UDS consistent with amphetamine as well as and methamphetamine.  " "Patient indicates that he does smoke methamphetamine.  He had quit for a long time, but has recently slipped.  Given the patient's Marfan syndrome, he had a CT angiogram of the chest, there was no acute aortic pathology, the aortic root appeared normal.  He did however note advanced emphysematous changes.  Cardiology evaluated the patient, the indicated likely a type II NSTEMI related to his methamphetamine use, and I also agree with this assessment.  The patient has chest pain that is reproducible with palpation.  Patient had an echocardiogram, that showed no wall motion abnormalities, and normal ejection fraction.  The area of aorta that was visible via echo showed normal diameter.  No valvular disorders.    Patient was counseled extensively on methamphetamine and tobacco cessation especially given his soft tissue disorder with Marfan's syndrome.  Patient also noted to have advanced emphysematous changes on his CT scan, and a history of pneumothorax due to bullous rupture.  I have told him that if he continues to do this, he is at risk for further morbidity or mortality.  Patient voiced understanding, and does plan on cessation.  Patient does not have a primary care doctor, we will establish him with a primary care provider.      Physical Exam on Discharge:  /73 (BP Location: Right arm, Patient Position: Lying)   Pulse 75   Temp 97.9 °F (36.6 °C) (Oral)   Resp 16   Ht 190.5 cm (75\")   Wt 68.9 kg (151 lb 14.4 oz)   SpO2 98%   BMI 18.99 kg/m²   Physical Exam   Constitutional: He is oriented to person, place, and time. No distress.   Tall; slender; minimal pectus excavatum   HENT:   Head: Normocephalic and atraumatic.   Eyes: Conjunctivae are normal. No scleral icterus.   Neck: Neck supple. No JVD present.   Cardiovascular: Normal rate, regular rhythm and intact distal pulses.   No murmur heard.  Chest pain reproducible to palpitation, left lateral chest wall    Pulmonary/Chest: Effort normal and breath " sounds normal. No stridor. No respiratory distress. He has no wheezes. He has no rales.   Abdominal: Soft. Bowel sounds are normal. He exhibits no distension and no mass. There is no tenderness. There is no guarding.   Musculoskeletal: He exhibits no edema.   Neurological: He is alert and oriented to person, place, and time.   Skin: Skin is warm and dry. He is not diaphoretic. No erythema.   Numerous tattoos   Psychiatric: He has a normal mood and affect. His behavior is normal.   Nursing note and vitals reviewed.        Condition on Discharge: Stable     Discharge Disposition:  Home or Self Care    Discharge Medications:     Discharge Medications      New Medications      Instructions Start Date   ibuprofen 600 MG tablet  Commonly known as:  ADVIL,MOTRIN   600 mg, Oral, Every 6 Hours PRN           Discharge Diet:   Diet Instructions     Diet: Regular; Thin      Discharge Diet:  Regular    Fluid Consistency:  Thin        Activity at Discharge:   Activity Instructions     Activity as Tolerated            Follow-up Appointments:   No future appointments.    Test Results Pending at Discharge: None    Marcin Epstein MD  06/01/20  11:37    Time: 35 minutes.

## 2020-06-02 ENCOUNTER — READMISSION MANAGEMENT (OUTPATIENT)
Dept: CALL CENTER | Facility: HOSPITAL | Age: 35
End: 2020-06-02

## 2020-06-02 NOTE — OUTREACH NOTE
Prep Survey      Responses   [facility] facility patient discharged from?  [facility]   Is LACE score < 7 ?  Yes   Eligibility  Not Eligible   What are the reasons patient is not eligible?  Other [Polysubstance abuse]   COVID-19 Test Status  Not tested   Does the patient have one of the following disease processes/diagnoses(primary or secondary)?  Acute MI (STEMI,NSTEMI)   Prep survey completed?  Yes          Sosa Ferrer RN

## 2020-06-02 NOTE — PAYOR COMM NOTE
"ND home 6-1-20  [id number]    Bhavana Ku (35 y.o. Male)     Date of Birth Social Security Number Address Home Phone MRN    1985  [address] [place] [address] [phone] [id number]    [employer] Marital Status          Other Single       Admission Date Admission Type Admitting Provider Attending Provider Department, Room/Bed    5/31/20 Emergency Marcin Epstein MD  [facility] 4B, 435/1    Discharge Date Discharge Disposition Discharge Destination        6/1/2020 Home or Self Care Home             Attending Provider:  (none)   Allergies:  Medrol [Methylprednisolone]    Isolation:  None   Infection:  None   Code Status:  Prior    Ht:  190.5 cm (75\")   Wt:  68.9 kg (151 lb 14.4 oz)    Admission Cmt:  None   Principal Problem:  None                Active Insurance as of 5/31/2020     Primary Coverage     Payor Plan Insurance Group Employer/Plan Group    [employer] [place] [employer] [place]      Payor Plan Address Payor Plan Phone Number Payor Plan Fax Number Effective Dates    [address] [address] [phone]  12/18/2019 - None Entered    [facility] [id number]       Subscriber Name Subscriber Birth Date Member ID       BHAVANA KU 1985 [id number]                 Emergency Contacts      (Rel.) Home Phone Work Phone Mobile Phone    THERESE KU (Mother) -- -- [phone]               Discharge Summary      Marcin Epstein MD at 06/01/20 1137                [facility]  DISCHARGE SUMMARY       Date of Admission: 5/31/2020  Date of Discharge:  6/1/2020  Primary Care Physician: Provider, No Known    Presenting Problem/History of Present Illness:  Chest pain     Final Discharge Diagnoses:  Active Hospital Problems    Diagnosis   • Polysubstance abuse, including stimulants   • NSTEMI (non-ST elevated myocardial infarction) (CMS/HCC), Type 2, due to stimulant abuse   • Tobacco use disorder   • Bullous emphysema (CMS/HCC) "   • Marfan syndrome       Consults: Cardiology    Procedures Performed: None    Pertinent Test Results:   Imaging Results (Last 7 Days)     ** No results found for the last 168 hours. **        Lab Results (last 7 days)     Procedure Component Value Units Date/Time    Troponin [[id number]]  (Abnormal) Collected:  06/01/20 0529    Specimen:  Blood Updated:  06/01/20 0647     Troponin T 0.037 ng/mL     Narrative:       Troponin T Reference Range:  <= 0.03 ng/mL-   Negative for AMI  >0.03 ng/mL-     Abnormal for myocardial necrosis.  Clinicians would have to utilize clinical acumen, EKG, Troponin and serial changes to determine if it is an Acute Myocardial Infarction or myocardial injury due to an underlying chronic condition.       Results may be falsely decreased if patient taking Biotin.      Urine Drug Screen - Urine, Clean Catch [[id number]]  (Abnormal) Collected:  06/01/20 0612    Specimen:  Urine, Clean Catch Updated:  06/01/20 0632     THC, Screen, Urine Positive     Phencyclidine (PCP), Urine Negative     Cocaine Screen, Urine Negative     Methamphetamine, Ur Positive     Opiate Screen Positive     Amphetamine Screen, Urine Positive     Benzodiazepine Screen, Urine Negative     Tricyclic Antidepressants Screen Negative     Methadone Screen, Urine Negative     Barbiturates Screen, Urine Negative     Oxycodone Screen, Urine Negative     Propoxyphene Screen Negative     Buprenorphine, Screen, Urine Negative    Narrative:       Cutoff For Drugs Screened:    Amphetamines               500 ng/ml  Barbiturates               200 ng/ml  Benzodiazepines            150 ng/ml  Cocaine                    150 ng/ml  Methadone                  200 ng/ml  Opiates                    100 ng/ml  Phencyclidine               25 ng/ml  THC                            50 ng/ml  Methamphetamine            500 ng/ml  Tricyclic Antidepressants  300 ng/ml  Oxycodone                  100 ng/ml  Propoxyphene               300  ng/ml  Buprenorphine               10 ng/ml    The normal value for all drugs tested is negative. This report includes unconfirmed screening results, with the cutoff values listed, to be used for medical treatment purposes only.  Unconfirmed results must not be used for non-medical purposes such as employment or legal testing.  Clinical consideration should be applied to any drug of abuse test, particularly when unconfirmed results are used.      CBC Auto Differential [[id number]]  (Abnormal) Collected:  06/01/20 0529    Specimen:  Blood Updated:  06/01/20 0620     WBC 6.71 10*3/mm3      RBC 4.59 10*6/mm3      Hemoglobin 14.1 g/dL      Hematocrit 41.8 %      MCV 91.1 fL      MCH 30.7 pg      MCHC 33.7 g/dL      RDW 12.8 %      RDW-SD 42.3 fl      MPV 9.6 fL      Platelets 226 10*3/mm3      Neutrophil % 55.2 %      Lymphocyte % 30.8 %      Monocyte % 10.4 %      Eosinophil % 2.1 %      Basophil % 0.9 %      Immature Grans % 0.6 %      Neutrophils, Absolute 3.70 10*3/mm3      Lymphocytes, Absolute 2.07 10*3/mm3      Monocytes, Absolute 0.70 10*3/mm3      Eosinophils, Absolute 0.14 10*3/mm3      Basophils, Absolute 0.06 10*3/mm3      Immature Grans, Absolute 0.04 10*3/mm3      nRBC 0.0 /100 WBC     TSH [[id number]]  (Normal) Collected:  06/01/20 0020    Specimen:  Blood Updated:  06/01/20 0135     TSH 1.840 uIU/mL      Comment: TSH results may be falsely decreased if patient taking Biotin.       Lipid Panel [[id number]]  (Abnormal) Collected:  06/01/20 0020    Specimen:  Blood Updated:  06/01/20 0131     Total Cholesterol 128 mg/dL      Triglycerides 98 mg/dL      HDL Cholesterol 32 mg/dL      LDL Cholesterol  76 mg/dL      VLDL Cholesterol 19.6 mg/dL      LDL/HDL Ratio 2.39    Narrative:       Cholesterol Reference Ranges  (U.S. Department of Health and Human Services ATP III Classifications)    Desirable          <200 mg/dL  Borderline High    200-239 mg/dL  High Risk          >240 mg/dL      Triglyceride Reference  Ranges  (U.S. Department of Health and Human Services ATP III Classifications)    Normal           <150 mg/dL  Borderline High  150-199 mg/dL  High             200-499 mg/dL  Very High        >500 mg/dL    HDL Reference Ranges  (U.S. Department of Health and Human Services ATP III Classifcations)    Low     <40 mg/dl (major risk factor for CHD)  High    >60 mg/dl ('negative' risk factor for CHD)        LDL Reference Ranges  (U.S. Department of Health and Human Services ATP III Classifcations)    Optimal          <100 mg/dL  Near Optimal     100-129 mg/dL  Borderline High  130-159 mg/dL  High             160-189 mg/dL  Very High        >189 mg/dL    Troponin [[id number]]  (Abnormal) Collected:  06/01/20 0020    Specimen:  Blood Updated:  06/01/20 0130     Troponin T 0.036 ng/mL     Narrative:       Troponin T Reference Range:  <= 0.03 ng/mL-   Negative for AMI  >0.03 ng/mL-     Abnormal for myocardial necrosis.  Clinicians would have to utilize clinical acumen, EKG, Troponin and serial changes to determine if it is an Acute Myocardial Infarction or myocardial injury due to an underlying chronic condition.       Results may be falsely decreased if patient taking Biotin.      Comprehensive Metabolic Panel [[id number]] Collected:  06/01/20 0020    Specimen:  Blood Updated:  06/01/20 0128     Glucose 79 mg/dL      BUN 9 mg/dL      Creatinine 0.87 mg/dL      Sodium 142 mmol/L      Potassium 4.1 mmol/L      Chloride 103 mmol/L      CO2 26.0 mmol/L      Calcium 9.0 mg/dL      Total Protein 6.5 g/dL      Albumin 4.10 g/dL      ALT (SGPT) 8 U/L      AST (SGOT) 13 U/L      Alkaline Phosphatase 87 U/L      Total Bilirubin 0.3 mg/dL      eGFR Non African Amer 100 mL/min/1.73      Globulin 2.4 gm/dL      A/G Ratio 1.7 g/dL      BUN/Creatinine Ratio 10.3     Anion Gap 13.0 mmol/L     Narrative:       GFR Normal >60  Chronic Kidney Disease <60  Kidney Failure <15      Magnesium [[id number]]  (Normal) Collected:  05/31/20 2020     Specimen:  Blood Updated:  06/01/20 0030     Magnesium 2.0 mg/dL     Phosphorus [[id number]]  (Normal) Collected:  05/31/20 2020    Specimen:  Blood Updated:  06/01/20 0030     Phosphorus 4.3 mg/dL     [place] Draw [[id number]] Collected:  05/31/20 2020    Specimen:  Blood Updated:  05/31/20 2130    Narrative:       The following orders were created for panel order [place] Draw.  Procedure                               Abnormality         Status                     ---------                               -----------         ------                     Light Blue Top[[id number]]                                   Final result               Green Top (Gel)[[id number]]                                  Final result               Lavender Top[[id number]]                                     Final result               Red Top[[id number]]                                          Final result                 Please view results for these tests on the individual orders.    Light Blue Top [[id number]] Collected:  05/31/20 2020    Specimen:  Blood Updated:  05/31/20 2130     Extra Tube hold for add-on     Comment: Auto resulted       Red Top [[id number]] Collected:  05/31/20 2020    Specimen:  Blood Updated:  05/31/20 2130     Extra Tube Hold for add-ons.     Comment: Auto resulted.       Green Top (Gel) [[id number]] Collected:  05/31/20 2020    Specimen:  Blood Updated:  05/31/20 2130     Extra Tube Hold for add-ons.     Comment: Auto resulted.       Lavender Top [[id number]] Collected:  05/31/20 2020    Specimen:  Blood Updated:  05/31/20 2130     Extra Tube hold for add-on     Comment: Auto resulted       Troponin [[id number]]  (Abnormal) Collected:  05/31/20 2020    Specimen:  Blood Updated:  05/31/20 2119     Troponin T 0.046 ng/mL     Narrative:       Troponin T Reference Range:  <= 0.03 ng/mL-   Negative for AMI  >0.03 ng/mL-     Abnormal for myocardial necrosis.  Clinicians would have to utilize clinical acumen, EKG, Troponin and  "serial changes to determine if it is an Acute Myocardial Infarction or myocardial injury due to an underlying chronic condition.       Results may be falsely decreased if patient taking Biotin.            HPI 5/31/2020:  via Dr. Bose:  \"Patient is a 35-year-old white male past medical history of Marfan syndrome with previous pneumothorax on the right also history of smoking and methamphetamine use claims to be clean for a year.  He presents with a 2-day history of chest pain and left arm numbness.  Sound like it is been coming and going intermittently.  He was evaluated in outside ER EKGs were unremarkable however he did have an elevated troponin.  CT of the chest was obtained was negative for any type of aortic dissection or aneurysm.  He was transferred here patient claims that he has not used methamphetamines in a year no urine drug screen is been done yet.  He denies cough he denies significant shortness of breath he denies hemoptysis he denies nausea vomiting or any other symptoms.  He denies fevers or chills.  His troponin continues to remain elevated here the rest of his labs are relatively unremarkable.  He is being admitted for chest pain elevated troponin.\"      Hospital Course:    Patient was mated observation for his chest pain.  Patient was noted to have left-sided chest pain that was reproducible with palpation.  Patient also noted to have a UDS consistent with amphetamine as well as and methamphetamine.  Patient indicates that he does smoke methamphetamine.  He had quit for a long time, but has recently slipped.  Given the patient's Marfan syndrome, he had a CT angiogram of the chest, there was no acute aortic pathology, the aortic root appeared normal.  He did however note advanced emphysematous changes.  Cardiology evaluated the patient, the indicated likely a type II NSTEMI related to his methamphetamine use, and I also agree with this assessment.  The patient has chest pain that is reproducible " "with palpation.  Patient had an echocardiogram, that showed no wall motion abnormalities, and normal ejection fraction.  The area of aorta that was visible via echo showed normal diameter.  No valvular disorders.    Patient was counseled extensively on methamphetamine and tobacco cessation especially given his soft tissue disorder with Marfan's syndrome.  Patient also noted to have advanced emphysematous changes on his CT scan, and a history of pneumothorax due to bullous rupture.  I have told him that if he continues to do this, he is at risk for further morbidity or mortality.  Patient voiced understanding, and does plan on cessation.  Patient does not have a primary care doctor, we will establish him with a primary care provider.      Physical Exam on Discharge:  /73 (BP Location: Right arm, Patient Position: Lying)   Pulse 75   Temp 97.9 °F (36.6 °C) (Oral)   Resp 16   Ht 190.5 cm (75\")   Wt 68.9 kg (151 lb 14.4 oz)   SpO2 98%   BMI 18.99 kg/m²    Physical Exam   Constitutional: He is oriented to person, place, and time. No distress.   Tall; slender; minimal pectus excavatum   HENT:   Head: Normocephalic and atraumatic.   Eyes: Conjunctivae are normal. No scleral icterus.   Neck: Neck supple. No JVD present.   Cardiovascular: Normal rate, regular rhythm and intact distal pulses.   No murmur heard.  Chest pain reproducible to palpitation, left lateral chest wall    Pulmonary/Chest: Effort normal and breath sounds normal. No stridor. No respiratory distress. He has no wheezes. He has no rales.   Abdominal: Soft. Bowel sounds are normal. He exhibits no distension and no mass. There is no tenderness. There is no guarding.   Musculoskeletal: He exhibits no edema.   Neurological: He is alert and oriented to person, place, and time.   Skin: Skin is warm and dry. He is not diaphoretic. No erythema.   Numerous tattoos   Psychiatric: He has a normal mood and affect. His behavior is normal.   Nursing note and " vitals reviewed.        Condition on Discharge: Stable     Discharge Disposition:  Home or Self Care    Discharge Medications:     Discharge Medications      New Medications      Instructions Start Date   ibuprofen 600 MG tablet  Commonly known as:  ADVIL,MOTRIN   600 mg, Oral, Every 6 Hours PRN           Discharge Diet:   Diet Instructions     Diet: Regular; Thin      Discharge Diet:  Regular    Fluid Consistency:  Thin        Activity at Discharge:   Activity Instructions     Activity as Tolerated            Follow-up Appointments:   No future appointments.    Test Results Pending at Discharge: None    Marcin Epstein MD  06/01/20  11:37    Time: 35 minutes.           Electronically signed by Marcin Epstein MD at 06/01/20 5362

## 2020-06-10 ENCOUNTER — HOSPITAL ENCOUNTER (EMERGENCY)
Facility: HOSPITAL | Age: 35
Discharge: HOME | End: 2020-06-10
Attending: EMERGENCY MEDICINE | Admitting: EMERGENCY MEDICINE

## 2020-06-10 ENCOUNTER — APPOINTMENT (OUTPATIENT)
Dept: GENERAL RADIOLOGY | Facility: HOSPITAL | Age: 35
End: 2020-06-10

## 2020-06-10 ENCOUNTER — APPOINTMENT (OUTPATIENT)
Dept: CT IMAGING | Facility: HOSPITAL | Age: 35
End: 2020-06-10

## 2020-06-10 VITALS
DIASTOLIC BLOOD PRESSURE: 76 MMHG | WEIGHT: 144 LBS | HEIGHT: 75 IN | OXYGEN SATURATION: 97 % | HEART RATE: 61 BPM | RESPIRATION RATE: 15 BRPM | BODY MASS INDEX: 17.91 KG/M2 | SYSTOLIC BLOOD PRESSURE: 108 MMHG | TEMPERATURE: 98.2 F

## 2020-06-10 DIAGNOSIS — R07.9 CHEST PAIN, UNSPECIFIED TYPE: Primary | ICD-10-CM

## 2020-06-10 LAB
ALBUMIN SERPL-MCNC: 4.3 G/DL (ref 3.5–5.2)
ALBUMIN/GLOB SERPL: 1.5 G/DL
ALP SERPL-CCNC: 91 U/L (ref 39–117)
ALT SERPL W P-5'-P-CCNC: 17 U/L (ref 1–41)
AMPHET+METHAMPHET UR QL: POSITIVE
AMPHETAMINES UR QL: POSITIVE
ANION GAP SERPL CALCULATED.3IONS-SCNC: 13 MMOL/L (ref 5–15)
AST SERPL-CCNC: 21 U/L (ref 1–40)
BARBITURATES UR QL SCN: NEGATIVE
BASOPHILS # BLD AUTO: 0.04 10*3/MM3 (ref 0–0.2)
BASOPHILS NFR BLD AUTO: 0.9 % (ref 0–1.5)
BENZODIAZ UR QL SCN: NEGATIVE
BILIRUB SERPL-MCNC: 0.6 MG/DL (ref 0.2–1.2)
BUN BLD-MCNC: 22 MG/DL (ref 6–20)
BUN/CREAT SERPL: 20.2 (ref 7–25)
BUPRENORPHINE SERPL-MCNC: NEGATIVE NG/ML
CALCIUM SPEC-SCNC: 9.5 MG/DL (ref 8.6–10.5)
CANNABINOIDS SERPL QL: POSITIVE
CHLORIDE SERPL-SCNC: 101 MMOL/L (ref 98–107)
CO2 SERPL-SCNC: 23 MMOL/L (ref 22–29)
COCAINE UR QL: NEGATIVE
CREAT BLD-MCNC: 1.09 MG/DL (ref 0.76–1.27)
DEPRECATED RDW RBC AUTO: 39.7 FL (ref 37–54)
EOSINOPHIL # BLD AUTO: 0.09 10*3/MM3 (ref 0–0.4)
EOSINOPHIL NFR BLD AUTO: 2 % (ref 0.3–6.2)
ERYTHROCYTE [DISTWIDTH] IN BLOOD BY AUTOMATED COUNT: 12.3 % (ref 12.3–15.4)
GFR SERPL CREATININE-BSD FRML MDRD: 77 ML/MIN/1.73
GLOBULIN UR ELPH-MCNC: 2.9 GM/DL
GLUCOSE BLD-MCNC: 123 MG/DL (ref 65–99)
HCT VFR BLD AUTO: 41.1 % (ref 37.5–51)
HGB BLD-MCNC: 14.1 G/DL (ref 13–17.7)
HOLD SPECIMEN: NORMAL
HOLD SPECIMEN: NORMAL
IMM GRANULOCYTES # BLD AUTO: 0.03 10*3/MM3 (ref 0–0.05)
IMM GRANULOCYTES NFR BLD AUTO: 0.7 % (ref 0–0.5)
LYMPHOCYTES # BLD AUTO: 1.07 10*3/MM3 (ref 0.7–3.1)
LYMPHOCYTES NFR BLD AUTO: 23.9 % (ref 19.6–45.3)
MCH RBC QN AUTO: 30.2 PG (ref 26.6–33)
MCHC RBC AUTO-ENTMCNC: 34.3 G/DL (ref 31.5–35.7)
MCV RBC AUTO: 88 FL (ref 79–97)
METHADONE UR QL SCN: NEGATIVE
MONOCYTES # BLD AUTO: 0.43 10*3/MM3 (ref 0.1–0.9)
MONOCYTES NFR BLD AUTO: 9.6 % (ref 5–12)
NEUTROPHILS # BLD AUTO: 2.82 10*3/MM3 (ref 1.7–7)
NEUTROPHILS NFR BLD AUTO: 62.9 % (ref 42.7–76)
NRBC BLD AUTO-RTO: 0 /100 WBC (ref 0–0.2)
NT-PROBNP SERPL-MCNC: 47.4 PG/ML (ref 5–450)
OPIATES UR QL: POSITIVE
OXYCODONE UR QL SCN: NEGATIVE
PCP UR QL SCN: NEGATIVE
PLATELET # BLD AUTO: 225 10*3/MM3 (ref 140–450)
PMV BLD AUTO: 9.8 FL (ref 6–12)
POTASSIUM BLD-SCNC: 3.3 MMOL/L (ref 3.5–5.2)
PROPOXYPH UR QL: NEGATIVE
PROT SERPL-MCNC: 7.2 G/DL (ref 6–8.5)
RBC # BLD AUTO: 4.67 10*6/MM3 (ref 4.14–5.8)
SODIUM BLD-SCNC: 137 MMOL/L (ref 136–145)
TRICYCLICS UR QL SCN: NEGATIVE
TROPONIN T SERPL-MCNC: <0.01 NG/ML (ref 0–0.03)
TROPONIN T SERPL-MCNC: <0.01 NG/ML (ref 0–0.03)
WBC NRBC COR # BLD: 4.48 10*3/MM3 (ref 3.4–10.8)
WHOLE BLOOD HOLD SPECIMEN: NORMAL
WHOLE BLOOD HOLD SPECIMEN: NORMAL

## 2020-06-10 PROCEDURE — 93005 ELECTROCARDIOGRAM TRACING: CPT | Performed by: EMERGENCY MEDICINE

## 2020-06-10 PROCEDURE — 84484 ASSAY OF TROPONIN QUANT: CPT | Performed by: EMERGENCY MEDICINE

## 2020-06-10 PROCEDURE — 80306 DRUG TEST PRSMV INSTRMNT: CPT | Performed by: EMERGENCY MEDICINE

## 2020-06-10 PROCEDURE — 93010 ELECTROCARDIOGRAM REPORT: CPT | Performed by: INTERNAL MEDICINE

## 2020-06-10 PROCEDURE — 80053 COMPREHEN METABOLIC PANEL: CPT | Performed by: EMERGENCY MEDICINE

## 2020-06-10 PROCEDURE — 85025 COMPLETE CBC W/AUTO DIFF WBC: CPT | Performed by: EMERGENCY MEDICINE

## 2020-06-10 PROCEDURE — 93005 ELECTROCARDIOGRAM TRACING: CPT

## 2020-06-10 PROCEDURE — 71045 X-RAY EXAM CHEST 1 VIEW: CPT

## 2020-06-10 PROCEDURE — 71275 CT ANGIOGRAPHY CHEST: CPT

## 2020-06-10 PROCEDURE — 25010000002 MORPHINE SULFATE (PF) 2 MG/ML SOLUTION: Performed by: EMERGENCY MEDICINE

## 2020-06-10 PROCEDURE — 0 IOPAMIDOL PER 1 ML: Performed by: EMERGENCY MEDICINE

## 2020-06-10 PROCEDURE — 36415 COLL VENOUS BLD VENIPUNCTURE: CPT

## 2020-06-10 PROCEDURE — 96374 THER/PROPH/DIAG INJ IV PUSH: CPT

## 2020-06-10 PROCEDURE — 99284 EMERGENCY DEPT VISIT MOD MDM: CPT

## 2020-06-10 PROCEDURE — 83880 ASSAY OF NATRIURETIC PEPTIDE: CPT | Performed by: EMERGENCY MEDICINE

## 2020-06-10 RX ORDER — SODIUM CHLORIDE 0.9 % (FLUSH) 0.9 %
10 SYRINGE (ML) INJECTION AS NEEDED
Status: DISCONTINUED | OUTPATIENT
Start: 2020-06-10 | End: 2020-06-10 | Stop reason: HOSPADM

## 2020-06-10 RX ORDER — MORPHINE SULFATE 2 MG/ML
2 INJECTION, SOLUTION INTRAMUSCULAR; INTRAVENOUS ONCE
Status: COMPLETED | OUTPATIENT
Start: 2020-06-10 | End: 2020-06-10

## 2020-06-10 RX ADMIN — MORPHINE SULFATE 2 MG: 2 INJECTION, SOLUTION INTRAMUSCULAR; INTRAVENOUS at 08:22

## 2020-06-10 RX ADMIN — IOPAMIDOL 100 ML: 755 INJECTION, SOLUTION INTRAVENOUS at 09:28

## 2020-06-10 NOTE — DISCHARGE INSTRUCTIONS
Follow up with one of the [facility] physician groups below to setup primary care. If you have trouble making an appointment, please call the [facility] Nurse Line at [phone]    Dr. Ronel Betts DO, Dr. Elian Porter DO, and JANESSA Yen  [facility]  [address] [place] [address], [address] [phone]    Dr. Tyler Benjamin MD  [facility] - [address] [address], [address], [place], [place] [id number] [phone]    Dr. Yasmani Perez DO, Dr. Gustavo Hicks DO,  JANESSA Ramirez, and JANESSA Saldana  [facility] - [address] [address], [address], [place], [place] [id number] [phone]     Dr. Adelaide Woodruff MD, and JANESSA Laurent  [facility] [address] [address], [place], [place] [id number] [phone]    Dr. Paul Fenton MD and Dr. Juan Jose Alfaro MD  [address], [address] [phone]    Dr. Krishna Barger MD  [facility]  [address] [place] [address], [address], [place], [place], [address] [phone]    Dr. Felix Faith MD  [facility]  [address], [place], [address] [phone]

## 2020-06-10 NOTE — ED PROVIDER NOTES
Subjective   Patient is a 35-year-old male who presents to the ER with chest pain.  Patient was admitted here from May 31 to June 1 for chest pain and elevated troponin.  His drug screen was positive for multiple substances including methamphetamine.  Patient was diagnosed with a type II non-STEMI due to drug ingestion.  He also has a history of Marfan's.  Patient states this chest pain began 2 days ago and has been constant since that time.  It is located over the left anterior chest wall and is sharp in nature with radiation to the left arm and neck.  Pain has been worse since last evening.  He has associated shortness of breath.  He denies any fever, abdominal pain, nausea vomiting diarrhea, urinary changes, neurologic changes, leg pain or swelling.          Review of Systems   Constitutional: Negative.    HENT: Negative.    Eyes: Negative.    Respiratory: Positive for shortness of breath.    Cardiovascular: Positive for chest pain.   Gastrointestinal: Negative.    Endocrine: Negative.    Genitourinary: Negative.    Musculoskeletal: Negative.    Skin: Negative.    Allergic/Immunologic: Negative.    Neurological: Negative.    Hematological: Negative.    Psychiatric/Behavioral: Negative.    All other systems reviewed and are negative.      Past Medical History:   Diagnosis Date   • Back pain    • Chest pain    • Claustrophobia    • COPD (chronic obstructive pulmonary disease) (CMS/HCC)    • Lung disease    • Marfan syndrome    • Pneumothorax    • Seizures (CMS/HCC)     when he was a child    • Smoking     3/4 pack a day   • Tobacco abuse    • Underweight        Allergies   Allergen Reactions   • Medrol [Methylprednisolone] Other (See Comments)     Patient states he gets violent on this medication        Past Surgical History:   Procedure Laterality Date   • LUNG LOBECTOMY Right 02/01/2017   • THORACOSCOPY N/A 2/3/2017    Procedure: BRONCHOSCOPY, THORACOSCOPY RIGHT CHEST,  WEDGE RESECTION RIGHT UPPER AND LOWER LOBE  OF LUNG, MECHANICAL PLEURODESIS;  Surgeon: Kyle Heath MD;  Location: [facility];  Service:    • TYMPANOSTOMY  05/12/2016    Recorded       Family History   Problem Relation Age of Onset   • Heart attack Father    • Stroke Father    • No Known Problems Mother    • Cancer Maternal Grandmother         breast   • Breast cancer Maternal Grandmother    • No Known Problems Daughter    • Prostate cancer Neg Hx    • Colon cancer Neg Hx    • Alcohol abuse Neg Hx    • Drug abuse Neg Hx    • Diabetes Neg Hx    • Thyroid cancer Neg Hx        Social History     Socioeconomic History   • Marital status: Significant Other     Spouse name: Not on file   • Number of children: Not on file   • Years of education: Not on file   • Highest education level: Not on file   Tobacco Use   • Smoking status: Current Every Day Smoker     Packs/day: 0.50     Years: 17.00     Pack years: 8.50     Types: Cigarettes   • Smokeless tobacco: Never Used   Substance and Sexual Activity   • Alcohol use: No   • Drug use: No     Comment: CLEAN X 1 YEAR   • Sexual activity: Yes     Partners: Female   Social History Narrative    Trying for disability. single           Objective   Physical Exam   Constitutional: He is oriented to person, place, and time. He appears well-developed and well-nourished. He appears distressed.   HENT:   Head: Normocephalic and atraumatic.   Eyes: Pupils are equal, round, and reactive to light. Conjunctivae are normal.   Neck: Normal range of motion.   Cardiovascular: Normal rate, regular rhythm and normal heart sounds.   Equal bilateral radial pulses   Pulmonary/Chest: Effort normal and breath sounds normal.   Tender to palpation left superior anterior chest wall   Abdominal: Soft. There is no tenderness.   Musculoskeletal: Normal range of motion. He exhibits no edema or deformity.   Neurological: He is alert and oriented to person, place, and time. He has normal strength.   Skin: Skin is warm.   Psychiatric: He has a normal  mood and affect. His behavior is normal.   Nursing note and vitals reviewed.      Procedures           ED Course      EKG: Normal sinus rhythm with a rate of 70, no acute ischemia or infarction  ECG2: Normal sinus rhythm with a rate of 60, no acute ischemia or infarction    Patient was given morphine.  Due to his history of Marfan's, a CT scan of the chest was ordered.     Lab Results (last 24 hours)     Procedure Component Value Units Date/Time    CBC & Differential [[id number]] Collected:  06/10/20 0812    Specimen:  Blood Updated:  06/10/20 0821    Narrative:       The following orders were created for panel order CBC & Differential.  Procedure                               Abnormality         Status                     ---------                               -----------         ------                     CBC Auto Differential[[id number]]        Abnormal            Final result                 Please view results for these tests on the individual orders.    Comprehensive Metabolic Panel [[id number]]  (Abnormal) Collected:  06/10/20 0812    Specimen:  Blood Updated:  06/10/20 0839     Glucose 123 mg/dL      BUN 22 mg/dL      Creatinine 1.09 mg/dL      Sodium 137 mmol/L      Potassium 3.3 mmol/L      Chloride 101 mmol/L      CO2 23.0 mmol/L      Calcium 9.5 mg/dL      Total Protein 7.2 g/dL      Albumin 4.30 g/dL      ALT (SGPT) 17 U/L      AST (SGOT) 21 U/L      Alkaline Phosphatase 91 U/L      Total Bilirubin 0.6 mg/dL      eGFR Non African Amer 77 mL/min/1.73      Globulin 2.9 gm/dL      A/G Ratio 1.5 g/dL      BUN/Creatinine Ratio 20.2     Anion Gap 13.0 mmol/L     Narrative:       GFR Normal >60  Chronic Kidney Disease <60  Kidney Failure <15      Troponin [[id number]]  (Normal) Collected:  06/10/20 0812    Specimen:  Blood Updated:  06/10/20 0837     Troponin T <0.010 ng/mL     Narrative:       Troponin T Reference Range:  <= 0.03 ng/mL-   Negative for AMI  >0.03 ng/mL-     Abnormal for myocardial necrosis.   Clinicians would have to utilize clinical acumen, EKG, Troponin and serial changes to determine if it is an Acute Myocardial Infarction or myocardial injury due to an underlying chronic condition.       Results may be falsely decreased if patient taking Biotin.      BNP [[id number]]  (Normal) Collected:  06/10/20 0812    Specimen:  Blood Updated:  06/10/20 0836     proBNP 47.4 pg/mL     Narrative:       Among patients with dyspnea, NT-proBNP is highly sensitive for the detection of acute congestive heart failure. In addition NT-proBNP of <300 pg/ml effectively rules out acute congestive heart failure with 99% negative predictive value.    Results may be falsely decreased if patient taking Biotin.      CBC Auto Differential [[id number]]  (Abnormal) Collected:  06/10/20 0812    Specimen:  Blood Updated:  06/10/20 0821     WBC 4.48 10*3/mm3      RBC 4.67 10*6/mm3      Hemoglobin 14.1 g/dL      Hematocrit 41.1 %      MCV 88.0 fL      MCH 30.2 pg      MCHC 34.3 g/dL      RDW 12.3 %      RDW-SD 39.7 fl      MPV 9.8 fL      Platelets 225 10*3/mm3      Neutrophil % 62.9 %      Lymphocyte % 23.9 %      Monocyte % 9.6 %      Eosinophil % 2.0 %      Basophil % 0.9 %      Immature Grans % 0.7 %      Neutrophils, Absolute 2.82 10*3/mm3      Lymphocytes, Absolute 1.07 10*3/mm3      Monocytes, Absolute 0.43 10*3/mm3      Eosinophils, Absolute 0.09 10*3/mm3      Basophils, Absolute 0.04 10*3/mm3      Immature Grans, Absolute 0.03 10*3/mm3      nRBC 0.0 /100 WBC     Troponin [[id number]]  (Normal) Collected:  06/10/20 1130    Specimen:  Blood Updated:  06/10/20 1155     Troponin T <0.010 ng/mL     Narrative:       Troponin T Reference Range:  <= 0.03 ng/mL-   Negative for AMI  >0.03 ng/mL-     Abnormal for myocardial necrosis.  Clinicians would have to utilize clinical acumen, EKG, Troponin and serial changes to determine if it is an Acute Myocardial Infarction or myocardial injury due to an underlying chronic condition.       Results  may be falsely decreased if patient taking Biotin.          CT Angiogram Chest   Final Result      XR Chest 1 View   Final Result   1. No active disease is seen.   2. Hyperexpansion and bronchial wall thickening consistent with   COPD/emphysema.           This report was finalized on 06/10/2020 08:35 by Dr. Raymundo Azevedo MD.        Labs are normal including troponin x2.  Chest x-ray showed COPD.  CT scan of the chest showed no evidence of pulmonary embolus or dissection.  No cause for chest pain found.  Patient will be discharged home to follow-up with his PCP.  He is return to the ER for any worse or new pain, shortness of breath, fever or other concerns.                              HEART Score (for prediction of 6-week risk of major adverse cardiac event) reviewed and/or performed as part of the patient evaluation and treatment planning process.  The result associated with this review/performance is: 1       MDM    Final diagnoses:   Chest pain, unspecified type            Alicia Holcomb MD  06/10/20 1234       Alicia Holcomb MD  06/10/20 1238

## 2020-06-12 ENCOUNTER — TELEPHONE (OUTPATIENT)
Dept: INTERNAL MEDICINE | Facility: CLINIC | Age: 35
End: 2020-06-12

## 2020-06-12 NOTE — TELEPHONE ENCOUNTER
Attempted to call patient and schedule EST CARE appt since he was seen in the ER on 06/10. Patient hung up when I tried explaining what I was calling for.

## 2021-08-12 PROCEDURE — U0004 COV-19 TEST NON-CDC HGH THRU: HCPCS | Performed by: NURSE PRACTITIONER

## 2021-08-25 ENCOUNTER — OFFICE VISIT (OUTPATIENT)
Dept: FAMILY MEDICINE CLINIC | Facility: CLINIC | Age: 36
End: 2021-08-25

## 2021-08-25 VITALS
HEIGHT: 76 IN | TEMPERATURE: 98 F | WEIGHT: 159 LBS | SYSTOLIC BLOOD PRESSURE: 93 MMHG | DIASTOLIC BLOOD PRESSURE: 69 MMHG | OXYGEN SATURATION: 99 % | BODY MASS INDEX: 19.36 KG/M2 | HEART RATE: 94 BPM | RESPIRATION RATE: 20 BRPM

## 2021-08-25 DIAGNOSIS — R07.9 CHEST PAIN, UNSPECIFIED TYPE: Primary | ICD-10-CM

## 2021-08-25 DIAGNOSIS — Q87.40 MARFAN SYNDROME: ICD-10-CM

## 2021-08-25 DIAGNOSIS — Z76.89 ENCOUNTER TO ESTABLISH CARE: ICD-10-CM

## 2021-08-25 DIAGNOSIS — R55 SYNCOPE, UNSPECIFIED SYNCOPE TYPE: ICD-10-CM

## 2021-08-25 DIAGNOSIS — F19.90 IVDU (INTRAVENOUS DRUG USER): ICD-10-CM

## 2021-08-25 DIAGNOSIS — R06.00 DYSPNEA, UNSPECIFIED TYPE: ICD-10-CM

## 2021-08-25 DIAGNOSIS — F15.11 HISTORY OF METHAMPHETAMINE ABUSE (HCC): ICD-10-CM

## 2021-08-25 PROCEDURE — 93000 ELECTROCARDIOGRAM COMPLETE: CPT | Performed by: NURSE PRACTITIONER

## 2021-08-25 PROCEDURE — 99214 OFFICE O/P EST MOD 30 MIN: CPT | Performed by: NURSE PRACTITIONER

## 2021-08-25 RX ORDER — ALBUTEROL SULFATE 90 UG/1
2 AEROSOL, METERED RESPIRATORY (INHALATION) EVERY 4 HOURS PRN
Qty: 18 G | Refills: 2 | Status: SHIPPED | OUTPATIENT
Start: 2021-08-25 | End: 2021-11-08 | Stop reason: SDUPTHER
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
## 2021-08-26 ENCOUNTER — TELEPHONE (OUTPATIENT)
Dept: FAMILY MEDICINE CLINIC | Facility: CLINIC | Age: 36
End: 2021-08-26

## 2021-08-26 ENCOUNTER — APPOINTMENT (OUTPATIENT)
Dept: CT IMAGING | Age: 36
End: 2021-08-26
Payer: MEDICAID

## 2021-08-26 ENCOUNTER — HOSPITAL ENCOUNTER (EMERGENCY)
Age: 36
Discharge: HOME | End: 2021-08-26
Payer: MEDICAID

## 2021-08-26 ENCOUNTER — HOSPITAL ENCOUNTER (EMERGENCY)
Facility: HOSPITAL | Age: 36
Discharge: LEFT BEFORE DISCHARGE | End: 2021-08-26

## 2021-08-26 ENCOUNTER — APPOINTMENT (OUTPATIENT)
Dept: GENERAL RADIOLOGY | Age: 36
End: 2021-08-26
Payer: MEDICAID

## 2021-08-26 VITALS
RESPIRATION RATE: 19 BRPM | HEIGHT: 77 IN | TEMPERATURE: 98.1 F | DIASTOLIC BLOOD PRESSURE: 68 MMHG | HEART RATE: 71 BPM | OXYGEN SATURATION: 93 % | WEIGHT: 160 LBS | SYSTOLIC BLOOD PRESSURE: 99 MMHG | BODY MASS INDEX: 18.89 KG/M2

## 2021-08-26 VITALS
HEIGHT: 77 IN | SYSTOLIC BLOOD PRESSURE: 97 MMHG | TEMPERATURE: 98.6 F | WEIGHT: 160 LBS | BODY MASS INDEX: 18.89 KG/M2 | RESPIRATION RATE: 16 BRPM | DIASTOLIC BLOOD PRESSURE: 61 MMHG | HEART RATE: 91 BPM | OXYGEN SATURATION: 98 %

## 2021-08-26 DIAGNOSIS — R07.9 CHEST PAIN, UNSPECIFIED TYPE: Primary | ICD-10-CM

## 2021-08-26 LAB
ALBUMIN SERPL-MCNC: 4 G/DL (ref 3.5–5.2)
ALBUMIN SERPL-MCNC: 4.1 G/DL (ref 3.5–5.2)
ALBUMIN SERPL-MCNC: 4.5 G/DL (ref 4–5)
ALBUMIN/GLOB SERPL: 1.5 G/DL
ALBUMIN/GLOB SERPL: 1.7 {RATIO} (ref 1.2–2.2)
ALP BLD-CCNC: 91 U/L (ref 40–130)
ALP SERPL-CCNC: 91 U/L (ref 39–117)
ALP SERPL-CCNC: 98 IU/L (ref 48–121)
ALT SERPL W P-5'-P-CCNC: 9 U/L (ref 1–41)
ALT SERPL-CCNC: 10 IU/L (ref 0–44)
ALT SERPL-CCNC: 10 U/L (ref 5–41)
ANION GAP SERPL CALCULATED.3IONS-SCNC: 11 MMOL/L (ref 7–19)
ANION GAP SERPL CALCULATED.3IONS-SCNC: 9 MMOL/L (ref 5–15)
AST SERPL-CCNC: 12 U/L (ref 1–40)
AST SERPL-CCNC: 13 U/L (ref 5–40)
AST SERPL-CCNC: 20 IU/L (ref 0–40)
BASOPHILS # BLD AUTO: 0.04 10*3/MM3 (ref 0–0.2)
BASOPHILS # BLD AUTO: 0.1 X10E3/UL (ref 0–0.2)
BASOPHILS ABSOLUTE: 0 K/UL (ref 0–0.2)
BASOPHILS NFR BLD AUTO: 0.8 % (ref 0–1.5)
BASOPHILS NFR BLD AUTO: 1 %
BASOPHILS RELATIVE PERCENT: 0.5 % (ref 0–1)
BILIRUB SERPL-MCNC: 0.4 MG/DL (ref 0.2–1.2)
BILIRUB SERPL-MCNC: 0.4 MG/DL (ref 0–1.2)
BILIRUB SERPL-MCNC: 0.5 MG/DL (ref 0–1.2)
BUN BLDV-MCNC: 18 MG/DL (ref 6–20)
BUN SERPL-MCNC: 15 MG/DL (ref 6–20)
BUN SERPL-MCNC: 17 MG/DL (ref 6–20)
BUN/CREAT SERPL: 13 (ref 9–20)
BUN/CREAT SERPL: 18.1 (ref 7–25)
CALCIUM SERPL-MCNC: 8.9 MG/DL (ref 8.6–10)
CALCIUM SERPL-MCNC: 9.4 MG/DL (ref 8.7–10.2)
CALCIUM SPEC-SCNC: 8.8 MG/DL (ref 8.6–10.5)
CHLORIDE BLD-SCNC: 103 MMOL/L (ref 98–111)
CHLORIDE SERPL-SCNC: 102 MMOL/L (ref 96–106)
CHLORIDE SERPL-SCNC: 107 MMOL/L (ref 98–107)
CHOLEST SERPL-MCNC: 167 MG/DL (ref 100–199)
CO2 SERPL-SCNC: 23 MMOL/L (ref 22–29)
CO2 SERPL-SCNC: 24 MMOL/L (ref 20–29)
CO2: 25 MMOL/L (ref 22–29)
CREAT SERPL-MCNC: 0.94 MG/DL (ref 0.76–1.27)
CREAT SERPL-MCNC: 1.1 MG/DL (ref 0.5–1.2)
CREAT SERPL-MCNC: 1.13 MG/DL (ref 0.76–1.27)
DEPRECATED RDW RBC AUTO: 39.2 FL (ref 37–54)
EOSINOPHIL # BLD AUTO: 0.1 X10E3/UL (ref 0–0.4)
EOSINOPHIL # BLD AUTO: 0.16 10*3/MM3 (ref 0–0.4)
EOSINOPHIL NFR BLD AUTO: 3 %
EOSINOPHIL NFR BLD AUTO: 3.2 % (ref 0.3–6.2)
EOSINOPHILS ABSOLUTE: 0.1 K/UL (ref 0–0.6)
EOSINOPHILS RELATIVE PERCENT: 2.4 % (ref 0–5)
ERYTHROCYTE [DISTWIDTH] IN BLOOD BY AUTOMATED COUNT: 12.4 % (ref 12.3–15.4)
ERYTHROCYTE [DISTWIDTH] IN BLOOD BY AUTOMATED COUNT: 12.8 % (ref 11.6–15.4)
GFR AFRICAN AMERICAN: >59
GFR NON-AFRICAN AMERICAN: >60
GFR SERPL CREATININE-BSD FRML MDRD: 91 ML/MIN/1.73
GLOBULIN SER CALC-MCNC: 2.6 G/DL (ref 1.5–4.5)
GLOBULIN UR ELPH-MCNC: 2.6 GM/DL
GLUCOSE BLD-MCNC: 117 MG/DL (ref 74–109)
GLUCOSE SERPL-MCNC: 106 MG/DL (ref 65–99)
GLUCOSE SERPL-MCNC: 86 MG/DL (ref 65–99)
HAV IGM SERPL QL IA: NEGATIVE
HBV CORE IGM SERPL QL IA: NEGATIVE
HBV SURFACE AG SERPL QL IA: NEGATIVE
HCT VFR BLD AUTO: 41.7 % (ref 37.5–51)
HCT VFR BLD AUTO: 49.5 % (ref 37.5–51)
HCT VFR BLD CALC: 45.8 % (ref 42–52)
HCV AB S/CO SERPL IA: <0.1 S/CO RATIO (ref 0–0.9)
HDLC SERPL-MCNC: 38 MG/DL
HEMOGLOBIN: 14.8 G/DL (ref 14–18)
HGB BLD-MCNC: 14.6 G/DL (ref 13–17.7)
HGB BLD-MCNC: 15.9 G/DL (ref 13–17.7)
HIV 1+2 AB+HIV1 P24 AG SERPL QL IA: NON REACTIVE
HOLD SPECIMEN: NORMAL
HOLD SPECIMEN: NORMAL
IMM GRANULOCYTES # BLD AUTO: 0 X10E3/UL (ref 0–0.1)
IMM GRANULOCYTES # BLD AUTO: 0.02 10*3/MM3 (ref 0–0.05)
IMM GRANULOCYTES NFR BLD AUTO: 0.4 % (ref 0–0.5)
IMM GRANULOCYTES NFR BLD AUTO: 1 %
IMMATURE GRANULOCYTES #: 0 K/UL
LDLC SERPL CALC-MCNC: 108 MG/DL (ref 0–99)
LYMPHOCYTES # BLD AUTO: 1.8 X10E3/UL (ref 0.7–3.1)
LYMPHOCYTES # BLD AUTO: 1.81 10*3/MM3 (ref 0.7–3.1)
LYMPHOCYTES ABSOLUTE: 1.6 K/UL (ref 1.1–4.5)
LYMPHOCYTES NFR BLD AUTO: 35 %
LYMPHOCYTES NFR BLD AUTO: 35.9 % (ref 19.6–45.3)
LYMPHOCYTES RELATIVE PERCENT: 27.2 % (ref 20–40)
MCH RBC QN AUTO: 28.8 PG (ref 26.6–33)
MCH RBC QN AUTO: 29.9 PG (ref 27–31)
MCH RBC QN AUTO: 30.2 PG (ref 26.6–33)
MCHC RBC AUTO-ENTMCNC: 32.1 G/DL (ref 31.5–35.7)
MCHC RBC AUTO-ENTMCNC: 32.3 G/DL (ref 33–37)
MCHC RBC AUTO-ENTMCNC: 35 G/DL (ref 31.5–35.7)
MCV RBC AUTO: 86.3 FL (ref 79–97)
MCV RBC AUTO: 90 FL (ref 79–97)
MCV RBC AUTO: 92.5 FL (ref 80–94)
MONOCYTES # BLD AUTO: 0.5 X10E3/UL (ref 0.1–0.9)
MONOCYTES # BLD AUTO: 0.54 10*3/MM3 (ref 0.1–0.9)
MONOCYTES ABSOLUTE: 0.5 K/UL (ref 0–0.9)
MONOCYTES NFR BLD AUTO: 10 %
MONOCYTES NFR BLD AUTO: 10.7 % (ref 5–12)
MONOCYTES RELATIVE PERCENT: 9.2 % (ref 0–10)
NEUTROPHILS # BLD AUTO: 2.6 X10E3/UL (ref 1.4–7)
NEUTROPHILS ABSOLUTE: 3.6 K/UL (ref 1.5–7.5)
NEUTROPHILS NFR BLD AUTO: 2.47 10*3/MM3 (ref 1.7–7)
NEUTROPHILS NFR BLD AUTO: 49 % (ref 42.7–76)
NEUTROPHILS NFR BLD AUTO: 50 %
NEUTROPHILS RELATIVE PERCENT: 60.2 % (ref 50–65)
NRBC BLD AUTO-RTO: 0 /100 WBC (ref 0–0.2)
PDW BLD-RTO: 12.5 % (ref 11.5–14.5)
PLATELET # BLD AUTO: 177 10*3/MM3 (ref 140–450)
PLATELET # BLD AUTO: 240 X10E3/UL (ref 150–450)
PLATELET # BLD: 192 K/UL (ref 130–400)
PMV BLD AUTO: 9.3 FL (ref 6–12)
PMV BLD AUTO: 9.4 FL (ref 9.4–12.4)
POTASSIUM SERPL-SCNC: 3.8 MMOL/L (ref 3.5–5)
POTASSIUM SERPL-SCNC: 3.8 MMOL/L (ref 3.5–5.2)
POTASSIUM SERPL-SCNC: 5 MMOL/L (ref 3.5–5.2)
PROT SERPL-MCNC: 6.6 G/DL (ref 6–8.5)
PROT SERPL-MCNC: 7.1 G/DL (ref 6–8.5)
RBC # BLD AUTO: 4.83 10*6/MM3 (ref 4.14–5.8)
RBC # BLD AUTO: 5.53 X10E6/UL (ref 4.14–5.8)
RBC # BLD: 4.95 M/UL (ref 4.7–6.1)
SARS-COV-2, NAAT: NOT DETECTED
SODIUM BLD-SCNC: 139 MMOL/L (ref 136–145)
SODIUM SERPL-SCNC: 139 MMOL/L (ref 134–144)
SODIUM SERPL-SCNC: 139 MMOL/L (ref 136–145)
TOTAL PROTEIN: 6.8 G/DL (ref 6.6–8.7)
TRIGL SERPL-MCNC: 116 MG/DL (ref 0–149)
TROPONIN: <0.01 NG/ML (ref 0–0.03)
VLDLC SERPL CALC-MCNC: 21 MG/DL (ref 5–40)
WBC # BLD AUTO: 5.04 10*3/MM3 (ref 3.4–10.8)
WBC # BLD AUTO: 5.2 X10E3/UL (ref 3.4–10.8)
WBC # BLD: 5.9 K/UL (ref 4.8–10.8)
WHOLE BLOOD HOLD SPECIMEN: NORMAL
WHOLE BLOOD HOLD SPECIMEN: NORMAL

## 2021-08-26 PROCEDURE — 85025 COMPLETE CBC W/AUTO DIFF WBC: CPT | Performed by: EMERGENCY MEDICINE

## 2021-08-26 PROCEDURE — 87635 SARS-COV-2 COVID-19 AMP PRB: CPT

## 2021-08-26 PROCEDURE — 93005 ELECTROCARDIOGRAM TRACING: CPT

## 2021-08-26 PROCEDURE — 85025 COMPLETE CBC W/AUTO DIFF WBC: CPT

## 2021-08-26 PROCEDURE — 71275 CT ANGIOGRAPHY CHEST: CPT

## 2021-08-26 PROCEDURE — 6360000002 HC RX W HCPCS: Performed by: NURSE PRACTITIONER

## 2021-08-26 PROCEDURE — 96376 TX/PRO/DX INJ SAME DRUG ADON: CPT

## 2021-08-26 PROCEDURE — 93010 ELECTROCARDIOGRAM REPORT: CPT | Performed by: INTERNAL MEDICINE

## 2021-08-26 PROCEDURE — 36415 COLL VENOUS BLD VENIPUNCTURE: CPT

## 2021-08-26 PROCEDURE — 6360000004 HC RX CONTRAST MEDICATION: Performed by: NURSE PRACTITIONER

## 2021-08-26 PROCEDURE — 80053 COMPREHEN METABOLIC PANEL: CPT

## 2021-08-26 PROCEDURE — 99211 OFF/OP EST MAY X REQ PHY/QHP: CPT

## 2021-08-26 PROCEDURE — 71045 X-RAY EXAM CHEST 1 VIEW: CPT

## 2021-08-26 PROCEDURE — 99285 EMERGENCY DEPT VISIT HI MDM: CPT

## 2021-08-26 PROCEDURE — 96374 THER/PROPH/DIAG INJ IV PUSH: CPT

## 2021-08-26 PROCEDURE — 93005 ELECTROCARDIOGRAM TRACING: CPT | Performed by: EMERGENCY MEDICINE

## 2021-08-26 PROCEDURE — 84484 ASSAY OF TROPONIN QUANT: CPT

## 2021-08-26 RX ORDER — MORPHINE SULFATE 4 MG/ML
2 INJECTION, SOLUTION INTRAMUSCULAR; INTRAVENOUS ONCE
Status: COMPLETED | OUTPATIENT
Start: 2021-08-26 | End: 2021-08-26

## 2021-08-26 RX ADMIN — MORPHINE SULFATE 2 MG: 4 INJECTION, SOLUTION INTRAMUSCULAR; INTRAVENOUS at 17:05

## 2021-08-26 RX ADMIN — MORPHINE SULFATE 2 MG: 4 INJECTION, SOLUTION INTRAMUSCULAR; INTRAVENOUS at 18:51

## 2021-08-26 RX ADMIN — IOPAMIDOL 90 ML: 755 INJECTION, SOLUTION INTRAVENOUS at 17:43

## 2021-08-26 ASSESSMENT — PAIN DESCRIPTION - DESCRIPTORS
DESCRIPTORS: ACHING
DESCRIPTORS: STABBING

## 2021-08-26 ASSESSMENT — HEART SCORE
ECG: 0
ECG: 0

## 2021-08-26 ASSESSMENT — PAIN DESCRIPTION - FREQUENCY: FREQUENCY: CONTINUOUS

## 2021-08-26 ASSESSMENT — PAIN SCALES - GENERAL
PAINLEVEL_OUTOF10: 8
PAINLEVEL_OUTOF10: 6
PAINLEVEL_OUTOF10: 8
PAINLEVEL_OUTOF10: 5

## 2021-08-26 ASSESSMENT — ENCOUNTER SYMPTOMS
SHORTNESS OF BREATH: 1
ABDOMINAL PAIN: 0

## 2021-08-26 ASSESSMENT — PAIN DESCRIPTION - LOCATION: LOCATION: CHEST

## 2021-08-26 NOTE — ED PROVIDER NOTES
[facility]  eMERGENCY dEPARTMENT eNCOUnter      Pt Name: Dameon Cedeño  MRN: [id number]  [place] 1985  Date of evaluation: 8/26/2021  Provider: Dvais Sarabia, [address]       Chief Complaint   Patient presents with    Chest Pain     today         HISTORY OF PRESENT ILLNESS   (Location/Symptom, Timing/Onset,Context/Setting, Quality, Duration, Modifying Factors, Severity)  Note limiting factors. Fatoumata Horne a 29 y.o. male who presents to the emergency department for evaluation of chest pain. Pt tells me that he has history of intermittent chest pain with continuous chest pain since 830 this morning after lifting his dog. He tells me that he has pain along left side of chest worse with movement and breathing. He tells me that he has history of Marfan's syndrome. He tells me that he has had right lung resection in past. He tells me that he has history of lung cancer relating that he hasn't had any follow up for this in past 3 years. He gives history of cigarette smoking. He relates that he has had episodes of shortness of breath today. He denies fevers, cough as well as recent illness. [facility]    Nursing Notes were reviewed. REVIEW OF SYSTEMS    (2-9 systems for level 4, 10 or more for level 5)     Review of Systems   Constitutional: Negative for fever. Respiratory: Positive for shortness of breath. Cardiovascular: Positive for chest pain. Gastrointestinal: Negative for abdominal pain. All other systems reviewed and are negative. A complete review of systems was performed and is negative except as noted above in the HPI.        PAST MEDICAL HISTORY     Past Medical History:   Diagnosis Date    Marfan's disease     Pneumothorax          SURGICAL HISTORY       Past Surgical History:   Procedure Laterality Date    HERNIA REPAIR      LUNG REMOVAL, PARTIAL Right          CURRENT MEDICATIONS       Previous Medications    No medications on file       ALLERGIES Methylprednisolone    FAMILY HISTORY     History reviewed. No pertinent family history. SOCIAL HISTORY       Social History     Socioeconomic History    Marital status: Single     Spouse name: None    Number of children: None    Years of education: None    Highest education level: None   Occupational History    None   Tobacco Use    Smoking status: Current Every Day Smoker     Packs/day: 0.50     Years: 10.00     Pack years: 5.00     Types: Cigarettes    Smokeless tobacco: Never Used   Vaping Use    Vaping Use: Never used   Substance and Sexual Activity    Alcohol use: No    Drug use: No    Sexual activity: None   Other Topics Concern    None   Social History Narrative    None     Social Determinants of Health     Financial Resource Strain:     Difficulty of Paying Living Expenses:    Food Insecurity:     Worried About Running Out of Food in the Last Year:     Ran Out of Food in the Last Year:    Transportation Needs:     Lack of Transportation (Medical):      Lack of Transportation (Non-Medical):    Physical Activity:     Days of Exercise per Week:     Minutes of Exercise per Session:    Stress:     Feeling of Stress :    Social Connections:     Frequency of Communication with Friends and Family:     Frequency of Social Gatherings with Friends and Family:     Attends Sikh Services:     Active Member of Clubs or Organizations:     Attends Club or Organization Meetings:     Marital Status:    Intimate Partner Violence:     Fear of Current or Ex-Partner:     Emotionally Abused:     Physically Abused:     Sexually Abused:        SCREENINGS    [place] Coma Scale  Eye Opening: Spontaneous  Best Verbal Response: Oriented  Best Motor Response: Obeys commands  [place] Coma Scale Score: 15 Heart Score for chest pain patients  History: Slightly Suspicious  ECG: Normal  Patient Age: < 45 years  *Risk factors for Atherosclerotic disease: Cigarette smoking  Risk Factors: 1 or 2 risk factors  Troponin: < 1X normal limit  Heart Score Total: 1      PHYSICAL EXAM    (up to 7 for level 4, 8 or more for level 5)     ED Triage Vitals [08/26/21 1547]   BP Temp Temp Source Pulse Resp SpO2 Height Weight   110/63 98.1 °F (36.7 °C) Oral 104 22 99 % 6' 5\" (1.956 m) 160 lb (72.6 kg)       Physical Exam  Vitals reviewed. HENT:      Head: Normocephalic. Right Ear: External ear normal.      Left Ear: External ear normal.   Eyes:      Conjunctiva/sclera: Conjunctivae normal.      Pupils: Pupils are equal, round, and reactive to light. Cardiovascular:      Rate and Rhythm: Normal rate and regular rhythm. Heart sounds: Normal heart sounds. Pulmonary:      Effort: Pulmonary effort is normal.      Breath sounds: Normal breath sounds. Chest:      Chest wall: Tenderness (left upper chest wall reproducing subjective symptoms of chest pain) present. Abdominal:      General: Bowel sounds are normal.      Palpations: Abdomen is soft. Musculoskeletal:         General: Normal range of motion. Cervical back: Normal range of motion. Skin:     General: Skin is warm and dry. Neurological:      Mental Status: He is alert and oriented to person, place, and time. DIAGNOSTIC RESULTS     EKG: All EKG's are interpreted by the Emergency Department Physician who either signs or Co-signs this chart in the absence of acardiologist.    There is a regular rate and rhythm. SR hr 95b/min Normal HI interval and normal P waves. Normal QRS interval. Normal QT interval. No obvious ST elevation or ST depression. RADIOLOGY:   Non-plain film images such as CT, Ultrasound andMRI are read by the radiologist. Plain radiographic images are visualized and preliminarily interpreted by the emergency physician with the below findings:        Interpretation per the Radiologist below, if available at the time of this note:    CTA PULMONARY W CONTRAST   Final Result   1. No evidence of pulmonary embolus.     2.  No evidence of aortic dissection or aneurysm. 3.  Severe emphysema. Signed by Dr Teresita Anguiano   Final Result   No active cardiopulmonary disease. Chronic inflammatory and obstructive lung changes. Signed by Dr Jake Blake            ED BEDSIDE ULTRASOUND:   Performed by ED Physician - none    LABS:  Labs Reviewed   CBC WITH AUTO DIFFERENTIAL - Abnormal; Notable for the following components:       Result Value    MCHC 32.3 (*)     All other components within normal limits   COMPREHENSIVE METABOLIC PANEL - Abnormal; Notable for the following components:    Glucose 117 (*)     All other components within normal limits   COVID-19, RAPID   TROPONIN       All other labs were within normal range or not returned as of this dictation. RE-ASSESSMENT     Pt has low risk HEART score. He has had continuous chest pain for greater than 4 hours and because of this troponin/ekg were not repeated. Pt remains in no distress at discharge. EMERGENCY DEPARTMENT COURSE and DIFFERENTIALDIAGNOSIS/MDM:   Vitals:    Vitals:    08/26/21 1830 08/26/21 1853 08/26/21 1930 08/26/21 1942   BP:  99/68     Pulse: 72 80 79 71   Resp: 17 18 19    Temp:       TempSrc:       SpO2: 97% 95% 96% 93%   Weight:       Height:           MDM      CONSULTS:  None    PROCEDURES:  Unless otherwise notedbelow, none     Procedures    FINAL IMPRESSION     1. Chest pain, unspecified type          DISPOSITION/PLAN   DISPOSITION Decision To Discharge 08/26/2021 07:33:57 PM      PATIENT REFERRED TO:  [address] [place] [address]  [phone]  Schedule an appointment as soon as possible for a visit         DISCHARGE MEDICATIONS:       There are no discharge medications for this patient.       (Pleasenote that portions of this note were completed with a voice recognition program.  Efforts were made to edit the dictations but occasionally words are mis-transcribed.)                     Luis Mcmullen Cindy Husbands, APRN  08/26/21 1952

## 2021-08-26 NOTE — TELEPHONE ENCOUNTER
Caller: Ariel Platt    Relationship: Self    Best call back number: [phone]    What is the best time to reach you:     Who are you requesting to speak with (clinical staff, provider,  specific staff member): NURSE    Do you know the name of the person who called: PATIENT    What was the call regarding: NEITHER INHALER IS COVERED BY PATIENT'S INSURANCE    Do you require a callback: YES    [facility] #[id number] - [place] - [address] AT [facility] [address] - [phone]  - [phone] FX

## 2021-08-27 ENCOUNTER — TELEPHONE (OUTPATIENT)
Dept: FAMILY MEDICINE CLINIC | Facility: CLINIC | Age: 36
End: 2021-08-27

## 2021-08-27 LAB
QT INTERVAL: 332 MS
QTC INTERVAL: 417 MS

## 2021-08-27 NOTE — TELEPHONE ENCOUNTER
Spoke with patients wife who reports that he has no symptoms but she tested positive for trich. Patient scheduled an appointment for 08/30/2021

## 2021-08-27 NOTE — TELEPHONE ENCOUNTER
"Caller: SAMMIE GUERRA    Relationship: Emergency Contact    Best call back number: [phone]    What are your current symptoms: STATES THAT WIFE HAS \"TRIC\" AND NEEDS TREATMENT MEDS    If a prescription is needed, what is your preferred pharmacy and phone number: [facility] #[id number] - [facility] [place] - [address] AT [facility] [address] - [phone]  - [phone] FX     "

## 2021-08-29 LAB
EKG P AXIS: 80 DEGREES
EKG P-R INTERVAL: 156 MS
EKG Q-T INTERVAL: 334 MS
EKG QRS DURATION: 80 MS
EKG QTC CALCULATION (BAZETT): 395 MS
EKG T AXIS: 77 DEGREES

## 2021-08-29 PROCEDURE — 93010 ELECTROCARDIOGRAM REPORT: CPT | Performed by: INTERNAL MEDICINE

## 2021-08-30 ENCOUNTER — TELEPHONE (OUTPATIENT)
Dept: FAMILY MEDICINE CLINIC | Facility: CLINIC | Age: 36
End: 2021-08-30

## 2021-08-30 ENCOUNTER — OFFICE VISIT (OUTPATIENT)
Dept: FAMILY MEDICINE CLINIC | Facility: CLINIC | Age: 36
End: 2021-08-30

## 2021-08-30 VITALS
WEIGHT: 160 LBS | DIASTOLIC BLOOD PRESSURE: 73 MMHG | OXYGEN SATURATION: 98 % | BODY MASS INDEX: 19.48 KG/M2 | HEIGHT: 76 IN | HEART RATE: 96 BPM | SYSTOLIC BLOOD PRESSURE: 106 MMHG | RESPIRATION RATE: 20 BRPM | TEMPERATURE: 97.6 F

## 2021-08-30 DIAGNOSIS — Z20.2 EXPOSURE TO SEXUALLY TRANSMITTED DISEASE (STD): Primary | ICD-10-CM

## 2021-08-30 DIAGNOSIS — Z72.0 TOBACCO ABUSE: ICD-10-CM

## 2021-08-30 DIAGNOSIS — R07.9 CHEST PAIN, UNSPECIFIED TYPE: ICD-10-CM

## 2021-08-30 PROCEDURE — 96372 THER/PROPH/DIAG INJ SC/IM: CPT | Performed by: NURSE PRACTITIONER

## 2021-08-30 PROCEDURE — 99213 OFFICE O/P EST LOW 20 MIN: CPT | Performed by: NURSE PRACTITIONER

## 2021-08-30 RX ORDER — NICOTINE 21-14-7MG
1 KIT TRANSDERMAL DAILY
Qty: 56 EACH | Refills: 0 | Status: SHIPPED | OUTPATIENT
Start: 2021-08-30 | End: 2021-08-31

## 2021-08-30 RX ORDER — DOXYCYCLINE HYCLATE 100 MG/1
100 CAPSULE ORAL 2 TIMES DAILY
Qty: 14 CAPSULE | Refills: 0 | Status: SHIPPED | OUTPATIENT
Start: 2021-08-30 | End: 2021-09-06

## 2021-08-30 RX ORDER — METRONIDAZOLE 500 MG/1
2000 TABLET ORAL ONCE
Qty: 4 TABLET | Refills: 0 | Status: SHIPPED | OUTPATIENT
Start: 2021-08-30 | End: 2021-08-30

## 2021-08-30 RX ORDER — CEFTRIAXONE 500 MG/1
500 INJECTION, POWDER, FOR SOLUTION INTRAMUSCULAR; INTRAVENOUS ONCE
Status: COMPLETED | OUTPATIENT
Start: 2021-08-30 | End: 2021-08-30

## 2021-08-30 RX ORDER — CYCLOBENZAPRINE HCL 5 MG
5 TABLET ORAL 3 TIMES DAILY PRN
Qty: 30 TABLET | Refills: 0 | Status: SHIPPED | OUTPATIENT
Start: 2021-08-30 | End: 2021-09-08

## 2021-08-30 RX ADMIN — CEFTRIAXONE 500 MG: 500 INJECTION, POWDER, FOR SOLUTION INTRAMUSCULAR; INTRAVENOUS at 14:43

## 2021-08-30 NOTE — PROGRESS NOTES
"Chief Complaint  Exposure to STD (spouse tested positive for trich)    Subjective          Ross Platt presents to [facility] for exposure to std.  History of Present Illness  Exposure to std  New. Reports current partner tested positive for std. They are not sure of which ones but she was treated with rocephin and 2 antibiotics she is taking at home. He denies any current symptoms.     He was seen in ED for chest pain over the weekend. All testing normal except for emphysema. He would like order for nicotine patches for smoking cessation. Reports he is ready to quit. 4 min of counseling spent with patient. He currently smokes 1 ppd.     He is wanting to try muscle relaxer for chest pain to see if it will relieve symptoms.       Objective   Vital Signs:   /73 (BP Location: Right arm, Patient Position: Sitting, Cuff Size: Adult)   Pulse 96   Temp 97.6 °F (36.4 °C) (Temporal)   Resp 20   Ht 193 cm (76\") Comment: KS  Wt 72.6 kg (160 lb)   SpO2 98%   BMI 19.48 kg/m²     Physical Exam  Vitals and nursing note reviewed.   Constitutional:       General: He is not in acute distress.     Appearance: He is well-developed.   HENT:      Right Ear: Tympanic membrane and ear canal normal.      Left Ear: Tympanic membrane and ear canal normal.      Nose: Nose normal.      Right Sinus: No maxillary sinus tenderness or frontal sinus tenderness.      Left Sinus: No maxillary sinus tenderness or frontal sinus tenderness.      Mouth/Throat:      Pharynx: Uvula midline. No uvula swelling.   Eyes:      Conjunctiva/sclera: Conjunctivae normal.   Neck:      Thyroid: No thyromegaly.      Trachea: No tracheal deviation.   Cardiovascular:      Rate and Rhythm: Normal rate and regular rhythm.      Heart sounds: Normal heart sounds.   Pulmonary:      Effort: Pulmonary effort is normal.      Breath sounds: Normal breath sounds.   Genitourinary:     Comments: Declines gu exam  Musculoskeletal:    "   Cervical back: Neck supple.   Lymphadenopathy:      Cervical: No cervical adenopathy.   Skin:     General: Skin is warm and dry.   Neurological:      Mental Status: He is alert.   Psychiatric:         Behavior: Behavior normal.        Result Review :                 Assessment and Plan    Diagnoses and all orders for this visit:    1. Exposure to sexually transmitted disease (STD) (Primary)  -     cefTRIAXone (ROCEPHIN) injection 500 mg  -     Cancel: Chlamydia trachomatis, Neisseria gonorrhoeae, Trichomonas vaginalis, PCR - Swab, Urine, Random Void; Future  -     Chlamydia trachomatis, Neisseria gonorrhoeae, Trichomonas vaginalis, PCR - Swab, Urine, Random Void    2. Tobacco abuse    3. Chest pain, unspecified type    Other orders  -     metroNIDAZOLE (Flagyl) 500 MG tablet; Take 4 tablets by mouth 1 (One) Time for 1 dose.  Dispense: 4 tablet; Refill: 0  -     doxycycline (VIBRAMYCIN) 100 MG capsule; Take 1 capsule by mouth 2 (Two) Times a Day for 7 days.  Dispense: 14 capsule; Refill: 0  -     Nicotine 21-14-7 MG/24HR kit; Place 1 patch on the skin as directed by provider Daily.  Dispense: 56 each; Refill: 0  -     cyclobenzaprine (FLEXERIL) 5 MG tablet; Take 1 tablet by mouth 3 (Three) Times a Day As Needed for Muscle Spasms.  Dispense: 30 tablet; Refill: 0      Refrain from sex until treatment complete.       Follow Up   Return for Next scheduled follow up.  Patient was given instructions and counseling regarding his condition or for health maintenance advice. Please see specific information pulled into the AVS if appropriate.

## 2021-08-31 ENCOUNTER — OFFICE VISIT (OUTPATIENT)
Dept: CARDIOLOGY | Facility: CLINIC | Age: 36
End: 2021-08-31

## 2021-08-31 ENCOUNTER — TELEPHONE (OUTPATIENT)
Dept: FAMILY MEDICINE CLINIC | Facility: CLINIC | Age: 36
End: 2021-08-31

## 2021-08-31 VITALS
HEART RATE: 101 BPM | SYSTOLIC BLOOD PRESSURE: 100 MMHG | DIASTOLIC BLOOD PRESSURE: 48 MMHG | HEIGHT: 76 IN | BODY MASS INDEX: 19.48 KG/M2 | WEIGHT: 160 LBS | OXYGEN SATURATION: 100 %

## 2021-08-31 DIAGNOSIS — F19.20 POLYSUBSTANCE (EXCLUDING OPIOIDS) DEPENDENCE (HCC): ICD-10-CM

## 2021-08-31 DIAGNOSIS — J43.9 BULLOUS EMPHYSEMA (HCC): ICD-10-CM

## 2021-08-31 DIAGNOSIS — F17.200 TOBACCO USE DISORDER: ICD-10-CM

## 2021-08-31 DIAGNOSIS — R55 NEAR SYNCOPE: Primary | ICD-10-CM

## 2021-08-31 DIAGNOSIS — R07.89 CHEST PAIN, ATYPICAL: ICD-10-CM

## 2021-08-31 DIAGNOSIS — Q87.40 MARFAN SYNDROME: ICD-10-CM

## 2021-08-31 DIAGNOSIS — F17.200 SMOKING: Chronic | ICD-10-CM

## 2021-08-31 PROCEDURE — 99214 OFFICE O/P EST MOD 30 MIN: CPT | Performed by: EMERGENCY MEDICINE

## 2021-08-31 RX ORDER — NICOTINE 21 MG/24HR
1 PATCH, TRANSDERMAL 24 HOURS TRANSDERMAL EVERY 24 HOURS
Qty: 28 PATCH | Refills: 0 | Status: SHIPPED | OUTPATIENT
Start: 2021-08-31 | End: 2021-09-28

## 2021-08-31 NOTE — TELEPHONE ENCOUNTER
Caller: [facility] #[id number] - [place] - [address] AT [facility] [address] - [phone] [facility] [phone] FX    Relationship: Pharmacy    Best call back number: [phone]    What is the best time to reach you: ANYTIME     Who are you requesting to speak with (clinical staff, provider,  specific staff member): CLINICAL STAFF     Do you know the name of the person who called:  [facility] CALLING IN REGARDING SCRIPT THEY RECEIVED FOR Nicotine 21-14-7 MG/24HR kit. CALLER STATED THEY DO NOT HAVE KITS LIKE THAT AND THEY WOULD NEED TO KNOW HOW LONG HE SHOULD TAKE THESE.     What was the call regarding: CLARIFICATION ON SCRIPT    Do you require a callback: YES

## 2021-08-31 NOTE — PROGRESS NOTES
[facility] - CARDIOLOGY  New Patient Initial Outpatient Evaulation    Primary Care Physician: Cristy Schmidt APRN    Subjective     Chief Complaint   Patient presents with   • Chest Pain     NEW PT         History of Present Illness    Patient is a very pleasant 36-year-old male with a past medical history of polysubstance abuse, Marfan syndrome, COPD and nicotine abuse who presents to cardiology clinic for initial evaluation. Patient states that he has chronic chest pain and shortness of breath. He says that he only has one of his lungs. He says that the chest pain is left-sided and radiates to the middle of his chest. He describes it as a stabbing and pressure-like sensation in nature. He is currently a smoker. Blood pressure is normal at 100/48 today and his heart rate is a little high at 101.    Review of Systems   Constitutional: Negative for diaphoresis, fever and malaise/fatigue.   HENT: Negative for congestion.    Eyes: Negative for vision loss in left eye and vision loss in right eye.   Cardiovascular: Positive for chest pain. Negative for claudication, dyspnea on exertion, irregular heartbeat, leg swelling, orthopnea, palpitations and syncope.   Respiratory: Positive for shortness of breath. Negative for cough and wheezing.    Hematologic/Lymphatic: Negative for adenopathy.   Skin: Negative for rash.   Musculoskeletal: Negative for joint pain and joint swelling.   Gastrointestinal: Negative for abdominal pain, diarrhea, nausea and vomiting.   Neurological: Negative for excessive daytime sleepiness, dizziness, focal weakness, light-headedness, numbness and weakness.   Psychiatric/Behavioral: Negative for depression. The patient does not have insomnia.         Otherwise complete ROS reviewed and negative except as mentioned in the HPI.      Past Medical History:   Past Medical History:   Diagnosis Date   • Back pain    • Chest pain    • Claustrophobia    • COPD (chronic obstructive pulmonary disease)  (CMS/HCC)    • Emphysema of lung (CMS/HCC)    • Lung disease    • Marfan syndrome    • Pneumothorax    • Seizures (CMS/HCC)     when he was a child    • Smoking     3/4 pack a day   • Tobacco abuse    • Underweight        Past Surgical History:  Past Surgical History:   Procedure Laterality Date   • HERNIA REPAIR     • LUNG LOBECTOMY Right 02/01/2017   • LUNG REMOVAL, TOTAL Right 2015   • THORACOSCOPY N/A 2/3/2017    Procedure: BRONCHOSCOPY, THORACOSCOPY RIGHT CHEST,  WEDGE RESECTION RIGHT UPPER AND LOWER LOBE OF LUNG, MECHANICAL PLEURODESIS;  Surgeon: Kyle Heath MD;  Location: [facility];  Service:    • TYMPANOSTOMY  05/12/2016    Recorded       Family History: family history includes Breast cancer in his maternal grandmother; Cancer in his maternal grandmother; Heart attack in his father; No Known Problems in his daughter, mother, and sister; Stroke in his father.    Social History:  reports that he has been smoking cigarettes. He has a 10.00 pack-year smoking history. He has never used smokeless tobacco. He reports previous alcohol use. He reports previous drug use. Drugs: Methamphetamines and IV.    Medications:  Prior to Admission medications    Medication Sig Start Date End Date Taking? Authorizing Provider   albuterol sulfate  (90 Base) MCG/ACT inhaler Inhale 2 puffs Every 4 (Four) Hours As Needed for Wheezing or Shortness of Air. 8/25/21  Yes Roxana, Jasmaine, APRN   cyclobenzaprine (FLEXERIL) 5 MG tablet Take 1 tablet by mouth 3 (Three) Times a Day As Needed for Muscle Spasms. 8/30/21  Yes Roxana, Jasmaine, APRN   doxycycline (VIBRAMYCIN) 100 MG capsule Take 1 capsule by mouth 2 (Two) Times a Day for 7 days. 8/30/21 9/6/21 Yes Roxana, Jasmaine, APRN   fluticasone-salmeterol (Advair Diskus) 250-50 MCG/DOSE DISKUS Inhale 1 puff 2 (Two) Times a Day. 8/25/21  Yes Roxana, Jasmaine, APRN   nicotine (NICODERM CQ) 21 MG/24HR patch Place 1 patch on the skin as directed by provider Daily for 28  "days. 8/31/21 9/28/21 Yes Roxana, Jasmaine, APRN   metroNIDAZOLE (Flagyl) 500 MG tablet Take 4 tablets by mouth 1 (One) Time for 1 dose. 8/30/21 8/30/21  Roxana, Jasmaine, APRN   Nicotine 21-14-7 MG/24HR kit Place 1 patch on the skin as directed by provider Daily. 8/30/21 8/31/21  Roxana, Jasmaine, APRN     Allergies:  Allergies   Allergen Reactions   • Calcium Channel Blockers Other (See Comments)     Do not use in marfan   • Ciprofloxacin Other (See Comments)     Do not use fluoroquinolones in marfan   • Medrol [Methylprednisolone] Other (See Comments)     Patient states he gets violent on this medication        Objective     Vital Signs: /48   Pulse 101   Ht 193 cm (75.98\")   Wt 72.6 kg (160 lb)   SpO2 100%   BMI 19.48 kg/m²     Vitals and nursing note reviewed.   Constitutional:       Appearance: Normal and healthy appearance. Well-developed and not in distress.   Eyes:      Extraocular Movements: Extraocular movements intact.      Pupils: Pupils are equal, round, and reactive to light.   HENT:      Head: Normocephalic and atraumatic.    Mouth/Throat:      Pharynx: Oropharynx is clear.   Neck:      Vascular: JVD normal.      Trachea: Trachea normal.   Pulmonary:      Effort: Pulmonary effort is normal.      Breath sounds: Normal breath sounds. No wheezing. No rhonchi. No rales.   Cardiovascular:      PMI at left midclavicular line. Normal rate. Regular rhythm. Normal S1. Normal S2.      Murmurs: There is a grade 2/6 systolic murmur.      No gallop. No click. No rub.   Pulses:     Dorsalis pedis: 2+ bilaterally.     Posterior tibial: 2+ bilaterally.  Abdominal:      General: Bowel sounds are normal.      Palpations: Abdomen is soft.      Tenderness: There is no abdominal tenderness.   Musculoskeletal: Normal range of motion.      Cervical back: Normal range of motion and neck supple. Skin:     General: Skin is warm and dry.      Capillary Refill: Capillary refill takes less than 2 seconds. "   Feet:      Right foot:      Skin integrity: Skin integrity normal.      Left foot:      Skin integrity: Skin integrity normal.   Neurological:      Mental Status: Alert and oriented to person, place and time.      Cranial Nerves: Cranial nerves are intact.      Sensory: Sensation is intact.      Motor: Motor function is intact.      Coordination: Coordination is intact.   Psychiatric:         Speech: Speech normal.         Behavior: Behavior is cooperative.         Results Reviewed:          Lab Results   Component Value Date    CHOL 128 06/01/2020    TRIG 116 08/25/2021    HDL 38 (L) 08/25/2021    VLDL 21 08/25/2021    LDLHDL 2.39 06/01/2020     No results found for: HGBA1C    Assessment / Plan        Problem List Items Addressed This Visit        Mental Health    Polysubstance abuse, including stimulants       Multi-system (Lupus, Sarcoid...)    Marfan syndrome       Pulmonary and Pneumonias    Bullous emphysema (CMS/HCC)       Tobacco    Smoking (Chronic)    Overview     3/4 pack a day         Tobacco use disorder      Other Visit Diagnoses     Near syncope    -  Primary    Relevant Orders    Holter Monitor - 72 Hour Up To 15 Days    Chest pain, atypical        Relevant Orders    Adult Stress Echo W/ Cont or Stress Agent if Necessary Per Protocol          Plan:    We will schedule the patient for a transthoracic echocardiogram. We will also place him on a heart monitor today. Follow-up in 1 month.    Counseled on smoking cessation and abstinence from drug abuse.    Thank you Chelle Schmidt for this referral. Please call me with any questions at any time. My cell phone number is [phone].      Clint Mcintyre,    Interventional cardiology  [facility]  08/31/21   18:14 CDT

## 2021-09-01 ENCOUNTER — TELEPHONE (OUTPATIENT)
Dept: FAMILY MEDICINE CLINIC | Facility: CLINIC | Age: 36
End: 2021-09-01

## 2021-09-01 LAB
C TRACH RRNA SPEC QL NAA+PROBE: NEGATIVE
N GONORRHOEA RRNA SPEC QL NAA+PROBE: NEGATIVE
T VAGINALIS DNA SPEC QL NAA+PROBE: NEGATIVE

## 2021-09-02 ENCOUNTER — TELEPHONE (OUTPATIENT)
Dept: FAMILY MEDICINE CLINIC | Facility: CLINIC | Age: 36
End: 2021-09-02

## 2021-09-02 NOTE — TELEPHONE ENCOUNTER
Pharmacy Name:  [facility] #[id number] - [place] - [address] AT [facility] OF [address] - [phone] [facility] [phone] FX (Pharmacy)    Pharmacy representative name: Donnelly     Pharmacy representative phone number: [phone]    What medication are you calling in regards to:  NICOTINE PATCHES   What question does the pharmacy have: DIRECTIONS FOR NICOTINE PATCHES     Who is the provider that prescribed the medication: DID NOT STATE     Additional notes: NONE

## 2021-09-02 NOTE — TELEPHONE ENCOUNTER
I have sent in new script for the 21 mcg only. For the length that I sent in the script for. Cancel the 21, 14, and 7.

## 2021-09-02 NOTE — TELEPHONE ENCOUNTER
Advised patient that he would need to contact insurance to determine what would be covered by his insurance.  In the meantime, is there an alternative that we could send for him?

## 2021-09-02 NOTE — TELEPHONE ENCOUNTER
Spoke with pharmacy.  They just need the titration directions added to script.  How many patches for each mg and how long   21,14 and 7.

## 2021-09-07 ENCOUNTER — APPOINTMENT (OUTPATIENT)
Dept: CT IMAGING | Facility: HOSPITAL | Age: 36
End: 2021-09-07

## 2021-09-08 ENCOUNTER — OFFICE VISIT (OUTPATIENT)
Dept: FAMILY MEDICINE CLINIC | Facility: CLINIC | Age: 36
End: 2021-09-08

## 2021-09-08 VITALS
HEART RATE: 102 BPM | OXYGEN SATURATION: 98 % | TEMPERATURE: 97 F | WEIGHT: 163.6 LBS | SYSTOLIC BLOOD PRESSURE: 110 MMHG | HEIGHT: 76 IN | DIASTOLIC BLOOD PRESSURE: 77 MMHG | BODY MASS INDEX: 19.92 KG/M2 | RESPIRATION RATE: 20 BRPM

## 2021-09-08 DIAGNOSIS — R06.00 DYSPNEA, UNSPECIFIED TYPE: ICD-10-CM

## 2021-09-08 DIAGNOSIS — R07.9 CHEST PAIN, UNSPECIFIED TYPE: Primary | ICD-10-CM

## 2021-09-08 DIAGNOSIS — J43.9 PULMONARY EMPHYSEMA, UNSPECIFIED EMPHYSEMA TYPE (HCC): ICD-10-CM

## 2021-09-08 DIAGNOSIS — Q87.40 MARFAN SYNDROME: ICD-10-CM

## 2021-09-08 PROCEDURE — 99214 OFFICE O/P EST MOD 30 MIN: CPT | Performed by: NURSE PRACTITIONER

## 2021-09-08 RX ORDER — METHOCARBAMOL 750 MG/1
750 TABLET, FILM COATED ORAL 4 TIMES DAILY PRN
Qty: 120 TABLET | Refills: 0 | Status: SHIPPED | OUTPATIENT
Start: 2021-09-08 | End: 2021-10-08 | Stop reason: SINTOL

## 2021-09-08 RX ORDER — METRONIDAZOLE 500 MG/1
TABLET ORAL
COMMUNITY
Start: 2021-08-30 | End: 2021-11-09

## 2021-09-08 RX ORDER — DICLOFENAC SODIUM 75 MG/1
75 TABLET, DELAYED RELEASE ORAL 2 TIMES DAILY
Qty: 60 TABLET | Refills: 0 | Status: SHIPPED | OUTPATIENT
Start: 2021-09-08 | End: 2021-09-21

## 2021-09-08 NOTE — PROGRESS NOTES
"Chief Complaint  Chest Pain (2 week follow up )    Subjective          Ross Platt presents to [facility] for follow up on chest pain.   History of Present Illness  Patient reports chest pain is improving some but still occurring. Reports pain is sharp. Dyspnea is improved a little with advair and albuterol. Was seen in ED and had evaluation for chest pain with CTA normal (except emphysema), labs all normal.     He reports muscle relaxer and nsaids are not controlling pain and asking for something stronger. Discussed with patient I do not recommend any narcotics due to previous substance abuse history.     Patient has started process of disability d/t pain and chronic dyspnea with severe emphysema. He is unable to catch his breath while trying to work.     Objective   Vital Signs:   /77 (BP Location: Left arm, Patient Position: Sitting, Cuff Size: Adult)   Pulse 102   Temp 97 °F (36.1 °C) (Temporal)   Resp 20   Ht 192.8 cm (75.9\") Comment: pr  Wt 74.2 kg (163 lb 9.6 oz)   SpO2 98%   BMI 19.97 kg/m²     Physical Exam  Vitals and nursing note reviewed.   Constitutional:       General: He is not in acute distress.     Appearance: He is well-developed and underweight.   HENT:      Right Ear: Tympanic membrane and ear canal normal.      Left Ear: Tympanic membrane and ear canal normal.      Nose: Nose normal.      Right Sinus: No maxillary sinus tenderness or frontal sinus tenderness.      Left Sinus: No maxillary sinus tenderness or frontal sinus tenderness.      Mouth/Throat:      Pharynx: Uvula midline. No uvula swelling.   Eyes:      Conjunctiva/sclera: Conjunctivae normal.   Neck:      Thyroid: No thyromegaly.      Trachea: No tracheal deviation.   Cardiovascular:      Rate and Rhythm: Normal rate and regular rhythm.      Heart sounds: Normal heart sounds.   Pulmonary:      Effort: Pulmonary effort is normal.      Breath sounds: Examination of the right-upper field " reveals decreased breath sounds. Examination of the right-middle field reveals decreased breath sounds. Decreased breath sounds, wheezing and rhonchi present.      Comments: Improved some  Musculoskeletal:      Cervical back: Neck supple.   Lymphadenopathy:      Cervical: No cervical adenopathy.   Skin:     General: Skin is warm and dry.   Neurological:      Mental Status: He is alert.   Psychiatric:         Mood and Affect: Mood is anxious.         Behavior: Behavior normal.        Result Review :                 Assessment and Plan    Diagnoses and all orders for this visit:    1. Chest pain, unspecified type (Primary)    2. Marfan syndrome    3. Dyspnea, unspecified type    4. Pulmonary emphysema, unspecified emphysema type (CMS/HCC)    Other orders  -     methocarbamol (ROBAXIN) 750 MG tablet; Take 1 tablet by mouth 4 (Four) Times a Day As Needed for Muscle Spasms.  Dispense: 120 tablet; Refill: 0  -     diclofenac (VOLTAREN) 75 MG EC tablet; Take 1 tablet by mouth 2 (Two) Times a Day.  Dispense: 60 tablet; Refill: 0      Will trial robaxin and voltaren for chest pain.   Avoid narcotics- if not improving after further cardiac evaluation with cardiology- would recommend referral to pain clinic if needed  Continue with follow up with cardiology        Ross Platt  reports that he has been smoking cigarettes. He has a 10.00 pack-year smoking history. He has never used smokeless tobacco.. I have educated him on the risk of diseases from using tobacco products such as cancer, COPD and heart disease.     I advised him to quit and he is willing to quit. We have discussed the following method/s for tobacco cessation:  nicotine patches, but insurance is not covering them.  Together we have set a quit date for unknown.  He will follow up with me in 1 month or sooner to check on his progress.    I spent 4 minutes counseling the patient.           Follow Up   Return in about 1 month (around 10/8/2021) for  Recheck.  Patient was given instructions and counseling regarding his condition or for health maintenance advice. Please see specific information pulled into the AVS if appropriate.

## 2021-09-09 ENCOUNTER — APPOINTMENT (OUTPATIENT)
Dept: CARDIOLOGY | Facility: HOSPITAL | Age: 36
End: 2021-09-09

## 2021-09-14 ENCOUNTER — APPOINTMENT (OUTPATIENT)
Dept: CARDIOLOGY | Facility: HOSPITAL | Age: 36
End: 2021-09-14

## 2021-09-21 ENCOUNTER — TELEPHONE (OUTPATIENT)
Dept: FAMILY MEDICINE CLINIC | Facility: CLINIC | Age: 36
End: 2021-09-21

## 2021-09-21 RX ORDER — DICLOFENAC SODIUM 75 MG/1
TABLET, DELAYED RELEASE ORAL
Qty: 60 TABLET | Refills: 0 | Status: SHIPPED | OUTPATIENT
Start: 2021-09-21 | End: 2021-12-17

## 2021-09-21 NOTE — TELEPHONE ENCOUNTER
Refilled 9/8/2021    Rx Refill Note  Requested Prescriptions     Pending Prescriptions Disp Refills   • diclofenac (VOLTAREN) 75 MG EC tablet [Pharmacy Med Name: DICLOFENAC SODIUM 75MG DR TABLETS] 60 tablet 0     Sig: TAKE 1 TABLET BY MOUTH TWICE DAILY      Last office visit with prescribing clinician: 9/8/2021      Next office visit with prescribing clinician: 10/8/2021     Sarah Daniels MA  09/21/21, 08:59 CDT

## 2021-09-22 NOTE — TELEPHONE ENCOUNTER
"He needs to finish cardiac work up as I advised him at his visit. Then I will feel comfortable referring him. I do not think pain mgmt will see a referral for \"chest pain\" that has not been cleared from cardiology.   "

## 2021-09-29 ENCOUNTER — APPOINTMENT (OUTPATIENT)
Dept: GENERAL RADIOLOGY | Age: 36
End: 2021-09-29
Payer: MEDICAID

## 2021-09-29 ENCOUNTER — HOSPITAL ENCOUNTER (EMERGENCY)
Age: 36
Discharge: HOME | End: 2021-09-29
Attending: EMERGENCY MEDICINE
Payer: MEDICAID

## 2021-09-29 ENCOUNTER — APPOINTMENT (OUTPATIENT)
Dept: CT IMAGING | Age: 36
End: 2021-09-29
Payer: MEDICAID

## 2021-09-29 VITALS
HEART RATE: 78 BPM | BODY MASS INDEX: 18.97 KG/M2 | TEMPERATURE: 98.6 F | OXYGEN SATURATION: 97 % | SYSTOLIC BLOOD PRESSURE: 108 MMHG | WEIGHT: 160 LBS | DIASTOLIC BLOOD PRESSURE: 78 MMHG | RESPIRATION RATE: 18 BRPM

## 2021-09-29 DIAGNOSIS — Q87.40 MARFAN SYNDROME: ICD-10-CM

## 2021-09-29 DIAGNOSIS — R25.2 MUSCLE CRAMP: ICD-10-CM

## 2021-09-29 DIAGNOSIS — R07.9 CHEST PAIN, UNSPECIFIED TYPE: Primary | ICD-10-CM

## 2021-09-29 LAB
ALBUMIN SERPL-MCNC: 4.3 G/DL (ref 3.5–5.2)
ALP BLD-CCNC: 84 U/L (ref 40–130)
ALT SERPL-CCNC: 15 U/L (ref 5–41)
AMPHETAMINE SCREEN, URINE: NEGATIVE
ANION GAP SERPL CALCULATED.3IONS-SCNC: 12 MMOL/L (ref 7–19)
APTT: 29.1 SEC (ref 26–36.2)
AST SERPL-CCNC: 17 U/L (ref 5–40)
BARBITURATE SCREEN URINE: NEGATIVE
BASOPHILS ABSOLUTE: 0 K/UL (ref 0–0.2)
BASOPHILS RELATIVE PERCENT: 0.7 % (ref 0–1)
BENZODIAZEPINE SCREEN, URINE: NEGATIVE
BILIRUB SERPL-MCNC: 0.5 MG/DL (ref 0.2–1.2)
BILIRUBIN URINE: NEGATIVE
BILIRUBIN URINE: NEGATIVE
BLOOD, URINE: NEGATIVE
BLOOD, URINE: NEGATIVE
BUN BLDV-MCNC: 9 MG/DL (ref 6–20)
CALCIUM SERPL-MCNC: 9.3 MG/DL (ref 8.6–10)
CANNABINOID SCREEN URINE: NEGATIVE
CHLORIDE BLD-SCNC: 103 MMOL/L (ref 98–111)
CLARITY: CLEAR
CLARITY: CLEAR
CO2: 22 MMOL/L (ref 22–29)
COCAINE METABOLITE SCREEN URINE: NEGATIVE
COLOR: YELLOW
COLOR: YELLOW
CREAT SERPL-MCNC: 1 MG/DL (ref 0.5–1.2)
EOSINOPHILS ABSOLUTE: 0.2 K/UL (ref 0–0.6)
EOSINOPHILS RELATIVE PERCENT: 2.5 % (ref 0–5)
GFR AFRICAN AMERICAN: >59
GFR NON-AFRICAN AMERICAN: >60
GLUCOSE BLD-MCNC: 89 MG/DL (ref 74–109)
GLUCOSE URINE: NEGATIVE MG/DL
GLUCOSE URINE: NEGATIVE MG/DL
HCT VFR BLD CALC: 45.6 % (ref 42–52)
HEMOGLOBIN: 15 G/DL (ref 14–18)
IMMATURE GRANULOCYTES #: 0 K/UL
INR BLD: 1.03 (ref 0.88–1.18)
KETONES, URINE: NEGATIVE MG/DL
KETONES, URINE: NEGATIVE MG/DL
LEUKOCYTE ESTERASE, URINE: NEGATIVE
LEUKOCYTE ESTERASE, URINE: NEGATIVE
LYMPHOCYTES ABSOLUTE: 2 K/UL (ref 1.1–4.5)
LYMPHOCYTES RELATIVE PERCENT: 34 % (ref 20–40)
Lab: NORMAL
MCH RBC QN AUTO: 30.4 PG (ref 27–31)
MCHC RBC AUTO-ENTMCNC: 32.9 G/DL (ref 33–37)
MCV RBC AUTO: 92.5 FL (ref 80–94)
MONOCYTES ABSOLUTE: 0.6 K/UL (ref 0–0.9)
MONOCYTES RELATIVE PERCENT: 9.5 % (ref 0–10)
NEUTROPHILS ABSOLUTE: 3.1 K/UL (ref 1.5–7.5)
NEUTROPHILS RELATIVE PERCENT: 52.6 % (ref 50–65)
NITRITE, URINE: NEGATIVE
NITRITE, URINE: NEGATIVE
OPIATE SCREEN URINE: NEGATIVE
PDW BLD-RTO: 13 % (ref 11.5–14.5)
PH UA: 5 (ref 5–8)
PH UA: 5 (ref 5–8)
PLATELET # BLD: 211 K/UL (ref 130–400)
PMV BLD AUTO: 9.4 FL (ref 9.4–12.4)
POTASSIUM REFLEX MAGNESIUM: 4.1 MMOL/L (ref 3.5–5)
PRO-BNP: 35 PG/ML (ref 0–450)
PROTEIN UA: NEGATIVE MG/DL
PROTEIN UA: NEGATIVE MG/DL
PROTHROMBIN TIME: 13.7 SEC (ref 12–14.6)
RBC # BLD: 4.93 M/UL (ref 4.7–6.1)
SARS-COV-2, NAAT: NOT DETECTED
SODIUM BLD-SCNC: 137 MMOL/L (ref 136–145)
SPECIFIC GRAVITY UA: 1.04 (ref 1–1.03)
SPECIFIC GRAVITY UA: >=1.045 (ref 1–1.03)
TOTAL PROTEIN: 7.2 G/DL (ref 6.6–8.7)
TROPONIN: <0.01 NG/ML (ref 0–0.03)
TROPONIN: <0.01 NG/ML (ref 0–0.03)
UROBILINOGEN, URINE: 0.2 E.U./DL
UROBILINOGEN, URINE: 1 E.U./DL
WBC # BLD: 5.9 K/UL (ref 4.8–10.8)

## 2021-09-29 PROCEDURE — 83880 ASSAY OF NATRIURETIC PEPTIDE: CPT

## 2021-09-29 PROCEDURE — 2580000003 HC RX 258: Performed by: NURSE PRACTITIONER

## 2021-09-29 PROCEDURE — 96374 THER/PROPH/DIAG INJ IV PUSH: CPT

## 2021-09-29 PROCEDURE — 99283 EMERGENCY DEPT VISIT LOW MDM: CPT

## 2021-09-29 PROCEDURE — 96375 TX/PRO/DX INJ NEW DRUG ADDON: CPT

## 2021-09-29 PROCEDURE — 85025 COMPLETE CBC W/AUTO DIFF WBC: CPT

## 2021-09-29 PROCEDURE — 6360000002 HC RX W HCPCS: Performed by: EMERGENCY MEDICINE

## 2021-09-29 PROCEDURE — 85610 PROTHROMBIN TIME: CPT

## 2021-09-29 PROCEDURE — 71275 CT ANGIOGRAPHY CHEST: CPT

## 2021-09-29 PROCEDURE — 85730 THROMBOPLASTIN TIME PARTIAL: CPT

## 2021-09-29 PROCEDURE — 81003 URINALYSIS AUTO W/O SCOPE: CPT

## 2021-09-29 PROCEDURE — 80053 COMPREHEN METABOLIC PANEL: CPT

## 2021-09-29 PROCEDURE — 93005 ELECTROCARDIOGRAM TRACING: CPT | Performed by: NURSE PRACTITIONER

## 2021-09-29 PROCEDURE — 6360000002 HC RX W HCPCS: Performed by: NURSE PRACTITIONER

## 2021-09-29 PROCEDURE — 6360000004 HC RX CONTRAST MEDICATION: Performed by: EMERGENCY MEDICINE

## 2021-09-29 PROCEDURE — 84484 ASSAY OF TROPONIN QUANT: CPT

## 2021-09-29 PROCEDURE — 36415 COLL VENOUS BLD VENIPUNCTURE: CPT

## 2021-09-29 PROCEDURE — 87635 SARS-COV-2 COVID-19 AMP PRB: CPT

## 2021-09-29 PROCEDURE — 71045 X-RAY EXAM CHEST 1 VIEW: CPT

## 2021-09-29 PROCEDURE — 80307 DRUG TEST PRSMV CHEM ANLYZR: CPT

## 2021-09-29 RX ORDER — FENTANYL CITRATE 50 UG/ML
25 INJECTION, SOLUTION INTRAMUSCULAR; INTRAVENOUS ONCE
Status: COMPLETED | OUTPATIENT
Start: 2021-09-29 | End: 2021-09-29

## 2021-09-29 RX ORDER — 0.9 % SODIUM CHLORIDE 0.9 %
1000 INTRAVENOUS SOLUTION INTRAVENOUS ONCE
Status: COMPLETED | OUTPATIENT
Start: 2021-09-29 | End: 2021-09-29

## 2021-09-29 RX ORDER — ONDANSETRON 2 MG/ML
4 INJECTION INTRAMUSCULAR; INTRAVENOUS ONCE
Status: COMPLETED | OUTPATIENT
Start: 2021-09-29 | End: 2021-09-29

## 2021-09-29 RX ORDER — HYDROMORPHONE HYDROCHLORIDE 1 MG/ML
1 INJECTION, SOLUTION INTRAMUSCULAR; INTRAVENOUS; SUBCUTANEOUS ONCE
Status: COMPLETED | OUTPATIENT
Start: 2021-09-29 | End: 2021-09-29

## 2021-09-29 RX ADMIN — IOPAMIDOL 90 ML: 755 INJECTION, SOLUTION INTRAVENOUS at 16:26

## 2021-09-29 RX ADMIN — HYDROMORPHONE HYDROCHLORIDE 1 MG: 1 INJECTION, SOLUTION INTRAMUSCULAR; INTRAVENOUS; SUBCUTANEOUS at 18:24

## 2021-09-29 RX ADMIN — FENTANYL CITRATE 25 MCG: 0.05 INJECTION, SOLUTION INTRAMUSCULAR; INTRAVENOUS at 17:05

## 2021-09-29 RX ADMIN — SODIUM CHLORIDE 1000 ML: 9 INJECTION, SOLUTION INTRAVENOUS at 18:23

## 2021-09-29 RX ADMIN — ONDANSETRON 4 MG: 2 INJECTION INTRAMUSCULAR; INTRAVENOUS at 18:24

## 2021-09-29 ASSESSMENT — ENCOUNTER SYMPTOMS
VOICE CHANGE: 0
COUGH: 1
SHORTNESS OF BREATH: 1
EYE PAIN: 0
ABDOMINAL PAIN: 0
DIARRHEA: 0
COUGH: 0
RHINORRHEA: 0
VOMITING: 0
WHEEZING: 0
EYE REDNESS: 0

## 2021-09-29 ASSESSMENT — PAIN SCALES - GENERAL
PAINLEVEL_OUTOF10: 8
PAINLEVEL_OUTOF10: 8
PAINLEVEL_OUTOF10: 7

## 2021-09-29 ASSESSMENT — HEART SCORE: ECG: 0

## 2021-09-29 NOTE — ED PROVIDER NOTES
[facility] EMERGENCY DEPT  EMERGENCY DEPARTMENT ENCOUNTER      Pt Name: Lisbeth Cowan  MRN: [id number]  [place] 1985  Date of evaluation: 9/29/2021  Provider: Ravinrda Harrison MD    [address] [place] [address]       Chief Complaint   Patient presents with    Chest Pain    Numbness         HISTORY OF PRESENT ILLNESS   (Location/Symptom, Timing/Onset,Context/Setting, Quality, Duration, Modifying Factors, Severity)  Note limiting factors. Lisbeth Cowan is a 29 y.o. male who presents to the emergency department with complaint of left-sided chest pain that radiates down the left arm. Reports associated numbness and tingling left arm and cramping in bilateral lower extremities. Also has some shortness of breath. Patient has a history of Marfan syndrome with previous partial resection of right lung. No history of aortic pathology but states his doctors told him to come here for evaluation after he called them this morning. HPI    NursingNotes were reviewed. REVIEW OF SYSTEMS    (2-9 systems for level 4, 10 or more for level 5)     Review of Systems   Constitutional: Negative for fatigue and fever. HENT: Negative for congestion, rhinorrhea and voice change. Eyes: Negative for pain and redness. Respiratory: Positive for shortness of breath. Negative for cough. Cardiovascular: Positive for chest pain. Gastrointestinal: Negative for abdominal pain, diarrhea and vomiting. Endocrine: Negative. Genitourinary: Negative. Musculoskeletal: Positive for myalgias. Negative for arthralgias and gait problem. Skin: Negative for rash and wound. Neurological: Positive for numbness. Negative for weakness and headaches. Hematological: Negative. Psychiatric/Behavioral: Negative. All other systems reviewed and are negative. A complete review of systems was performed and is negative except as noted above in the HPI.        PAST MEDICAL HISTORY     Past Medical History:   Diagnosis Date    Marfan's disease     9/29/21 1626)       EMERGENCY DEPARTMENT COURSE and DIFFERENTIALDIAGNOSIS/MDM:   Vitals:    Vitals:    09/29/21 1610   BP: (!) 102/90   Pulse: 117   Resp: 18   Temp: 98.6 °F (37 °C)   SpO2: 97%   Weight: 160 lb (72.6 kg)       MDM         CONSULTS:  None    PROCEDURES:  Unless otherwise notedbelow, none     Procedures      FINAL IMPRESSION     1. Chest pain, unspecified type    2. Marfan syndrome    3. Muscle cramp          DISPOSITION/PLAN   DISPOSITION        PATIENT REFERRED TO:  No follow-up provider specified.     DISCHARGE MEDICATIONS:  New Prescriptions    No medications on file          (Please note that portions of this note were completed with a voice recognition program.  Efforts were made to edit the dictations butoccasionally words are mis-transcribed.)    Beverley Maria MD (electronically signed)  AttendingEmerNational Park Medical Center Physician    Lisa Sorenson MD  09/29/21 2423

## 2021-09-29 NOTE — ED PROVIDER NOTES
[facility]  eMERGENCY dEPARTMENT eNCOUnter      Pt Name: Haily Castillo  MRN: [id number]  [place] 1985  Date of evaluation: 9/29/2021  Provider: Burak Whelan, [address]       Chief Complaint   Patient presents with    Chest Pain    Numbness         HISTORY OF PRESENT ILLNESS   (Location/Symptom, Timing/Onset,Context/Setting, Quality, Duration, Modifying Factors, Severity)  Note limiting factors. Haily Castillo is a 29 y.o. male who presents to the emergency department with left sided chest pain that started last night. Pt complains of numbness to his left arm also that has come and gone. + shortness of breath. = productive cough. History of marfans and a right partial lung resection. + smoker. Trying to quit. Pt has also had cramps to his legs. Has been vaccinated for covid    The history is provided by the patient. NursingNotes were reviewed. REVIEW OF SYSTEMS    (2-9 systems for level 4, 10 or more for level 5)     Review of Systems   Constitutional: Negative for fever. HENT: Negative for congestion. Respiratory: Positive for cough and shortness of breath. Negative for wheezing. Cardiovascular: Positive for chest pain. Gastrointestinal: Negative for vomiting. Neurological: Positive for numbness. Except as noted above the remainder of the review of systems was reviewed and negative. PAST MEDICAL HISTORY     Past Medical History:   Diagnosis Date    Marfan's disease     Pneumothorax          SURGICALHISTORY       Past Surgical History:   Procedure Laterality Date    HERNIA REPAIR      LUNG REMOVAL, PARTIAL Right          CURRENT MEDICATIONS       There are no discharge medications for this patient. ALLERGIES     Methylprednisolone    FAMILY HISTORY     No family history on file.        SOCIAL HISTORY       Social History     Socioeconomic History    Marital status: Single     Spouse name: Not on file    Number of children: Not on file  Years of education: Not on file    Highest education level: Not on file   Occupational History    Not on file   Tobacco Use    Smoking status: Current Every Day Smoker     Packs/day: 0.50     Years: 10.00     Pack years: 5.00     Types: Cigarettes    Smokeless tobacco: Never Used   Vaping Use    Vaping Use: Never used   Substance and Sexual Activity    Alcohol use: No    Drug use: No    Sexual activity: Not on file   Other Topics Concern    Not on file   Social History Narrative    Not on file     Social Determinants of Health     Financial Resource Strain:     Difficulty of Paying Living Expenses:    Food Insecurity:     Worried About Running Out of Food in the Last Year:     [address] in the Last Year:    Transportation Needs:     Lack of Transportation (Medical):  Lack of Transportation (Non-Medical):    Physical Activity:     Days of Exercise per Week:     Minutes of Exercise per Session:    Stress:     Feeling of Stress :    Social Connections:     Frequency of Communication with Friends and Family:     Frequency of Social Gatherings with Friends and Family:     Attends Yazidi Services:     Active Member of Clubs or Organizations:     Attends Club or Organization Meetings:     Marital Status:    Intimate Partner Violence:     Fear of Current or Ex-Partner:     Emotionally Abused:     Physically Abused:     Sexually Abused:        SCREENINGS    Dany Coma Scale  Eye Opening: Spontaneous  Best Verbal Response: Oriented  Best Motor Response: Obeys commands  Dany Coma Scale Score: 15 @FLOW([id number])@      PHYSICAL EXAM    (up to 7 for level 4, 8 or more for level 5)     ED Triage Vitals [09/29/21 1610]   BP Temp Temp src Pulse Resp SpO2 Height Weight   (!) 102/90 98.6 °F (37 °C) -- 117 18 97 % -- 160 lb (72.6 kg)       Physical Exam  Vitals and nursing note reviewed. Constitutional:       Appearance: He is well-developed and underweight.    HENT:      Head: Normocephalic and atraumatic. Eyes:      General: No scleral icterus. Right eye: No discharge. Left eye: No discharge. Cardiovascular:      Rate and Rhythm: Normal rate. Pulmonary:      Effort: No respiratory distress. Chest:      Chest wall: Tenderness present. No deformity or swelling. Musculoskeletal:      Cervical back: Normal range of motion and neck supple. Skin:     Comments: Multiple tatoos   Neurological:      Mental Status: He is alert. Psychiatric:         Behavior: Behavior normal. Behavior is cooperative. DIAGNOSTIC RESULTS     EKG: All EKG's are interpreted by the Emergency Department Physician who either signs or Co-signsthis chart in the absence of a cardiologist.  99 sinus rhythm minimal ST elevation inferior leads read at 1630 by Dr. Narayan Nash  75 sinus rhythm unchanged from previous read at 1808 by Dr. Kvng Mercer:   [address] such as CT, Ultrasound and MRI are read by the radiologist. Plain radiographic images are visualized and preliminarily interpreted by the emergency physician with the below findings:      Interpretation per the Radiologist below, if available at the time of this note:    CTA [address]   Final Result   1. No acute cardiopulmonary finding. Specifically, no evidence of   thoracic aortic aneurysm, intramural hematoma or dissection. 2. Severe emphysematous changes. Signed by Dr Stefany Patel      XR CHEST PORTABLE   Final Result   1.. Emphysematous changes of the lungs. No evidence of consolidative   pneumonia or effusion. 2. The mediastinal contours are normal in appearance.    Signed by Dr Luberta Meigs            ED BEDSIDEULTRASOUND:   Performed by ED Physician -none    LABS:  Labs Reviewed   CBC WITH AUTO DIFFERENTIAL - Abnormal; Notable for the following components:       Result Value    MCHC 32.9 (*)     All other components within normal limits   COVID-19, RAPID   COMPREHENSIVE METABOLIC PANEL W/ REFLEX TO MG FOR LOW K   PROTIME-INR   APTT   TROPONIN   BRAIN NATRIURETIC PEPTIDE   URINE RT REFLEX TO CULTURE   URINE RT REFLEX TO CULTURE   URINE DRUG SCREEN   TROPONIN   APTT       All other labs were within normal range or not returned as of this dictation. EMERGENCY DEPARTMENT COURSE and DIFFERENTIALDIAGNOSIS/MDM:   Vitals:    Vitals:    09/29/21 1610 09/29/21 1840   BP: (!) 102/90 108/78   Pulse: 117 78   Resp: 18 18   Temp: 98.6 °F (37 °C)    SpO2: 97% 97%   Weight: 160 lb (72.6 kg)            MDM  Pt needs a follow up with cardiology. To continue to try to quit smoking    CONSULTS:  None    PROCEDURES:  Unless otherwise noted below, none     Procedures    FINAL IMPRESSION      1. Chest pain, unspecified type    2. Marfan syndrome    3. Muscle cramp        DISPOSITION/PLAN   DISPOSITION        PATIENT REFERRED TO:  Avery Braun MD  [address] [facility]  [phone]    Schedule an appointment as soon as possible for a visit         DISCHARGE MEDICATIONS:  There are no discharge medications for this patient.          (Please note that portions of this note were completed with a voice recognitionprogram.  Efforts were made to edit the dictations but occasionally words are mis-transcribed.)    JO ANN Chavez (electronically signed)         JO ANN Chavez  09/30/21 8217

## 2021-09-30 NOTE — ED NOTES
RN has explained Narcotic Policy to patient. Patient understands that they are not allowed to operate a motor vehicle for a minimum of 4 hours after administration. Patient is aware and understands that law enforcement will be contacted if the patient attempts to operate a motor vehicle prior to the stated 4 hours.       Brittany Moffett RN  09/29/21 9449

## 2021-10-01 LAB
EKG P AXIS: 79 DEGREES
EKG P AXIS: 81 DEGREES
EKG P-R INTERVAL: 162 MS
EKG P-R INTERVAL: 174 MS
EKG Q-T INTERVAL: 328 MS
EKG Q-T INTERVAL: 366 MS
EKG QRS DURATION: 78 MS
EKG QRS DURATION: 80 MS
EKG QTC CALCULATION (BAZETT): 390 MS
EKG QTC CALCULATION (BAZETT): 396 MS
EKG T AXIS: 81 DEGREES
EKG T AXIS: 82 DEGREES

## 2021-10-01 PROCEDURE — 93010 ELECTROCARDIOGRAM REPORT: CPT | Performed by: INTERNAL MEDICINE

## 2021-10-08 ENCOUNTER — OFFICE VISIT (OUTPATIENT)
Dept: FAMILY MEDICINE CLINIC | Facility: CLINIC | Age: 36
End: 2021-10-08

## 2021-10-08 ENCOUNTER — TELEPHONE (OUTPATIENT)
Dept: FAMILY MEDICINE CLINIC | Facility: CLINIC | Age: 36
End: 2021-10-08

## 2021-10-08 VITALS
HEART RATE: 112 BPM | DIASTOLIC BLOOD PRESSURE: 64 MMHG | OXYGEN SATURATION: 98 % | RESPIRATION RATE: 16 BRPM | WEIGHT: 160 LBS | SYSTOLIC BLOOD PRESSURE: 112 MMHG | TEMPERATURE: 98.9 F | BODY MASS INDEX: 19.89 KG/M2 | HEIGHT: 75 IN

## 2021-10-08 DIAGNOSIS — R11.0 NAUSEA: ICD-10-CM

## 2021-10-08 DIAGNOSIS — R07.9 CHEST PAIN, UNSPECIFIED TYPE: ICD-10-CM

## 2021-10-08 DIAGNOSIS — R20.2 PARESTHESIA OF LEFT ARM: ICD-10-CM

## 2021-10-08 DIAGNOSIS — J06.9 UPPER RESPIRATORY TRACT INFECTION, UNSPECIFIED TYPE: Primary | ICD-10-CM

## 2021-10-08 DIAGNOSIS — Z72.0 TOBACCO ABUSE: ICD-10-CM

## 2021-10-08 DIAGNOSIS — R51.9 ACUTE NONINTRACTABLE HEADACHE, UNSPECIFIED HEADACHE TYPE: ICD-10-CM

## 2021-10-08 DIAGNOSIS — R94.31 ABNORMAL HOLTER EXAM: ICD-10-CM

## 2021-10-08 PROCEDURE — 93000 ELECTROCARDIOGRAM COMPLETE: CPT | Performed by: NURSE PRACTITIONER

## 2021-10-08 PROCEDURE — 99214 OFFICE O/P EST MOD 30 MIN: CPT | Performed by: NURSE PRACTITIONER

## 2021-10-08 RX ORDER — PREDNISONE 10 MG/1
TABLET ORAL
Qty: 7 TABLET | Refills: 0 | Status: SHIPPED | OUTPATIENT
Start: 2021-10-08 | End: 2021-10-14

## 2021-10-08 RX ORDER — ONDANSETRON 8 MG/1
8 TABLET, ORALLY DISINTEGRATING ORAL EVERY 8 HOURS PRN
Qty: 20 TABLET | Refills: 0 | Status: SHIPPED | OUTPATIENT
Start: 2021-10-08 | End: 2021-12-17

## 2021-10-08 RX ORDER — BACLOFEN 10 MG/1
10 TABLET ORAL 3 TIMES DAILY PRN
Qty: 60 TABLET | Refills: 0 | Status: SHIPPED | OUTPATIENT
Start: 2021-10-08 | End: 2021-10-14 | Stop reason: SDUPTHER

## 2021-10-08 NOTE — PROGRESS NOTES
"Chief Complaint  Chest Pain (Pt reports that )    Subjective          Ross Platt presents to [facility]  History of Present Illness  Uri  New. Reports started earlier this week. Occurring for 4 days or so. Reports cough, headache, shortness of breath, chest pain, nausea, fatigue, weakness. Inhalers helped some. Has not tried anything for nausea. No fevers. Reports s.o. ill with similar symptoms. Reports blurry right eye today as well, reports left over antibiotic eye drops helped with symptoms temporarily. He missed his iop for drug abuse yesterday along with s.o. and is needing a note for this. He is also asking for a note for Sunday (2 days from now) to miss.     He was seen for chest pain 9 days ago in ER, cardiac evaluation negative at that time. Has been seen multiple times for this. Reports chest pain is left sided. Pain with movement of left arm. Left arm is numb and tingling. Pain worse in arm and chest when abducting arm and extension of left shoulder. Pain is sharp and constant. Nothing helps. He did have holter completed which showed several abnormal rhythms, we discussed these and the importance of following up with echocardiogram and cardiology as scheduled.     Reports he did not tolerate robaxin, family told him it made him irritable.     He is decreasing smoking. Reports smoking 1/4 ppd now. Trying to continue to cut back.          Objective   Vital Signs:   /64 (BP Location: Left arm, Patient Position: Sitting, Cuff Size: Adult)   Pulse 112   Temp 98.9 °F (37.2 °C) (Infrared)   Resp 16   Ht 190.5 cm (75\") Comment: Per patient  Wt 72.6 kg (160 lb) Comment: Per patient  SpO2 98%   BMI 20.00 kg/m²     Physical Exam  Vitals and nursing note reviewed.   Constitutional:       General: He is not in acute distress.     Appearance: He is well-developed.   HENT:      Right Ear: Tympanic membrane and ear canal normal.      Left Ear: Tympanic membrane and ear " canal normal.      Nose: Congestion present.      Right Sinus: No maxillary sinus tenderness or frontal sinus tenderness.      Left Sinus: No maxillary sinus tenderness or frontal sinus tenderness.      Mouth/Throat:      Mouth: Mucous membranes are moist.      Pharynx: Oropharynx is clear. Uvula midline. No uvula swelling.   Eyes:      Conjunctiva/sclera:      Right eye: Right conjunctiva is injected.   Neck:      Thyroid: No thyromegaly.      Trachea: No tracheal deviation.   Cardiovascular:      Rate and Rhythm: Normal rate and regular rhythm.      Heart sounds: Normal heart sounds.   Pulmonary:      Effort: Pulmonary effort is normal.      Breath sounds: Examination of the right-upper field reveals wheezing. Examination of the left-upper field reveals wheezing. Decreased breath sounds and wheezing present.   Chest:      Chest wall: Tenderness present.       Abdominal:      General: Bowel sounds are normal.      Palpations: Abdomen is soft. There is no mass.      Tenderness: There is no abdominal tenderness.   Musculoskeletal:      Left shoulder: No tenderness, bony tenderness or crepitus. Decreased range of motion. Normal strength.      Cervical back: Neck supple.   Lymphadenopathy:      Cervical: No cervical adenopathy.   Skin:     General: Skin is warm and dry.   Neurological:      Mental Status: He is alert.   Psychiatric:         Mood and Affect: Mood normal.         Behavior: Behavior normal.        Result Review :            ECG 12 Lead    Date/Time: 10/8/2021 4:43 PM  Performed by: Cristy Schmidt APRN  Authorized by: Cristy Schmidt APRN   Comparison: compared with previous ECG   Rhythm: sinus rhythm  Rate: normal  BPM: 92  Conduction: conduction normal  ST Segments: ST segments normal  T Waves: T waves normal  QRS axis: normal  Other findings comments: possible lae, rightward axis    Clinical impression: non-specific ECG              Assessment and Plan    Diagnoses and all orders for this  visit:    1. Upper respiratory tract infection, unspecified type (Primary)  -     COVID-19,[facility] ROUTINE, NP/OP SWAB IN TRANSPORT MEDIA OR ESWAB 72 HR TAT - Swab, Nasopharynx    2. Nausea  -     COVID-19,[facility] ROUTINE, NP/OP SWAB IN TRANSPORT MEDIA OR ESWAB 72 HR TAT - Swab, Nasopharynx    3. Acute nonintractable headache, unspecified headache type    4. Paresthesia of left arm    5. Chest pain, unspecified type  -     ECG 12 Lead    6. Abnormal Holter exam    7. Tobacco abuse    Other orders  -     predniSONE (DELTASONE) 10 MG tablet; Take 1 tablet by mouth 2 (Two) Times a Day for 2 days, THEN 1 tablet Daily for 2 days, THEN 0.5 tablets Daily for 2 days.  Dispense: 7 tablet; Refill: 0  -     ondansetron ODT (Zofran ODT) 8 MG disintegrating tablet; Place 1 tablet on the tongue Every 8 (Eight) Hours As Needed for Nausea or Vomiting.  Dispense: 20 tablet; Refill: 0  -     baclofen (LIORESAL) 10 MG tablet; Take 1 tablet by mouth 3 (Three) Times a Day As Needed for Muscle Spasms.  Dispense: 60 tablet; Refill: 0      ekg unchanged   Encouraged f/u echo this week and cardiology.   Decrease smoking  ED if worse    zofran for nausea  Prednisone for left arm/shoulder issues, dyspnea  Continue inhalers    Stop robaxin, start baclofen        Follow Up   Return ER IF WORSE- needs to be seen sunday in urgent care for illness if not improved.  Patient was given instructions and counseling regarding his condition or for health maintenance advice. Please see specific information pulled into the AVS if appropriate.

## 2021-10-09 LAB
LABCORP SARS-COV-2, NAA 2 DAY TAT: NORMAL
SARS-COV-2 RNA RESP QL NAA+PROBE: NOT DETECTED

## 2021-10-14 ENCOUNTER — HOSPITAL ENCOUNTER (OUTPATIENT)
Dept: CARDIOLOGY | Facility: HOSPITAL | Age: 36
Discharge: HOME | End: 2021-10-14
Admitting: NURSE PRACTITIONER

## 2021-10-14 ENCOUNTER — TELEPHONE (OUTPATIENT)
Dept: FAMILY MEDICINE CLINIC | Facility: CLINIC | Age: 36
End: 2021-10-14

## 2021-10-14 VITALS
DIASTOLIC BLOOD PRESSURE: 64 MMHG | SYSTOLIC BLOOD PRESSURE: 112 MMHG | WEIGHT: 160 LBS | HEIGHT: 75 IN | BODY MASS INDEX: 19.89 KG/M2

## 2021-10-14 DIAGNOSIS — R06.00 DYSPNEA, UNSPECIFIED TYPE: ICD-10-CM

## 2021-10-14 DIAGNOSIS — Q87.40 MARFAN SYNDROME: ICD-10-CM

## 2021-10-14 DIAGNOSIS — F19.90 IVDU (INTRAVENOUS DRUG USER): ICD-10-CM

## 2021-10-14 DIAGNOSIS — R07.9 CHEST PAIN, UNSPECIFIED TYPE: ICD-10-CM

## 2021-10-14 PROCEDURE — 93306 TTE W/DOPPLER COMPLETE: CPT | Performed by: EMERGENCY MEDICINE

## 2021-10-14 PROCEDURE — 93306 TTE W/DOPPLER COMPLETE: CPT

## 2021-10-14 RX ORDER — BACLOFEN 10 MG/1
10 TABLET ORAL 3 TIMES DAILY PRN
Qty: 60 TABLET | Refills: 0 | Status: SHIPPED | OUTPATIENT
Start: 2021-10-14 | End: 2021-10-28

## 2021-10-14 RX ORDER — BACLOFEN 10 MG/1
10 TABLET ORAL 3 TIMES DAILY PRN
Qty: 60 TABLET | Refills: 0 | Status: CANCELLED | OUTPATIENT
Start: 2021-10-14

## 2021-10-14 NOTE — TELEPHONE ENCOUNTER
Pt called wanting a refill on his muscle relaxer. Pt has an appt with cardiology today at 2:00. I advised t is he was having active chest pain to go to the ER. Pt did not want to go to the ER.     Last filled 10/8/21

## 2021-10-15 LAB
BH CV ECHO MEAS - AO MAX PG (FULL): 1.6 MMHG
BH CV ECHO MEAS - AO MAX PG: 7.6 MMHG
BH CV ECHO MEAS - AO MEAN PG (FULL): 1 MMHG
BH CV ECHO MEAS - AO MEAN PG: 4 MMHG
BH CV ECHO MEAS - AO ROOT AREA (BSA CORRECTED): 1.7
BH CV ECHO MEAS - AO ROOT AREA: 9.3 CM^2
BH CV ECHO MEAS - AO ROOT DIAM: 3.5 CM
BH CV ECHO MEAS - AO V2 MAX: 138 CM/SEC
BH CV ECHO MEAS - AO V2 MEAN: 91.1 CM/SEC
BH CV ECHO MEAS - AO V2 VTI: 22 CM
BH CV ECHO MEAS - AVA(I,A): 3.5 CM^2
BH CV ECHO MEAS - AVA(I,D): 3.5 CM^2
BH CV ECHO MEAS - AVA(V,A): 2.8 CM^2
BH CV ECHO MEAS - AVA(V,D): 2.8 CM^2
BH CV ECHO MEAS - BSA(HAYCOCK): 1.9 M^2
BH CV ECHO MEAS - BSA: 2 M^2
BH CV ECHO MEAS - BZI_BMI: 20 KILOGRAMS/M^2
BH CV ECHO MEAS - BZI_METRIC_HEIGHT: 190.5 CM
BH CV ECHO MEAS - BZI_METRIC_WEIGHT: 72.6 KG
BH CV ECHO MEAS - EDV(CUBED): 79 ML
BH CV ECHO MEAS - EDV(TEICH): 82.6 ML
BH CV ECHO MEAS - EF(CUBED): 69.1 %
BH CV ECHO MEAS - EF(TEICH): 61 %
BH CV ECHO MEAS - ESV(CUBED): 24.4 ML
BH CV ECHO MEAS - ESV(TEICH): 32.2 ML
BH CV ECHO MEAS - FS: 32.4 %
BH CV ECHO MEAS - IVS/LVPW: 0.96
BH CV ECHO MEAS - IVSD: 0.97 CM
BH CV ECHO MEAS - LA DIMENSION: 3.5 CM
BH CV ECHO MEAS - LA/AO: 1
BH CV ECHO MEAS - LAT PEAK E' VEL: 16.3 CM/SEC
BH CV ECHO MEAS - LV MASS(C)D: 140.2 GRAMS
BH CV ECHO MEAS - LV MASS(C)DI: 70.2 GRAMS/M^2
BH CV ECHO MEAS - LV MAX PG: 6.1 MMHG
BH CV ECHO MEAS - LV MEAN PG: 3 MMHG
BH CV ECHO MEAS - LV V1 MAX: 123 CM/SEC
BH CV ECHO MEAS - LV V1 MEAN: 80.9 CM/SEC
BH CV ECHO MEAS - LV V1 VTI: 24.7 CM
BH CV ECHO MEAS - LVIDD: 4.3 CM
BH CV ECHO MEAS - LVIDS: 2.9 CM
BH CV ECHO MEAS - LVOT AREA (M): 3.1 CM^2
BH CV ECHO MEAS - LVOT AREA: 3.1 CM^2
BH CV ECHO MEAS - LVOT DIAM: 2 CM
BH CV ECHO MEAS - LVPWD: 1 CM
BH CV ECHO MEAS - MED PEAK E' VEL: 10 CM/SEC
BH CV ECHO MEAS - MV A MAX VEL: 93.6 CM/SEC
BH CV ECHO MEAS - MV DEC SLOPE: 1400 CM/SEC^2
BH CV ECHO MEAS - MV DEC TIME: 0.1 SEC
BH CV ECHO MEAS - MV E MAX VEL: 85.9 CM/SEC
BH CV ECHO MEAS - MV E/A: 0.92
BH CV ECHO MEAS - MV P1/2T MAX VEL: 90.2 CM/SEC
BH CV ECHO MEAS - MV P1/2T: 18.9 MSEC
BH CV ECHO MEAS - MVA P1/2T LCG: 2.4 CM^2
BH CV ECHO MEAS - MVA(P1/2T): 11.7 CM^2
BH CV ECHO MEAS - PA MAX PG: 6.2 MMHG
BH CV ECHO MEAS - PA V2 MAX: 124.5 CM/SEC
BH CV ECHO MEAS - RAP SYSTOLE: 5 MMHG
BH CV ECHO MEAS - RVSP: 19 MMHG
BH CV ECHO MEAS - SI(AO): 103 ML/M^2
BH CV ECHO MEAS - SI(CUBED): 27.3 ML/M^2
BH CV ECHO MEAS - SI(LVOT): 38.9 ML/M^2
BH CV ECHO MEAS - SI(TEICH): 25.3 ML/M^2
BH CV ECHO MEAS - SV(AO): 205.7 ML
BH CV ECHO MEAS - SV(CUBED): 54.6 ML
BH CV ECHO MEAS - SV(LVOT): 77.6 ML
BH CV ECHO MEAS - SV(TEICH): 50.4 ML
BH CV ECHO MEAS - TR MAX VEL: 187 CM/SEC
BH CV ECHO MEASUREMENTS AVERAGE E/E' RATIO: 6.53
LEFT ATRIUM VOLUME INDEX: 22 ML/M2

## 2021-10-19 ENCOUNTER — HOSPITAL ENCOUNTER (OUTPATIENT)
Dept: CARDIOLOGY | Facility: HOSPITAL | Age: 36
Discharge: HOME | End: 2021-10-19
Admitting: EMERGENCY MEDICINE

## 2021-10-19 VITALS
DIASTOLIC BLOOD PRESSURE: 58 MMHG | BODY MASS INDEX: 19.89 KG/M2 | HEART RATE: 81 BPM | HEIGHT: 75 IN | SYSTOLIC BLOOD PRESSURE: 100 MMHG | WEIGHT: 160 LBS

## 2021-10-19 DIAGNOSIS — R07.89 CHEST PAIN, ATYPICAL: ICD-10-CM

## 2021-10-19 LAB
BH CV STRESS ATROPINE STAGE 5: 0.2
BH CV STRESS BP STAGE 1: NORMAL
BH CV STRESS BP STAGE 2: NORMAL
BH CV STRESS BP STAGE 3: NORMAL
BH CV STRESS BP STAGE 4: NORMAL
BH CV STRESS BP STAGE 5: NORMAL
BH CV STRESS DOB - ATROPINE STAGE 4: 0.3
BH CV STRESS DOSE DOBUTAMINE STAGE 1: 10
BH CV STRESS DOSE DOBUTAMINE STAGE 2: 20
BH CV STRESS DOSE DOBUTAMINE STAGE 3: 30
BH CV STRESS DOSE DOBUTAMINE STAGE 4: 40
BH CV STRESS DOSE DOBUTAMINE STAGE 5: 50
BH CV STRESS DURATION MIN STAGE 1: 3
BH CV STRESS DURATION MIN STAGE 2: 3
BH CV STRESS DURATION MIN STAGE 3: 3
BH CV STRESS DURATION MIN STAGE 4: 3
BH CV STRESS DURATION MIN STAGE 5: 1
BH CV STRESS DURATION SEC STAGE 1: 0
BH CV STRESS DURATION SEC STAGE 2: 0
BH CV STRESS DURATION SEC STAGE 3: 0
BH CV STRESS DURATION SEC STAGE 4: 0
BH CV STRESS DURATION SEC STAGE 5: 45
BH CV STRESS HR STAGE 1: 91
BH CV STRESS HR STAGE 2: 110
BH CV STRESS HR STAGE 3: 131
BH CV STRESS HR STAGE 4: 146
BH CV STRESS HR STAGE 5: 162
BH CV STRESS PROTOCOL 1: NORMAL
BH CV STRESS RECOVERY BP: NORMAL MMHG
BH CV STRESS RECOVERY HR: 120 BPM
BH CV STRESS STAGE 1: 1
BH CV STRESS STAGE 2: 2
BH CV STRESS STAGE 3: 3
BH CV STRESS STAGE 4: 4
BH CV STRESS STAGE 5: 5
MAXIMAL PREDICTED HEART RATE: 185 BPM
PERCENT MAX PREDICTED HR: 87.57 %
STRESS BASELINE BP: NORMAL MMHG
STRESS BASELINE HR: 81 BPM
STRESS PERCENT HR: 103 %
STRESS POST EXERCISE DUR MIN: 13 MIN
STRESS POST EXERCISE DUR SEC: 45 SEC
STRESS POST PEAK BP: NORMAL MMHG
STRESS POST PEAK HR: 162 BPM
STRESS TARGET HR: 157 BPM

## 2021-10-19 PROCEDURE — 25010000002 PERFLUTREN 6.52 MG/ML SUSPENSION: Performed by: EMERGENCY MEDICINE

## 2021-10-19 PROCEDURE — 93350 STRESS TTE ONLY: CPT

## 2021-10-19 PROCEDURE — 25010000003 DOBUTAMINE PER 250 MG: Performed by: EMERGENCY MEDICINE

## 2021-10-19 PROCEDURE — 93352 ADMIN ECG CONTRAST AGENT: CPT | Performed by: EMERGENCY MEDICINE

## 2021-10-19 PROCEDURE — 93017 CV STRESS TEST TRACING ONLY: CPT

## 2021-10-19 PROCEDURE — 93350 STRESS TTE ONLY: CPT | Performed by: EMERGENCY MEDICINE

## 2021-10-19 PROCEDURE — 93018 CV STRESS TEST I&R ONLY: CPT | Performed by: EMERGENCY MEDICINE

## 2021-10-19 PROCEDURE — 25010000003 ATROPINE SULFATE: Performed by: EMERGENCY MEDICINE

## 2021-10-19 RX ORDER — DOBUTAMINE HYDROCHLORIDE 100 MG/100ML
5-50 INJECTION INTRAVENOUS CONTINUOUS
Status: DISCONTINUED | OUTPATIENT
Start: 2021-10-19 | End: 2021-10-20 | Stop reason: HOSPADM

## 2021-10-19 RX ADMIN — Medication 10 MCG/KG/MIN: at 11:21

## 2021-10-19 RX ADMIN — PERFLUTREN 8.48 MG: 6.52 INJECTION, SUSPENSION INTRAVENOUS at 11:21

## 2021-10-19 RX ADMIN — ATROPINE SULFATE 0.5 MG: 0.1 INJECTION PARENTERAL at 11:45

## 2021-10-25 ENCOUNTER — TELEPHONE (OUTPATIENT)
Dept: FAMILY MEDICINE CLINIC | Facility: CLINIC | Age: 36
End: 2021-10-25

## 2021-10-25 RX ORDER — CYCLOBENZAPRINE HCL 5 MG
TABLET ORAL
Qty: 30 TABLET | Refills: 0 | OUTPATIENT
Start: 2021-10-25

## 2021-10-25 NOTE — TELEPHONE ENCOUNTER
Rx Refill Note  Requested Prescriptions     Pending Prescriptions Disp Refills   • cyclobenzaprine (FLEXERIL) 5 MG tablet [Pharmacy Med Name: CYCLOBENZAPRINE 5MG TABLETS] 30 tablet 0     Sig: TAKE 1 TABLET BY MOUTH THREE TIMES DAILY AS NEEDED FOR MUSCLE SPASMS      Last office visit with prescribing clinician: 10/8/2021      Next office visit with prescribing clinician:   Not schd     LRX:8/30/2021- was DCd stating not efficacious     10/25/21, 11:12 CDT

## 2021-10-25 NOTE — TELEPHONE ENCOUNTER
Caller: Ross Platt    Relationship: Self      Medication requested (name and dosage): ROBAXIN 750 MG  SENBT TO [facility] IN [place].    PATIENT IS ALSO WANTING AN UPDATE ON THE REFERRAL TO PAIN  MANAGEMENT     Carlos Shoemaker, PCT   10/25/21 14:05 CDT

## 2021-10-27 NOTE — TELEPHONE ENCOUNTER
I do not need to see him for a referral to pain management until he completes evaluation and is cleared by cardiology that nothing needs to be done with his heart for this chest pain. He has had an abnormal holter, abnormal echo and his stress test not resulted. Please have him call cardiology and schedule a follow up to discuss these results.

## 2021-10-27 NOTE — TELEPHONE ENCOUNTER
Patient called mailbox full unable to leave message patient has been sent a [employer] message to contact the office

## 2021-10-27 NOTE — TELEPHONE ENCOUNTER
I have been able to speak with patient and he has been instructed to contact cardiology to schedule apt for evaluation he was agreeable.

## 2021-10-28 RX ORDER — BACLOFEN 10 MG/1
TABLET ORAL
Qty: 90 TABLET | Refills: 0 | Status: SHIPPED | OUTPATIENT
Start: 2021-10-28 | End: 2021-11-23

## 2021-11-01 ENCOUNTER — APPOINTMENT (OUTPATIENT)
Dept: CT IMAGING | Age: 36
End: 2021-11-01
Payer: MEDICAID

## 2021-11-01 ENCOUNTER — HOSPITAL ENCOUNTER (EMERGENCY)
Age: 36
Discharge: HOME | End: 2021-11-01
Payer: MEDICAID

## 2021-11-01 ENCOUNTER — APPOINTMENT (OUTPATIENT)
Dept: GENERAL RADIOLOGY | Age: 36
End: 2021-11-01
Payer: MEDICAID

## 2021-11-01 VITALS
BODY MASS INDEX: 19.67 KG/M2 | HEIGHT: 78 IN | TEMPERATURE: 98.5 F | RESPIRATION RATE: 15 BRPM | OXYGEN SATURATION: 98 % | SYSTOLIC BLOOD PRESSURE: 101 MMHG | HEART RATE: 76 BPM | DIASTOLIC BLOOD PRESSURE: 66 MMHG | WEIGHT: 170 LBS

## 2021-11-01 DIAGNOSIS — I77.1 CELIAC ARTERY STENOSIS (HCC): ICD-10-CM

## 2021-11-01 DIAGNOSIS — R07.9 CHEST PAIN, UNSPECIFIED TYPE: Primary | ICD-10-CM

## 2021-11-01 LAB
ALBUMIN SERPL-MCNC: 4.4 G/DL (ref 3.5–5.2)
ALP BLD-CCNC: 94 U/L (ref 40–130)
ALT SERPL-CCNC: 19 U/L (ref 5–41)
AMPHETAMINE SCREEN, URINE: NEGATIVE
ANION GAP SERPL CALCULATED.3IONS-SCNC: 8 MMOL/L (ref 7–19)
AST SERPL-CCNC: 20 U/L (ref 5–40)
BARBITURATE SCREEN URINE: NEGATIVE
BASOPHILS ABSOLUTE: 0.1 K/UL (ref 0–0.2)
BASOPHILS RELATIVE PERCENT: 0.8 % (ref 0–1)
BENZODIAZEPINE SCREEN, URINE: NEGATIVE
BILIRUB SERPL-MCNC: <0.2 MG/DL (ref 0.2–1.2)
BUN BLDV-MCNC: 16 MG/DL (ref 6–20)
CALCIUM SERPL-MCNC: 9.3 MG/DL (ref 8.6–10)
CANNABINOID SCREEN URINE: NEGATIVE
CHLORIDE BLD-SCNC: 102 MMOL/L (ref 98–111)
CO2: 28 MMOL/L (ref 22–29)
COCAINE METABOLITE SCREEN URINE: NEGATIVE
CREAT SERPL-MCNC: 1 MG/DL (ref 0.5–1.2)
EOSINOPHILS ABSOLUTE: 0.2 K/UL (ref 0–0.6)
EOSINOPHILS RELATIVE PERCENT: 2.7 % (ref 0–5)
GFR AFRICAN AMERICAN: >59
GFR NON-AFRICAN AMERICAN: >60
GLUCOSE BLD-MCNC: 83 MG/DL (ref 74–109)
HCT VFR BLD CALC: 46.4 % (ref 42–52)
HEMOGLOBIN: 15 G/DL (ref 14–18)
IMMATURE GRANULOCYTES #: 0.1 K/UL
LYMPHOCYTES ABSOLUTE: 2 K/UL (ref 1.1–4.5)
LYMPHOCYTES RELATIVE PERCENT: 26.7 % (ref 20–40)
Lab: NORMAL
MCH RBC QN AUTO: 30.9 PG (ref 27–31)
MCHC RBC AUTO-ENTMCNC: 32.3 G/DL (ref 33–37)
MCV RBC AUTO: 95.7 FL (ref 80–94)
MONOCYTES ABSOLUTE: 0.7 K/UL (ref 0–0.9)
MONOCYTES RELATIVE PERCENT: 9.6 % (ref 0–10)
NEUTROPHILS ABSOLUTE: 4.3 K/UL (ref 1.5–7.5)
NEUTROPHILS RELATIVE PERCENT: 59 % (ref 50–65)
OPIATE SCREEN URINE: NEGATIVE
PDW BLD-RTO: 13.4 % (ref 11.5–14.5)
PLATELET # BLD: 221 K/UL (ref 130–400)
PMV BLD AUTO: 9.5 FL (ref 9.4–12.4)
POTASSIUM REFLEX MAGNESIUM: 4 MMOL/L (ref 3.5–5)
RBC # BLD: 4.85 M/UL (ref 4.7–6.1)
SODIUM BLD-SCNC: 138 MMOL/L (ref 136–145)
TOTAL PROTEIN: 7.1 G/DL (ref 6.6–8.7)
TROPONIN: <0.01 NG/ML (ref 0–0.03)
TROPONIN: <0.01 NG/ML (ref 0–0.03)
WBC # BLD: 7.3 K/UL (ref 4.8–10.8)

## 2021-11-01 PROCEDURE — 80053 COMPREHEN METABOLIC PANEL: CPT

## 2021-11-01 PROCEDURE — 96376 TX/PRO/DX INJ SAME DRUG ADON: CPT

## 2021-11-01 PROCEDURE — 36415 COLL VENOUS BLD VENIPUNCTURE: CPT

## 2021-11-01 PROCEDURE — 93005 ELECTROCARDIOGRAM TRACING: CPT | Performed by: NURSE PRACTITIONER

## 2021-11-01 PROCEDURE — 96374 THER/PROPH/DIAG INJ IV PUSH: CPT

## 2021-11-01 PROCEDURE — 6360000002 HC RX W HCPCS: Performed by: NURSE PRACTITIONER

## 2021-11-01 PROCEDURE — 80307 DRUG TEST PRSMV CHEM ANLYZR: CPT

## 2021-11-01 PROCEDURE — 99283 EMERGENCY DEPT VISIT LOW MDM: CPT

## 2021-11-01 PROCEDURE — 6360000004 HC RX CONTRAST MEDICATION: Performed by: NURSE PRACTITIONER

## 2021-11-01 PROCEDURE — 84484 ASSAY OF TROPONIN QUANT: CPT

## 2021-11-01 PROCEDURE — 71045 X-RAY EXAM CHEST 1 VIEW: CPT

## 2021-11-01 PROCEDURE — 71275 CT ANGIOGRAPHY CHEST: CPT

## 2021-11-01 PROCEDURE — 85025 COMPLETE CBC W/AUTO DIFF WBC: CPT

## 2021-11-01 RX ORDER — HYDROMORPHONE HYDROCHLORIDE 1 MG/ML
1 INJECTION, SOLUTION INTRAMUSCULAR; INTRAVENOUS; SUBCUTANEOUS ONCE
Status: COMPLETED | OUTPATIENT
Start: 2021-11-01 | End: 2021-11-01

## 2021-11-01 RX ORDER — BACLOFEN 10 MG/1
10 TABLET ORAL 3 TIMES DAILY
COMMUNITY
End: 2022-05-08

## 2021-11-01 RX ORDER — HYDROMORPHONE HYDROCHLORIDE 1 MG/ML
0.5 INJECTION, SOLUTION INTRAMUSCULAR; INTRAVENOUS; SUBCUTANEOUS ONCE
Status: COMPLETED | OUTPATIENT
Start: 2021-11-01 | End: 2021-11-01

## 2021-11-01 RX ADMIN — HYDROMORPHONE HYDROCHLORIDE 0.5 MG: 1 INJECTION, SOLUTION INTRAMUSCULAR; INTRAVENOUS; SUBCUTANEOUS at 23:25

## 2021-11-01 RX ADMIN — HYDROMORPHONE HYDROCHLORIDE 1 MG: 1 INJECTION, SOLUTION INTRAMUSCULAR; INTRAVENOUS; SUBCUTANEOUS at 20:49

## 2021-11-01 RX ADMIN — IOPAMIDOL 90 ML: 755 INJECTION, SOLUTION INTRAVENOUS at 20:33

## 2021-11-01 ASSESSMENT — PAIN SCALES - GENERAL
PAINLEVEL_OUTOF10: 6
PAINLEVEL_OUTOF10: 5
PAINLEVEL_OUTOF10: 8
PAINLEVEL_OUTOF10: 6

## 2021-11-01 ASSESSMENT — ENCOUNTER SYMPTOMS
SHORTNESS OF BREATH: 1
ABDOMINAL PAIN: 0

## 2021-11-01 ASSESSMENT — PAIN DESCRIPTION - DESCRIPTORS: DESCRIPTORS: ACHING

## 2021-11-01 ASSESSMENT — PAIN DESCRIPTION - ORIENTATION: ORIENTATION: MID

## 2021-11-01 ASSESSMENT — PAIN DESCRIPTION - LOCATION: LOCATION: CHEST

## 2021-11-01 NOTE — TELEPHONE ENCOUNTER
Caller: THERESE KU    Relationship: Mother          Requested Prescriptions:   Requested Prescriptions     Pending Prescriptions Disp Refills   • albuterol sulfate  (90 Base) MCG/ACT inhaler 18 g 2     Sig: Inhale 2 puffs Every 4 (Four) Hours As Needed for Wheezing or Shortness of Air.   • metroNIDAZOLE (FLAGYL) 500 MG tablet     • fluticasone-salmeterol (Advair Diskus) 250-50 MCG/DOSE DISKUS 60 each 2     Sig: Inhale 1 puff 2 (Two) Times a Day.    ROBXAN 750MG   Pharmacy where request should be sent: [id number] - [facility] - [address] AT [facility] [address] - [phone] [facility] [phone]   [phone]      Best call back number: [phone]  Does the patient have less than a 3 day supply:  [x] Yes  [] No    Lauri Hawkins Rep   11/01/21 12:44 CDT

## 2021-11-01 NOTE — LETTER
[facility] EMERGENCY DEPT  [address] [facility]  Phone: [phone]             November 1, 2021    Patient: Artemus Halsted   YOB: 1985   Date of Visit: 11/1/2021       To Whom It May Concern:    Artemus Halsted was seen and treated in our emergency department on 11/1/2021. He may return to work on 11/3/2021.       Sincerely,             Signature:__________________________________

## 2021-11-02 NOTE — ED NOTES
Patient placed on cardiac monitor, continuous pulse oximeter, and NIBP monitor. Monitor alarms on.        Chanel Pretty RN  11/01/21 1950

## 2021-11-02 NOTE — ED PROVIDER NOTES
[facility] EMERGENCY DEPT  eMERGENCY dEPARTMENT eNCOUnter      Pt Name: Miracle Villatoro  MRN: [id number]  [place] 1985  Date of evaluation: 11/1/2021  Provider: JO ANN Silva    CHIEF COMPLAINT       Chief Complaint   Patient presents with    Chest Pain     chest pain began last night       Shortness of Breath     hx of Marfan          HISTORY OF PRESENT ILLNESS   (Location/Symptom, Timing/Onset,Context/Setting, Quality, Duration, Modifying Factors, Severity)  Note limiting factors. Dottie Garcia a 29 y.o. male who presents to the emergency department for evaluation of chest pain and shortness of breath. Pt tells me that he has had chest pain and shortness of breath that began last night worsening today. He relates that he has had tingling/numbness of left hand. He denies weakness of left arm/left hand. He tells me that he has history of Marfan's. He denies abdominal pain and does not endorse any problems with back pain to me. He has had no fevers or cough. HPI    Nursing Notes were reviewed. REVIEW OF SYSTEMS    (2-9 systems for level 4, 10 or more for level 5)     Review of Systems   Constitutional: Negative for fever. Respiratory: Positive for shortness of breath. Cardiovascular: Positive for chest pain. Gastrointestinal: Negative for abdominal pain. Neurological: Positive for numbness (left hand). All other systems reviewed and are negative. A complete review of systems was performed and is negative except as noted above in the HPI. PAST MEDICAL HISTORY     Past Medical History:   Diagnosis Date    Marfan's disease     Pneumothorax          SURGICAL HISTORY       Past Surgical History:   Procedure Laterality Date    HERNIA REPAIR      LUNG REMOVAL, PARTIAL Right          CURRENT MEDICATIONS       Previous Medications    BACLOFEN (LIORESAL) 10 MG TABLET    Take 10 mg by mouth 3 times daily       ALLERGIES     Methylprednisolone    FAMILY HISTORY     History reviewed.  No pertinent family history. SOCIAL HISTORY       Social History     Socioeconomic History    Marital status: Single     Spouse name: None    Number of children: None    Years of education: None    Highest education level: None   Occupational History    None   Tobacco Use    Smoking status: Current Every Day Smoker     Packs/day: 0.50     Years: 10.00     Pack years: 5.00     Types: Cigarettes    Smokeless tobacco: Never Used   Vaping Use    Vaping Use: Never used   Substance and Sexual Activity    Alcohol use: No    Drug use: No    Sexual activity: None   Other Topics Concern    None   Social History Narrative    None     Social Determinants of Health     Financial Resource Strain:     Difficulty of Paying Living Expenses:    Food Insecurity:     Worried About Running Out of Food in the Last Year:     Ran Out of Food in the Last Year:    Transportation Needs:     Lack of Transportation (Medical):  Lack of Transportation (Non-Medical):    Physical Activity:     Days of Exercise per Week:     Minutes of Exercise per Session:    Stress:     Feeling of Stress :    Social Connections:     Frequency of Communication with Friends and Family:     Frequency of Social Gatherings with Friends and Family:     Attends Mandaeism Services:     Active Member of Clubs or Organizations:     Attends Club or Organization Meetings:     Marital Status:    Intimate Partner Violence:     Fear of Current or Ex-Partner:     Emotionally Abused:     Physically Abused:     Sexually Abused:        SCREENINGS             PHYSICAL EXAM    (up to 7 for level 4, 8 or more for level 5)     ED Triage Vitals [11/01/21 1948]   BP Temp Temp Source Pulse Resp SpO2 Height Weight   130/79 98.5 °F (36.9 °C) Infrared 94 21 98 % 6' 6\" (1.981 m) 170 lb (77.1 kg)       Physical Exam  Vitals reviewed. HENT:      Head: Normocephalic.       Right Ear: External ear normal.      Left Ear: External ear normal.   Eyes: Conjunctiva/sclera: Conjunctivae normal.      Pupils: Pupils are equal, round, and reactive to light. Cardiovascular:      Rate and Rhythm: Normal rate and regular rhythm. Heart sounds: Normal heart sounds. Pulmonary:      Effort: Pulmonary effort is normal.      Breath sounds: Normal breath sounds. Abdominal:      General: Bowel sounds are normal.      Palpations: Abdomen is soft. Musculoskeletal:         General: Normal range of motion. Cervical back: Normal range of motion. Skin:     General: Skin is warm and dry. Neurological:      Mental Status: He is alert and oriented to person, place, and time. Comments: CMS intact left upper extremity          DIAGNOSTIC RESULTS     EKG: All EKG's are interpreted by the Emergency Department Physician who either signs or Co-signs this chart in the absence of acardiologist.    There is a regular rate and rhythm. SR hr 87b/min Normal AK interval and normal P waves. Normal QRS interval. Normal QT interval. No obvious ST elevation or ST depression. Repeat 2hr ekg=>There is a regular rate and rhythm. SR hr 76b/min Normal AK interval and normal P waves. Normal QRS interval. Normal QT interval. No obvious ST elevation or ST depression. RADIOLOGY:   Non-plain film images such as CT, Ultrasound andMRI are read by the radiologist. Plain radiographic images are visualized and preliminarily interpreted by the emergency physician with the below findings:        Interpretation per the Radiologist below, if available at the time of this note:    CTA CHEST [address]   Final Result   1. No evidence of pulmonary embolus or other acute cardiopulmonary   process. The thoracic urine great vessels are normal in appearance   without evidence of dissection or aneurysm. 2. 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 3. Moderate to severe emphysematous changes of the lungs.  No evidence   of lobar pneumonia or

## 2021-11-02 NOTE — ED PROVIDER NOTES
Attending Supervisory Note/Shared Visit    I have reviewed the mid-levels findings and agree. We have discussed the case reviewed the diagnostic data I am in agreement with the treatment plan and disposition.     Ale Dixon MD  Attending Emergency Physician       Tarik Rand MD  11/01/21 9179

## 2021-11-02 NOTE — TELEPHONE ENCOUNTER
Rx Refill Note  Requested Prescriptions     Pending Prescriptions Disp Refills   • albuterol sulfate  (90 Base) MCG/ACT inhaler 18 g 2     Sig: Inhale 2 puffs Every 4 (Four) Hours As Needed for Wheezing or Shortness of Air.   • metroNIDAZOLE (FLAGYL) 500 MG tablet     • fluticasone-salmeterol (Advair Diskus) 250-50 MCG/DOSE DISKUS 60 each 2     Sig: Inhale 1 puff 2 (Two) Times a Day.      Last office visit with prescribing clinician: 10/8/2021      Next office visit with prescribing clinician:   Pt not schd     LRX:albuterol inhaler-8/25/21  LRX:flagyl-8/30/21  LRX:advair diskus-8/25/21  Unsure why pt's mother requested refill for flagyl      Liv Perkins MA  11/02/21, 08:45 CDT

## 2021-11-03 LAB
EKG P AXIS: 72 DEGREES
EKG P AXIS: 75 DEGREES
EKG P-R INTERVAL: 158 MS
EKG P-R INTERVAL: 196 MS
EKG Q-T INTERVAL: 332 MS
EKG Q-T INTERVAL: 366 MS
EKG QRS DURATION: 78 MS
EKG QRS DURATION: 80 MS
EKG QTC CALCULATION (BAZETT): 377 MS
EKG QTC CALCULATION (BAZETT): 394 MS
EKG T AXIS: 77 DEGREES
EKG T AXIS: 78 DEGREES

## 2021-11-03 RX ORDER — METRONIDAZOLE 500 MG/1
TABLET ORAL
OUTPATIENT
Start: 2021-11-03

## 2021-11-03 RX ORDER — ALBUTEROL SULFATE 90 UG/1
2 AEROSOL, METERED RESPIRATORY (INHALATION) EVERY 4 HOURS PRN
Qty: 18 G | Refills: 2 | OUTPATIENT
Start: 2021-11-03

## 2021-11-05 ENCOUNTER — APPOINTMENT (OUTPATIENT)
Dept: GENERAL RADIOLOGY | Facility: HOSPITAL | Age: 36
End: 2021-11-05

## 2021-11-05 ENCOUNTER — HOSPITAL ENCOUNTER (EMERGENCY)
Facility: HOSPITAL | Age: 36
Discharge: HOME | End: 2021-11-05
Attending: EMERGENCY MEDICINE | Admitting: EMERGENCY MEDICINE

## 2021-11-05 VITALS
HEIGHT: 77 IN | BODY MASS INDEX: 18.89 KG/M2 | RESPIRATION RATE: 20 BRPM | OXYGEN SATURATION: 95 % | TEMPERATURE: 97.8 F | HEART RATE: 75 BPM | SYSTOLIC BLOOD PRESSURE: 121 MMHG | WEIGHT: 160 LBS | DIASTOLIC BLOOD PRESSURE: 64 MMHG

## 2021-11-05 DIAGNOSIS — R09.1 PLEURISY: Primary | ICD-10-CM

## 2021-11-05 LAB
ALBUMIN SERPL-MCNC: 4 G/DL (ref 3.5–5.2)
ALBUMIN/GLOB SERPL: 1.6 G/DL
ALP SERPL-CCNC: 82 U/L (ref 39–117)
ALT SERPL W P-5'-P-CCNC: 21 U/L (ref 1–41)
ANION GAP SERPL CALCULATED.3IONS-SCNC: 7 MMOL/L (ref 5–15)
AST SERPL-CCNC: 23 U/L (ref 1–40)
BASOPHILS # BLD AUTO: 0.04 10*3/MM3 (ref 0–0.2)
BASOPHILS NFR BLD AUTO: 0.6 % (ref 0–1.5)
BILIRUB SERPL-MCNC: 0.2 MG/DL (ref 0–1.2)
BUN SERPL-MCNC: 13 MG/DL (ref 6–20)
BUN/CREAT SERPL: 12.3 (ref 7–25)
CALCIUM SPEC-SCNC: 9 MG/DL (ref 8.6–10.5)
CHLORIDE SERPL-SCNC: 102 MMOL/L (ref 98–107)
CO2 SERPL-SCNC: 28 MMOL/L (ref 22–29)
CREAT SERPL-MCNC: 1.06 MG/DL (ref 0.76–1.27)
D DIMER PPP FEU-MCNC: <0.22 MG/L (FEU) (ref 0–0.5)
DEPRECATED RDW RBC AUTO: 44.9 FL (ref 37–54)
EOSINOPHIL # BLD AUTO: 0.15 10*3/MM3 (ref 0–0.4)
EOSINOPHIL NFR BLD AUTO: 2.2 % (ref 0.3–6.2)
ERYTHROCYTE [DISTWIDTH] IN BLOOD BY AUTOMATED COUNT: 13.2 % (ref 12.3–15.4)
GFR SERPL CREATININE-BSD FRML MDRD: 80 ML/MIN/1.73
GLOBULIN UR ELPH-MCNC: 2.5 GM/DL
GLUCOSE SERPL-MCNC: 90 MG/DL (ref 65–99)
HBV SURFACE AG SERPL QL IA: NORMAL
HCT VFR BLD AUTO: 40.4 % (ref 37.5–51)
HCV AB SER DONR QL: NORMAL
HGB BLD-MCNC: 14 G/DL (ref 13–17.7)
HIV1+2 AB SER QL: NORMAL
IMM GRANULOCYTES # BLD AUTO: 0.08 10*3/MM3 (ref 0–0.05)
IMM GRANULOCYTES NFR BLD AUTO: 1.2 % (ref 0–0.5)
LYMPHOCYTES # BLD AUTO: 1.94 10*3/MM3 (ref 0.7–3.1)
LYMPHOCYTES NFR BLD AUTO: 29.1 % (ref 19.6–45.3)
MCH RBC QN AUTO: 31.6 PG (ref 26.6–33)
MCHC RBC AUTO-ENTMCNC: 34.7 G/DL (ref 31.5–35.7)
MCV RBC AUTO: 91.2 FL (ref 79–97)
MONOCYTES # BLD AUTO: 0.68 10*3/MM3 (ref 0.1–0.9)
MONOCYTES NFR BLD AUTO: 10.2 % (ref 5–12)
NEUTROPHILS NFR BLD AUTO: 3.78 10*3/MM3 (ref 1.7–7)
NEUTROPHILS NFR BLD AUTO: 56.7 % (ref 42.7–76)
NRBC BLD AUTO-RTO: 0 /100 WBC (ref 0–0.2)
PLATELET # BLD AUTO: 224 10*3/MM3 (ref 140–450)
PMV BLD AUTO: 9.4 FL (ref 6–12)
POTASSIUM SERPL-SCNC: 3.7 MMOL/L (ref 3.5–5.2)
PROT SERPL-MCNC: 6.5 G/DL (ref 6–8.5)
RBC # BLD AUTO: 4.43 10*6/MM3 (ref 4.14–5.8)
SODIUM SERPL-SCNC: 137 MMOL/L (ref 136–145)
TROPONIN T SERPL-MCNC: <0.01 NG/ML (ref 0–0.03)
WBC # BLD AUTO: 6.67 10*3/MM3 (ref 3.4–10.8)

## 2021-11-05 PROCEDURE — 86803 HEPATITIS C AB TEST: CPT | Performed by: EMERGENCY MEDICINE

## 2021-11-05 PROCEDURE — 80053 COMPREHEN METABOLIC PANEL: CPT | Performed by: EMERGENCY MEDICINE

## 2021-11-05 PROCEDURE — 25010000002 ONDANSETRON PER 1 MG: Performed by: EMERGENCY MEDICINE

## 2021-11-05 PROCEDURE — 85379 FIBRIN DEGRADATION QUANT: CPT | Performed by: EMERGENCY MEDICINE

## 2021-11-05 PROCEDURE — 86706 HEP B SURFACE ANTIBODY: CPT | Performed by: EMERGENCY MEDICINE

## 2021-11-05 PROCEDURE — 25010000002 KETOROLAC TROMETHAMINE PER 15 MG: Performed by: EMERGENCY MEDICINE

## 2021-11-05 PROCEDURE — 93005 ELECTROCARDIOGRAM TRACING: CPT

## 2021-11-05 PROCEDURE — 87340 HEPATITIS B SURFACE AG IA: CPT | Performed by: EMERGENCY MEDICINE

## 2021-11-05 PROCEDURE — 99283 EMERGENCY DEPT VISIT LOW MDM: CPT

## 2021-11-05 PROCEDURE — 96375 TX/PRO/DX INJ NEW DRUG ADDON: CPT

## 2021-11-05 PROCEDURE — G0432 EIA HIV-1/HIV-2 SCREEN: HCPCS | Performed by: EMERGENCY MEDICINE

## 2021-11-05 PROCEDURE — 93010 ELECTROCARDIOGRAM REPORT: CPT | Performed by: INTERNAL MEDICINE

## 2021-11-05 PROCEDURE — 85025 COMPLETE CBC W/AUTO DIFF WBC: CPT | Performed by: EMERGENCY MEDICINE

## 2021-11-05 PROCEDURE — 84484 ASSAY OF TROPONIN QUANT: CPT | Performed by: EMERGENCY MEDICINE

## 2021-11-05 PROCEDURE — 25010000002 HYDROMORPHONE PER 4 MG: Performed by: EMERGENCY MEDICINE

## 2021-11-05 PROCEDURE — 96374 THER/PROPH/DIAG INJ IV PUSH: CPT

## 2021-11-05 PROCEDURE — 93005 ELECTROCARDIOGRAM TRACING: CPT | Performed by: EMERGENCY MEDICINE

## 2021-11-05 PROCEDURE — 71045 X-RAY EXAM CHEST 1 VIEW: CPT

## 2021-11-05 RX ORDER — KETOROLAC TROMETHAMINE 15 MG/ML
15 INJECTION, SOLUTION INTRAMUSCULAR; INTRAVENOUS ONCE
Status: COMPLETED | OUTPATIENT
Start: 2021-11-05 | End: 2021-11-05

## 2021-11-05 RX ORDER — HYDROMORPHONE HYDROCHLORIDE 1 MG/ML
0.5 INJECTION, SOLUTION INTRAMUSCULAR; INTRAVENOUS; SUBCUTANEOUS ONCE
Status: COMPLETED | OUTPATIENT
Start: 2021-11-05 | End: 2021-11-05

## 2021-11-05 RX ORDER — HYDROCODONE BITARTRATE AND ACETAMINOPHEN 7.5; 325 MG/1; MG/1
1 TABLET ORAL EVERY 4 HOURS PRN
Qty: 10 TABLET | Refills: 0 | Status: SHIPPED | OUTPATIENT
Start: 2021-11-05 | End: 2021-12-14 | Stop reason: SDUPTHER

## 2021-11-05 RX ORDER — ONDANSETRON 2 MG/ML
4 INJECTION INTRAMUSCULAR; INTRAVENOUS ONCE
Status: COMPLETED | OUTPATIENT
Start: 2021-11-05 | End: 2021-11-05

## 2021-11-05 RX ORDER — INDOMETHACIN 50 MG/1
50 CAPSULE ORAL
Qty: 30 CAPSULE | Refills: 0 | Status: SHIPPED | OUTPATIENT
Start: 2021-11-05 | End: 2021-11-16

## 2021-11-05 RX ORDER — SODIUM CHLORIDE 0.9 % (FLUSH) 0.9 %
10 SYRINGE (ML) INJECTION AS NEEDED
Status: DISCONTINUED | OUTPATIENT
Start: 2021-11-05 | End: 2021-11-05 | Stop reason: HOSPADM

## 2021-11-05 RX ADMIN — ONDANSETRON 4 MG: 2 INJECTION INTRAMUSCULAR; INTRAVENOUS at 21:16

## 2021-11-05 RX ADMIN — KETOROLAC TROMETHAMINE 15 MG: 15 INJECTION, SOLUTION INTRAMUSCULAR; INTRAVENOUS at 19:33

## 2021-11-05 RX ADMIN — HYDROMORPHONE HYDROCHLORIDE 0.5 MG: 1 INJECTION, SOLUTION INTRAMUSCULAR; INTRAVENOUS; SUBCUTANEOUS at 21:16

## 2021-11-06 LAB
HBV SURFACE AB SER RIA-ACNC: REACTIVE
QT INTERVAL: 348 MS
QTC INTERVAL: 448 MS

## 2021-11-06 NOTE — ED PROVIDER NOTES
Subjective   Patient complains of pain in the left side of his chest.  He describes it as sharp and stabbing.  It hurts more when he breathes deeply or coughs.  It started today.  It does not radiate anywhere.  He complains of some shortness of breath but actually means he has trouble breathing because of the pain.  He denies any fever or chills.  He does have some cough that is nonproductive.  He has a history of Marfan syndrome.  He does continue to smoke.  He want to be checked out.      History provided by:  Patient   used: No    Chest Pain  Pain location:  L chest  Pain quality: sharp and stabbing    Pain radiates to:  Does not radiate  Pain severity:  Severe  Onset quality:  Gradual  Duration:  1 day  Timing:  Constant  Progression:  Waxing and waning  Chronicity:  New  Context: breathing    Context: not drug use, not eating, not intercourse, not lifting, not movement, not raising an arm, not at rest, not stress and not trauma    Relieved by:  Nothing  Worsened by:  Deep breathing and coughing  Ineffective treatments:  None tried  Associated symptoms: cough and shortness of breath    Associated symptoms: no abdominal pain, no AICD problem, no altered mental status, no anorexia, no anxiety, no back pain, no claudication, no diaphoresis, no dizziness, no dysphagia, no fatigue, no fever, no headache, no heartburn, no lower extremity edema, no nausea, no near-syncope, no numbness, no orthopnea, no palpitations, no PND, no syncope, no vomiting and no weakness    Risk factors: male sex, Marfan's syndrome and smoking    Risk factors: no aortic disease, no birth control, no coronary artery disease, no diabetes mellitus, no Chencho-Danlos syndrome, no high cholesterol, no hypertension, no immobilization, not obese, not pregnant, no prior DVT/PE and no surgery        Review of Systems   Constitutional: Negative.  Negative for diaphoresis, fatigue and fever.   HENT: Negative.  Negative for trouble  swallowing.    Respiratory: Positive for cough and shortness of breath.    Cardiovascular: Positive for chest pain. Negative for palpitations, orthopnea, claudication, syncope, PND and near-syncope.   Gastrointestinal: Negative.  Negative for abdominal pain, anorexia, heartburn, nausea and vomiting.   Genitourinary: Negative.    Musculoskeletal: Negative.  Negative for back pain.   Skin: Negative.    Neurological: Negative.  Negative for dizziness, weakness, numbness and headaches.   Hematological: Negative.    Psychiatric/Behavioral: Negative.    All other systems reviewed and are negative.      Past Medical History:   Diagnosis Date   • Back pain    • Chest pain    • Claustrophobia    • COPD (chronic obstructive pulmonary disease) (HCC)    • Emphysema of lung (HCC)    • Lung disease    • Marfan syndrome    • Pneumothorax    • Seizures (HCC)     when he was a child    • Seizures (HCC)    • Smoking     3/4 pack a day   • Tobacco abuse    • Underweight        Allergies   Allergen Reactions   • Calcium Channel Blockers Other (See Comments)     Do not use in marfan   • Ciprofloxacin Other (See Comments)     Do not use fluoroquinolones in marfan   • Medrol [Methylprednisolone] Other (See Comments)     Patient states he gets violent on this medication        Past Surgical History:   Procedure Laterality Date   • HERNIA REPAIR     • LUNG LOBECTOMY Right 02/01/2017   • LUNG REMOVAL, TOTAL Right 2015   • THORACOSCOPY N/A 2/3/2017    Procedure: BRONCHOSCOPY, THORACOSCOPY RIGHT CHEST,  WEDGE RESECTION RIGHT UPPER AND LOWER LOBE OF LUNG, MECHANICAL PLEURODESIS;  Surgeon: Kyle Heath MD;  Location: [facility];  Service:    • TYMPANOSTOMY  05/12/2016    Recorded       Family History   Problem Relation Age of Onset   • No Known Problems Mother    • Heart attack Father    • Stroke Father    • No Known Problems Sister    • No Known Problems Daughter    • Cancer Maternal Grandmother         breast   • Breast cancer Maternal  Grandmother    • Prostate cancer Neg Hx    • Colon cancer Neg Hx    • Alcohol abuse Neg Hx    • Drug abuse Neg Hx    • Diabetes Neg Hx    • Thyroid cancer Neg Hx        Social History     Socioeconomic History   • Marital status:    Tobacco Use   • Smoking status: Current Every Day Smoker     Packs/day: 0.50     Years: 20.00     Pack years: 10.00     Types: Cigarettes   • Smokeless tobacco: Never Used   Vaping Use   • Vaping Use: Never used   Substance and Sexual Activity   • Alcohol use: Not Currently   • Drug use: Not Currently     Types: Methamphetamines, IV     Comment: CLEAN X 1 YEAR   • Sexual activity: Yes     Partners: Female       Prior to Admission medications    Medication Sig Start Date End Date Taking? Authorizing Provider   albuterol sulfate  (90 Base) MCG/ACT inhaler Inhale 2 puffs Every 4 (Four) Hours As Needed for Wheezing or Shortness of Air. 8/25/21   Roxana, Jasmaine, APRN   baclofen (LIORESAL) 10 MG tablet TAKE 1 TABLET BY MOUTH THREE TIMES DAILY AS NEEDED FOR MUSCLE SPASMS 10/28/21   Roxana, Jasmaine, APRN   diclofenac (VOLTAREN) 75 MG EC tablet TAKE 1 TABLET BY MOUTH TWICE DAILY 9/21/21   Roxana, Jasmaine, APRN   fluticasone-salmeterol (Advair Diskus) 250-50 MCG/DOSE DISKUS Inhale 1 puff 2 (Two) Times a Day. 8/25/21   Roxana, Jasmaine, APRN   metroNIDAZOLE (FLAGYL) 500 MG tablet TAKE 4 TABLETS BY MOUTH 1 TIME FOR 1 DOSE 8/30/21   Provider, MD Zenon   ondansetron ODT (Zofran ODT) 8 MG disintegrating tablet Place 1 tablet on the tongue Every 8 (Eight) Hours As Needed for Nausea or Vomiting. 10/8/21   Roxana, Jasmaine, APRN       Medications   ketorolac (TORADOL) injection 15 mg (15 mg Intravenous Given 11/5/21 1933)   HYDROmorphone (DILAUDID) injection 0.5 mg (0.5 mg Intravenous Given 11/5/21 2116)   ondansetron (ZOFRAN) injection 4 mg (4 mg Intravenous Given 11/5/21 2116)       Vitals:    11/05/21 2144   BP: 121/64   Pulse:    Resp: 20   Temp: 97.8 °F (36.6 °C)    SpO2:          Objective   Physical Exam  Vitals and nursing note reviewed.   Constitutional:       Appearance: He is well-developed.   HENT:      Head: Normocephalic and atraumatic.   Cardiovascular:      Rate and Rhythm: Normal rate and regular rhythm.   Pulmonary:      Effort: Pulmonary effort is normal.      Breath sounds: Normal breath sounds.   Abdominal:      General: Bowel sounds are normal.      Palpations: Abdomen is soft.   Musculoskeletal:         General: Normal range of motion.      Cervical back: Normal range of motion and neck supple.   Skin:     General: Skin is warm and dry.   Neurological:      General: No focal deficit present.      Mental Status: He is alert and oriented to person, place, and time.   Psychiatric:         Mood and Affect: Mood normal.         Behavior: Behavior normal.         Procedures         Lab Results (last 24 hours)     Procedure Component Value Units Date/Time    CBC & Differential [[id number]]  (Abnormal) Collected: 11/05/21 1932    Specimen: Blood Updated: 11/05/21 1946    Narrative:      The following orders were created for panel order CBC & Differential.  Procedure                               Abnormality         Status                     ---------                               -----------         ------                     CBC Auto Differential[[id number]]        Abnormal            Final result                 Please view results for these tests on the individual orders.    Comprehensive Metabolic Panel [[id number]] Collected: 11/05/21 1932    Specimen: Blood Updated: 11/05/21 2007     Glucose 90 mg/dL      BUN 13 mg/dL      Creatinine 1.06 mg/dL      Sodium 137 mmol/L      Potassium 3.7 mmol/L      Chloride 102 mmol/L      CO2 28.0 mmol/L      Calcium 9.0 mg/dL      Total Protein 6.5 g/dL      Albumin 4.00 g/dL      ALT (SGPT) 21 U/L      AST (SGOT) 23 U/L      Alkaline Phosphatase 82 U/L      Total Bilirubin 0.2 mg/dL      eGFR Non African Amer 80 mL/min/1.73       Globulin 2.5 gm/dL      A/G Ratio 1.6 g/dL      BUN/Creatinine Ratio 12.3     Anion Gap 7.0 mmol/L     Narrative:      GFR Normal >60  Chronic Kidney Disease <60  Kidney Failure <15      Troponin [[id number]]  (Normal) Collected: 11/05/21 1932    Specimen: Blood Updated: 11/05/21 2003     Troponin T <0.010 ng/mL     Narrative:      Troponin T Reference Range:  <= 0.03 ng/mL-   Negative for AMI  >0.03 ng/mL-     Abnormal for myocardial necrosis.  Clinicians would have to utilize clinical acumen, EKG, Troponin and serial changes to determine if it is an Acute Myocardial Infarction or myocardial injury due to an underlying chronic condition.       Results may be falsely decreased if patient taking Biotin.      D-dimer, Quantitative [[id number]]  (Normal) Collected: 11/05/21 1932    Specimen: Blood Updated: 11/05/21 2008     D-Dimer, Quantitative <0.22 mg/L (FEU)     Narrative:      Reference Range is 0-0.50 mg/L FEU. However, results <0.50 mg/L FEU tends to rule out DVT or PE. Results >0.50 mg/L FEU are not useful in predicting absence or presence of DVT or PE.      CBC Auto Differential [[id number]]  (Abnormal) Collected: 11/05/21 1932    Specimen: Blood Updated: 11/05/21 1946     WBC 6.67 10*3/mm3      RBC 4.43 10*6/mm3      Hemoglobin 14.0 g/dL      Hematocrit 40.4 %      MCV 91.2 fL      MCH 31.6 pg      MCHC 34.7 g/dL      RDW 13.2 %      RDW-SD 44.9 fl      MPV 9.4 fL      Platelets 224 10*3/mm3      Neutrophil % 56.7 %      Lymphocyte % 29.1 %      Monocyte % 10.2 %      Eosinophil % 2.2 %      Basophil % 0.6 %      Immature Grans % 1.2 %      Neutrophils, Absolute 3.78 10*3/mm3      Lymphocytes, Absolute 1.94 10*3/mm3      Monocytes, Absolute 0.68 10*3/mm3      Eosinophils, Absolute 0.15 10*3/mm3      Basophils, Absolute 0.04 10*3/mm3      Immature Grans, Absolute 0.08 10*3/mm3      nRBC 0.0 /100 WBC     Hepatitis B Surface Antigen [[id number]]  (Normal) Collected: 11/05/21 214    Specimen: Blood Updated:  11/05/21 2217     Hepatitis B Surface Ag Non-Reactive    Hepatitis B Surface Antibody [[id number]] Collected: 11/05/21 2141    Specimen: Blood Updated: 11/05/21 2147    Hepatitis C Antibody [[id number]]  (Normal) Collected: 11/05/21 2141    Specimen: Blood Updated: 11/05/21 2215     Hepatitis C Ab Non-Reactive    Narrative:      Results may be falsely decreased if patient taking Biotin.      HIV-1 / O / 2 Ag / Antibody 4th Generation [[id number]]  (Normal) Collected: 11/05/21 2141    Specimen: Blood Updated: 11/05/21 2225     HIV-1/ HIV-2 Non-Reactive    Narrative:      The HIV antibody/antigen combo assay is a qualitative assay for HIV that includes the p24 antigen as well as antibodies to HIV types 1 and 2. This test is intended to be used as a screening assay in the diagnosis of HIV infection in patients over the age of 2.  Results may be falsely decreased if patient taking Biotin.            XR Chest 1 View   Final Result          ED Course  ED Course as of 11/06/21 1117   Sat Nov 06, 2021   1116 I told the patient his testing was all negative.  He is given a very good description of pleurisy and we will treated as such.  There was an accidental exposure of his blood to the nursing staff so he had some needlestick labs drawn also.  We will treat him for pleurisy.  He is discharged in stable condition. [TR]      ED Course User Index  [TR] Juan Jose Platt Jr., MD          MDM  Number of Diagnoses or Management Options  Pleurisy: new and requires workup     Amount and/or Complexity of Data Reviewed  Clinical lab tests: ordered and reviewed  Tests in the radiology section of CPT®: ordered and reviewed  Tests in the medicine section of CPT®: ordered and reviewed    Risk of Complications, Morbidity, and/or Mortality  Presenting problems: moderate  Diagnostic procedures: moderate  Management options: moderate    Patient Progress  Patient progress: stable      Final diagnoses:   Pleurisy          Juan Jose Platt Jr.,  MD  11/06/21 7139

## 2021-11-06 NOTE — ED TRIAGE NOTES
PATIENT PRESENTS WITH CC OF [facility]. PATIENT REPORTS HE HAS HX OF MARFAN'S SYNDROME AND HAD TO HAVE A LOBECTOMY. PATIENT REPORTS LEFT SIDED CP THAT STARTED PTA THAT IS PRESSURE.

## 2021-11-07 ENCOUNTER — TELEPHONE (OUTPATIENT)
Dept: EMERGENCY DEPT | Facility: HOSPITAL | Age: 36
End: 2021-11-07

## 2021-11-08 RX ORDER — ALBUTEROL SULFATE 90 UG/1
2 AEROSOL, METERED RESPIRATORY (INHALATION) EVERY 4 HOURS PRN
Qty: 18 G | Refills: 2 | Status: SHIPPED | OUTPATIENT
Start: 2021-11-08 | End: 2021-12-29

## 2021-11-08 NOTE — TELEPHONE ENCOUNTER
Caller: Ross Platt    Relationship: Self    Requested Prescriptions:   Requested Prescriptions     Pending Prescriptions Disp Refills   • albuterol sulfate  (90 Base) MCG/ACT inhaler 18 g 2     Sig: Inhale 2 puffs Every 4 (Four) Hours As Needed for Wheezing or Shortness of Air.        [id number] - [address] AT [facility] [address] - [phone]  - [phone]     Best call back number:[phone]    Does the patient have less than a 3 day supply:  [x] Yes  [] No    Lauri Bacon Rep   11/08/21 15:14 CST

## 2021-11-08 NOTE — TELEPHONE ENCOUNTER
Rx Refill Note  Requested Prescriptions     Pending Prescriptions Disp Refills   • albuterol sulfate  (90 Base) MCG/ACT inhaler 18 g 2     Sig: Inhale 2 puffs Every 4 (Four) Hours As Needed for Wheezing or Shortness of Air.      Last office visit with prescribing clinician: 09/08/2021  Next office visit with prescribing clinician: Visit date not found            Eveline Reich LPN  11/08/21, 15:29 CST

## 2021-11-09 ENCOUNTER — OFFICE VISIT (OUTPATIENT)
Dept: FAMILY MEDICINE CLINIC | Facility: CLINIC | Age: 36
End: 2021-11-09

## 2021-11-09 VITALS
BODY MASS INDEX: 20.31 KG/M2 | SYSTOLIC BLOOD PRESSURE: 110 MMHG | HEART RATE: 102 BPM | RESPIRATION RATE: 18 BRPM | TEMPERATURE: 97.7 F | HEIGHT: 77 IN | WEIGHT: 172 LBS | DIASTOLIC BLOOD PRESSURE: 77 MMHG

## 2021-11-09 DIAGNOSIS — J93.12 SECONDARY SPONTANEOUS PNEUMOTHORAX: ICD-10-CM

## 2021-11-09 DIAGNOSIS — J43.9 PULMONARY EMPHYSEMA, UNSPECIFIED EMPHYSEMA TYPE (HCC): Chronic | ICD-10-CM

## 2021-11-09 DIAGNOSIS — Z11.59 NEED FOR HEPATITIS B SCREENING TEST: ICD-10-CM

## 2021-11-09 DIAGNOSIS — R06.02 SHORTNESS OF BREATH: Primary | ICD-10-CM

## 2021-11-09 DIAGNOSIS — Z71.6 ENCOUNTER FOR SMOKING CESSATION COUNSELING: ICD-10-CM

## 2021-11-09 DIAGNOSIS — J43.9 BULLOUS EMPHYSEMA (HCC): ICD-10-CM

## 2021-11-09 PROCEDURE — 99214 OFFICE O/P EST MOD 30 MIN: CPT | Performed by: NURSE PRACTITIONER

## 2021-11-09 RX ORDER — NICOTINE 21 MG/24HR
1 PATCH, TRANSDERMAL 24 HOURS TRANSDERMAL EVERY 24 HOURS
Qty: 30 PATCH | Refills: 0 | Status: SHIPPED | OUTPATIENT
Start: 2021-11-09 | End: 2022-08-03 | Stop reason: SDUPTHER

## 2021-11-09 NOTE — PROGRESS NOTES
Chief Complaint  Chest Pain    Subjective          Ross Platt presents to [facility]  Pt complains of chest pain from breathing and shortness of breath.  Tells me he has pleurisy and says that they told him the stress test was abnormal and that he had fluid around heart.  He has been to the Er multiple times, and he has had multiple tests.  He is upset because they did a hepatitis test the last time he was at the ER and two tests were done one said he was positive for hep b and one said negative and he wants another test today. He complains throughout visit that his lungs and chest hurt and he can't go on nothing helps.  He continues to smoke.  He has emphysema, hx of pneumothorax, says that Dr. Heath took out most of his right lung about 6 yrs ago.  He says he calls the cardiologist office and can never get anyone.  I recommend that he call them again and needs to get in and see them.  I also suggested that I refer him to Pulmonology.  Please see ER visits and tests completed below:     ER at [facility] 9/29/21; 11/1/21  EKG: All EKG's are interpreted by the Emergency Department Physician who either signs or Co-signsthis chart in the absence of a cardiologist. 99 sinus rhythm minimal ST elevation inferior leads read at 1630 by Dr. Vergara 75 sinus rhythm unchanged from previous read at 1808 by Dr. Vergara     RADIOLOGY:   Non-plain filmimages such as CT, Ultrasound and MRI are read by the radiologist. Plain radiographic images are visualized and preliminarily interpreted by the emergency physician with the below findings: Interpretation per the Radiologist below, if available at the time of this note:    CTA CHEST W WO CONTRAST  Final Result   1. No acute cardiopulmonary finding. Specifically, no evidence of thoracic aortic aneurysm, intramural hematoma or dissection.   2. Severe emphysematous changes.   Signed by Dr Alicia Cheng     XR CHEST PORTABLE   Final Result   1..  Emphysematous changes of the lungs. No evidence of consolidative pneumonia or effusion.   2. The mediastinal contours are normal in appearance.   Signed by Dr Leonard Hyde   ---------------------------------------------------------------------------------------------------------------------------------------------------------------------------------  Got no better and went to [facility] [place] operated by [facility]  11/5/21  Patient complains of pain in the left side of his chest.  He describes it as sharp and stabbing.  It hurts more when he breathes deeply or coughs.  It started today.  It does not radiate anywhere.  He complains of some shortness of breath but actually means he has trouble breathing because of the pain.  He denies any fever or chills.  He does have some cough that is nonproductive.  He has a history of Marfan syndrome.  He does continue to smoke.  He want to be checked out.    Lab Results       Component                Value               Date                       WBC                          6.67                11/05/2021                 HGB                         14.0                  11/05/2021                 HCT                          40.4                  11/05/2021                 MCV                          91.2                  11/05/2021                 PLT                         224                     11/05/2021            Lab Results       Component              Value               Date                       GLUCOSE                90                  11/05/2021                 BUN                          13                   11/05/2021                 CREATININE            1.06                11/05/2021                 EGFRIFNONA            80                 11/05/2021                 EGFRIFAFRI              >59               08/26/2021                 BCR                          12.3                11/05/2021                 K                                 3.7                 11/05/2021                  CO2                         28.0                11/05/2021                 CALCIUM                   9.0                 11/05/2021                 PROTENTOTREF     7.1                 08/25/2021                 ALBUMIN                   4.00                11/05/2021                 LABIL2                       1.7                 08/25/2021                 AST                            23                  11/05/2021                 ALT                            21                  11/05/2021              Lab Results       Component                Value               Date                       CKTOTAL                  1,184 (H)           06/15/2018                 CKMB                     0.32                06/20/2017                 TROPONINI                <0.01               11/01/2021                 TROPONINT                <0.010              11/05/2021            Lab Results       Component                Value               Date                       PTT                      31.5                12/18/2019              Lab Results       Component                Value               Date                       HEPAIGM                  Negative            08/25/2021     * according to records he had a Hep B test on the same day and time and one was negative and one was positive.      Lab Results       Component                Value               Date                       DDIMER                   <0.22               11/05/2021              Pain Management Panel     Pain Management Panel Latest Ref Rng & Units 11/1/2021 9/29/2021    AMPHETAMINES SCREEN, URINE Negative <1000 ng/mL Negative Negative    BARBITURATES SCREEN Negative - -    BENZODIAZEPINE SCREEN, URINE Negative - -    BUPRENORPHINEUR Negative - -    COCAINE SCREEN, URINE Negative <300 ng/mL Negative Negative    METHADONE SCREEN, URINE Negative - -    METHAMPHETAMINEUR Negative - -     Stress test  Interpretation Summary    · No ECG evidence of  "myocardial ischemia. Positive clinical evidence of myocardial ischemia.  · Segment augmentation had a normal response to stress. Cavity size behaved normally in response to stress. Left ventricular function is normal.  · Normal stress echo consistent with a low risk study for myocardial ischemia.  · Overall, this is a low risk test for ischemia.                      Shortness of Breath  This is a recurrent problem. The current episode started 1 to 4 weeks ago. The problem occurs constantly. The problem has been gradually worsening. Associated symptoms include chest pain. Pertinent negatives include no coryza, fever, hemoptysis, orthopnea or sputum production. Nothing aggravates the symptoms. The patient has no known risk factors for DVT/PE.   Chest Pain   Associated symptoms include shortness of breath. Pertinent negatives include no fever, hemoptysis, orthopnea or sputum production.       Objective   Vital Signs:   /77 (BP Location: Right arm, Patient Position: Sitting, Cuff Size: Adult)   Pulse 102   Temp 97.7 °F (36.5 °C) (Infrared)   Resp 18   Ht 195.6 cm (77\") Comment: per patient  Wt 78 kg (172 lb)   BMI 20.40 kg/m²     Physical Exam  Vitals and nursing note reviewed.   Constitutional:       General: He is awake.      Appearance: He is well-developed and well-groomed. He is ill-appearing.   HENT:      Head: Normocephalic and atraumatic.      Right Ear: Hearing normal.      Left Ear: Hearing normal.      Nose: Nose normal.      Mouth/Throat:      Lips: Pink.      Pharynx: Oropharynx is clear.   Eyes:      General: Lids are normal.      Conjunctiva/sclera: Conjunctivae normal.   Cardiovascular:      Rate and Rhythm: Regular rhythm. Tachycardia present.      Heart sounds: Normal heart sounds.   Pulmonary:      Effort: Pulmonary effort is normal.      Breath sounds: Normal air entry. Examination of the right-upper field reveals decreased breath sounds. Examination of the left-upper field reveals " wheezing. Examination of the right-middle field reveals decreased breath sounds. Examination of the left-middle field reveals wheezing. Examination of the right-lower field reveals decreased breath sounds. Examination of the left-lower field reveals wheezing. Decreased breath sounds and wheezing present.   Musculoskeletal:      Cervical back: Full passive range of motion without pain.      Right lower leg: No edema.      Left lower leg: No edema.   Lymphadenopathy:      Head:      Right side of head: No submental, submandibular or tonsillar adenopathy.      Left side of head: No submental, submandibular or tonsillar adenopathy.   Skin:     General: Skin is warm and dry.   Neurological:      Mental Status: He is alert and oriented to person, place, and time.      Sensory: Sensation is intact.      Motor: Motor function is intact.      Coordination: Coordination is intact.      Gait: Gait is intact.   Psychiatric:         Attention and Perception: Attention and perception normal.         Mood and Affect: Mood and affect normal.         Speech: Speech normal.         Behavior: Behavior normal. Behavior is cooperative.         Thought Content: Thought content normal.         Cognition and Memory: Cognition and memory normal.         Judgment: Judgment normal.        Result Review :                 Assessment and Plan    Diagnoses and all orders for this visit:    1. Shortness of breath (Primary)  -     fluticasone-salmeterol (Advair Diskus) 500-50 MCG/DOSE DISKUS; Inhale 1 puff 2 (Two) Times a Day.  Dispense: 60 each; Refill: 0  -     Ambulatory Referral to Pulmonology    2. Need for hepatitis B screening test  -     Hepatitis B Surface Antibody    3. Encounter for smoking cessation counseling  -     nicotine (Nicoderm CQ) 21 MG/24HR patch; Place 1 patch on the skin as directed by provider Daily.  Dispense: 30 patch; Refill: 0        Tobacco abuse: Smoking Cessation discussed today for  >3 minutes.  · Patient advised  regarding the risks of continued tobacco use  · Set a quit date    · Methods to quit smoking were discussed   · Handouts offered to the patient regarding tobacco cessation  · Patient expressed willingness to quit smoking   · Benefits of oral medications were discussed:  Chantix/Wellbutrin/Nicoderm CQ/gum   · Risks and side effects of medication discussed:  Suicidal thoughts, behavior change, agitation, hostility  · Discussed no benefit or recommendation to switch to E. Cigarettes as health hazards are not yet known.   · Lifestyle changes discussed  · Stress reduction discussed  · BH cessation class and handout offered to patient today.   · Discussed to consider ration technique to quit with time: Each day set out the number of cigarettes that you allow yourself to smoke.  Each day decrease this number by 1 cigarrette until you get to a point that you feel you need another cigarette.  You can hold at that level x 1 week.  Continue to decrease until you either are tobacco free or until you cannot decline any further. When you cannot decrease this any further you can return and we can discussed medication options again.  Initial goal with reduction technique is 25% in 3 months.  · Write down all the reasons why you want to quit: money, children, spouse, being healthy  · Don't buy cartons of cigarettes, buy one pack at a time so cigarettes are not available  · Write down ways that you plan to stop the cravings: chewing gum, candy, drink a glass of water, kiss your child, take a walk, read a book, throw a ball and exercise  · Develop a contract and have a supportive witness to sign and post on refrigerator  · Get rid of all smoking paraphernalia: left over cigarettes, matches, lighters  · Avoid too much caffeine, it will cause jitters when trying to quit  · In place of smoking cigarettes try sunflower seeds, sugar free lollipops, gum, carrot or celery sticks.   · Try herbal tea when you have a craving to smoke, it will  relax you.   · Instead of taking a smoking break at work play a game of SuddenValuesire or take a walk  · Create a smoke free zone, don't allow anyone to smoke in your home, car, or sitting next to you  · Make actual no smoking signs and post in hour house  · Search the Internet on ways to quit smoking:  Http://smokefree.gov/smokefreetxt and www.heart.org  · Report any side effects of smoking cessation medications to your health care provider  · Make an index card and any time you are tempter to light up, take it out and read it:          A.  Smoking increases the risk of lung, bladder, pancreatic, mouth, and esophageal              cancers        B.  Smoking increases the risk of heart disease, stroke and high blood pressure        C.  Smoking reduces levels of folate.  Low Levels can increase the risk for              depression and Alzheimer's disease        D.  Smoking  Increases the likelihood of impotence        E.  Smoking affects the ability to smell and taste        F.  Smoking results in low birthrate babies      4. Bullous emphysema (HCC)  -     Ambulatory Referral to Pulmonology    5. Pulmonary emphysema, unspecified emphysema type (HCC)  -     Ambulatory Referral to Pulmonology    6. Secondary spontaneous pneumothorax  -     Ambulatory Referral to Pulmonology    7.  Recommend he make the follow up appt with cardiology  8.  If worsens return to Er  I spent 16 minutes caring for Ross on this date of service. This time includes time spent by me in the following activities:preparing for the visit, obtaining and/or reviewing a separately obtained history, performing a medically appropriate examination and/or evaluation , counseling and educating the patient/family/caregiver, ordering medications, tests, or procedures, referring and communicating with other health care professionals , documenting information in the medical record and care coordination     Follow Up       Return in about 2 weeks (around  11/23/2021).  Patient was given instructions and counseling regarding his condition or for health maintenance advice. Please see specific information pulled into the AVS if appropriate.     Electronically signed by Terrie Chappell DNP, APRN, 11/09/21, 3:19 PM CST.

## 2021-11-10 LAB — HBV SURFACE AB SER QL: REACTIVE

## 2021-11-16 ENCOUNTER — OFFICE VISIT (OUTPATIENT)
Dept: CARDIOLOGY | Facility: CLINIC | Age: 36
End: 2021-11-16

## 2021-11-16 VITALS
BODY MASS INDEX: 20.31 KG/M2 | SYSTOLIC BLOOD PRESSURE: 110 MMHG | HEIGHT: 77 IN | WEIGHT: 172 LBS | OXYGEN SATURATION: 99 % | DIASTOLIC BLOOD PRESSURE: 60 MMHG | HEART RATE: 119 BPM

## 2021-11-16 DIAGNOSIS — F19.20 POLYSUBSTANCE (EXCLUDING OPIOIDS) DEPENDENCE (HCC): ICD-10-CM

## 2021-11-16 DIAGNOSIS — J43.9 PULMONARY EMPHYSEMA, UNSPECIFIED EMPHYSEMA TYPE (HCC): Chronic | ICD-10-CM

## 2021-11-16 DIAGNOSIS — I25.9 CHEST PAIN DUE TO MYOCARDIAL ISCHEMIA, UNSPECIFIED ISCHEMIC CHEST PAIN TYPE: Primary | ICD-10-CM

## 2021-11-16 DIAGNOSIS — R09.1 PLEURISY: ICD-10-CM

## 2021-11-16 DIAGNOSIS — F17.200 SMOKING: Chronic | ICD-10-CM

## 2021-11-16 DIAGNOSIS — Q87.40 MARFAN SYNDROME: ICD-10-CM

## 2021-11-16 PROCEDURE — 93000 ELECTROCARDIOGRAM COMPLETE: CPT | Performed by: EMERGENCY MEDICINE

## 2021-11-16 PROCEDURE — 99213 OFFICE O/P EST LOW 20 MIN: CPT | Performed by: EMERGENCY MEDICINE

## 2021-11-16 RX ORDER — COLCHICINE 0.6 MG/1
0.6 TABLET ORAL 2 TIMES DAILY
Qty: 60 TABLET | Refills: 0 | Status: SHIPPED | OUTPATIENT
Start: 2021-11-16 | End: 2021-12-17

## 2021-11-16 RX ORDER — PREDNISONE 1 MG/1
5 TABLET ORAL DAILY
Qty: 70 TABLET | Refills: 0 | Status: SHIPPED | OUTPATIENT
Start: 2021-11-16 | End: 2021-12-17

## 2021-11-17 ENCOUNTER — OFFICE VISIT (OUTPATIENT)
Dept: FAMILY MEDICINE CLINIC | Facility: CLINIC | Age: 36
End: 2021-11-17

## 2021-11-17 ENCOUNTER — TELEPHONE (OUTPATIENT)
Dept: FAMILY MEDICINE CLINIC | Facility: CLINIC | Age: 36
End: 2021-11-17

## 2021-11-17 VITALS
BODY MASS INDEX: 20.29 KG/M2 | SYSTOLIC BLOOD PRESSURE: 104 MMHG | RESPIRATION RATE: 20 BRPM | TEMPERATURE: 97.7 F | HEART RATE: 96 BPM | DIASTOLIC BLOOD PRESSURE: 71 MMHG | HEIGHT: 77 IN | WEIGHT: 171.8 LBS | OXYGEN SATURATION: 99 %

## 2021-11-17 DIAGNOSIS — M54.2 CERVICAL PAIN: ICD-10-CM

## 2021-11-17 DIAGNOSIS — N52.9 ERECTILE DYSFUNCTION, UNSPECIFIED ERECTILE DYSFUNCTION TYPE: ICD-10-CM

## 2021-11-17 DIAGNOSIS — R76.8 HEPATITIS B ANTIBODY POSITIVE: Primary | ICD-10-CM

## 2021-11-17 DIAGNOSIS — N46.9 MALE FERTILITY PROBLEMS: ICD-10-CM

## 2021-11-17 DIAGNOSIS — R07.9 CHRONIC CHEST PAIN: ICD-10-CM

## 2021-11-17 DIAGNOSIS — G89.29 CHRONIC CHEST PAIN: ICD-10-CM

## 2021-11-17 DIAGNOSIS — M54.12 CERVICAL RADICULOPATHY: ICD-10-CM

## 2021-11-17 PROCEDURE — 99214 OFFICE O/P EST MOD 30 MIN: CPT | Performed by: NURSE PRACTITIONER

## 2021-11-17 RX ORDER — SILDENAFIL 50 MG/1
50 TABLET, FILM COATED ORAL DAILY PRN
Qty: 20 TABLET | Refills: 0 | Status: SHIPPED | OUTPATIENT
Start: 2021-11-17 | End: 2021-11-18

## 2021-11-17 NOTE — TELEPHONE ENCOUNTER
Caller: Ross Platt    Relationship to patient: Self    Best call back number: [phone]    Patients insurance will not pay for any of the prescriptions that was prescribed by Cristy today. He is wondering if there are other alternatives ? Please advise.    alcohol withdrawal

## 2021-11-18 PROBLEM — R09.1 PLEURISY: Status: ACTIVE | Noted: 2021-11-18

## 2021-11-18 LAB
HBV DNA SERPL NAA+PROBE-ACNC: NORMAL IU/ML
HBV DNA SERPL NAA+PROBE-LOG IU: NORMAL LOG10 IU/ML
REF LAB TEST REF RANGE: NORMAL

## 2021-11-18 RX ORDER — SILDENAFIL CITRATE 20 MG/1
20-40 TABLET ORAL DAILY PRN
Qty: 20 TABLET | Refills: 0 | Status: SHIPPED | OUTPATIENT
Start: 2021-11-18 | End: 2021-12-02

## 2021-11-18 NOTE — PROGRESS NOTES
Subjective:     Encounter Date:11/16/2021      Patient ID: Ross Platt is a 36 y.o. male.    Chief Complaint:  History of Present Illness    Patient is a 36-year-old male who is a follow-up today.  He has a history of Marfan syndrome, polysubstance abuse COPD, and nicotine abuse.  At last appointment he was complaining of chest pain.  He is already undergone a transthoracic echocardiogram which showed normal systolic and normal diastolic function.  He had no significant valve disease.  There was a moderate pericardial effusion present.  He wore a heart monitor for approximately 2 weeks which showed predominantly normal sinus rhythm with a first-degree block.  He had one episode of nonsustained V. tach, one episode of SVT and one episode of Mobitz 1 second-degree block.  He underwent a stress echocardiogram which was overall low risk for ischemia.    Today, he says he continues to have significant chest pain and shortness of breath.  The way he is describing it today it sounds pleuritic in nature more so than cardiac.    Review of Systems   Constitutional: Negative for diaphoresis, fever and malaise/fatigue.   HENT: Negative for congestion.    Eyes: Negative for vision loss in left eye and vision loss in right eye.   Cardiovascular: Positive for chest pain and dyspnea on exertion. Negative for claudication, irregular heartbeat, leg swelling, orthopnea, palpitations and syncope.   Respiratory: Positive for cough and shortness of breath. Negative for wheezing.    Hematologic/Lymphatic: Negative for adenopathy.   Skin: Negative for rash.   Musculoskeletal: Negative for joint pain and joint swelling.   Gastrointestinal: Negative for abdominal pain, diarrhea, nausea and vomiting.   Neurological: Negative for excessive daytime sleepiness, dizziness, focal weakness, light-headedness, numbness and weakness.   Psychiatric/Behavioral: Negative for depression. The patient does not have insomnia.            Current  Outpatient Medications:   •  albuterol sulfate  (90 Base) MCG/ACT inhaler, Inhale 2 puffs Every 4 (Four) Hours As Needed for Wheezing or Shortness of Air., Disp: 18 g, Rfl: 2  •  baclofen (LIORESAL) 10 MG tablet, TAKE 1 TABLET BY MOUTH THREE TIMES DAILY AS NEEDED FOR MUSCLE SPASMS, Disp: 90 tablet, Rfl: 0  •  diclofenac (VOLTAREN) 75 MG EC tablet, TAKE 1 TABLET BY MOUTH TWICE DAILY, Disp: 60 tablet, Rfl: 0  •  fluticasone-salmeterol (Advair Diskus) 500-50 MCG/DOSE DISKUS, Inhale 1 puff 2 (Two) Times a Day., Disp: 60 each, Rfl: 0  •  HYDROcodone-acetaminophen (NORCO) 7.5-325 MG per tablet, Take 1 tablet by mouth Every 4 (Four) Hours As Needed for Moderate Pain ., Disp: 10 tablet, Rfl: 0  •  nicotine (Nicoderm CQ) 21 MG/24HR patch, Place 1 patch on the skin as directed by provider Daily., Disp: 30 patch, Rfl: 0  •  ondansetron ODT (Zofran ODT) 8 MG disintegrating tablet, Place 1 tablet on the tongue Every 8 (Eight) Hours As Needed for Nausea or Vomiting., Disp: 20 tablet, Rfl: 0  •  colchicine 0.6 MG tablet, Take 1 tablet by mouth 2 (Two) Times a Day., Disp: 60 tablet, Rfl: 0  •  predniSONE (DELTASONE) 5 MG tablet, Take 1 tablet by mouth Daily. TAKE 4 TABS QD X 1 WEEK, THEN 3 TABS QD X 1 WEEK, THEN 2 TABS QD X 1 WEEK, THEN 1 TAB qd, Disp: 70 tablet, Rfl: 0  •  sildenafil (REVATIO) 20 MG tablet, Take 1-2 tablets by mouth Daily As Needed (ED- take 30 min prior to sexual activity)., Disp: 20 tablet, Rfl: 0       Objective:      Vitals:    11/16/21 1426   BP: 110/60   Pulse: 119   SpO2: 99%     Vitals and nursing note reviewed.   Constitutional:       Appearance: Normal and healthy appearance. Well-developed and not in distress.   Eyes:      Extraocular Movements: Extraocular movements intact.      Pupils: Pupils are equal, round, and reactive to light.   HENT:      Head: Normocephalic and atraumatic.    Mouth/Throat:      Pharynx: Oropharynx is clear.   Neck:      Vascular: JVD normal.      Trachea: Trachea  normal.   Pulmonary:      Effort: Pulmonary effort is normal.      Breath sounds: Normal breath sounds. No wheezing. No rhonchi. No rales.   Cardiovascular:      PMI at left midclavicular line. Normal rate. Regular rhythm. Normal S1. Normal S2.      Murmurs: There is no murmur.      No gallop. No click. No rub.   Pulses:     Dorsalis pedis: 2+ bilaterally.     Posterior tibial: 2+ bilaterally.  Abdominal:      General: Bowel sounds are normal.      Palpations: Abdomen is soft.      Tenderness: There is no abdominal tenderness.   Musculoskeletal: Normal range of motion.      Cervical back: Normal range of motion and neck supple. Skin:     General: Skin is warm and dry.      Capillary Refill: Capillary refill takes less than 2 seconds.   Feet:      Right foot:      Skin integrity: Skin integrity normal.      Left foot:      Skin integrity: Skin integrity normal.   Neurological:      Mental Status: Alert and oriented to person, place and time.      Cranial Nerves: Cranial nerves are intact.      Sensory: Sensation is intact.      Motor: Motor function is intact.      Coordination: Coordination is intact.   Psychiatric:         Speech: Speech normal.         Behavior: Behavior is cooperative.         Lab Review:         ECG 12 Lead    Date/Time: 11/18/2021 5:21 PM  Performed by: Clint Mcintyre DO  Authorized by: Clint Mcintyre DO   Comparison: compared with previous ECG   Similar to previous ECG  Rhythm: sinus rhythm  Rate: normal  Conduction: conduction normal  ST Segments: ST segments normal  T Waves: T waves normal  QRS axis: normal  Other: no other findings    Clinical impression: normal ECG              Assessment/Plan:     Problem List Items Addressed This Visit        Cardiac and Vasculature    Chest pain due to myocardial ischemia - Primary       Mental Health    Polysubstance abuse, including stimulants       Multi-system (Lupus, Sarcoid...)    Marfan syndrome       Pulmonary and  Pneumonias    Emphysema of lung (HCC) (Chronic)    Relevant Medications    predniSONE (DELTASONE) 5 MG tablet    Pleurisy       Tobacco    Smoking (Chronic)    Overview     3/4 pack a day                 Recommendations/plans:    36-year-old male presents for follow-up today.  He has a history of Marfan's syndrome and nicotine abuse.  He had a moderate sized effusion noted on his echo previously.    He continues to have chest pain or shortness of breath.  The way he describes the chest pain today, it sounds pleuritic in nature.    I am going to start him on a month long regimen of steroids and anti-inflammatories as I think this is some form of inflammation.    Follow-up with cardiology in 3 months          Clint Mcintyre DO  11/16/2021  17:22 CST

## 2021-11-19 ENCOUNTER — TELEPHONE (OUTPATIENT)
Dept: FAMILY MEDICINE CLINIC | Facility: CLINIC | Age: 36
End: 2021-11-19

## 2021-11-19 NOTE — TELEPHONE ENCOUNTER
Caller: Ross Platt    Relationship: Self    Best call back number: [phone]    What is the best time to reach you: ANY TIME    Who are you requesting to speak with (clinical staff, provider,  specific staff member): ENRIQUE    Do you know the name of the person who called: SELF    What was the call regarding: SINCE IT IS TAKING SO LONG FOR PATIENT TO GET INTO PAIN MANAGEMENT CAN SOMETHING BE CALLED IN FOR PAIN FOR PATIENT? PLEASE ADVISE    Do you require a callback: YES

## 2021-11-23 RX ORDER — BACLOFEN 10 MG/1
TABLET ORAL
Qty: 60 TABLET | Refills: 0 | Status: SHIPPED | OUTPATIENT
Start: 2021-11-23 | End: 2022-05-11 | Stop reason: ALTCHOICE

## 2021-11-23 NOTE — TELEPHONE ENCOUNTER
Rx Refill Note  Requested Prescriptions     Pending Prescriptions Disp Refills   • baclofen (LIORESAL) 10 MG tablet [Pharmacy Med Name: BACLOFEN 10MG TABLETS] 60 tablet      Sig: TAKE 1 TABLET BY MOUTH THREE TIMES DAILY AS NEEDED FOR MUSCLE SPASMS      Last office visit with prescribing clinician: 11/17/2021      Next office visit with prescribing clinician: flaquita altamirano     LRX:10/28/21    Liv Perkins MA  11/23/21, 10:54 CST

## 2021-11-24 ENCOUNTER — TELEPHONE (OUTPATIENT)
Dept: CARDIOLOGY | Facility: CLINIC | Age: 36
End: 2021-11-24

## 2021-11-24 NOTE — TELEPHONE ENCOUNTER
MEDIMPACT IS DENIED COLCHICINE 0.6 MG     THEY NEED ONE OF THE FOLLOWING DIAGNOSIS:    GOUT, FAMILIAL MEDITERRANEAN FEVER, PERICARDITIS, OR ACUTE GOUT ATTACK WITH TRIAL AND FAILURE OF, OR CONTRAINDICATION, INTOLERANCE TO NON-STEROIDAL ANTI-INFLAMMATORY DRUG (NSAID; SUCH AS IBUPROFEN) OR ORAL STEROID (SUCH AS PREDNISONE).        PLEASE ADVISE

## 2021-11-29 ENCOUNTER — APPOINTMENT (OUTPATIENT)
Dept: GENERAL RADIOLOGY | Age: 36
End: 2021-11-29
Payer: MEDICAID

## 2021-11-29 ENCOUNTER — HOSPITAL ENCOUNTER (EMERGENCY)
Age: 36
Discharge: LEFT BEFORE DISCHARGE | End: 2021-11-30
Payer: MEDICAID

## 2021-11-29 VITALS
OXYGEN SATURATION: 98 % | SYSTOLIC BLOOD PRESSURE: 110 MMHG | RESPIRATION RATE: 19 BRPM | BODY MASS INDEX: 19.09 KG/M2 | WEIGHT: 165 LBS | DIASTOLIC BLOOD PRESSURE: 68 MMHG | HEART RATE: 112 BPM | TEMPERATURE: 97.1 F | HEIGHT: 78 IN

## 2021-11-29 LAB
ALBUMIN SERPL-MCNC: 4.4 G/DL (ref 3.5–5.2)
ALP BLD-CCNC: 85 U/L (ref 40–130)
ALT SERPL-CCNC: 32 U/L (ref 5–41)
ANION GAP SERPL CALCULATED.3IONS-SCNC: 12 MMOL/L (ref 7–19)
AST SERPL-CCNC: 29 U/L (ref 5–40)
BASOPHILS ABSOLUTE: 0.1 K/UL (ref 0–0.2)
BASOPHILS RELATIVE PERCENT: 0.7 % (ref 0–1)
BILIRUB SERPL-MCNC: <0.2 MG/DL (ref 0.2–1.2)
BUN BLDV-MCNC: 18 MG/DL (ref 6–20)
CALCIUM SERPL-MCNC: 9.6 MG/DL (ref 8.6–10)
CHLORIDE BLD-SCNC: 104 MMOL/L (ref 98–111)
CO2: 24 MMOL/L (ref 22–29)
CREAT SERPL-MCNC: 0.9 MG/DL (ref 0.5–1.2)
EOSINOPHILS ABSOLUTE: 0 K/UL (ref 0–0.6)
EOSINOPHILS RELATIVE PERCENT: 0.2 % (ref 0–5)
GFR AFRICAN AMERICAN: >59
GFR NON-AFRICAN AMERICAN: >60
GLUCOSE BLD-MCNC: 120 MG/DL (ref 74–109)
HCT VFR BLD CALC: 45.4 % (ref 42–52)
HEMOGLOBIN: 14.8 G/DL (ref 14–18)
IMMATURE GRANULOCYTES #: 0.2 K/UL
LYMPHOCYTES ABSOLUTE: 1 K/UL (ref 1.1–4.5)
LYMPHOCYTES RELATIVE PERCENT: 11.7 % (ref 20–40)
MCH RBC QN AUTO: 31 PG (ref 27–31)
MCHC RBC AUTO-ENTMCNC: 32.6 G/DL (ref 33–37)
MCV RBC AUTO: 95.2 FL (ref 80–94)
MONOCYTES ABSOLUTE: 0.5 K/UL (ref 0–0.9)
MONOCYTES RELATIVE PERCENT: 6.2 % (ref 0–10)
NEUTROPHILS ABSOLUTE: 6.8 K/UL (ref 1.5–7.5)
NEUTROPHILS RELATIVE PERCENT: 78.9 % (ref 50–65)
PDW BLD-RTO: 13.2 % (ref 11.5–14.5)
PLATELET # BLD: 208 K/UL (ref 130–400)
PMV BLD AUTO: 9.2 FL (ref 9.4–12.4)
POTASSIUM SERPL-SCNC: 4.4 MMOL/L (ref 3.5–5)
RBC # BLD: 4.77 M/UL (ref 4.7–6.1)
SARS-COV-2, NAAT: NOT DETECTED
SODIUM BLD-SCNC: 140 MMOL/L (ref 136–145)
TOTAL PROTEIN: 6.9 G/DL (ref 6.6–8.7)
TROPONIN: <0.01 NG/ML (ref 0–0.03)
WBC # BLD: 8.6 K/UL (ref 4.8–10.8)

## 2021-11-29 PROCEDURE — 80053 COMPREHEN METABOLIC PANEL: CPT

## 2021-11-29 PROCEDURE — 36415 COLL VENOUS BLD VENIPUNCTURE: CPT

## 2021-11-29 PROCEDURE — 85025 COMPLETE CBC W/AUTO DIFF WBC: CPT

## 2021-11-29 PROCEDURE — 84484 ASSAY OF TROPONIN QUANT: CPT

## 2021-11-29 PROCEDURE — 4500000002 HC ER NO CHARGE

## 2021-11-29 PROCEDURE — 87635 SARS-COV-2 COVID-19 AMP PRB: CPT

## 2021-11-29 PROCEDURE — 71046 X-RAY EXAM CHEST 2 VIEWS: CPT

## 2021-11-29 RX ORDER — PREDNISONE 10 MG/1
TABLET ORAL
COMMUNITY
Start: 2021-10-08 | End: 2022-05-08

## 2021-11-29 ASSESSMENT — PAIN DESCRIPTION - DESCRIPTORS: DESCRIPTORS: STABBING

## 2021-11-29 ASSESSMENT — PAIN DESCRIPTION - ORIENTATION: ORIENTATION: MID;LEFT;RIGHT

## 2021-11-29 ASSESSMENT — PAIN DESCRIPTION - LOCATION: LOCATION: CHEST

## 2021-11-29 ASSESSMENT — PAIN DESCRIPTION - FREQUENCY: FREQUENCY: CONTINUOUS

## 2021-11-29 ASSESSMENT — PAIN SCALES - GENERAL: PAINLEVEL_OUTOF10: 8

## 2021-11-29 ASSESSMENT — PAIN DESCRIPTION - PAIN TYPE: TYPE: ACUTE PAIN

## 2021-11-30 NOTE — ED NOTES
Pt called out demanding to be \"unhooked\" so he could leave, this nurse went in room to talk to pt, pt verbally aggressive w staff and stated he's been here for hours and we have done nothing for his pain, I tried to explain to pt that we had a full ER and that the doctor would get to him as quick as he could, pt stated he was leaving and he \"wouldn't bring his dead dog to this [facility].\" Pt's IV d/c'd and pt left ER.      Rashmi Acharya, DEL  11/30/21 [id number]

## 2021-11-30 NOTE — ED NOTES
Bed: 13  Expected date:   Expected time:   Means of arrival:   Comments:     Sharon Delatorre RN  11/29/21 5713

## 2021-12-02 ENCOUNTER — TELEPHONE (OUTPATIENT)
Dept: FAMILY MEDICINE CLINIC | Facility: CLINIC | Age: 36
End: 2021-12-02

## 2021-12-02 RX ORDER — VARDENAFIL HYDROCHLORIDE 10 MG/1
10 TABLET ORAL DAILY PRN
Qty: 20 TABLET | Refills: 0 | OUTPATIENT
Start: 2021-12-02 | End: 2021-12-17

## 2021-12-02 NOTE — TELEPHONE ENCOUNTER
I have not received any PA requests from pt's pharmacy- I have called pharmacy 3 times today, on hold for over 20 min each time with no success in speaking with anyone. Will attempt to initiate PA for sildenafil via covermymeds from our office, the other medicaiton was rxd by another provider, he will have to contact their office to have them complete PA.    PA denied alternative requested vardenafil 20mg tablets. Please advise.

## 2021-12-02 NOTE — TELEPHONE ENCOUNTER
Called pt to notify about medications-pt voiced understanding. Explained to pt that was unable to reach pharmacy today, and to call us back if he still has issues with the vardenafil being covered by insurance. Explained to pt that he would need to contact Dr. Kim's office to ask about PA completion for colchicine, since he was the ordering provider, pt verbalized understanding.

## 2021-12-02 NOTE — TELEPHONE ENCOUNTER
PATIENT CALLED IN TO LET US KNOW THAT HE NEEDS PRIOR AUTHORIZATIONS ON THE     sildenafil (REVATIO) 20 MG tablet    colchicine 0.6 MG tablet    PLEASE CALL  1 [phone]     GOOD CALL BACK   [phone]

## 2021-12-03 LAB
EKG P AXIS: 87 DEGREES
EKG P-R INTERVAL: 142 MS
EKG Q-T INTERVAL: 310 MS
EKG QRS DURATION: 80 MS
EKG QTC CALCULATION (BAZETT): 404 MS
EKG T AXIS: 82 DEGREES

## 2021-12-03 NOTE — TELEPHONE ENCOUNTER
Pt states that he went to the pharmacy to  his medication and they told the pt to have our office contact them at the pharmacy.     Called the pharmacy- states that medica

## 2021-12-08 DIAGNOSIS — R06.02 SHORTNESS OF BREATH: ICD-10-CM

## 2021-12-08 NOTE — TELEPHONE ENCOUNTER
Rx Refill Note  Requested Prescriptions     Pending Prescriptions Disp Refills   • Advair Diskus 500-50 MCG/DOSE DISKUS [Pharmacy Med Name: ADVAIR DISKUS 500/50MCG (RED) 60S] 60 each 0     Sig: INHALE 1 PUFF BY MOUTH TWICE DAILY      Last office visit with prescribing clinician: 11/9/2021      Next office visit with prescribing clinician:   Last RX: don't see it on med list. Pt uses albuterol inhaler     Kaiser Farias MA  12/08/21, 16:29 CST

## 2021-12-08 NOTE — TELEPHONE ENCOUNTER
Rx Refill Note  Requested Prescriptions     Pending Prescriptions Disp Refills   • Advair Diskus 500-50 MCG/DOSE DISKUS [Pharmacy Med Name: ADVAIR DISKUS 500/50MCG (RED) 60S] 60 each 0     Sig: INHALE 1 PUFF BY MOUTH TWICE DAILY      Last office visit with prescribing clinician: 11/9/2021      Next office visit with prescribing clinician: Visit date not found   Last RX:     Kaiser Farias MA  12/08/21, 15:21 CST

## 2021-12-14 ENCOUNTER — TELEPHONE (OUTPATIENT)
Dept: FAMILY MEDICINE CLINIC | Facility: CLINIC | Age: 36
End: 2021-12-14

## 2021-12-14 DIAGNOSIS — R09.1 PLEURISY: ICD-10-CM

## 2021-12-14 NOTE — TELEPHONE ENCOUNTER
Caller: Ross Platt    Relationship: Self    Best call back number:[phone]    What medication are you requesting: NECK PAIN MEDICATION     What are your current symptoms: NECK PAIN    Have you had these symptoms before:    [x] Yes  [] No    Have you been treated for these symptoms before:   [x] Yes  [] No    If a prescription is needed, what is your preferred pharmacy and phone number: [id number] - [place] - [id number] AT [facility] [address] - [phone]  - [phone] FX     Additional notes: PATIENT HAD HIS [facility] AP[facility]MENT [facility], THEY ARE NOT GOING TO BE ABLE TO START PRESCRIBING HIM MEDICATION UNTIL HER HAS HIS MRI OF HIS NECK. HE WAS TOLD IT WOULD TAKE WEEKS IF NOT LONGER FOR THE MRI TO BE COMPLETED. WANTING TO SEE ABOUT GETTING MEDICATION TO HELP UNTIL HE CAN GET THE MRI COMPLETED

## 2021-12-14 NOTE — TELEPHONE ENCOUNTER
Rx Refill Note  Requested Prescriptions     Pending Prescriptions Disp Refills   • HYDROcodone-acetaminophen (NORCO) 7.5-325 MG per tablet 10 tablet 0     Sig: Take 1 tablet by mouth Every 4 (Four) Hours As Needed for Moderate Pain .      Last office visit with prescribing clinician: 11/17/2021      Next office visit with prescribing clinician: no follow up schd     LRx: 11/15/2021-q10   Last UDS: 11/1/2021  No contract on file since it was a short fill      Liv Perkins MA  12/14/21, 10:17 CST

## 2021-12-14 NOTE — TELEPHONE ENCOUNTER
PATIENT CALLED IN REQUESTING WE FAX THE XRAYS  OF NECK TO [facility] IN [place] FOR HIS APPOINTMENT AND WAS TOLD THEY DONT HAVE THEM YET      PLEASE FAX TOO   [phone]     GOOD CALL BACK   [phone]

## 2021-12-15 RX ORDER — HYDROCODONE BITARTRATE AND ACETAMINOPHEN 7.5; 325 MG/1; MG/1
1 TABLET ORAL EVERY 4 HOURS PRN
Qty: 10 TABLET | Refills: 0 | Status: SHIPPED | OUTPATIENT
Start: 2021-12-15 | End: 2021-12-22

## 2021-12-17 ENCOUNTER — HOSPITAL ENCOUNTER (EMERGENCY)
Facility: HOSPITAL | Age: 36
Discharge: HOME | End: 2021-12-18
Attending: EMERGENCY MEDICINE | Admitting: EMERGENCY MEDICINE

## 2021-12-17 ENCOUNTER — APPOINTMENT (OUTPATIENT)
Dept: GENERAL RADIOLOGY | Facility: HOSPITAL | Age: 36
End: 2021-12-17

## 2021-12-17 ENCOUNTER — APPOINTMENT (OUTPATIENT)
Dept: CT IMAGING | Facility: HOSPITAL | Age: 36
End: 2021-12-17

## 2021-12-17 DIAGNOSIS — J43.9 PULMONARY EMPHYSEMA, UNSPECIFIED EMPHYSEMA TYPE (HCC): ICD-10-CM

## 2021-12-17 DIAGNOSIS — R09.1 PLEURISY: Primary | ICD-10-CM

## 2021-12-17 DIAGNOSIS — Q87.40 MARFAN'S SYNDROME: ICD-10-CM

## 2021-12-17 LAB
ALBUMIN SERPL-MCNC: 4.2 G/DL (ref 3.5–5.2)
ALBUMIN/GLOB SERPL: 1.4 G/DL
ALP SERPL-CCNC: 83 U/L (ref 39–117)
ALT SERPL W P-5'-P-CCNC: 28 U/L (ref 1–41)
ANION GAP SERPL CALCULATED.3IONS-SCNC: 10 MMOL/L (ref 5–15)
AST SERPL-CCNC: 26 U/L (ref 1–40)
BASOPHILS # BLD AUTO: 0.05 10*3/MM3 (ref 0–0.2)
BASOPHILS NFR BLD AUTO: 0.7 % (ref 0–1.5)
BILIRUB SERPL-MCNC: 0.2 MG/DL (ref 0–1.2)
BUN SERPL-MCNC: 20 MG/DL (ref 6–20)
BUN/CREAT SERPL: 20 (ref 7–25)
CALCIUM SPEC-SCNC: 9.3 MG/DL (ref 8.6–10.5)
CHLORIDE SERPL-SCNC: 104 MMOL/L (ref 98–107)
CO2 SERPL-SCNC: 23 MMOL/L (ref 22–29)
CREAT SERPL-MCNC: 1 MG/DL (ref 0.76–1.27)
DEPRECATED RDW RBC AUTO: 43.1 FL (ref 37–54)
EOSINOPHIL # BLD AUTO: 0.15 10*3/MM3 (ref 0–0.4)
EOSINOPHIL NFR BLD AUTO: 2.1 % (ref 0.3–6.2)
ERYTHROCYTE [DISTWIDTH] IN BLOOD BY AUTOMATED COUNT: 12.6 % (ref 12.3–15.4)
GFR SERPL CREATININE-BSD FRML MDRD: 85 ML/MIN/1.73
GLOBULIN UR ELPH-MCNC: 3 GM/DL
GLUCOSE SERPL-MCNC: 97 MG/DL (ref 65–99)
HCT VFR BLD AUTO: 46.8 % (ref 37.5–51)
HGB BLD-MCNC: 15.4 G/DL (ref 13–17.7)
HOLD SPECIMEN: NORMAL
HOLD SPECIMEN: NORMAL
IMM GRANULOCYTES # BLD AUTO: 0.05 10*3/MM3 (ref 0–0.05)
IMM GRANULOCYTES NFR BLD AUTO: 0.7 % (ref 0–0.5)
LYMPHOCYTES # BLD AUTO: 1.98 10*3/MM3 (ref 0.7–3.1)
LYMPHOCYTES NFR BLD AUTO: 28.3 % (ref 19.6–45.3)
MCH RBC QN AUTO: 30.8 PG (ref 26.6–33)
MCHC RBC AUTO-ENTMCNC: 32.9 G/DL (ref 31.5–35.7)
MCV RBC AUTO: 93.6 FL (ref 79–97)
MONOCYTES # BLD AUTO: 0.63 10*3/MM3 (ref 0.1–0.9)
MONOCYTES NFR BLD AUTO: 9 % (ref 5–12)
NEUTROPHILS NFR BLD AUTO: 4.14 10*3/MM3 (ref 1.7–7)
NEUTROPHILS NFR BLD AUTO: 59.2 % (ref 42.7–76)
NRBC BLD AUTO-RTO: 0 /100 WBC (ref 0–0.2)
PLATELET # BLD AUTO: 229 10*3/MM3 (ref 140–450)
PMV BLD AUTO: 9.2 FL (ref 6–12)
POTASSIUM SERPL-SCNC: 4.3 MMOL/L (ref 3.5–5.2)
PROT SERPL-MCNC: 7.2 G/DL (ref 6–8.5)
RBC # BLD AUTO: 5 10*6/MM3 (ref 4.14–5.8)
SODIUM SERPL-SCNC: 137 MMOL/L (ref 136–145)
TROPONIN T SERPL-MCNC: <0.01 NG/ML (ref 0–0.03)
WBC NRBC COR # BLD: 7 10*3/MM3 (ref 3.4–10.8)
WHOLE BLOOD HOLD SPECIMEN: NORMAL
WHOLE BLOOD HOLD SPECIMEN: NORMAL

## 2021-12-17 PROCEDURE — 93005 ELECTROCARDIOGRAM TRACING: CPT

## 2021-12-17 PROCEDURE — 71275 CT ANGIOGRAPHY CHEST: CPT

## 2021-12-17 PROCEDURE — 99284 EMERGENCY DEPT VISIT MOD MDM: CPT

## 2021-12-17 PROCEDURE — 25010000002 KETOROLAC TROMETHAMINE PER 15 MG: Performed by: EMERGENCY MEDICINE

## 2021-12-17 PROCEDURE — 80053 COMPREHEN METABOLIC PANEL: CPT | Performed by: EMERGENCY MEDICINE

## 2021-12-17 PROCEDURE — 96374 THER/PROPH/DIAG INJ IV PUSH: CPT

## 2021-12-17 PROCEDURE — 93005 ELECTROCARDIOGRAM TRACING: CPT | Performed by: EMERGENCY MEDICINE

## 2021-12-17 PROCEDURE — 0 HYDROMORPHONE 1 MG/ML SOLUTION: Performed by: EMERGENCY MEDICINE

## 2021-12-17 PROCEDURE — 93010 ELECTROCARDIOGRAM REPORT: CPT | Performed by: INTERNAL MEDICINE

## 2021-12-17 PROCEDURE — 84484 ASSAY OF TROPONIN QUANT: CPT | Performed by: EMERGENCY MEDICINE

## 2021-12-17 PROCEDURE — 71045 X-RAY EXAM CHEST 1 VIEW: CPT

## 2021-12-17 PROCEDURE — 0 IOPAMIDOL PER 1 ML: Performed by: EMERGENCY MEDICINE

## 2021-12-17 PROCEDURE — 96375 TX/PRO/DX INJ NEW DRUG ADDON: CPT

## 2021-12-17 PROCEDURE — 85025 COMPLETE CBC W/AUTO DIFF WBC: CPT | Performed by: EMERGENCY MEDICINE

## 2021-12-17 PROCEDURE — 25010000002 ONDANSETRON PER 1 MG: Performed by: EMERGENCY MEDICINE

## 2021-12-17 PROCEDURE — 36415 COLL VENOUS BLD VENIPUNCTURE: CPT

## 2021-12-17 RX ORDER — PREDNISONE 20 MG/1
20 TABLET ORAL 2 TIMES DAILY
Qty: 14 TABLET | Refills: 0 | OUTPATIENT
Start: 2021-12-17 | End: 2021-12-23

## 2021-12-17 RX ORDER — ONDANSETRON 2 MG/ML
4 INJECTION INTRAMUSCULAR; INTRAVENOUS ONCE
Status: COMPLETED | OUTPATIENT
Start: 2021-12-17 | End: 2021-12-17

## 2021-12-17 RX ORDER — KETOROLAC TROMETHAMINE 15 MG/ML
15 INJECTION, SOLUTION INTRAMUSCULAR; INTRAVENOUS ONCE
Status: COMPLETED | OUTPATIENT
Start: 2021-12-17 | End: 2021-12-17

## 2021-12-17 RX ORDER — SODIUM CHLORIDE 0.9 % (FLUSH) 0.9 %
10 SYRINGE (ML) INJECTION AS NEEDED
Status: DISCONTINUED | OUTPATIENT
Start: 2021-12-17 | End: 2021-12-18 | Stop reason: HOSPADM

## 2021-12-17 RX ORDER — HYDROCODONE BITARTRATE AND ACETAMINOPHEN 7.5; 325 MG/1; MG/1
1 TABLET ORAL EVERY 4 HOURS PRN
Qty: 10 TABLET | Refills: 0 | Status: SHIPPED | OUTPATIENT
Start: 2021-12-17 | End: 2021-12-22

## 2021-12-17 RX ADMIN — ONDANSETRON 4 MG: 2 INJECTION INTRAMUSCULAR; INTRAVENOUS at 23:41

## 2021-12-17 RX ADMIN — KETOROLAC TROMETHAMINE 15 MG: 15 INJECTION, SOLUTION INTRAMUSCULAR; INTRAVENOUS at 21:23

## 2021-12-17 RX ADMIN — IOPAMIDOL 100 ML: 755 INJECTION, SOLUTION INTRAVENOUS at 22:30

## 2021-12-17 RX ADMIN — HYDROMORPHONE HYDROCHLORIDE 1 MG: 1 INJECTION, SOLUTION INTRAMUSCULAR; INTRAVENOUS; SUBCUTANEOUS at 23:41

## 2021-12-18 VITALS
WEIGHT: 178 LBS | HEIGHT: 78 IN | DIASTOLIC BLOOD PRESSURE: 87 MMHG | HEART RATE: 69 BPM | TEMPERATURE: 98.5 F | BODY MASS INDEX: 20.59 KG/M2 | OXYGEN SATURATION: 99 % | SYSTOLIC BLOOD PRESSURE: 131 MMHG | RESPIRATION RATE: 18 BRPM

## 2021-12-18 LAB — HOLD SPECIMEN: NORMAL

## 2021-12-18 NOTE — ED NOTES
Pt presents CC of chest pain and SOB that started today. Chest pain is left sided and constant, stabbing pain that radiates to the left arm and neck. Pt reports marfan syndrome history and states the chest pain is typical but has become worse over the last day.     Danielle Crain, RN  12/17/21 2057

## 2021-12-18 NOTE — ED PROVIDER NOTES
Subjective   Patient complains sudden onset of severe stabbing sharp left-sided chest pain.  This all started about 2 hours ago.  He says that it hurts even to touch the outside of his chest or to breathe deeply or cough.  However he does have a history of Marfan syndrome and is very worried about his aorta also.  He says he has only one long and that is on the left side because his right lung is collapsed in the past and I told him it is no longer functional.      History provided by:  Patient   used: No    Chest Pain  Pain location:  L chest  Pain quality: sharp and stabbing    Pain radiates to:  Does not radiate  Pain severity:  Severe  Onset quality:  Sudden  Duration:  2 hours  Timing:  Constant  Progression:  Unchanged  Chronicity:  New  Context: not breathing, not drug use, not eating, not intercourse, not lifting, not movement, not raising an arm, not at rest, not stress and not trauma    Relieved by:  Nothing  Worsened by:  Coughing and deep breathing  Ineffective treatments:  None tried  Associated symptoms: shortness of breath    Associated symptoms: no abdominal pain, no AICD problem, no altered mental status, no anorexia, no claudication, no cough, no dizziness, no fatigue, no heartburn, no lower extremity edema, no nausea, no numbness, no PND and no weakness    Risk factors: male sex, Marfan's syndrome and smoking        Review of Systems   Constitutional: Negative.  Negative for fatigue.   HENT: Negative.    Respiratory: Positive for shortness of breath. Negative for cough.    Cardiovascular: Positive for chest pain. Negative for claudication and PND.   Gastrointestinal: Negative.  Negative for abdominal pain, anorexia, heartburn and nausea.   Genitourinary: Negative.    Musculoskeletal: Negative.    Skin: Negative.    Neurological: Negative.  Negative for dizziness, weakness and numbness.   Hematological: Negative.    Psychiatric/Behavioral: Negative.    All other systems reviewed  and are negative.      Past Medical History:   Diagnosis Date   • Back pain    • Chest pain    • Claustrophobia    • COPD (chronic obstructive pulmonary disease) (HCC)    • Emphysema of lung (HCC)    • Lung disease    • Marfan syndrome    • Pneumothorax    • Seizures (HCC)     when he was a child    • Seizures (HCC)    • Smoking     3/4 pack a day   • Tobacco abuse    • Underweight        Allergies   Allergen Reactions   • Calcium Channel Blockers Other (See Comments)     Do not use in marfan   • Ciprofloxacin Other (See Comments)     Do not use fluoroquinolones in marfan   • Medrol [Methylprednisolone] Other (See Comments)     Patient states he gets violent on this medication        Past Surgical History:   Procedure Laterality Date   • HERNIA REPAIR     • LUNG LOBECTOMY Right 02/01/2017   • LUNG REMOVAL, TOTAL Right 2015   • THORACOSCOPY N/A 2/3/2017    Procedure: BRONCHOSCOPY, THORACOSCOPY RIGHT CHEST,  WEDGE RESECTION RIGHT UPPER AND LOWER LOBE OF LUNG, MECHANICAL PLEURODESIS;  Surgeon: Kyle Heath MD;  Location: [facility];  Service:    • TYMPANOSTOMY  05/12/2016    Recorded       Family History   Problem Relation Age of Onset   • No Known Problems Mother    • Heart attack Father    • Stroke Father    • No Known Problems Sister    • No Known Problems Daughter    • Cancer Maternal Grandmother         breast   • Breast cancer Maternal Grandmother    • Prostate cancer Neg Hx    • Colon cancer Neg Hx    • Alcohol abuse Neg Hx    • Drug abuse Neg Hx    • Diabetes Neg Hx    • Thyroid cancer Neg Hx        Social History     Socioeconomic History   • Marital status:    Tobacco Use   • Smoking status: Current Every Day Smoker     Packs/day: 0.50     Years: 20.00     Pack years: 10.00     Types: Cigarettes   • Smokeless tobacco: Never Used   Vaping Use   • Vaping Use: Never used   Substance and Sexual Activity   • Alcohol use: Not Currently   • Drug use: Not Currently     Types: Methamphetamines, IV      Comment: CLEAN X 1 YEAR   • Sexual activity: Yes     Partners: Female       Prior to Admission medications    Medication Sig Start Date End Date Taking? Authorizing Provider   Advair Diskus 500-50 MCG/DOSE DISKUS INHALE 1 PUFF BY MOUTH TWICE DAILY 12/8/21  Yes RoxanaEstbeanaine, APRN   albuterol sulfate  (90 Base) MCG/ACT inhaler Inhale 2 puffs Every 4 (Four) Hours As Needed for Wheezing or Shortness of Air. 11/8/21  Yes RoxanaEstebanaine, APRN   baclofen (LIORESAL) 10 MG tablet TAKE 1 TABLET BY MOUTH THREE TIMES DAILY AS NEEDED FOR MUSCLE SPASMS 11/23/21  Yes Roxana Jasmaine, APRN   HYDROcodone-acetaminophen (NORCO) 7.5-325 MG per tablet Take 1 tablet by mouth Every 4 (Four) Hours As Needed for Moderate Pain . 12/15/21  Yes Roxana Jasmaine, APRN   nicotine (Nicoderm CQ) 21 MG/24HR patch Place 1 patch on the skin as directed by provider Daily. 11/9/21  Yes Terrie Chappell, LUC, APRN   colchicine 0.6 MG tablet Take 1 tablet by mouth 2 (Two) Times a Day. 11/16/21   Clint Mcintyre DO   diclofenac (VOLTAREN) 75 MG EC tablet TAKE 1 TABLET BY MOUTH TWICE DAILY 9/21/21   RoxanaEstebanaine, APRN   ondansetron ODT (Zofran ODT) 8 MG disintegrating tablet Place 1 tablet on the tongue Every 8 (Eight) Hours As Needed for Nausea or Vomiting. 10/8/21   Roxana Jasmaine, APRN   predniSONE (DELTASONE) 5 MG tablet Take 1 tablet by mouth Daily. TAKE 4 TABS QD X 1 WEEK, THEN 3 TABS QD X 1 WEEK, THEN 2 TABS QD X 1 WEEK, THEN 1 TAB qd 11/16/21   Clint Mcintyre DO   vardenafil (Levitra) 10 MG tablet Take 1 tablet by mouth Daily As Needed for Erectile Dysfunction (30 min prior to sexual activity). 12/2/21   Roxana, Jasmaine, APRN       Medications   ketorolac (TORADOL) injection 15 mg (15 mg Intravenous Given 12/17/21 2123)   iopamidol (ISOVUE-370) 76 % injection 100 mL (100 mL Intravenous Given 12/17/21 6884)   HYDROmorphone (DILAUDID) injection 1 mg (1 mg Intravenous Given 12/17/21 0130)    ondansetron (ZOFRAN) injection 4 mg (4 mg Intravenous Given 12/17/21 2341)       Vitals:    12/18/21 0013   BP: 131/87   Pulse: 69   Resp: 18   Temp: 98.5 °F (36.9 °C)   SpO2: 99%         Objective   Physical Exam  Vitals and nursing note reviewed.   Constitutional:       Appearance: He is well-developed.   HENT:      Head: Normocephalic and atraumatic.   Cardiovascular:      Rate and Rhythm: Normal rate and regular rhythm.   Pulmonary:      Effort: Pulmonary effort is normal.      Breath sounds: Normal breath sounds.   Chest:      Chest wall: Tenderness present.      Comments: Patient is tender to palpation in the left anterolateral chest wall.  Musculoskeletal:         General: Normal range of motion.      Cervical back: Normal range of motion and neck supple.   Skin:     General: Skin is warm and dry.   Neurological:      General: No focal deficit present.      Mental Status: He is alert and oriented to person, place, and time.   Psychiatric:         Mood and Affect: Mood normal.         Behavior: Behavior normal.         Procedures         Lab Results (last 24 hours)     Procedure Component Value Units Date/Time    CBC & Differential [[id number]]  (Abnormal) Collected: 12/17/21 2112    Specimen: Blood Updated: 12/17/21 2122    Narrative:      The following orders were created for panel order CBC & Differential.  Procedure                               Abnormality         Status                     ---------                               -----------         ------                     CBC Auto Differential[[id number]]        Abnormal            Final result                 Please view results for these tests on the individual orders.    Comprehensive Metabolic Panel [[id number]] Collected: 12/17/21 2112    Specimen: Blood Updated: 12/17/21 2143     Glucose 97 mg/dL      BUN 20 mg/dL      Creatinine 1.00 mg/dL      Sodium 137 mmol/L      Potassium 4.3 mmol/L      Comment: Slight hemolysis detected by analyzer.  Results may be affected.        Chloride 104 mmol/L      CO2 23.0 mmol/L      Calcium 9.3 mg/dL      Total Protein 7.2 g/dL      Albumin 4.20 g/dL      ALT (SGPT) 28 U/L      AST (SGOT) 26 U/L      Comment: Slight hemolysis detected by analyzer. Results may be affected.        Alkaline Phosphatase 83 U/L      Total Bilirubin 0.2 mg/dL      eGFR Non African Amer 85 mL/min/1.73      Globulin 3.0 gm/dL      A/G Ratio 1.4 g/dL      BUN/Creatinine Ratio 20.0     Anion Gap 10.0 mmol/L     Narrative:      GFR Normal >60  Chronic Kidney Disease <60  Kidney Failure <15      Troponin [[id number]]  (Normal) Collected: 12/17/21 2112    Specimen: Blood Updated: 12/17/21 2133     Troponin T <0.010 ng/mL     Narrative:      Troponin T Reference Range:  <= 0.03 ng/mL-   Negative for AMI  >0.03 ng/mL-     Abnormal for myocardial necrosis.  Clinicians would have to utilize clinical acumen, EKG, Troponin and serial changes to determine if it is an Acute Myocardial Infarction or myocardial injury due to an underlying chronic condition.       Results may be falsely decreased if patient taking Biotin.      CBC Auto Differential [[id number]]  (Abnormal) Collected: 12/17/21 2112    Specimen: Blood Updated: 12/17/21 2122     WBC 7.00 10*3/mm3      RBC 5.00 10*6/mm3      Hemoglobin 15.4 g/dL      Hematocrit 46.8 %      MCV 93.6 fL      MCH 30.8 pg      MCHC 32.9 g/dL      RDW 12.6 %      RDW-SD 43.1 fl      MPV 9.2 fL      Platelets 229 10*3/mm3      Neutrophil % 59.2 %      Lymphocyte % 28.3 %      Monocyte % 9.0 %      Eosinophil % 2.1 %      Basophil % 0.7 %      Immature Grans % 0.7 %      Neutrophils, Absolute 4.14 10*3/mm3      Lymphocytes, Absolute 1.98 10*3/mm3      Monocytes, Absolute 0.63 10*3/mm3      Eosinophils, Absolute 0.15 10*3/mm3      Basophils, Absolute 0.05 10*3/mm3      Immature Grans, Absolute 0.05 10*3/mm3      nRBC 0.0 /100 WBC           XR Chest 1 View    (Results Pending)   CT Angiogram Chest    (Results Pending)        ED Course  ED Course as of 12/18/21 0352   Sat Dec 18, 2021   0351 I told the patient his lab work was all fine and his CT did not show anything wrong with his aorta.  This is apparently just a pleuritic pain due to his underlying lung disease.  We will get him something for that and see if we get him better.  He is discharged in stable condition. [TR]      ED Course User Index  [TR] Juan Jose Platt Jr., MD          MDM  Number of Diagnoses or Management Options  Marfan's syndrome: new and requires workup  Pleurisy: new and requires workup  Pulmonary emphysema, unspecified emphysema type (HCC): new and requires workup     Amount and/or Complexity of Data Reviewed  Clinical lab tests: ordered and reviewed  Tests in the radiology section of CPT®: ordered and reviewed  Tests in the medicine section of CPT®: reviewed and ordered    Risk of Complications, Morbidity, and/or Mortality  Presenting problems: moderate  Diagnostic procedures: moderate  Management options: moderate    Patient Progress  Patient progress: stable      Final diagnoses:   Pleurisy   Pulmonary emphysema, unspecified emphysema type (HCC)   Marfan's syndrome          Juan Jose Platt Jr., MD  12/18/21 0352

## 2021-12-18 NOTE — ED NOTES
Pt is requesting more medicine for pain, md najera notified and awaiting ct results, pt updated about plan of care      Antoinette Miramontes, RN  12/17/21 8225

## 2021-12-20 ENCOUNTER — TELEPHONE (OUTPATIENT)
Dept: FAMILY MEDICINE CLINIC | Facility: CLINIC | Age: 36
End: 2021-12-20

## 2021-12-20 NOTE — TELEPHONE ENCOUNTER
Patient called and stated that he is not allowed to take Pain medication while taking ILP classes.     He needs a letter from PCP stating that he can take this medication.    Needs doctors order for medication.    Please advise ASAP.

## 2021-12-21 ENCOUNTER — OFFICE VISIT (OUTPATIENT)
Dept: PULMONOLOGY | Facility: CLINIC | Age: 36
End: 2021-12-21

## 2021-12-21 ENCOUNTER — OFFICE VISIT (OUTPATIENT)
Dept: URGENT CARE | Age: 36
End: 2021-12-21
Payer: MEDICAID

## 2021-12-21 VITALS
TEMPERATURE: 99.3 F | SYSTOLIC BLOOD PRESSURE: 121 MMHG | DIASTOLIC BLOOD PRESSURE: 77 MMHG | WEIGHT: 183.4 LBS | BODY MASS INDEX: 21.22 KG/M2 | HEART RATE: 116 BPM | HEIGHT: 78 IN | OXYGEN SATURATION: 96 % | RESPIRATION RATE: 18 BRPM

## 2021-12-21 VITALS
BODY MASS INDEX: 21.1 KG/M2 | WEIGHT: 182.4 LBS | HEIGHT: 78 IN | DIASTOLIC BLOOD PRESSURE: 80 MMHG | SYSTOLIC BLOOD PRESSURE: 132 MMHG | OXYGEN SATURATION: 99 % | HEART RATE: 121 BPM

## 2021-12-21 DIAGNOSIS — Z20.822 EXPOSURE TO COVID-19 VIRUS: ICD-10-CM

## 2021-12-21 DIAGNOSIS — Z11.59 SCREENING FOR VIRAL DISEASE: Primary | ICD-10-CM

## 2021-12-21 DIAGNOSIS — Q87.40 MARFAN SYNDROME: ICD-10-CM

## 2021-12-21 DIAGNOSIS — R09.1 PLEURISY: ICD-10-CM

## 2021-12-21 DIAGNOSIS — J43.9 PULMONARY EMPHYSEMA, UNSPECIFIED EMPHYSEMA TYPE (HCC): Chronic | ICD-10-CM

## 2021-12-21 DIAGNOSIS — F17.200 TOBACCO USE DISORDER: ICD-10-CM

## 2021-12-21 DIAGNOSIS — I21.4 NSTEMI (NON-ST ELEVATED MYOCARDIAL INFARCTION) (HCC): ICD-10-CM

## 2021-12-21 DIAGNOSIS — J43.9 BULLOUS EMPHYSEMA (HCC): Primary | ICD-10-CM

## 2021-12-21 DIAGNOSIS — R63.6 UNDERWEIGHT: ICD-10-CM

## 2021-12-21 DIAGNOSIS — R91.1 SOLITARY PULMONARY NODULE: ICD-10-CM

## 2021-12-21 DIAGNOSIS — F19.20 POLYSUBSTANCE (EXCLUDING OPIOIDS) DEPENDENCE (HCC): ICD-10-CM

## 2021-12-21 DIAGNOSIS — J93.12 SECONDARY SPONTANEOUS PNEUMOTHORAX: ICD-10-CM

## 2021-12-21 LAB — SARS-COV-2, PCR: NOT DETECTED

## 2021-12-21 PROCEDURE — G8427 DOCREV CUR MEDS BY ELIG CLIN: HCPCS | Performed by: NURSE PRACTITIONER

## 2021-12-21 PROCEDURE — 4004F PT TOBACCO SCREEN RCVD TLK: CPT | Performed by: NURSE PRACTITIONER

## 2021-12-21 PROCEDURE — G8484 FLU IMMUNIZE NO ADMIN: HCPCS | Performed by: NURSE PRACTITIONER

## 2021-12-21 PROCEDURE — 99204 OFFICE O/P NEW MOD 45 MIN: CPT | Performed by: INTERNAL MEDICINE

## 2021-12-21 PROCEDURE — 99406 BEHAV CHNG SMOKING 3-10 MIN: CPT | Performed by: INTERNAL MEDICINE

## 2021-12-21 PROCEDURE — 99202 OFFICE O/P NEW SF 15 MIN: CPT | Performed by: NURSE PRACTITIONER

## 2021-12-21 PROCEDURE — G8420 CALC BMI NORM PARAMETERS: HCPCS | Performed by: NURSE PRACTITIONER

## 2021-12-21 RX ORDER — FLUTICASONE PROPIONATE 50 MCG
2 SPRAY, SUSPENSION (ML) NASAL DAILY
COMMUNITY
Start: 2021-12-21 | End: 2022-05-08

## 2021-12-21 RX ORDER — FLUTICASONE FUROATE, UMECLIDINIUM BROMIDE AND VILANTEROL TRIFENATATE 200; 62.5; 25 UG/1; UG/1; UG/1
1 POWDER RESPIRATORY (INHALATION)
COMMUNITY
Start: 2021-12-21

## 2021-12-21 RX ORDER — FLUTICASONE PROPIONATE 50 MCG
2 SPRAY, SUSPENSION (ML) NASAL DAILY
Qty: 16 G | Refills: 5 | Status: SHIPPED | OUTPATIENT
Start: 2021-12-21 | End: 2022-06-15

## 2021-12-21 RX ORDER — ALBUTEROL SULFATE 90 UG/1
2 AEROSOL, METERED RESPIRATORY (INHALATION) EVERY 4 HOURS PRN
COMMUNITY
Start: 2021-11-08

## 2021-12-21 RX ORDER — LORATADINE 10 MG/1
10 TABLET ORAL DAILY
Qty: 90 TABLET | Refills: 3 | Status: SHIPPED | OUTPATIENT
Start: 2021-12-21 | End: 2022-06-15
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
## 2021-12-22 ENCOUNTER — APPOINTMENT (OUTPATIENT)
Dept: CT IMAGING | Facility: HOSPITAL | Age: 36
End: 2021-12-22

## 2021-12-22 ENCOUNTER — HOSPITAL ENCOUNTER (EMERGENCY)
Facility: HOSPITAL | Age: 36
Discharge: HOME | End: 2021-12-23
Admitting: FAMILY MEDICINE

## 2021-12-22 ENCOUNTER — TELEMEDICINE (OUTPATIENT)
Dept: FAMILY MEDICINE CLINIC | Facility: CLINIC | Age: 36
End: 2021-12-22

## 2021-12-22 ENCOUNTER — TELEPHONE (OUTPATIENT)
Dept: FAMILY MEDICINE CLINIC | Facility: CLINIC | Age: 36
End: 2021-12-22

## 2021-12-22 DIAGNOSIS — R07.9 CHRONIC CHEST PAIN: ICD-10-CM

## 2021-12-22 DIAGNOSIS — R16.0 LIVER MASS: ICD-10-CM

## 2021-12-22 DIAGNOSIS — S39.012A STRAIN OF LUMBAR REGION, INITIAL ENCOUNTER: Primary | ICD-10-CM

## 2021-12-22 DIAGNOSIS — M54.12 CERVICAL RADICULOPATHY: Primary | ICD-10-CM

## 2021-12-22 DIAGNOSIS — R73.9 HYPERGLYCEMIA: ICD-10-CM

## 2021-12-22 DIAGNOSIS — G89.29 CHRONIC CHEST PAIN: ICD-10-CM

## 2021-12-22 LAB
ALBUMIN SERPL-MCNC: 4.2 G/DL (ref 3.5–5.2)
ALBUMIN/GLOB SERPL: 1.4 G/DL
ALP SERPL-CCNC: 80 U/L (ref 39–117)
ALT SERPL W P-5'-P-CCNC: 31 U/L (ref 1–41)
ANION GAP SERPL CALCULATED.3IONS-SCNC: 20 MMOL/L (ref 5–15)
AST SERPL-CCNC: 29 U/L (ref 1–40)
BASOPHILS # BLD AUTO: 0.04 10*3/MM3 (ref 0–0.2)
BASOPHILS NFR BLD AUTO: 0.4 % (ref 0–1.5)
BILIRUB SERPL-MCNC: 0.2 MG/DL (ref 0–1.2)
BILIRUB UR QL STRIP: NEGATIVE
BUN SERPL-MCNC: 19 MG/DL (ref 6–20)
BUN/CREAT SERPL: 21.1 (ref 7–25)
CALCIUM SPEC-SCNC: 9.4 MG/DL (ref 8.6–10.5)
CHLORIDE SERPL-SCNC: 100 MMOL/L (ref 98–107)
CLARITY UR: CLEAR
CO2 SERPL-SCNC: 25 MMOL/L (ref 22–29)
COLOR UR: YELLOW
CREAT SERPL-MCNC: 0.9 MG/DL (ref 0.76–1.27)
D-LACTATE SERPL-SCNC: 1.2 MMOL/L (ref 0.5–2)
DEPRECATED RDW RBC AUTO: 43.8 FL (ref 37–54)
EOSINOPHIL # BLD AUTO: 0.01 10*3/MM3 (ref 0–0.4)
EOSINOPHIL NFR BLD AUTO: 0.1 % (ref 0.3–6.2)
ERYTHROCYTE [DISTWIDTH] IN BLOOD BY AUTOMATED COUNT: 12.7 % (ref 12.3–15.4)
GFR SERPL CREATININE-BSD FRML MDRD: 96 ML/MIN/1.73
GLOBULIN UR ELPH-MCNC: 3 GM/DL
GLUCOSE SERPL-MCNC: 141 MG/DL (ref 65–99)
GLUCOSE UR STRIP-MCNC: NEGATIVE MG/DL
HCT VFR BLD AUTO: 42.4 % (ref 37.5–51)
HGB BLD-MCNC: 14.3 G/DL (ref 13–17.7)
HGB UR QL STRIP.AUTO: NEGATIVE
HOLD SPECIMEN: NORMAL
IMM GRANULOCYTES # BLD AUTO: 0.19 10*3/MM3 (ref 0–0.05)
IMM GRANULOCYTES NFR BLD AUTO: 1.9 % (ref 0–0.5)
INR PPP: 0.92 (ref 0.91–1.09)
KETONES UR QL STRIP: NEGATIVE
LEUKOCYTE ESTERASE UR QL STRIP.AUTO: NEGATIVE
LIPASE SERPL-CCNC: 22 U/L (ref 13–60)
LYMPHOCYTES # BLD AUTO: 1.15 10*3/MM3 (ref 0.7–3.1)
LYMPHOCYTES NFR BLD AUTO: 11.4 % (ref 19.6–45.3)
MCH RBC QN AUTO: 31.4 PG (ref 26.6–33)
MCHC RBC AUTO-ENTMCNC: 33.7 G/DL (ref 31.5–35.7)
MCV RBC AUTO: 93.2 FL (ref 79–97)
MONOCYTES # BLD AUTO: 0.68 10*3/MM3 (ref 0.1–0.9)
MONOCYTES NFR BLD AUTO: 6.8 % (ref 5–12)
NEUTROPHILS NFR BLD AUTO: 79.4 % (ref 42.7–76)
NEUTROPHILS NFR BLD AUTO: 8 10*3/MM3 (ref 1.7–7)
NITRITE UR QL STRIP: NEGATIVE
NRBC BLD AUTO-RTO: 0 /100 WBC (ref 0–0.2)
PH UR STRIP.AUTO: 8 [PH] (ref 5–8)
PLATELET # BLD AUTO: 234 10*3/MM3 (ref 140–450)
PMV BLD AUTO: 9.2 FL (ref 6–12)
POTASSIUM SERPL-SCNC: 4.3 MMOL/L (ref 3.5–5.2)
PROT SERPL-MCNC: 7.2 G/DL (ref 6–8.5)
PROT UR QL STRIP: NEGATIVE
PROTHROMBIN TIME: 12 SECONDS (ref 11.9–14.6)
QT INTERVAL: 346 MS
QTC INTERVAL: 432 MS
RBC # BLD AUTO: 4.55 10*6/MM3 (ref 4.14–5.8)
SODIUM SERPL-SCNC: 145 MMOL/L (ref 136–145)
SP GR UR STRIP: 1.02 (ref 1–1.03)
UROBILINOGEN UR QL STRIP: NORMAL
WBC NRBC COR # BLD: 10.07 10*3/MM3 (ref 3.4–10.8)
WHOLE BLOOD HOLD SPECIMEN: NORMAL
WHOLE BLOOD HOLD SPECIMEN: NORMAL

## 2021-12-22 PROCEDURE — 25010000002 ONDANSETRON PER 1 MG: Performed by: NURSE PRACTITIONER

## 2021-12-22 PROCEDURE — 80053 COMPREHEN METABOLIC PANEL: CPT | Performed by: NURSE PRACTITIONER

## 2021-12-22 PROCEDURE — 25010000002 KETOROLAC TROMETHAMINE PER 15 MG: Performed by: NURSE PRACTITIONER

## 2021-12-22 PROCEDURE — 99213 OFFICE O/P EST LOW 20 MIN: CPT | Performed by: NURSE PRACTITIONER

## 2021-12-22 PROCEDURE — 96374 THER/PROPH/DIAG INJ IV PUSH: CPT

## 2021-12-22 PROCEDURE — 99283 EMERGENCY DEPT VISIT LOW MDM: CPT

## 2021-12-22 PROCEDURE — 74177 CT ABD & PELVIS W/CONTRAST: CPT

## 2021-12-22 PROCEDURE — 72131 CT LUMBAR SPINE W/O DYE: CPT

## 2021-12-22 PROCEDURE — 83690 ASSAY OF LIPASE: CPT | Performed by: NURSE PRACTITIONER

## 2021-12-22 PROCEDURE — 81003 URINALYSIS AUTO W/O SCOPE: CPT | Performed by: NURSE PRACTITIONER

## 2021-12-22 PROCEDURE — 25010000002 IOPAMIDOL 61 % SOLUTION: Performed by: NURSE PRACTITIONER

## 2021-12-22 PROCEDURE — 83605 ASSAY OF LACTIC ACID: CPT | Performed by: NURSE PRACTITIONER

## 2021-12-22 PROCEDURE — 85025 COMPLETE CBC W/AUTO DIFF WBC: CPT | Performed by: NURSE PRACTITIONER

## 2021-12-22 PROCEDURE — 96375 TX/PRO/DX INJ NEW DRUG ADDON: CPT

## 2021-12-22 PROCEDURE — 85610 PROTHROMBIN TIME: CPT | Performed by: NURSE PRACTITIONER

## 2021-12-22 RX ORDER — ONDANSETRON 2 MG/ML
4 INJECTION INTRAMUSCULAR; INTRAVENOUS ONCE
Status: COMPLETED | OUTPATIENT
Start: 2021-12-22 | End: 2021-12-22

## 2021-12-22 RX ORDER — SODIUM CHLORIDE 0.9 % (FLUSH) 0.9 %
10 SYRINGE (ML) INJECTION AS NEEDED
Status: DISCONTINUED | OUTPATIENT
Start: 2021-12-22 | End: 2021-12-23 | Stop reason: HOSPADM

## 2021-12-22 RX ORDER — HYDROCODONE BITARTRATE AND ACETAMINOPHEN 7.5; 325 MG/1; MG/1
1 TABLET ORAL 2 TIMES DAILY PRN
Qty: 60 TABLET | Refills: 0 | OUTPATIENT
Start: 2021-12-22 | End: 2021-12-23

## 2021-12-22 RX ORDER — KETOROLAC TROMETHAMINE 30 MG/ML
30 INJECTION, SOLUTION INTRAMUSCULAR; INTRAVENOUS ONCE
Status: COMPLETED | OUTPATIENT
Start: 2021-12-22 | End: 2021-12-22

## 2021-12-22 RX ADMIN — IOPAMIDOL 100 ML: 612 INJECTION, SOLUTION INTRAVENOUS at 23:23

## 2021-12-22 RX ADMIN — ONDANSETRON 4 MG: 2 INJECTION INTRAMUSCULAR; INTRAVENOUS at 22:22

## 2021-12-22 RX ADMIN — KETOROLAC TROMETHAMINE 30 MG: 30 INJECTION, SOLUTION INTRAMUSCULAR; INTRAVENOUS at 22:22

## 2021-12-22 RX ADMIN — SODIUM CHLORIDE, POTASSIUM CHLORIDE, SODIUM LACTATE AND CALCIUM CHLORIDE 1000 ML: 600; 310; 30; 20 INJECTION, SOLUTION INTRAVENOUS at 22:22

## 2021-12-22 NOTE — TELEPHONE ENCOUNTER
Caller: Ross Platt    Relationship: Self    Best call back number: [phone] (H)    Requested Prescriptions:    HYDROcodone-acetaminophen (Norco) 7.5-325 MG per tablet    Pharmacy where request should be sent:    [id number] - [facility] [place] - [address] AT [facility] [address] - [phone]  - [phone] FX   Additional details provided by patient:   STATES THE INSURANCE NEEDS A PRE AUTH FOR THE MEDICATION OF HYDROCODONE       Does the patient have less than a 3 day supply:  [x] Yes  [] No    Lauri Choi Rep   12/22/21 14:13 CST

## 2021-12-22 NOTE — PROGRESS NOTES
Chief Complaint  Pain    Subjective          Ross Platt presents via telehealth video visit with  [facility]  History of Present Illness  Here for ER follow-up for pain was diagnosed with pleurisy on 12/17/2021.  He was treated with a refill of hydrocodone and prednisone.  He is feeling somewhat better from this.      He reports he has an appointment coming up with pain clinic and they are wanting cervical spinal testing on him prior to starting him on any medications and is asking for refill on pain medicine.  Reports continued cervical pain, chest wall pain and left arm pain.    He would also like screening for diabetes, as he has been monitoring his glucose at home with someone else's monitor and it has been showing readings in 200-300s.      Objective   Vital Signs:   There were no vitals taken for this visit.    Physical Exam   No vital signs or bmi obtained for this encounter.      Physical exam-  · General appearance- Alert, appears comfortable. Dressed appropriately. No distress.   · HEENT- external examination of eyes, ears and nose all normal. Head is atraumatic. Hearing normal.   · Neck is symmetric, trachea midline.   · Normal respiratory effort.   · Digits and nails appear healthy. No clubbing, rashes or discolorations.   · Normal range of motion of all extremities.  · Mood appropriate. Communicates easily. Normal judgement and insight. Oriented to time, place and person. Memory appears intact.       Result Review :                 Assessment and Plan    Diagnoses and all orders for this visit:    1. Cervical radiculopathy (Primary)  -      HYDROcodone-acetaminophen (Norco) 7.5-325 MG per tablet; Take 1 tablet by mouth 2 (Two) Times a Day As Needed for Moderate Pain .  Dispense: 60 tablet; Refill: 0    2. Chronic chest pain  -      HYDROcodone-acetaminophen (Norco) 7.5-325 MG per tablet; Take 1 tablet by mouth 2 (Two) Times a Day As Needed for Moderate Pain .   Dispense: 60 tablet; Refill: 0    3. Hyperglycemia  -     Cancel: Hemoglobin A1c; Future      Plan:  A1c ordered for future (he is going to drop in)  JEAN CARLOS [place] reviewed.  1 month of hydrocodone prescribed to follow-up with pain management, advised patient I do not anticipate continuing this long-term for him.          e  Follow Up   Return in about 1 month (around 1/22/2022) for Recheck.  Patient was given instructions and counseling regarding his condition or for health maintenance advice. Please see specific information pulled into the AVS if appropriate.

## 2021-12-23 ENCOUNTER — TELEPHONE (OUTPATIENT)
Dept: PULMONOLOGY | Facility: CLINIC | Age: 36
End: 2021-12-23

## 2021-12-23 VITALS
OXYGEN SATURATION: 95 % | RESPIRATION RATE: 16 BRPM | HEART RATE: 65 BPM | HEIGHT: 76 IN | SYSTOLIC BLOOD PRESSURE: 103 MMHG | TEMPERATURE: 97.7 F | BODY MASS INDEX: 21.92 KG/M2 | WEIGHT: 180 LBS | DIASTOLIC BLOOD PRESSURE: 83 MMHG

## 2021-12-23 LAB — HOLD SPECIMEN: NORMAL

## 2021-12-23 RX ORDER — GLYCOPYRROLATE AND FORMOTEROL FUMARATE 9; 4.8 UG/1; UG/1
2 AEROSOL, METERED RESPIRATORY (INHALATION) 2 TIMES DAILY
Qty: 360 EACH | Refills: 1 | Status: SHIPPED | OUTPATIENT
Start: 2021-12-23 | End: 2022-03-23

## 2021-12-23 NOTE — TELEPHONE ENCOUNTER
Insurance will not cover Trelegy. Will cover: Bevespi and Flovent. Must try and fail these before Trelegy.

## 2021-12-23 NOTE — DISCHARGE INSTRUCTIONS
Drink plenty of fluid.  Tylenol or motrin as needed for pain/fever.  Follow up with PCP - call today for appointment.  Monitor area to liver. Return to ED if condition does not improve or worsens

## 2021-12-23 NOTE — ED PROVIDER NOTES
Subjective   36 yom with PMH of lung disease, chronic back pain, seizures, COPD and Marfan's syndrome presents with multiple complaints.  He states last night he developed right flank pain.  He states then today he flipped and fell down a couple of steps.  He states his low back is now painful.  He denies n/v/d.  He denies fever.  He denies saddle anesthesia or bowel/bladder incontinence. He denies CP or SOB.            Review of Systems   Constitutional: Negative for activity change, appetite change, fatigue and fever.   HENT: Negative for congestion, ear pain, facial swelling and sore throat.    Eyes: Negative for discharge and visual disturbance.   Respiratory: Negative for apnea, chest tightness, shortness of breath, wheezing and stridor.    Cardiovascular: Negative for chest pain and palpitations.   Gastrointestinal: Negative for abdominal distention, abdominal pain, diarrhea, nausea and vomiting.   Genitourinary: Negative for difficulty urinating and dysuria.   Musculoskeletal: Positive for back pain. Negative for arthralgias and myalgias.   Skin: Negative for rash and wound.   Neurological: Negative for dizziness and seizures.   Psychiatric/Behavioral: Negative for agitation and confusion.       Past Medical History:   Diagnosis Date   • Back pain    • Chest pain    • Claustrophobia    • COPD (chronic obstructive pulmonary disease) (HCC)    • Emphysema of lung (HCC)    • Lung disease    • Marfan syndrome    • Pneumothorax    • Seizures (HCC)     when he was a child    • Seizures (HCC)    • Smoking     3/4 pack a day   • Tobacco abuse    • Underweight        Allergies   Allergen Reactions   • Calcium Channel Blockers Other (See Comments)     Do not use in marfan   • Ciprofloxacin Other (See Comments)     Do not use fluoroquinolones in marfan   • Medrol [Methylprednisolone] Other (See Comments)     Patient states he gets violent on this medication        Past Surgical History:   Procedure Laterality Date   •  HERNIA REPAIR     • LUNG LOBECTOMY Right 02/01/2017   • LUNG REMOVAL, TOTAL Right 2015   • THORACOSCOPY N/A 2/3/2017    Procedure: BRONCHOSCOPY, THORACOSCOPY RIGHT CHEST,  WEDGE RESECTION RIGHT UPPER AND LOWER LOBE OF LUNG, MECHANICAL PLEURODESIS;  Surgeon: Kyle Heath MD;  Location: [facility] OR;  Service:    • TYMPANOSTOMY  05/12/2016    Recorded       Family History   Problem Relation Age of Onset   • No Known Problems Mother    • Heart attack Father    • Stroke Father    • No Known Problems Sister    • No Known Problems Daughter    • Cancer Maternal Grandmother         breast   • Breast cancer Maternal Grandmother    • Prostate cancer Neg Hx    • Colon cancer Neg Hx    • Alcohol abuse Neg Hx    • Drug abuse Neg Hx    • Diabetes Neg Hx    • Thyroid cancer Neg Hx        Social History     Socioeconomic History   • Marital status:    Tobacco Use   • Smoking status: Current Every Day Smoker     Packs/day: 0.50     Years: 24.00     Pack years: 12.00     Types: Cigarettes     Start date: 1997   • Smokeless tobacco: Never Used   Vaping Use   • Vaping Use: Never used   Substance and Sexual Activity   • Alcohol use: Not Currently   • Drug use: Not Currently     Types: Methamphetamines, IV     Comment: CLEAN X 1 YEAR   • Sexual activity: Yes     Partners: Female           Objective   Physical Exam  Vitals and nursing note reviewed.   Constitutional:       Appearance: He is well-developed.   HENT:      Head: Normocephalic.   Eyes:      Pupils: Pupils are equal, round, and reactive to light.   Cardiovascular:      Rate and Rhythm: Normal rate and regular rhythm.      Heart sounds: No murmur heard.      Pulmonary:      Effort: Pulmonary effort is normal.      Breath sounds: Normal breath sounds.   Abdominal:      General: Bowel sounds are normal. There is no distension.      Palpations: Abdomen is soft.      Tenderness: There is no abdominal tenderness. There is no right CVA tenderness or left CVA tenderness.    Musculoskeletal:         General: Normal range of motion.      Cervical back: Normal range of motion and neck supple.      Lumbar back: No swelling, deformity, spasms or tenderness. Normal range of motion.      Comments: No deformity or tenderness present of the lumbar spine is present    Skin:     General: Skin is warm and dry.   Neurological:      Mental Status: He is alert and oriented to person, place, and time.         Procedures           ED Course  ED Course as of 12/23/21 0230   Thu Dec 23, 2021   0037 His CT of the abdomen / pelvis = no evidence of acute intra abdominal pathology. There is a 10mm hypoenhancing focus in the dorsal right hepatic lobe.  Gallbladder is impressed.  Appendix is normal. No evidence of hydronephrosis or urinary calculi. Mild splenomegaly.    CT of the lumbar spine - unremarkable  Lab work and UA are also unremarkable.  He will be dc'd home to f/u with Pcp.  He voiced understanding of both results and instructions.  [KS]      ED Course User Index  [KS] Rogers, SandovalistJANESSA Vizcaino                                                 MDM  Number of Diagnoses or Management Options  Liver mass: minor  Strain of lumbar region, initial encounter: minor     Amount and/or Complexity of Data Reviewed  Clinical lab tests: ordered and reviewed  Tests in the radiology section of CPT®: ordered and reviewed  Discuss the patient with other providers: yes    Risk of Complications, Morbidity, and/or Mortality  Presenting problems: minimal  Diagnostic procedures: minimal  Management options: minimal    Patient Progress  Patient progress: stable      Final diagnoses:   Strain of lumbar region, initial encounter   Liver mass       ED Disposition  ED Disposition     ED Disposition Condition Comment    Discharge Stable           Cristy Schmidt APRN  [address]  [facility] [address]  [phone]    Schedule an appointment as soon as possible for a visit in 1 day  Routine ED follow up          Medication List      Stop    HYDROcodone-acetaminophen 7.5-325 MG per tablet  Commonly known as: Norco     predniSONE 20 MG tablet  Commonly known as: Chaim Pham, APRN  12/23/21 [id number]

## 2021-12-25 ENCOUNTER — APPOINTMENT (OUTPATIENT)
Dept: CT IMAGING | Age: 36
End: 2021-12-25
Payer: MEDICAID

## 2021-12-25 ENCOUNTER — HOSPITAL ENCOUNTER (EMERGENCY)
Age: 36
Discharge: HOME | End: 2021-12-25
Payer: MEDICAID

## 2021-12-25 VITALS
TEMPERATURE: 98 F | SYSTOLIC BLOOD PRESSURE: 145 MMHG | HEART RATE: 89 BPM | RESPIRATION RATE: 18 BRPM | OXYGEN SATURATION: 97 % | DIASTOLIC BLOOD PRESSURE: 78 MMHG
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
## 2021-12-25 DIAGNOSIS — M54.50 LUMBAR BACK PAIN: ICD-10-CM

## 2021-12-25 PROCEDURE — 99282 EMERGENCY DEPT VISIT SF MDM: CPT

## 2021-12-25 PROCEDURE — 6370000000 HC RX 637 (ALT 250 FOR IP): Performed by: NURSE PRACTITIONER

## 2021-12-25 PROCEDURE — 72131 CT LUMBAR SPINE W/O DYE: CPT

## 2021-12-25 RX ORDER — HYDROCODONE BITARTRATE AND ACETAMINOPHEN 10; 325 MG/1; MG/1
1 TABLET ORAL ONCE
Status: COMPLETED | OUTPATIENT
Start: 2021-12-25 | End: 2021-12-25

## 2021-12-25 RX ADMIN — HYDROCODONE BITARTRATE AND ACETAMINOPHEN 1 TABLET: 10; 325 TABLET ORAL at 12:38

## 2021-12-25 ASSESSMENT — ENCOUNTER SYMPTOMS
BOWEL INCONTINENCE: 0
ABDOMINAL PAIN: 0
BACK PAIN: 1

## 2021-12-25 ASSESSMENT — PAIN SCALES - GENERAL
PAINLEVEL_OUTOF10: 5
PAINLEVEL_OUTOF10: 7

## 2021-12-25 NOTE — ED PROVIDER NOTES
[facility]  eMERGENCY dEPARTMENT eNCOUnter      Pt Name: Cristina Higuera  MRN: [id number]  [place] 1985  Date of evaluation: 12/25/2021  Provider: JO ANN Perez    CHIEF COMPLAINT       Chief Complaint   Patient presents with    Fall     mechanical from hover board    Back Pain     lumbar         HISTORY OF PRESENT ILLNESS   (Location/Symptom, Timing/Onset,Context/Setting, Quality, Duration, Modifying Factors, Severity)  Note limiting factors. Cristina Higuera is a 29 y.o. male who presents to the emergency department after a fall down several steps just recently. (He did not fall from a hover board)  Patient states he was dressed as [place] and he slipped. Landed on his bottom and bounced down several steps. He denies any previous back surgeries. He has pain to his left lower back. No radiation to his legs    The history is provided by the patient. Back Pain  Location:  Lumbar spine  Quality:  Stabbing  Radiates to:  Does not radiate  Pain severity:  Moderate  Onset quality:  Sudden  Duration:  2 hours  Timing:  Constant  Context: falling    Associated symptoms: no abdominal pain, no bladder incontinence, no bowel incontinence, no fever, no leg pain, no perianal numbness and no weakness    Risk factors: not obese        NursingNotes were reviewed. REVIEW OF SYSTEMS    (2-9 systems for level 4, 10 or more for level 5)     Review of Systems   Constitutional: Negative for fever. Gastrointestinal: Negative for abdominal pain and bowel incontinence. Genitourinary: Negative for bladder incontinence. Musculoskeletal: Positive for back pain. Neurological: Negative for weakness. Except as noted above the remainder of the review of systems was reviewed and negative.        PAST MEDICAL HISTORY     Past Medical History:   Diagnosis Date    [facility]     Emphysema lung ([facility]     Marfan's disease     Pneumothorax          SURGICALHISTORY       Past Surgical History:   Procedure Laterality Date    HERNIA REPAIR      LUNG REMOVAL, PARTIAL Right          CURRENT MEDICATIONS       Discharge Medication List as of 12/25/2021  1:52 PM      CONTINUE these medications which have NOT CHANGED    Details   albuterol sulfate  (90 Base) MCG/ACT inhaler Inhale 2 puffs into the lungs every 4 hours as neededHistorical Med      fluticasone (FLONASE) 50 MCG/ACT nasal spray 2 sprays by Nasal route dailyHistorical Med      Fluticasone-Umeclidin-Vilant (TRELEGY ELLIPTA) 200-62.5-25 MCG/INH AEPB Inhale 1 puff into the lungs DailyHistorical Med      predniSONE (DELTASONE) 10 MG tablet Historical Med      baclofen (LIORESAL) 10 MG tablet Take 10 mg by mouth 3 times dailyHistorical Med             ALLERGIES     Calcium channel blockers, Ciprofloxacin, and Methylprednisolone    FAMILY HISTORY     History reviewed. No pertinent family history. SOCIAL HISTORY       Social History     Socioeconomic History    Marital status: Single     Spouse name: None    Number of children: None    Years of education: None    Highest education level: None   Occupational History    None   Tobacco Use    Smoking status: Current Every Day Smoker     Packs/day: 0.50     Years: 10.00     Pack years: 5.00     Types: Cigarettes    Smokeless tobacco: Never Used   Vaping Use    Vaping Use: Never used   Substance and Sexual Activity    Alcohol use: No    Drug use: No    Sexual activity: None   Other Topics Concern    None   Social History Narrative    None     Social Determinants of Health     Financial Resource Strain:     Difficulty of Paying Living Expenses: Not on file   Food Insecurity:     Worried About Running Out of Food in the Last Year: Not on file    Daren of Food in the Last Year: Not on file   Transportation Needs:     Lack of Transportation (Medical): Not on file    Lack of Transportation (Non-Medical):  Not on file   Physical Activity:     Days of Exercise per Week: Not on file    Minutes of Exercise per Session: Not on file   Stress:     Feeling of Stress : Not on file   Social Connections:     Frequency of Communication with Friends and Family: Not on file    Frequency of Social Gatherings with Friends and Family: Not on file    Attends Islam Services: Not on file    Active Member of [address] [place] [address] [employer] or Organizations: Not on file    Attends Club or Organization Meetings: Not on file    Marital Status: Not on file   Intimate Partner Violence:     Fear of Current or Ex-Partner: Not on file    Emotionally Abused: Not on file    Physically Abused: Not on file    Sexually Abused: Not on file   Housing Stability:     Unable to Pay for Housing in the Last Year: Not on file    Number of [place] in the Last Year: Not on file    Unstable Housing in the Last Year: Not on file       SCREENINGS      @FLOW([id number])@      PHYSICAL EXAM    (up to 7 for level 4, 8 or more for level 5)     ED Triage Vitals   BP Temp Temp src Pulse Resp SpO2 Height Weight   -- -- -- -- -- -- -- --       Physical Exam  Vitals and nursing note reviewed. Constitutional:       Appearance: He is well-developed. HENT:      Head: Normocephalic and atraumatic. Eyes:      General: No scleral icterus. Right eye: No discharge. Left eye: No discharge. Cardiovascular:      Rate and Rhythm: Normal rate. Pulmonary:      Effort: No respiratory distress. Abdominal:      Palpations: Abdomen is soft. Musculoskeletal:      Cervical back: Normal range of motion and neck supple. Lumbar back: Tenderness present. No swelling or deformity. Normal range of motion. Back:    Neurological:      Mental Status: He is alert.    Psychiatric:         Behavior: Behavior normal.         DIAGNOSTIC RESULTS     EKG: All EKG's are interpreted by the Emergency Department Physician who either signs or Co-signsthis chart in the absence of a cardiologist.        RADIOLOGY:   Dio Marroquin filmimages such as CT, Ultrasound and MRI are read by the radiologist. Plain radiographic images are visualized and preliminarily interpreted by the emergency physician with the below findings:      Interpretation per the Radiologist below, if available at the time of this note:    CT LUMBAR SPINE WO CONTRAST   Final Result   Impression:    1. No evidence of acute injury in the osseous lumbar spine. Signed by Dr Kim Dos Santos            ED BEDSIDEULTRASOUND:   Performed by ED Physician -none    LABS:  Labs Reviewed - No data to display    All other labs were within normal range or not returned as of this dictation. EMERGENCY DEPARTMENT COURSE and DIFFERENTIALDIAGNOSIS/MDM:   Vitals:    Vitals:    12/25/21 1330   BP: (!) 145/78   Pulse: 89   Resp: 18   Temp: 98 °F (36.7 °C)   SpO2: 97%           MDM  Better after treatment. Advised to use nsaids and heat      CONSULTS:  None    PROCEDURES:  Unless otherwise noted below, none     Procedures    FINAL IMPRESSION      1. Fall (on) (from) other stairs and steps, initial encounter    2. Lumbar back pain        DISPOSITION/PLAN   DISPOSITION Decision To Discharge 12/25/2021 01:44:39 PM      PATIENT REFERRED TO:  No follow-up provider specified.     DISCHARGE MEDICATIONS:  Discharge Medication List as of 12/25/2021  1:52 PM             (Please note that portions of this note were completed with a voice recognitionprogram.  Efforts were made to edit the dictations but occasionally words are mis-transcribed.)    JO ANN Sanchez (electronically signed)          JO ANN Sanchez  12/26/21 4866

## 2021-12-25 NOTE — Clinical Note
Maricarmen Linares was seen and treated in our emergency department on 12/25/2021. He may return to work on 12/27/2021. If you have any questions or concerns, please don't hesitate to call.       Corinne Bees, APRN

## 2021-12-27 ENCOUNTER — TELEMEDICINE (OUTPATIENT)
Dept: FAMILY MEDICINE CLINIC | Facility: CLINIC | Age: 36
End: 2021-12-27

## 2021-12-27 DIAGNOSIS — R73.9 HYPERGLYCEMIA: ICD-10-CM

## 2021-12-27 DIAGNOSIS — F52.32 DELAYED EJACULATION: Primary | ICD-10-CM

## 2021-12-27 PROCEDURE — 99213 OFFICE O/P EST LOW 20 MIN: CPT | Performed by: NURSE PRACTITIONER

## 2021-12-28 ENCOUNTER — PATIENT ROUNDING (BHMG ONLY) (OUTPATIENT)
Dept: PULMONOLOGY | Facility: CLINIC | Age: 36
End: 2021-12-28

## 2021-12-28 NOTE — TELEPHONE ENCOUNTER
Rx Refill Note  Requested Prescriptions     Pending Prescriptions Disp Refills   • ProAir  (90 Base) MCG/ACT inhaler [Pharmacy Med Name: PROAIR HFA ORAL INH (200  PFS) 8.5G] 8.5 g      Sig: INHALE TWO PUFFS EVERY FOUR HOURS AS NEEDED FOR WHEEZING OR SHORTNESS OF AIR      Last office visit with prescribing clinician: 11.09.2021      Next office visit with prescribing clinician: Visit date not found  Last RX:  11.08.2021  Kaiser Farias MA  12/28/21, 12:14 CST

## 2022-01-04 ENCOUNTER — APPOINTMENT (OUTPATIENT)
Dept: GENERAL RADIOLOGY | Facility: HOSPITAL | Age: 37
End: 2022-01-04

## 2022-01-04 ENCOUNTER — HOSPITAL ENCOUNTER (EMERGENCY)
Facility: HOSPITAL | Age: 37
Discharge: HOME | End: 2022-01-04
Admitting: INTERNAL MEDICINE

## 2022-01-04 VITALS
BODY MASS INDEX: 21.25 KG/M2 | OXYGEN SATURATION: 99 % | TEMPERATURE: 97.6 F | WEIGHT: 180 LBS | RESPIRATION RATE: 18 BRPM | DIASTOLIC BLOOD PRESSURE: 77 MMHG | HEART RATE: 89 BPM | HEIGHT: 77 IN | SYSTOLIC BLOOD PRESSURE: 111 MMHG

## 2022-01-04 DIAGNOSIS — U07.1 COVID-19: Primary | ICD-10-CM

## 2022-01-04 LAB
FLUAV AG NPH QL: NEGATIVE
FLUBV AG NPH QL IA: NEGATIVE
SARS-COV-2 RNA PNL SPEC NAA+PROBE: DETECTED

## 2022-01-04 PROCEDURE — 71045 X-RAY EXAM CHEST 1 VIEW: CPT

## 2022-01-04 PROCEDURE — 87804 INFLUENZA ASSAY W/OPTIC: CPT | Performed by: NURSE PRACTITIONER

## 2022-01-04 PROCEDURE — 87635 SARS-COV-2 COVID-19 AMP PRB: CPT | Performed by: NURSE PRACTITIONER

## 2022-01-04 PROCEDURE — 99283 EMERGENCY DEPT VISIT LOW MDM: CPT

## 2022-01-05 ENCOUNTER — TELEPHONE (OUTPATIENT)
Dept: FAMILY MEDICINE CLINIC | Facility: CLINIC | Age: 37
End: 2022-01-05

## 2022-01-05 NOTE — ED PROVIDER NOTES
Subjective   Patient is a 36-year-old white male presents emergency department with generalized body aches, nausea, cough that started yesterday.  He states he was exposed to several people that had covid-19 about 5 days ago.  He states he is in intensive outpatient therapy program and needs to be tested because his workplace was where he was exposed.  He states that tonight his soda tasted different and he has had loss of taste.  He states he received 1 dose of the Covid vaccination but did not complete the vaccination course.      History provided by:  Patient   used: No        Review of Systems   Constitutional: Negative.    HENT: Negative.    Eyes: Negative.    Respiratory:        Patient is a 36-year-old white male presents emergency department with generalized body aches, nausea, cough that started yesterday.  He states he was exposed to several people that had covid-19 about 5 days ago.  He states he is in intensive outpatient therapy program and needs to be tested because his workplace was where he was exposed.  He states that tonight his soda tasted different and he has had loss of taste.  He states he received 1 dose of the Covid vaccination but did not complete the vaccination course.     Cardiovascular: Negative.    Gastrointestinal: Negative.    Endocrine: Negative.    Genitourinary: Negative.    Musculoskeletal: Negative.    Skin: Negative.    Allergic/Immunologic: Negative.    Neurological: Negative.    Hematological: Negative.    Psychiatric/Behavioral: Negative.    All other systems reviewed and are negative.      Past Medical History:   Diagnosis Date   • Back pain    • Chest pain    • Claustrophobia    • COPD (chronic obstructive pulmonary disease) (HCC)    • Emphysema of lung (HCC)    • Lung disease    • Marfan syndrome    • Pneumothorax    • Seizures (HCC)     when he was a child    • Seizures (HCC)    • Smoking     3/4 pack a day   • Tobacco abuse    • Underweight         Allergies   Allergen Reactions   • Calcium Channel Blockers Other (See Comments)     Do not use in marfan   • Ciprofloxacin Other (See Comments)     Do not use fluoroquinolones in marfan   • Medrol [Methylprednisolone] Other (See Comments)     Patient states he gets violent on this medication        Past Surgical History:   Procedure Laterality Date   • HERNIA REPAIR     • LUNG LOBECTOMY Right 02/01/2017   • LUNG REMOVAL, TOTAL Right 2015   • THORACOSCOPY N/A 2/3/2017    Procedure: BRONCHOSCOPY, THORACOSCOPY RIGHT CHEST,  WEDGE RESECTION RIGHT UPPER AND LOWER LOBE OF LUNG, MECHANICAL PLEURODESIS;  Surgeon: Kyle Heath MD;  Location: [facility] OR;  Service:    • TYMPANOSTOMY  05/12/2016    Recorded       Family History   Problem Relation Age of Onset   • No Known Problems Mother    • Heart attack Father    • Stroke Father    • No Known Problems Sister    • No Known Problems Daughter    • Cancer Maternal Grandmother         breast   • Breast cancer Maternal Grandmother    • Prostate cancer Neg Hx    • Colon cancer Neg Hx    • Alcohol abuse Neg Hx    • Drug abuse Neg Hx    • Diabetes Neg Hx    • Thyroid cancer Neg Hx        Social History     Socioeconomic History   • Marital status:    Tobacco Use   • Smoking status: Current Every Day Smoker     Packs/day: 0.50     Years: 24.00     Pack years: 12.00     Types: Cigarettes     Start date: 1997   • Smokeless tobacco: Never Used   Vaping Use   • Vaping Use: Never used   Substance and Sexual Activity   • Alcohol use: Not Currently   • Drug use: Not Currently     Types: Methamphetamines, IV     Comment: CLEAN X 1 YEAR   • Sexual activity: Yes     Partners: Female       Prior to Admission medications    Medication Sig Start Date End Date Taking? Authorizing Provider   baclofen (LIORESAL) 10 MG tablet TAKE 1 TABLET BY MOUTH THREE TIMES DAILY AS NEEDED FOR MUSCLE SPASMS 11/23/21   Roxana, Jasmaine, APRN   fluticasone (Flonase Allergy Relief) 50 MCG/ACT nasal  "spray 2 sprays into the nostril(s) as directed by provider Daily. 12/21/21   Buster Fitch MD   Glycopyrrolate-Formoterol (Bevespi Aerosphere) 9-4.8 MCG/ACT aerosol Inhale 2 sprays 2 (Two) Times a Day for 90 days. 12/23/21 3/23/22  Buster Fitch MD   loratadine (CLARITIN) 10 MG tablet Take 1 tablet by mouth Daily. 12/21/21   Buster Fitch MD   nicotine (Nicoderm CQ) 21 MG/24HR patch Place 1 patch on the skin as directed by provider Daily. 11/9/21   Terrie Chappell DNP, APRN   ProAir  (90 Base) MCG/ACT inhaler INHALE TWO PUFFS EVERY FOUR HOURS AS NEEDED FOR WHEEZING OR SHORTNESS OF AIR 12/29/21   Cristy Schmidt APRN   vardenafil (Levitra) 10 MG tablet Take 1 tablet by mouth Daily As Needed for Erectile Dysfunction (30 min prior to sexual activity). 12/2/21 12/17/21  Cristy Schmidt APRN       /77 (BP Location: Right arm, Patient Position: Lying)   Pulse 89   Temp 97.6 °F (36.4 °C) (Oral)   Resp 18   Ht 195.6 cm (77\")   Wt 81.6 kg (180 lb)   SpO2 99%   BMI 21.34 kg/m²     Objective   Physical Exam  Vitals and nursing note reviewed.   Constitutional:       Appearance: He is well-developed.   HENT:      Head: Normocephalic and atraumatic.   Eyes:      Conjunctiva/sclera: Conjunctivae normal.      Pupils: Pupils are equal, round, and reactive to light.   Cardiovascular:      Rate and Rhythm: Normal rate and regular rhythm.      Heart sounds: Normal heart sounds.   Pulmonary:      Effort: Pulmonary effort is normal.      Breath sounds: Normal breath sounds.   Abdominal:      General: Bowel sounds are normal.      Palpations: Abdomen is soft.   Musculoskeletal:         General: Normal range of motion.      Cervical back: Normal range of motion and neck supple.   Skin:     General: Skin is warm and dry.   Neurological:      Mental Status: He is alert and oriented to person, place, and time.      Deep Tendon Reflexes: Reflexes are normal and symmetric.   Psychiatric:         " Behavior: Behavior normal.         Thought Content: Thought content normal.         Judgment: Judgment normal.         Procedures         Lab Results (last 24 hours)     Procedure Component Value Units Date/Time    COVID PRE-OP / PRE-PROCEDURE SCREENING ORDER (NO ISOLATION) - Swab, Nasal Cavity [[id number]]  (Abnormal) Collected: 01/04/22 2143    Specimen: Swab from Nasal Cavity Updated: 01/04/22 2242    Narrative:      The following orders were created for panel order COVID PRE-OP / PRE-PROCEDURE SCREENING ORDER (NO ISOLATION) - Swab, Nasal Cavity.  Procedure                               Abnormality         Status                     ---------                               -----------         ------                     COVID-19,[facility]...[[id number]]  Abnormal            Final result                 Please view results for these tests on the individual orders.    Influenza Antigen, Rapid - Swab, Nasopharynx [[id number]]  (Normal) Collected: 01/04/22 2143    Specimen: Swab from Nasopharynx Updated: 01/04/22 2216     Influenza A Ag, EIA Negative     Influenza B Ag, EIA Negative    Narrative:      Recommend confirmation of negative results by viral culture or molecular assay.    COVID-19,[facility] IN-HOUSE,Nasal Swab No Transport Media 3-4 HR TAT - Swab, Nasal Cavity [[id number]]  (Abnormal) Collected: 01/04/22 2143    Specimen: Swab from Nasal Cavity Updated: 01/04/22 2242     COVID19 Detected    Narrative:      Fact sheet for providers: https://www.fda.gov/media/005335/download     Fact sheet for patients: https://www.fda.gov/media/755808/download    Test performed by PCR.    Consider negative results in combination with clinical observations, patient history, and epidemiological information.          XR Chest 1 View   ED Interpretation   No acute consolidation or focality of pneumonia noted. Interpreted per me.          ED Course  ED Course as of 01/04/22 2358   Tue Jan 04, 2022 2246 Covid-19 is positive.   Influenza a and influenza B are negative.  Chest x-ray shows no acute consolidation or pneumonia.  Will place patient on quarantine.  Advised increase oral fluids.  Patient be discharged shortly in stable condition. [CW]      ED Course User Index  [CW] Linda Montoya, JANESSA          MDM  Number of Diagnoses or Management Options  COVID-19: minor     Amount and/or Complexity of Data Reviewed  Clinical lab tests: ordered and reviewed  Tests in the radiology section of CPT®: ordered and reviewed  Independent visualization of images, tracings, or specimens: yes    Patient Progress  Patient progress: stable      Final diagnoses:   COVID-19          Linda Montoya, APRN  01/04/22 2257

## 2022-01-05 NOTE — TELEPHONE ENCOUNTER
Pt was seen at  ED yesterday but no treatment. Please advise if something can be sent or what Pt can do or if Pt needs another appt.

## 2022-01-05 NOTE — TELEPHONE ENCOUNTER
Caller: Ross Platt    Relationship: Self    Best call back number: [phone]  What is the best time to reach you: AS SOON AS POSSIBLE PLEASE     Who are you requesting to speak with (clinical staff, provider,  specific staff member): PROVIDER OR CLINICAL     What was the call regarding: PATIENT REQUESTING A CALL BACK TO DISCUSS WHAT HE CAN TAKE TO TREAT HIS COVID SYMPTOMS SINCE HE HAS ONE LUNG  COUGH SORE THROAT BODY ACHE NO TASTE OR SMELL   PATIENT TESTED POSITIVE LAST NIGHT     Do you require a callback:YES

## 2022-01-05 NOTE — TELEPHONE ENCOUNTER
Needs video visit scheduled for telehealth visit to discuss treatment. If worse prior to appt go to er.

## 2022-01-06 ENCOUNTER — TELEMEDICINE (OUTPATIENT)
Dept: FAMILY MEDICINE CLINIC | Facility: CLINIC | Age: 37
End: 2022-01-06

## 2022-01-06 DIAGNOSIS — U07.1 COVID-19: ICD-10-CM

## 2022-01-06 DIAGNOSIS — U07.1 COVID-19: Primary | ICD-10-CM

## 2022-01-06 PROCEDURE — 99213 OFFICE O/P EST LOW 20 MIN: CPT | Performed by: NURSE PRACTITIONER

## 2022-01-06 RX ORDER — ACETAMINOPHEN 325 MG/1
650 TABLET ORAL
Status: CANCELLED | OUTPATIENT
Start: 2022-01-07

## 2022-01-06 RX ORDER — ACETAMINOPHEN 325 MG/1
650 TABLET ORAL ONCE
Status: CANCELLED
Start: 2022-01-07 | End: 2022-01-07

## 2022-01-06 RX ORDER — DIPHENHYDRAMINE HYDROCHLORIDE 50 MG/ML
50 INJECTION INTRAMUSCULAR; INTRAVENOUS
Status: CANCELLED | OUTPATIENT
Start: 2022-01-07

## 2022-01-06 RX ORDER — EPINEPHRINE 1 MG/ML
0.3 INJECTION, SOLUTION, CONCENTRATE INTRAVENOUS PRN
Status: CANCELLED | OUTPATIENT
Start: 2022-01-07

## 2022-01-06 RX ORDER — SODIUM CHLORIDE 0.9 % (FLUSH) 0.9 %
5-40 SYRINGE (ML) INJECTION PRN
Status: CANCELLED | OUTPATIENT
Start: 2022-01-07

## 2022-01-06 RX ORDER — SODIUM CHLORIDE 9 MG/ML
INJECTION, SOLUTION INTRAVENOUS CONTINUOUS
Status: CANCELLED | OUTPATIENT
Start: 2022-01-07

## 2022-01-06 RX ORDER — DIPHENHYDRAMINE HYDROCHLORIDE 50 MG/ML
25 INJECTION INTRAMUSCULAR; INTRAVENOUS ONCE
Status: CANCELLED
Start: 2022-01-07 | End: 2022-01-07

## 2022-01-06 RX ORDER — ONDANSETRON 2 MG/ML
8 INJECTION INTRAMUSCULAR; INTRAVENOUS
Status: CANCELLED | OUTPATIENT
Start: 2022-01-07

## 2022-01-06 RX ORDER — METHYLPREDNISOLONE 4 MG/1
TABLET ORAL
Qty: 21 TABLET | Refills: 0 | Status: SHIPPED | OUTPATIENT
Start: 2022-01-06 | End: 2022-06-15

## 2022-01-06 RX ORDER — DEXTROMETHORPHAN HYDROBROMIDE AND PROMETHAZINE HYDROCHLORIDE 15; 6.25 MG/5ML; MG/5ML
5 SYRUP ORAL 4 TIMES DAILY PRN
Qty: 118 ML | Refills: 0 | Status: SHIPPED | OUTPATIENT
Start: 2022-01-06 | End: 2022-06-15

## 2022-01-06 RX ORDER — ALBUTEROL SULFATE 90 UG/1
4 AEROSOL, METERED RESPIRATORY (INHALATION) PRN
Status: CANCELLED | OUTPATIENT
Start: 2022-01-07

## 2022-01-06 RX ORDER — AZITHROMYCIN 250 MG/1
TABLET, FILM COATED ORAL
Qty: 6 TABLET | Refills: 0 | Status: SHIPPED | OUTPATIENT
Start: 2022-01-06 | End: 2022-06-15

## 2022-01-06 NOTE — PROGRESS NOTES
Chief Complaint  Illness (Covid)    Subjective          Ross Platt presents to [facility]  History of Present Illness  Covid  Negative.  Symptoms started 3 days ago.  Tested positive on 1/4/2022 at  ED.  Patient reports currently has symptoms of body aches, chills, chest congestion, nasal congestion rhinorrhea, sore throat, headache, cough.  Has been using DayQuil and NyQuil with temporary improvement.  He is a high risk patient with chronic lung I have set him up for a monoclonal antibody for tomorrow we discussed this treatment option.  Denies any significant shortness of breath right now.      Objective   Vital Signs:   There were no vitals taken for this visit.    Physical Exam   No vital signs or bmi obtained for this encounter.      Physical exam-  · General appearance- Alert, appears ill. Dressed appropriately. No distress.   · HEENT- external examination of eyes, ears and nose all normal. Head is atraumatic. Hearing normal.   · Neck is symmetric, trachea midline.   · Normal respiratory effort.   · Digits and nails appear healthy. No clubbing, rashes or discolorations.   · Normal range of motion of all extremities.  · Mood appropriate. Communicates easily. Normal judgement and insight. Oriented to time, place and person. Memory appears intact.       Result Review :                 Assessment and Plan    Diagnoses and all orders for this visit:    1. COVID-19 (Primary)    Other orders  -     methylPREDNISolone (MEDROL) 4 MG dose pack; Take as directed on package instructions.  Dispense: 21 tablet; Refill: 0  -     azithromycin (Zithromax Z-Mariano) 250 MG tablet; Take 2 tablets by mouth on day 1, then 1 tablet daily on days 2-5  Dispense: 6 tablet; Refill: 0  -     promethazine-dextromethorphan (PROMETHAZINE-DM) 6.25-15 MG/5ML syrup; Take 5 mL by mouth 4 (Four) Times a Day As Needed for Cough.  Dispense: 118 mL; Refill: 0      Hold antibiotic for few days unless  needed.  Antibody infusion tomorrow  ER if worse        Follow Up   Return in about 1 week (around 1/13/2022), or if symptoms worsen or fail to improve.  Patient was given instructions and counseling regarding his condition or for health maintenance advice. Please see specific information pulled into the AVS if appropriate.

## 2022-01-07 ENCOUNTER — HOSPITAL ENCOUNTER (OUTPATIENT)
Dept: INFUSION THERAPY | Age: 37
Setting detail: INFUSION SERIES
Discharge: HOME | End: 2022-01-07
Payer: MEDICAID

## 2022-01-07 ENCOUNTER — TELEPHONE (OUTPATIENT)
Dept: FAMILY MEDICINE CLINIC | Facility: CLINIC | Age: 37
End: 2022-01-07

## 2022-01-07 VITALS
RESPIRATION RATE: 16 BRPM | DIASTOLIC BLOOD PRESSURE: 56 MMHG | TEMPERATURE: 98 F | SYSTOLIC BLOOD PRESSURE: 84 MMHG | OXYGEN SATURATION: 99 % | HEART RATE: 72 BPM

## 2022-01-07 DIAGNOSIS — U07.1 COVID-19: Primary | ICD-10-CM

## 2022-01-07 PROBLEM — F17.200 SMOKING: Status: ACTIVE | Noted: 2017-02-02

## 2022-01-07 PROBLEM — J43.9 BULLOUS EMPHYSEMA (HCC): Status: ACTIVE | Noted: 2017-02-02

## 2022-01-07 PROBLEM — Q87.40 MARFAN SYNDROME: Status: ACTIVE | Noted: 2017-02-07

## 2022-01-07 PROBLEM — I25.9 CHEST PAIN DUE TO MYOCARDIAL ISCHEMIA: Status: ACTIVE | Noted: 2020-05-31

## 2022-01-07 PROBLEM — J93.9 PNEUMOTHORAX: Status: ACTIVE | Noted: 2017-02-01

## 2022-01-07 PROBLEM — F19.20 POLYSUBSTANCE (EXCLUDING OPIOIDS) DEPENDENCE (HCC): Status: ACTIVE | Noted: 2020-06-01

## 2022-01-07 PROBLEM — M51.26 DISPLACEMENT OF LUMBAR INTERVERTEBRAL DISC WITHOUT MYELOPATHY: Status: ACTIVE | Noted: 2017-10-30

## 2022-01-07 PROBLEM — R91.1 SOLITARY PULMONARY NODULE: Status: ACTIVE | Noted: 2017-09-07

## 2022-01-07 PROBLEM — I21.4 NSTEMI (NON-ST ELEVATED MYOCARDIAL INFARCTION) (HCC): Status: ACTIVE | Noted: 2020-05-31

## 2022-01-07 PROBLEM — R09.1 PLEURISY: Status: ACTIVE | Noted: 2021-11-18

## 2022-01-07 PROBLEM — R63.6 UNDERWEIGHT: Status: ACTIVE | Noted: 2017-10-30

## 2022-01-07 PROCEDURE — 6370000000 HC RX 637 (ALT 250 FOR IP): Performed by: NURSE PRACTITIONER

## 2022-01-07 PROCEDURE — 2580000003 HC RX 258: Performed by: NURSE PRACTITIONER

## 2022-01-07 PROCEDURE — M0245 HC IV INFUSION BAMLANIVIMAB & ETESEVIMAB W/MONITORING: HCPCS

## 2022-01-07 PROCEDURE — 96374 THER/PROPH/DIAG INJ IV PUSH: CPT

## 2022-01-07 PROCEDURE — 6360000002 HC RX W HCPCS: Performed by: NURSE PRACTITIONER

## 2022-01-07 PROCEDURE — 2500000003 HC RX 250 WO HCPCS: Performed by: NURSE PRACTITIONER

## 2022-01-07 RX ORDER — SODIUM CHLORIDE 9 MG/ML
INJECTION, SOLUTION INTRAVENOUS CONTINUOUS
Status: CANCELLED | OUTPATIENT
Start: 2022-01-07

## 2022-01-07 RX ORDER — DEXTROMETHORPHAN HYDROBROMIDE AND PROMETHAZINE HYDROCHLORIDE 15; 6.25 MG/5ML; MG/5ML
5 SYRUP ORAL 4 TIMES DAILY PRN
COMMUNITY
Start: 2022-01-06 | End: 2022-05-08

## 2022-01-07 RX ORDER — DIPHENHYDRAMINE HYDROCHLORIDE 50 MG/ML
25 INJECTION INTRAMUSCULAR; INTRAVENOUS ONCE
Status: CANCELLED
Start: 2022-01-07 | End: 2022-01-07

## 2022-01-07 RX ORDER — DIPHENHYDRAMINE HYDROCHLORIDE 50 MG/ML
50 INJECTION INTRAMUSCULAR; INTRAVENOUS
Status: CANCELLED | OUTPATIENT
Start: 2022-01-07

## 2022-01-07 RX ORDER — AZITHROMYCIN 250 MG/1
TABLET, FILM COATED ORAL
COMMUNITY
Start: 2022-01-06 | End: 2022-05-08

## 2022-01-07 RX ORDER — ACETAMINOPHEN 325 MG/1
650 TABLET ORAL ONCE
Status: CANCELLED
Start: 2022-01-07 | End: 2022-01-07

## 2022-01-07 RX ORDER — ACETAMINOPHEN 325 MG/1
650 TABLET ORAL
Status: CANCELLED | OUTPATIENT
Start: 2022-01-07

## 2022-01-07 RX ORDER — SODIUM CHLORIDE 0.9 % (FLUSH) 0.9 %
5-40 SYRINGE (ML) INJECTION PRN
Status: CANCELLED | OUTPATIENT
Start: 2022-01-07

## 2022-01-07 RX ORDER — ALBUTEROL SULFATE 90 UG/1
4 AEROSOL, METERED RESPIRATORY (INHALATION) PRN
Status: CANCELLED | OUTPATIENT
Start: 2022-01-07

## 2022-01-07 RX ORDER — ACETAMINOPHEN 325 MG/1
650 TABLET ORAL ONCE
Status: COMPLETED | OUTPATIENT
Start: 2022-01-07 | End: 2022-01-07

## 2022-01-07 RX ORDER — ONDANSETRON 2 MG/ML
8 INJECTION INTRAMUSCULAR; INTRAVENOUS
Status: CANCELLED | OUTPATIENT
Start: 2022-01-07

## 2022-01-07 RX ORDER — SODIUM CHLORIDE 9 MG/ML
INJECTION, SOLUTION INTRAVENOUS CONTINUOUS
Status: ACTIVE | OUTPATIENT
Start: 2022-01-07 | End: 2022-01-07

## 2022-01-07 RX ORDER — DIPHENHYDRAMINE HYDROCHLORIDE 50 MG/ML
25 INJECTION INTRAMUSCULAR; INTRAVENOUS ONCE
Status: COMPLETED | OUTPATIENT
Start: 2022-01-07 | End: 2022-01-07

## 2022-01-07 RX ORDER — EPINEPHRINE 1 MG/ML
0.3 INJECTION, SOLUTION, CONCENTRATE INTRAVENOUS PRN
Status: CANCELLED | OUTPATIENT
Start: 2022-01-07

## 2022-01-07 RX ORDER — METHYLPREDNISOLONE 4 MG/1
TABLET ORAL
COMMUNITY
Start: 2022-01-06 | End: 2022-05-08

## 2022-01-07 RX ADMIN — SODIUM CHLORIDE: 9 INJECTION, SOLUTION INTRAVENOUS at 11:35

## 2022-01-07 RX ADMIN — DIPHENHYDRAMINE HYDROCHLORIDE 25 MG: 50 INJECTION, SOLUTION INTRAMUSCULAR; INTRAVENOUS at 11:34

## 2022-01-07 RX ADMIN — SODIUM CHLORIDE: 9 INJECTION, SOLUTION INTRAVENOUS at 10:45

## 2022-01-07 RX ADMIN — ACETAMINOPHEN 650 MG: 325 TABLET ORAL at 10:45

## 2022-01-07 ASSESSMENT — PAIN SCALES - GENERAL: PAINLEVEL_OUTOF10: 0

## 2022-01-07 NOTE — TELEPHONE ENCOUNTER
Regarding: FW: Mri      ----- Message -----  From: Eveline Reich LPN  Sent: 12/30/2021   3:54 PM CST  To: JANESSA Little  Subject: Mri                                              ----- Message from Eveline Reich LPN sent at 12/30/2021  3:54 PM CST -----       ----- Message sent from Eveline Reich LPN to Ross Platt at 12/30/2021  3:54 PM -----   I will forward this to Cristy for her review.       ----- Message -----       From:Ross Platt       Sent:12/30/2021  3:52 PM CST         To:JANESSA Little    Subject:Mri    I went to one then they wanted to come back for a m r I  but they called me and said my insurance won't cover the m r I so now I dnt no what to do and I can't get no meds till then      ----- Message -----       From:LINDA PALENCIA       Sent:12/30/2021  3:48 PM CST         To:Ross Platt    Subject:Mri    Did you have an appointment with them on the 14th?  -LINDA Mosquera      ----- Message -----       From:Ross Platt       Sent:12/30/2021  1:21 PM CST         To:JANESSA Little    Subject:Mri    Hey pain management said my insurance won't pay for m r I there so I need u to schedule one for me plz

## 2022-01-17 DIAGNOSIS — R06.02 SHORTNESS OF BREATH: ICD-10-CM

## 2022-01-18 NOTE — TELEPHONE ENCOUNTER
Rx Refill Note  Requested Prescriptions     Pending Prescriptions Disp Refills   • Advair Diskus 500-50 MCG/DOSE DISKUS [Pharmacy Med Name: ADVAIR DISKUS 500/50MCG (RED) 60S] 60 each 0     Sig: INHALE 1 PUFF BY MOUTH TWICE DAILY      Last office visit with prescribing clinician: 1/06/2022      Next office visit with prescribing clinician: Visit date not found  Last RX:    Kaiser Farias MA  01/18/22, 16:24 CST

## 2022-01-20 ENCOUNTER — TELEPHONE (OUTPATIENT)
Dept: FAMILY MEDICINE CLINIC | Facility: CLINIC | Age: 37
End: 2022-01-20

## 2022-01-20 DIAGNOSIS — M54.12 CERVICAL RADICULOPATHY: Primary | ICD-10-CM

## 2022-01-20 NOTE — TELEPHONE ENCOUNTER
Please get ahold of pain management and find out about follow up appt, ect. The pain medication was only until he was seen by pain mgmt and they would take over treating pain.

## 2022-01-20 NOTE — TELEPHONE ENCOUNTER
Pt is requesting Norco.   I seen where he was supposed to follow up with pain management but stated that insurance will not pay for pain management.   Please advise.

## 2022-01-20 NOTE — TELEPHONE ENCOUNTER
Called [facility] and spoke with Rossana.  Rossana states they do accept [employer] and visits are covered by [employer]. She had told patient to get with PCP to get an MRI ordered and then follow back up with pain management. She stated they did not tell him that insurance wouldn't cover anything.      I called patient.  Pt stated he was told that his insurance wouldn't cover pain management but I told pt that I had spoken with pain management and they did accept insurance but he needed a MRI by PCP.  Pt stated he was confused and asked that the MRI be ordered and scheduled as soon as possible.

## 2022-01-20 NOTE — TELEPHONE ENCOUNTER
Caller: Ross Platt    Relationship: Self    Best call back number: [phone]    What medication are you requesting: HYDROCODONE NARCO 7.5MG     If a prescription is needed, what is your preferred pharmacy and phone number: [id number] - [facility] - [address] AT [facility] [address] [facility] [address] - [phone]  - [phone]      Additional notes:  PATIENT STATES HE I SNOT NAOMI TO GET IN PAIN MANAGEMENT YET

## 2022-01-21 ENCOUNTER — TELEPHONE (OUTPATIENT)
Dept: FAMILY MEDICINE CLINIC | Facility: CLINIC | Age: 37
End: 2022-01-21

## 2022-01-21 NOTE — TELEPHONE ENCOUNTER
Caller: Ross Platt    Relationship: Self    Best call back number: [phone]    What is the best time to reach you: ANY    Who are you requesting to speak with (clinical staff, provider,  specific staff member): SABRINA    What was the call regarding: PATIENT CONFUSED, CALLED TO INQUIRE ABOUT STATUS OF ORDER FOR HYDROCODONE     Do you require a callback: YES, PLEASE ADVISE

## 2022-01-21 NOTE — TELEPHONE ENCOUNTER
Caller: Ross Platt    Relationship: Self    Best call back number: [phone]    What medication are you requesting: ANTIBIOTIC    What are your current symptoms: STY ON EYE  SWOLLEN SHUT  MATTED OVER     How long have you been experiencing symptoms:  ALL WEEK     Have you had these symptoms before:    [] Yes  [x] No    Have you been treated for these symptoms before:   [] Yes  [x] No    If a prescription is needed, what is your preferred pharmacy and phone number: [id number] [place], [place] - [address] AT [facility] [address] - [phone]  - [phone] FX

## 2022-01-22 ENCOUNTER — HOSPITAL ENCOUNTER (EMERGENCY)
Facility: HOSPITAL | Age: 37
Discharge: HOME | End: 2022-01-22
Admitting: FAMILY MEDICINE

## 2022-01-22 VITALS
HEART RATE: 83 BPM | TEMPERATURE: 98 F | RESPIRATION RATE: 16 BRPM | BODY MASS INDEX: 21.49 KG/M2 | WEIGHT: 182 LBS | DIASTOLIC BLOOD PRESSURE: 75 MMHG | OXYGEN SATURATION: 100 % | HEIGHT: 77 IN | SYSTOLIC BLOOD PRESSURE: 112 MMHG

## 2022-01-22 DIAGNOSIS — H00.014 HORDEOLUM OF LEFT UPPER EYELID, UNSPECIFIED HORDEOLUM TYPE: Primary | ICD-10-CM

## 2022-01-22 PROCEDURE — 99283 EMERGENCY DEPT VISIT LOW MDM: CPT

## 2022-01-22 RX ORDER — ERYTHROMYCIN 5 MG/G
OINTMENT OPHTHALMIC
Qty: 1 G | Refills: 0 | Status: SHIPPED | OUTPATIENT
Start: 2022-01-22 | End: 2022-06-15

## 2022-01-22 RX ORDER — HYDROCODONE BITARTRATE AND ACETAMINOPHEN 5; 325 MG/1; MG/1
1 TABLET ORAL ONCE
Status: COMPLETED | OUTPATIENT
Start: 2022-01-22 | End: 2022-01-22

## 2022-01-22 RX ADMIN — HYDROCODONE BITARTRATE AND ACETAMINOPHEN 1 TABLET: 5; 325 TABLET ORAL at 17:04

## 2022-01-22 NOTE — ED PROVIDER NOTES
Subjective   History of Present Illness    Patient is an otherwise healthy 36-year-old male presenting to ED with left eye stye.  PMH significant for seizure disorder, COPD, history of lung disease, Marfan syndrome, history of pneumothorax with thoracoscopy.  Patient states approximately a week and 1/2 to 2 weeks ago he began developing pain, swelling, and a bump on his left upper lateral eyelid.  Patient denies use of any moisturizers, make-up products, or any other products to the eye.  Patient states that the area has continued to swell for which she has been using over-the-counter eyedrops as well as triple antibiotic ointment.  Patient reports that over the past day it has become increasingly painful and he presents for further evaluation at this time.  Patient states that when it does try to drain he will have blurry vision which when he blinks will go away but denies any further diplopia, loss of vision.  Patient denies fevers, any other URI symptoms, any known injuries, or known sick contact.    Records reviewed show patient was last seen in the ED on 1/4/2022 for COVID-19.    Review of Systems   Constitutional: Negative.  Negative for fever.   HENT: Negative.  Negative for congestion, sinus pressure and sore throat.    Eyes: Positive for pain (Left upper eyelid). Negative for photophobia, discharge, redness, itching and visual disturbance.   Respiratory: Negative.    Cardiovascular: Negative.    Gastrointestinal: Negative.    Genitourinary: Negative.    Musculoskeletal: Negative.    Skin: Negative.    Neurological: Negative.    Psychiatric/Behavioral: Negative.    All other systems reviewed and are negative.      Past Medical History:   Diagnosis Date   • Back pain    • Chest pain    • Claustrophobia    • COPD (chronic obstructive pulmonary disease) (HCC)    • Emphysema of lung (HCC)    • Lung disease    • Marfan syndrome    • Pneumothorax    • Seizures (HCC)     when he was a child    • Seizures (HCC)    •  Smoking     3/4 pack a day   • Tobacco abuse    • Underweight        Allergies   Allergen Reactions   • Calcium Channel Blockers Other (See Comments)     Do not use in marfan   • Ciprofloxacin Other (See Comments)     Do not use fluoroquinolones in marfan   • Medrol [Methylprednisolone] Other (See Comments)     Patient states he gets violent on this medication        Past Surgical History:   Procedure Laterality Date   • HERNIA REPAIR     • LUNG LOBECTOMY Right 02/01/2017   • LUNG REMOVAL, TOTAL Right 2015   • THORACOSCOPY N/A 2/3/2017    Procedure: BRONCHOSCOPY, THORACOSCOPY RIGHT CHEST,  WEDGE RESECTION RIGHT UPPER AND LOWER LOBE OF LUNG, MECHANICAL PLEURODESIS;  Surgeon: Kyle Heath MD;  Location: [facility];  Service:    • TYMPANOSTOMY  05/12/2016    Recorded       Family History   Problem Relation Age of Onset   • No Known Problems Mother    • Heart attack Father    • Stroke Father    • No Known Problems Sister    • No Known Problems Daughter    • Cancer Maternal Grandmother         breast   • Breast cancer Maternal Grandmother    • Prostate cancer Neg Hx    • Colon cancer Neg Hx    • Alcohol abuse Neg Hx    • Drug abuse Neg Hx    • Diabetes Neg Hx    • Thyroid cancer Neg Hx        Social History     Socioeconomic History   • Marital status:    Tobacco Use   • Smoking status: Current Every Day Smoker     Packs/day: 0.25     Years: 24.00     Pack years: 6.00     Types: Cigarettes     Start date: 1997   • Smokeless tobacco: Never Used   Vaping Use   • Vaping Use: Never used   Substance and Sexual Activity   • Alcohol use: Not Currently   • Drug use: Not Currently   • Sexual activity: Yes     Partners: Female           Objective   Physical Exam  Vitals and nursing note reviewed.   Constitutional:       General: He is not in acute distress.     Appearance: Normal appearance. He is well-developed, well-groomed and normal weight. He is not ill-appearing, toxic-appearing or diaphoretic.   HENT:      Head:  Normocephalic.      Right Ear: External ear normal.      Left Ear: External ear normal.      Nose: Nose normal. No congestion.      Right Sinus: No maxillary sinus tenderness or frontal sinus tenderness.      Left Sinus: No maxillary sinus tenderness or frontal sinus tenderness.      Mouth/Throat:      Mouth: Mucous membranes are moist.      Pharynx: Oropharynx is clear.   Eyes:      General:         Right eye: No discharge or hordeolum.         Left eye: Hordeolum (Left upper lateral eyelid) present.No discharge.      Extraocular Movements: Extraocular movements intact.      Right eye: No nystagmus.      Left eye: No nystagmus.      Conjunctiva/sclera: Conjunctivae normal.      Right eye: Right conjunctiva is not injected. No exudate.     Left eye: Left conjunctiva is not injected. No exudate.     Pupils: Pupils are equal, round, and reactive to light.        Comments: Hordeolum noted to the left upper lateral eyelid with no further evidence of periorbital cellulitis. Normal inspection lower left eyelid as well as entire right periorbital region and upper and lower lids.   Cardiovascular:      Rate and Rhythm: Normal rate.   Pulmonary:      Effort: Pulmonary effort is normal.      Breath sounds: Normal breath sounds.   Abdominal:      General: Bowel sounds are normal.      Palpations: Abdomen is soft.   Musculoskeletal:         General: Normal range of motion.      Cervical back: Normal range of motion and neck supple.   Skin:     General: Skin is warm and dry.   Neurological:      Mental Status: He is alert and oriented to person, place, and time.      Gait: Gait normal.   Psychiatric:         Attention and Perception: Attention normal.         Mood and Affect: Mood and affect normal.         Speech: Speech normal.         Behavior: Behavior normal. Behavior is cooperative.         Procedures           ED Course                                                 MDM  Number of Diagnoses or Management Options      Amount and/or Complexity of Data Reviewed  Tests in the medicine section of CPT®: ordered and reviewed  Decide to obtain previous medical records or to obtain history from someone other than the patient: yes  Review and summarize past medical records: yes    Patient Progress  Patient progress: improved      Patient is an otherwise healthy 36-year-old male presenting to ED with left eye stye.  PMH significant for seizure disorder, COPD, history of lung disease, Marfan syndrome, history of pneumothorax with thoracoscopy.  Upon evaluation a small amount of discharge was expressed from the hordeolum.  Discussed with patient importance of warm compresses as well as ability to use antibiotic ointments.  Discussed need for continued follow-up with his primary care provider or an ophthalmologist and provided patient information for on-call providers.  Discussed return precautions and informed return to ED should he develop any new or worsening symptoms.  Examination reassuring with no evidence of [place], no evidence of periorbital cellulitis, no further symptoms including afebrile state, no evidence of URI infection.  Patient was educated on strict return precautions and need for immediate return to ED should he develop any new or worsening symptoms and very appreciate with no further questions, concerns, or needs at this time.  Patient is stable for discharge.      Final diagnoses:   Hordeolum of left upper eyelid, unspecified hordeolum type       ED Disposition  ED Disposition     ED Disposition Condition Comment    Discharge Stable           Cristy Schmidt, JANESSA  [facility] [address] [phone]    Schedule an appointment as soon as possible for a visit in 2 days      Jose Alberto Peña MD  [address] [facility] [address] [phone]          [facility] Emergency Department  [address] [place] [id number] [phone]    As needed          Medication List      New Prescriptions    erythromycin 5 MG/GM ophthalmic ointment  Commonly known as: ROMYCIN  Administer  into the left eye Every 4 (Four) Hours While Awake.           Where to Get Your Medications      These medications were sent to [id number] - [place], [place] - [address] AT [facility] [address] - [phone]  - [phone] FX  [address] [address]    Phone: [phone]   · erythromycin 5 MG/GM ophthalmic ointment          [place] PA-C  01/22/22 [id number]

## 2022-01-22 NOTE — DISCHARGE INSTRUCTIONS
Please apply warm compresses to your eye is much as tolerated as we discussed.  Please use the eye ointment in between compresses.  Please help with your primary care provider, the ophthalmologist for reevaluation within the next 24 to 48 hours per  Should you develop any new or worsening symptoms please return to the ER for further evaluation.        Stye    A stye, also known as a hordeolum, is a bump that forms on an eyelid. It may look like a pimple next to the eyelash. A stye can form inside the eyelid (internal stye) or outside the eyelid (external stye). A stye can cause redness, swelling, and pain on the eyelid.  Styes are very common. Anyone can get them at any age. They usually occur in just one eye, but you may have more than one in either eye.  What are the causes?  A stye is caused by an infection. The infection is almost always caused by bacteria called Staphylococcus aureus. This is a common type of bacteria that lives on the skin.  An internal stye may result from an infected oil-producing gland inside the eyelid. An external stye may be caused by an infection at the base of the eyelash (hair follicle).  What increases the risk?  You are more likely to develop a stye if:  · You have had a stye before.  · You have any of these conditions:  ? Diabetes.  ? Red, itchy, inflamed eyelids (blepharitis).  ? A skin condition such as seborrheic dermatitis or rosacea.  ? High fat levels in your blood (lipids).  What are the signs or symptoms?  The most common symptom of a stye is eyelid pain. Internal styes are more painful than external styes. Other symptoms may include:  · Painful swelling of your eyelid.  · A scratchy feeling in your eye.  · Tearing and redness of your eye.  · Pus draining from the stye.  How is this diagnosed?  Your health care provider may be able to diagnose a stye just by examining your eye. The health care provider may also check to make sure:  · You do not have a fever or other signs  of a more serious infection.  · The infection has not spread to other parts of your eye or areas around your eye.  How is this treated?  Most styes will clear up in a few days without treatment or with warm compresses applied to the area. You may need to use antibiotic drops or ointment to treat an infection.  In some cases, if your stye does not heal with routine treatment, your health care provider may drain pus from the stye using a thin blade or needle. This may be done if the stye is large, causing a lot of pain, or affecting your vision.  Follow these instructions at home:  · Take over-the-counter and prescription medicines only as told by your health care provider. This includes eye drops or ointments.  · If you were prescribed an antibiotic medicine, apply or use it as told by your health care provider. Do not stop using the antibiotic even if your condition improves.  · Apply a warm, wet cloth (warm compress) to your eye for 5-10 minutes, 4 times a day.  · Clean the affected eyelid as directed by your health care provider.  · Do not wear contact lenses or eye makeup until your stye has healed.  · Do not try to pop or drain the stye.  · Do not rub your eye.  Contact a health care provider if:  · You have chills or a fever.  · Your stye does not go away after several days.  · Your stye affects your vision.  · Your eyeball becomes swollen, red, or painful.  Get help right away if:  · You have pain when moving your eye around.  Summary  · A stye is a bump that forms on an eyelid. It may look like a pimple next to the eyelash.  · A stye can form inside the eyelid (internal stye) or outside the eyelid (external stye). A stye can cause redness, swelling, and pain on the eyelid.  · Your health care provider may be able to diagnose a stye just by examining your eye.  · Apply a warm, wet cloth (warm compress) to your eye for 5-10 minutes, 4 times a day.  This information is not intended to replace advice given to you  by your health care provider. Make sure you discuss any questions you have with your health care provider.  Document Revised: 11/30/2018 Document Reviewed: 08/30/2018  Elsevier Patient Education © 2021 [employer]

## 2022-01-24 ENCOUNTER — TELEPHONE (OUTPATIENT)
Dept: FAMILY MEDICINE CLINIC | Facility: CLINIC | Age: 37
End: 2022-01-24

## 2022-01-24 NOTE — TELEPHONE ENCOUNTER
Called patient back and explained that per our telephone conversation on 1/20/2022 that he was supposed to follow up with PM for pain medication and that the provider would not do another refill.  Explained that the provider did a refill to get him in with PM.  Pt had originally thought that his visits with PM was not covered by insurance and that is why he didn't go back to follow up. I had called PM and they had stated that the patient was told to get MRI by PCP and then follow back up with PM.   We are now waiting on PA on MRI and once MRI is completed patient can return back to PM.    Patient is requesting a few day supply to get him through until he gets his MRI completed and can see PM again.   Please advise.

## 2022-01-24 NOTE — TELEPHONE ENCOUNTER
Patient called in requesting a refill on hydrocodone. Medication was discontinued on 12/23/2021 at emergency room. Please review and advise.

## 2022-01-24 NOTE — TELEPHONE ENCOUNTER
PATIENT CALLED TO CHECK THE STATUS OF Rx REQUESTED.     PLEASE CONTACT PATIENT AND ADVISE.       PHARMACY:   [id number] - [place], [place] - [address] AT [facility] OF [address] - [phone]  - [phone]   [phone]

## 2022-01-26 NOTE — TELEPHONE ENCOUNTER
Called patient and told him provider could not refill narco and that he will need to follow up with PM for refills. I explained that we did get his MRI scheduled and he will need to complete that and follow up with PM. Pt voiced understanding.

## 2022-01-27 ENCOUNTER — APPOINTMENT (OUTPATIENT)
Dept: GENERAL RADIOLOGY | Facility: HOSPITAL | Age: 37
End: 2022-01-27

## 2022-01-27 ENCOUNTER — APPOINTMENT (OUTPATIENT)
Dept: CT IMAGING | Facility: HOSPITAL | Age: 37
End: 2022-01-27

## 2022-01-27 ENCOUNTER — HOSPITAL ENCOUNTER (EMERGENCY)
Facility: HOSPITAL | Age: 37
Discharge: LEFT BEFORE DISCHARGE | End: 2022-01-28
Admitting: FAMILY MEDICINE

## 2022-01-27 DIAGNOSIS — R07.9 CHEST PAIN, UNSPECIFIED TYPE: Primary | ICD-10-CM

## 2022-01-27 LAB
ALBUMIN SERPL-MCNC: 4.1 G/DL (ref 3.5–5.2)
ALBUMIN/GLOB SERPL: 1.5 G/DL
ALP SERPL-CCNC: 86 U/L (ref 39–117)
ALT SERPL W P-5'-P-CCNC: 22 U/L (ref 1–41)
ANION GAP SERPL CALCULATED.3IONS-SCNC: 8 MMOL/L (ref 5–15)
APTT PPP: 31.3 SECONDS (ref 24.1–35)
AST SERPL-CCNC: 17 U/L (ref 1–40)
BASOPHILS # BLD AUTO: 0.03 10*3/MM3 (ref 0–0.2)
BASOPHILS NFR BLD AUTO: 0.5 % (ref 0–1.5)
BILIRUB SERPL-MCNC: 0.3 MG/DL (ref 0–1.2)
BUN SERPL-MCNC: 14 MG/DL (ref 6–20)
BUN/CREAT SERPL: 14.3 (ref 7–25)
CALCIUM SPEC-SCNC: 8.9 MG/DL (ref 8.6–10.5)
CHLORIDE SERPL-SCNC: 104 MMOL/L (ref 98–107)
CO2 SERPL-SCNC: 28 MMOL/L (ref 22–29)
CREAT SERPL-MCNC: 0.98 MG/DL (ref 0.76–1.27)
D DIMER PPP FEU-MCNC: 0.24 MG/L (FEU) (ref 0–0.5)
DEPRECATED RDW RBC AUTO: 40.7 FL (ref 37–54)
EOSINOPHIL # BLD AUTO: 0.14 10*3/MM3 (ref 0–0.4)
EOSINOPHIL NFR BLD AUTO: 2.5 % (ref 0.3–6.2)
ERYTHROCYTE [DISTWIDTH] IN BLOOD BY AUTOMATED COUNT: 12.2 % (ref 12.3–15.4)
GFR SERPL CREATININE-BSD FRML MDRD: 87 ML/MIN/1.73
GLOBULIN UR ELPH-MCNC: 2.8 GM/DL
GLUCOSE SERPL-MCNC: 78 MG/DL (ref 65–99)
HCT VFR BLD AUTO: 45.2 % (ref 37.5–51)
HGB BLD-MCNC: 15.1 G/DL (ref 13–17.7)
IMM GRANULOCYTES # BLD AUTO: 0.03 10*3/MM3 (ref 0–0.05)
IMM GRANULOCYTES NFR BLD AUTO: 0.5 % (ref 0–0.5)
INR PPP: 1.08 (ref 0.91–1.09)
LYMPHOCYTES # BLD AUTO: 1.77 10*3/MM3 (ref 0.7–3.1)
LYMPHOCYTES NFR BLD AUTO: 31 % (ref 19.6–45.3)
MCH RBC QN AUTO: 30.6 PG (ref 26.6–33)
MCHC RBC AUTO-ENTMCNC: 33.4 G/DL (ref 31.5–35.7)
MCV RBC AUTO: 91.5 FL (ref 79–97)
MONOCYTES # BLD AUTO: 0.74 10*3/MM3 (ref 0.1–0.9)
MONOCYTES NFR BLD AUTO: 13 % (ref 5–12)
NEUTROPHILS NFR BLD AUTO: 3 10*3/MM3 (ref 1.7–7)
NEUTROPHILS NFR BLD AUTO: 52.5 % (ref 42.7–76)
NRBC BLD AUTO-RTO: 0 /100 WBC (ref 0–0.2)
PLATELET # BLD AUTO: 223 10*3/MM3 (ref 140–450)
PMV BLD AUTO: 9.4 FL (ref 6–12)
POTASSIUM SERPL-SCNC: 4 MMOL/L (ref 3.5–5.2)
PROT SERPL-MCNC: 6.9 G/DL (ref 6–8.5)
PROTHROMBIN TIME: 13.6 SECONDS (ref 11.9–14.6)
RBC # BLD AUTO: 4.94 10*6/MM3 (ref 4.14–5.8)
SARS-COV-2 RNA PNL SPEC NAA+PROBE: NOT DETECTED
SODIUM SERPL-SCNC: 140 MMOL/L (ref 136–145)
TROPONIN T SERPL-MCNC: <0.01 NG/ML (ref 0–0.03)
WBC NRBC COR # BLD: 5.71 10*3/MM3 (ref 3.4–10.8)

## 2022-01-27 PROCEDURE — 85025 COMPLETE CBC W/AUTO DIFF WBC: CPT | Performed by: PHYSICIAN ASSISTANT

## 2022-01-27 PROCEDURE — 25010000002 MORPHINE PER 10 MG: Performed by: EMERGENCY MEDICINE

## 2022-01-27 PROCEDURE — 84484 ASSAY OF TROPONIN QUANT: CPT | Performed by: PHYSICIAN ASSISTANT

## 2022-01-27 PROCEDURE — 71045 X-RAY EXAM CHEST 1 VIEW: CPT

## 2022-01-27 PROCEDURE — 96374 THER/PROPH/DIAG INJ IV PUSH: CPT

## 2022-01-27 PROCEDURE — 99283 EMERGENCY DEPT VISIT LOW MDM: CPT

## 2022-01-27 PROCEDURE — 93005 ELECTROCARDIOGRAM TRACING: CPT | Performed by: PHYSICIAN ASSISTANT

## 2022-01-27 PROCEDURE — 87635 SARS-COV-2 COVID-19 AMP PRB: CPT | Performed by: PHYSICIAN ASSISTANT

## 2022-01-27 PROCEDURE — 80053 COMPREHEN METABOLIC PANEL: CPT | Performed by: PHYSICIAN ASSISTANT

## 2022-01-27 PROCEDURE — 85610 PROTHROMBIN TIME: CPT | Performed by: PHYSICIAN ASSISTANT

## 2022-01-27 PROCEDURE — 85379 FIBRIN DEGRADATION QUANT: CPT | Performed by: PHYSICIAN ASSISTANT

## 2022-01-27 PROCEDURE — 85730 THROMBOPLASTIN TIME PARTIAL: CPT | Performed by: PHYSICIAN ASSISTANT

## 2022-01-27 PROCEDURE — 93010 ELECTROCARDIOGRAM REPORT: CPT | Performed by: INTERNAL MEDICINE

## 2022-01-27 RX ORDER — SODIUM CHLORIDE 0.9 % (FLUSH) 0.9 %
10 SYRINGE (ML) INJECTION AS NEEDED
Status: DISCONTINUED | OUTPATIENT
Start: 2022-01-27 | End: 2022-01-28 | Stop reason: HOSPADM

## 2022-01-27 RX ADMIN — MORPHINE SULFATE 4 MG: 4 INJECTION, SOLUTION INTRAMUSCULAR; INTRAVENOUS at 22:54

## 2022-01-28 ENCOUNTER — APPOINTMENT (OUTPATIENT)
Dept: CT IMAGING | Facility: HOSPITAL | Age: 37
End: 2022-01-28

## 2022-01-28 VITALS
SYSTOLIC BLOOD PRESSURE: 127 MMHG | HEART RATE: 88 BPM | BODY MASS INDEX: 21.25 KG/M2 | WEIGHT: 180 LBS | RESPIRATION RATE: 18 BRPM | TEMPERATURE: 97.7 F | DIASTOLIC BLOOD PRESSURE: 78 MMHG | OXYGEN SATURATION: 100 % | HEIGHT: 77 IN

## 2022-01-28 LAB
AMPHET+METHAMPHET UR QL: NEGATIVE
AMPHETAMINES UR QL: NEGATIVE
BARBITURATES UR QL SCN: NEGATIVE
BENZODIAZ UR QL SCN: NEGATIVE
BUPRENORPHINE SERPL-MCNC: NEGATIVE NG/ML
CANNABINOIDS SERPL QL: POSITIVE
COCAINE UR QL: NEGATIVE
METHADONE UR QL SCN: NEGATIVE
OPIATES UR QL: POSITIVE
OXYCODONE UR QL SCN: POSITIVE
PCP UR QL SCN: NEGATIVE
PROPOXYPH UR QL: NEGATIVE
TRICYCLICS UR QL SCN: NEGATIVE

## 2022-01-28 PROCEDURE — 96372 THER/PROPH/DIAG INJ SC/IM: CPT

## 2022-01-28 PROCEDURE — 80306 DRUG TEST PRSMV INSTRMNT: CPT | Performed by: PHYSICIAN ASSISTANT

## 2022-01-28 PROCEDURE — 25010000002 MORPHINE PER 10 MG: Performed by: FAMILY MEDICINE

## 2022-01-28 RX ORDER — MORPHINE SULFATE 10 MG/ML
5 INJECTION INTRAMUSCULAR; INTRAVENOUS; SUBCUTANEOUS ONCE
Status: COMPLETED | OUTPATIENT
Start: 2022-01-28 | End: 2022-01-28

## 2022-01-28 RX ORDER — IBUPROFEN 800 MG/1
800 TABLET ORAL ONCE
Status: COMPLETED | OUTPATIENT
Start: 2022-01-28 | End: 2022-01-28

## 2022-01-28 RX ADMIN — IBUPROFEN 800 MG: 800 TABLET, FILM COATED ORAL at 02:48

## 2022-01-28 RX ADMIN — MORPHINE SULFATE 5 MG: 10 INJECTION INTRAVENOUS at 02:48

## 2022-01-29 LAB
QT INTERVAL: 366 MS
QTC INTERVAL: 419 MS

## 2022-02-04 ENCOUNTER — APPOINTMENT (OUTPATIENT)
Dept: MRI IMAGING | Facility: HOSPITAL | Age: 37
End: 2022-02-04

## 2022-03-02 ENCOUNTER — APPOINTMENT (OUTPATIENT)
Dept: CT IMAGING | Facility: HOSPITAL | Age: 37
End: 2022-03-02

## 2022-03-02 ENCOUNTER — HOSPITAL ENCOUNTER (EMERGENCY)
Facility: HOSPITAL | Age: 37
Discharge: HOME | End: 2022-03-02
Attending: FAMILY MEDICINE | Admitting: FAMILY MEDICINE

## 2022-03-02 ENCOUNTER — APPOINTMENT (OUTPATIENT)
Dept: CARDIOLOGY | Facility: HOSPITAL | Age: 37
End: 2022-03-02

## 2022-03-02 ENCOUNTER — APPOINTMENT (OUTPATIENT)
Dept: GENERAL RADIOLOGY | Facility: HOSPITAL | Age: 37
End: 2022-03-02

## 2022-03-02 VITALS
DIASTOLIC BLOOD PRESSURE: 68 MMHG | TEMPERATURE: 98.5 F | RESPIRATION RATE: 20 BRPM | HEART RATE: 78 BPM | OXYGEN SATURATION: 100 % | SYSTOLIC BLOOD PRESSURE: 107 MMHG | BODY MASS INDEX: 23.06 KG/M2 | WEIGHT: 174 LBS | HEIGHT: 73 IN

## 2022-03-02 DIAGNOSIS — R07.9 CHEST PAIN, UNSPECIFIED TYPE: Primary | ICD-10-CM

## 2022-03-02 DIAGNOSIS — Z72.0 NICOTINE ABUSE: ICD-10-CM

## 2022-03-02 LAB
ALBUMIN SERPL-MCNC: 4.5 G/DL (ref 3.5–5.2)
ALBUMIN/GLOB SERPL: 1.7 G/DL
ALP SERPL-CCNC: 79 U/L (ref 39–117)
ALT SERPL W P-5'-P-CCNC: 12 U/L (ref 1–41)
ANION GAP SERPL CALCULATED.3IONS-SCNC: 10 MMOL/L (ref 5–15)
AST SERPL-CCNC: 14 U/L (ref 1–40)
BASOPHILS # BLD AUTO: 0.03 10*3/MM3 (ref 0–0.2)
BASOPHILS NFR BLD AUTO: 0.5 % (ref 0–1.5)
BH CV STRESS BP STAGE 1: NORMAL
BH CV STRESS BP STAGE 2: NORMAL
BH CV STRESS BP STAGE 3: NORMAL
BH CV STRESS DOB - ATROPINE STAGE 3: 1
BH CV STRESS DOSE DOBUTAMINE STAGE 1: 10
BH CV STRESS DOSE DOBUTAMINE STAGE 2: 20
BH CV STRESS DOSE DOBUTAMINE STAGE 3: 30
BH CV STRESS DURATION MIN STAGE 1: 3
BH CV STRESS DURATION MIN STAGE 2: 3
BH CV STRESS DURATION MIN STAGE 3: 2
BH CV STRESS DURATION SEC STAGE 1: 0
BH CV STRESS DURATION SEC STAGE 2: 0
BH CV STRESS DURATION SEC STAGE 3: 0
BH CV STRESS HR STAGE 1: 67
BH CV STRESS HR STAGE 2: 101
BH CV STRESS HR STAGE 3: 160
BH CV STRESS PROTOCOL 1: NORMAL
BH CV STRESS RECOVERY BP: NORMAL MMHG
BH CV STRESS RECOVERY HR: 109 BPM
BH CV STRESS STAGE 1: 1
BH CV STRESS STAGE 2: 2
BH CV STRESS STAGE 3: 3
BILIRUB SERPL-MCNC: 0.5 MG/DL (ref 0–1.2)
BUN SERPL-MCNC: 16 MG/DL (ref 6–20)
BUN/CREAT SERPL: 15.2 (ref 7–25)
CALCIUM SPEC-SCNC: 9.1 MG/DL (ref 8.6–10.5)
CHLORIDE SERPL-SCNC: 104 MMOL/L (ref 98–107)
CO2 SERPL-SCNC: 26 MMOL/L (ref 22–29)
CREAT SERPL-MCNC: 1.05 MG/DL (ref 0.76–1.27)
DEPRECATED RDW RBC AUTO: 41 FL (ref 37–54)
EGFRCR SERPLBLD CKD-EPI 2021: 94.9 ML/MIN/1.73
EOSINOPHIL # BLD AUTO: 0.13 10*3/MM3 (ref 0–0.4)
EOSINOPHIL NFR BLD AUTO: 2.3 % (ref 0.3–6.2)
ERYTHROCYTE [DISTWIDTH] IN BLOOD BY AUTOMATED COUNT: 12.4 % (ref 12.3–15.4)
GLOBULIN UR ELPH-MCNC: 2.6 GM/DL
GLUCOSE SERPL-MCNC: 98 MG/DL (ref 65–99)
HCT VFR BLD AUTO: 43.1 % (ref 37.5–51)
HGB BLD-MCNC: 14.6 G/DL (ref 13–17.7)
HOLD SPECIMEN: NORMAL
HOLD SPECIMEN: NORMAL
IMM GRANULOCYTES # BLD AUTO: 0.03 10*3/MM3 (ref 0–0.05)
IMM GRANULOCYTES NFR BLD AUTO: 0.5 % (ref 0–0.5)
LYMPHOCYTES # BLD AUTO: 1.41 10*3/MM3 (ref 0.7–3.1)
LYMPHOCYTES NFR BLD AUTO: 25.1 % (ref 19.6–45.3)
MAXIMAL PREDICTED HEART RATE: 185 BPM
MCH RBC QN AUTO: 30.7 PG (ref 26.6–33)
MCHC RBC AUTO-ENTMCNC: 33.9 G/DL (ref 31.5–35.7)
MCV RBC AUTO: 90.7 FL (ref 79–97)
MONOCYTES # BLD AUTO: 0.71 10*3/MM3 (ref 0.1–0.9)
MONOCYTES NFR BLD AUTO: 12.7 % (ref 5–12)
NEUTROPHILS NFR BLD AUTO: 3.3 10*3/MM3 (ref 1.7–7)
NEUTROPHILS NFR BLD AUTO: 58.9 % (ref 42.7–76)
NRBC BLD AUTO-RTO: 0 /100 WBC (ref 0–0.2)
PERCENT MAX PREDICTED HR: 86.49 %
PLATELET # BLD AUTO: 228 10*3/MM3 (ref 140–450)
PMV BLD AUTO: 9.8 FL (ref 6–12)
POTASSIUM SERPL-SCNC: 4 MMOL/L (ref 3.5–5.2)
PROT SERPL-MCNC: 7.1 G/DL (ref 6–8.5)
RBC # BLD AUTO: 4.75 10*6/MM3 (ref 4.14–5.8)
SODIUM SERPL-SCNC: 140 MMOL/L (ref 136–145)
STRESS BASELINE BP: NORMAL MMHG
STRESS BASELINE HR: 64 BPM
STRESS PERCENT HR: 102 %
STRESS POST EXERCISE DUR MIN: 8 MIN
STRESS POST EXERCISE DUR SEC: 0 SEC
STRESS POST PEAK BP: NORMAL MMHG
STRESS POST PEAK HR: 160 BPM
STRESS TARGET HR: 157 BPM
TROPONIN T SERPL-MCNC: <0.01 NG/ML (ref 0–0.03)
TROPONIN T SERPL-MCNC: <0.01 NG/ML (ref 0–0.03)
WBC NRBC COR # BLD: 5.61 10*3/MM3 (ref 3.4–10.8)
WHOLE BLOOD HOLD SPECIMEN: NORMAL
WHOLE BLOOD HOLD SPECIMEN: NORMAL

## 2022-03-02 PROCEDURE — 0 DOBUTAMINE PER 250 MG: Performed by: INTERNAL MEDICINE

## 2022-03-02 PROCEDURE — 99284 EMERGENCY DEPT VISIT MOD MDM: CPT

## 2022-03-02 PROCEDURE — 85025 COMPLETE CBC W/AUTO DIFF WBC: CPT | Performed by: FAMILY MEDICINE

## 2022-03-02 PROCEDURE — 25010000002 MORPHINE PER 10 MG: Performed by: FAMILY MEDICINE

## 2022-03-02 PROCEDURE — 25010000002 ONDANSETRON PER 1 MG: Performed by: FAMILY MEDICINE

## 2022-03-02 PROCEDURE — 36415 COLL VENOUS BLD VENIPUNCTURE: CPT

## 2022-03-02 PROCEDURE — 96375 TX/PRO/DX INJ NEW DRUG ADDON: CPT

## 2022-03-02 PROCEDURE — 93018 CV STRESS TEST I&R ONLY: CPT | Performed by: INTERNAL MEDICINE

## 2022-03-02 PROCEDURE — 93005 ELECTROCARDIOGRAM TRACING: CPT | Performed by: FAMILY MEDICINE

## 2022-03-02 PROCEDURE — 25010000002 KETOROLAC TROMETHAMINE PER 15 MG: Performed by: FAMILY MEDICINE

## 2022-03-02 PROCEDURE — 93010 ELECTROCARDIOGRAM REPORT: CPT | Performed by: INTERNAL MEDICINE

## 2022-03-02 PROCEDURE — 93350 STRESS TTE ONLY: CPT

## 2022-03-02 PROCEDURE — 93352 ADMIN ECG CONTRAST AGENT: CPT | Performed by: INTERNAL MEDICINE

## 2022-03-02 PROCEDURE — 93017 CV STRESS TEST TRACING ONLY: CPT

## 2022-03-02 PROCEDURE — 80053 COMPREHEN METABOLIC PANEL: CPT | Performed by: FAMILY MEDICINE

## 2022-03-02 PROCEDURE — 84484 ASSAY OF TROPONIN QUANT: CPT | Performed by: FAMILY MEDICINE

## 2022-03-02 PROCEDURE — 71275 CT ANGIOGRAPHY CHEST: CPT

## 2022-03-02 PROCEDURE — 96374 THER/PROPH/DIAG INJ IV PUSH: CPT

## 2022-03-02 PROCEDURE — 25010000002 PERFLUTREN 6.52 MG/ML SUSPENSION: Performed by: INTERNAL MEDICINE

## 2022-03-02 PROCEDURE — 0 IOPAMIDOL PER 1 ML: Performed by: FAMILY MEDICINE

## 2022-03-02 PROCEDURE — 93350 STRESS TTE ONLY: CPT | Performed by: INTERNAL MEDICINE

## 2022-03-02 PROCEDURE — 71045 X-RAY EXAM CHEST 1 VIEW: CPT

## 2022-03-02 RX ORDER — ONDANSETRON 2 MG/ML
4 INJECTION INTRAMUSCULAR; INTRAVENOUS ONCE
Status: COMPLETED | OUTPATIENT
Start: 2022-03-02 | End: 2022-03-02

## 2022-03-02 RX ORDER — DOBUTAMINE HYDROCHLORIDE 100 MG/100ML
10-50 INJECTION INTRAVENOUS CONTINUOUS
Status: DISCONTINUED | OUTPATIENT
Start: 2022-03-02 | End: 2022-03-02

## 2022-03-02 RX ORDER — KETOROLAC TROMETHAMINE 15 MG/ML
15 INJECTION, SOLUTION INTRAMUSCULAR; INTRAVENOUS ONCE
Status: COMPLETED | OUTPATIENT
Start: 2022-03-02 | End: 2022-03-02

## 2022-03-02 RX ORDER — SODIUM CHLORIDE 0.9 % (FLUSH) 0.9 %
10 SYRINGE (ML) INJECTION AS NEEDED
Status: DISCONTINUED | OUTPATIENT
Start: 2022-03-02 | End: 2022-03-02 | Stop reason: HOSPADM

## 2022-03-02 RX ADMIN — PERFLUTREN 8.48 MG: 6.52 INJECTION, SUSPENSION INTRAVENOUS at 15:26

## 2022-03-02 RX ADMIN — IOPAMIDOL 100 ML: 755 INJECTION, SOLUTION INTRAVENOUS at 11:42

## 2022-03-02 RX ADMIN — ATROPINE SULFATE 1 MG: 0.1 INJECTION INTRAVENOUS at 15:47

## 2022-03-02 RX ADMIN — ONDANSETRON HYDROCHLORIDE 4 MG: 2 SOLUTION INTRAMUSCULAR; INTRAVENOUS at 10:26

## 2022-03-02 RX ADMIN — MORPHINE SULFATE 4 MG: 4 INJECTION, SOLUTION INTRAMUSCULAR; INTRAVENOUS at 10:25

## 2022-03-02 RX ADMIN — Medication 10 MCG/KG/MIN: at 15:26

## 2022-03-02 RX ADMIN — KETOROLAC TROMETHAMINE 15 MG: 15 INJECTION, SOLUTION INTRAMUSCULAR; INTRAVENOUS at 14:57

## 2022-03-03 LAB
QT INTERVAL: 376 MS
QT INTERVAL: 414 MS
QTC INTERVAL: 409 MS
QTC INTERVAL: 428 MS

## 2022-03-16 NOTE — PROGRESS NOTES
December 28, 2021    Hello, may I speak with Ross Platt?    My name is Jina Wilson     I am  with [facility]  [address]  [facility] [address] [phone].    Before we get started may I verify your date of birth? 1985    I am calling to officially welcome you to our practice and ask about your recent visit. Is this a good time to talk? yes    Tell me about your visit with us. What things went well?  Everything was fine.       We're always looking for ways to make our patients' experiences even better. Do you have recommendations on ways we may improve?  no    Overall were you satisfied with your first visit to our practice? yes       I appreciate you taking the time to speak with me today. Is there anything else I can do for you? no      Thank you, and have a great day.      
161

## 2022-04-08 ENCOUNTER — TELEPHONE (OUTPATIENT)
Dept: FAMILY MEDICINE CLINIC | Facility: CLINIC | Age: 37
End: 2022-04-08

## 2022-04-08 NOTE — TELEPHONE ENCOUNTER
Caller: Ross Platt    Relationship: Self    Best call back number: [phone]     What medication are you requesting:   HYDROcodone-acetaminophen (NORCO) 5-325 MG per tablet    What are your current symptoms:   [place]ou[place], neck and arm pain    How long have you been experiencing symptoms:   2 weeks     Have you had these symptoms before:    [x] Yes  [] No    Have you been treated for these symptoms before:   [x] Yes  [] No    If a prescription is needed, what is your preferred pharmacy and phone number: [id number] - [facility] [place] - [address] AT [facility] [address] & [facility] [address] - [phone]  - [phone] FX     Additional notes:  Needs Rx called to pharmacy until able to get into pain mgmt

## 2022-05-08 ENCOUNTER — HOSPITAL ENCOUNTER (EMERGENCY)
Age: 37
Discharge: HOME | End: 2022-05-09
Attending: EMERGENCY MEDICINE
Payer: MEDICAID

## 2022-05-08 ENCOUNTER — APPOINTMENT (OUTPATIENT)
Dept: GENERAL RADIOLOGY | Age: 37
End: 2022-05-08
Payer: MEDICAID

## 2022-05-08 DIAGNOSIS — R07.89 ATYPICAL CHEST PAIN: ICD-10-CM

## 2022-05-08 DIAGNOSIS — R07.89 CHEST WALL PAIN: Primary | ICD-10-CM

## 2022-05-08 LAB
ALBUMIN SERPL-MCNC: 4.2 G/DL (ref 3.5–5.2)
ALP BLD-CCNC: 84 U/L (ref 40–130)
ALT SERPL-CCNC: 19 U/L (ref 5–41)
ANION GAP SERPL CALCULATED.3IONS-SCNC: 9 MMOL/L (ref 7–19)
AST SERPL-CCNC: 16 U/L (ref 5–40)
BASOPHILS ABSOLUTE: 0 K/UL (ref 0–0.2)
BASOPHILS RELATIVE PERCENT: 0.5 % (ref 0–1)
BILIRUB SERPL-MCNC: <0.2 MG/DL (ref 0.2–1.2)
BUN BLDV-MCNC: 13 MG/DL (ref 6–20)
CALCIUM SERPL-MCNC: 9.6 MG/DL (ref 8.6–10)
CHLORIDE BLD-SCNC: 101 MMOL/L (ref 98–111)
CO2: 29 MMOL/L (ref 22–29)
CREAT SERPL-MCNC: 0.9 MG/DL (ref 0.5–1.2)
EOSINOPHILS ABSOLUTE: 0.1 K/UL (ref 0–0.6)
EOSINOPHILS RELATIVE PERCENT: 1.6 % (ref 0–5)
GFR AFRICAN AMERICAN: >59
GFR NON-AFRICAN AMERICAN: >60
GLUCOSE BLD-MCNC: 92 MG/DL (ref 74–109)
HCT VFR BLD CALC: 44.8 % (ref 42–52)
HEMOGLOBIN: 14.9 G/DL (ref 14–18)
IMMATURE GRANULOCYTES #: 0 K/UL
LYMPHOCYTES ABSOLUTE: 2 K/UL (ref 1.1–4.5)
LYMPHOCYTES RELATIVE PERCENT: 26.7 % (ref 20–40)
MCH RBC QN AUTO: 30.8 PG (ref 27–31)
MCHC RBC AUTO-ENTMCNC: 33.3 G/DL (ref 33–37)
MCV RBC AUTO: 92.6 FL (ref 80–94)
MONOCYTES ABSOLUTE: 0.7 K/UL (ref 0–0.9)
MONOCYTES RELATIVE PERCENT: 8.9 % (ref 0–10)
NEUTROPHILS ABSOLUTE: 4.5 K/UL (ref 1.5–7.5)
NEUTROPHILS RELATIVE PERCENT: 61.8 % (ref 50–65)
PDW BLD-RTO: 12.1 % (ref 11.5–14.5)
PLATELET # BLD: 224 K/UL (ref 130–400)
PMV BLD AUTO: 9.8 FL (ref 9.4–12.4)
POTASSIUM SERPL-SCNC: 3.9 MMOL/L (ref 3.5–5)
RBC # BLD: 4.84 M/UL (ref 4.7–6.1)
SODIUM BLD-SCNC: 139 MMOL/L (ref 136–145)
TOTAL PROTEIN: 6.3 G/DL (ref 6.6–8.7)
TROPONIN: <0.01 NG/ML (ref 0–0.03)
WBC # BLD: 7.3 K/UL (ref 4.8–10.8)

## 2022-05-08 PROCEDURE — 71045 X-RAY EXAM CHEST 1 VIEW: CPT

## 2022-05-08 PROCEDURE — 93005 ELECTROCARDIOGRAM TRACING: CPT | Performed by: EMERGENCY MEDICINE

## 2022-05-08 PROCEDURE — 96374 THER/PROPH/DIAG INJ IV PUSH: CPT

## 2022-05-08 PROCEDURE — 99285 EMERGENCY DEPT VISIT HI MDM: CPT

## 2022-05-08 ASSESSMENT — PAIN SCALES - GENERAL: PAINLEVEL_OUTOF10: 7

## 2022-05-08 ASSESSMENT — PAIN - FUNCTIONAL ASSESSMENT: PAIN_FUNCTIONAL_ASSESSMENT: 0-10

## 2022-05-08 ASSESSMENT — PAIN DESCRIPTION - DESCRIPTORS: DESCRIPTORS: STABBING

## 2022-05-08 ASSESSMENT — PAIN DESCRIPTION - FREQUENCY: FREQUENCY: INTERMITTENT

## 2022-05-08 ASSESSMENT — PAIN DESCRIPTION - ONSET: ONSET: ON-GOING

## 2022-05-08 ASSESSMENT — PAIN DESCRIPTION - LOCATION: LOCATION: CHEST;ARM

## 2022-05-08 ASSESSMENT — PAIN DESCRIPTION - PAIN TYPE: TYPE: ACUTE PAIN

## 2022-05-08 ASSESSMENT — PAIN DESCRIPTION - ORIENTATION: ORIENTATION: LEFT

## 2022-05-09 ENCOUNTER — PATIENT MESSAGE (OUTPATIENT)
Dept: FAMILY MEDICINE CLINIC | Facility: CLINIC | Age: 37
End: 2022-05-09

## 2022-05-09 ENCOUNTER — TELEPHONE (OUTPATIENT)
Dept: FAMILY MEDICINE CLINIC | Facility: CLINIC | Age: 37
End: 2022-05-09

## 2022-05-09 VITALS
HEART RATE: 84 BPM | WEIGHT: 170 LBS | TEMPERATURE: 98.2 F | RESPIRATION RATE: 17 BRPM | DIASTOLIC BLOOD PRESSURE: 67 MMHG | OXYGEN SATURATION: 97 % | BODY MASS INDEX: 19.65 KG/M2 | SYSTOLIC BLOOD PRESSURE: 98 MMHG

## 2022-05-09 LAB
EKG P AXIS: 73 DEGREES
EKG P-R INTERVAL: 186 MS
EKG Q-T INTERVAL: 342 MS
EKG QRS DURATION: 82 MS
EKG QTC CALCULATION (BAZETT): 384 MS
EKG T AXIS: 76 DEGREES

## 2022-05-09 PROCEDURE — 36415 COLL VENOUS BLD VENIPUNCTURE: CPT

## 2022-05-09 PROCEDURE — 85025 COMPLETE CBC W/AUTO DIFF WBC: CPT

## 2022-05-09 PROCEDURE — 6360000002 HC RX W HCPCS: Performed by: EMERGENCY MEDICINE

## 2022-05-09 PROCEDURE — 84484 ASSAY OF TROPONIN QUANT: CPT

## 2022-05-09 PROCEDURE — 93010 ELECTROCARDIOGRAM REPORT: CPT | Performed by: INTERNAL MEDICINE

## 2022-05-09 PROCEDURE — 80053 COMPREHEN METABOLIC PANEL: CPT

## 2022-05-09 PROCEDURE — 96374 THER/PROPH/DIAG INJ IV PUSH: CPT

## 2022-05-09 RX ORDER — MORPHINE SULFATE 4 MG/ML
4 INJECTION, SOLUTION INTRAMUSCULAR; INTRAVENOUS ONCE
Status: COMPLETED | OUTPATIENT
Start: 2022-05-09 | End: 2022-05-09

## 2022-05-09 RX ADMIN — MORPHINE SULFATE 4 MG: 4 INJECTION, SOLUTION INTRAMUSCULAR; INTRAVENOUS at 00:07

## 2022-05-09 ASSESSMENT — ENCOUNTER SYMPTOMS
DIARRHEA: 0
BACK PAIN: 0
NAUSEA: 0
VOMITING: 0
SHORTNESS OF BREATH: 0
ABDOMINAL PAIN: 0
COUGH: 0

## 2022-05-09 ASSESSMENT — PAIN SCALES - GENERAL: PAINLEVEL_OUTOF10: 3

## 2022-05-09 NOTE — ED PROVIDER NOTES
[facility] EMERGENCY DEPT  eMERGENCY dEPARTMENT eNCOUnter      Pt Name: Abby Rodgers  MRN: [id number]  [place] 1985  Date of evaluation: 5/8/2022  Provider: Donna Landa MD    [address] [place] [address]       Chief Complaint   Patient presents with    Chest Pain     intermittent today, pt states left sided CP that radiates to left side of neck and left arm. Pt states he does not have a right lung hx of Marfans. HISTORY OF PRESENT ILLNESS   (Location/Symptom, Timing/Onset,Context/Setting, Quality, Duration, Modifying Factors, Severity)  Note limiting factors. Abby Rodgers is a 29 y.o. male who presents to the emergency department for chest pain. The patient reports intermittent sharp left-sided chest discomfort that occasionally goes to his neck and arm. He denies any cough or obvious infectious symptoms. No exertional symptoms. He states with his history of Marfan syndrome many times he feels any little discomfort he gets scared and worried. He has not had any cardiac issues. He did have a right-sided lung collapse and had a partial lung removal on the right side. He denies any ripping or tearing type sensations. No prior history of PE or DVT. No leg swelling. HPI    NursingNotes were reviewed. REVIEW OF SYSTEMS    (2-9 systems for level 4, 10 or more for level 5)     Review of Systems   Constitutional: Negative for chills and fever. Respiratory: Negative for cough and shortness of breath. Cardiovascular: Positive for chest pain. Negative for leg swelling. Gastrointestinal: Negative for abdominal pain, diarrhea, nausea and vomiting. Genitourinary: Negative for dysuria and frequency. Musculoskeletal: Negative for back pain and neck pain. Neurological: Negative for dizziness, syncope and headaches. All other systems reviewed and are negative.            PAST MEDICALHISTORY     Past Medical History:   Diagnosis Date    [facility] obstructive [facility]     [facility] lung ([facility]     Marfan's disease     Pneumothorax          SURGICAL HISTORY       Past Surgical History:   Procedure Laterality Date    HERNIA REPAIR      LUNG REMOVAL, PARTIAL Right          CURRENT MEDICATIONS     Discharge Medication List as of 5/9/2022 12:20 AM      CONTINUE these medications which have NOT CHANGED    Details   albuterol sulfate  (90 Base) MCG/ACT inhaler Inhale 2 puffs into the lungs every 4 hours as neededHistorical Med      Fluticasone-Umeclidin-Vilant (TRELEGY ELLIPTA) 200-62.5-25 MCG/INH AEPB Inhale 1 puff into the lungs DailyHistorical Med             ALLERGIES     Calcium channel blockers, Ciprofloxacin, and Methylprednisolone    FAMILY HISTORY     History reviewed. No pertinent family history. SOCIAL HISTORY       Social History     Socioeconomic History    Marital status: Single     Spouse name: None    Number of children: None    Years of education: None    Highest education level: None   Occupational History    None   Tobacco Use    Smoking status: Current Every Day Smoker     Packs/day: 0.50     Years: 10.00     Pack years: 5.00     Types: Cigarettes    Smokeless tobacco: Never Used   Vaping Use    Vaping Use: Never used   Substance and Sexual Activity    Alcohol use: No    Drug use: No    Sexual activity: None   Other Topics Concern    None   Social History Narrative    None     Social Determinants of Health     Financial Resource Strain:     Difficulty of Paying Living Expenses: Not on file   Food Insecurity:     Worried About Running Out of Food in the Last Year: Not on file    Daren of Food in the Last Year: Not on file   Transportation Needs:     Lack of Transportation (Medical): Not on file    Lack of Transportation (Non-Medical):  Not on file   Physical Activity:     Days of Exercise per Week: Not on file    Minutes of Exercise per Session: Not on file   Stress:     Feeling of Stress : Not on file   Social Connections:     Frequency of Communication with Friends and Family: Not on file    Frequency of Social Gatherings with Friends and Family: Not on file    Attends [employer] Services: Not on file    Active Member of Clubs or Organizations: Not on file    Attends Club or Organization Meetings: Not on file    Marital Status: Not on file   Intimate Partner Violence:     Fear of Current or Ex-Partner: Not on file    Emotionally Abused: Not on file    Physically Abused: Not on file    Sexually Abused: Not on file   Housing Stability:     Unable to Pay for Housing in the Last Year: Not on file    Number of [place] in the Last Year: Not on file    Unstable Housing in the Last Year: Not on file       SCREENINGS    Everson Coma Scale  Eye Opening: Spontaneous  Best Verbal Response: Oriented  Best Motor Response: Obeys commands  Everson Coma Scale Score: 15        PHYSICAL EXAM    (up to 7 for level 4, 8 or more for level 5)     ED Triage Vitals [05/08/22 2250]   BP Temp Temp Source Pulse Resp SpO2 Height Weight   117/73 98.2 °F (36.8 °C) Oral 94 19 96 % -- 170 lb (77.1 kg)       Physical Exam  Vitals and nursing note reviewed. Constitutional:       Appearance: Normal appearance. He is well-developed. He is not ill-appearing, toxic-appearing or diaphoretic. HENT:      Head: Normocephalic and atraumatic. Mouth/Throat:      Mouth: Mucous membranes are moist.   Eyes:      Conjunctiva/sclera: Conjunctivae normal.   Neck:      Trachea: No tracheal deviation. Cardiovascular:      Rate and Rhythm: Normal rate and regular rhythm. Pulses: Normal pulses. Heart sounds: Normal heart sounds. No murmur heard. Pulmonary:      Breath sounds: Normal breath sounds. No wheezing or rales. Chest:      Chest wall: Tenderness present. No deformity or crepitus. Abdominal:      Palpations: Abdomen is soft. There is no mass. Tenderness: There is no abdominal tenderness.    Musculoskeletal:         General: Normal range of motion. Cervical back: Normal range of motion and neck supple. Right lower leg: No edema. Left lower leg: No edema. Skin:     General: Skin is warm and dry. Neurological:      Mental Status: He is alert and oriented to person, place, and time. DIAGNOSTIC RESULTS     EKG: All EKG's areinterpreted by the Emergency Department Physician who either signs or Co-signs this chart in the absence of a cardiologist.    84 normal sinus rhythm no obvious ST changes nondiagnostic EKG    RADIOLOGY:  Non-plain film images such as CT, Ultrasound and MRI are read by the radiologist. Plain radiographic images are visualized and preliminarily interpreted bythe emergency physician with the below findings:      XR CHEST PORTABLE    (Results Pending)           LABS:  Labs Reviewed   COMPREHENSIVE METABOLIC PANEL - Abnormal; Notable for the following components:       Result Value    Total Protein 6.3 (*)     All other components within normal limits   CBC WITH AUTO DIFFERENTIAL   TROPONIN       All other labs were within normal range or not returned as of this dictation. EMERGENCY DEPARTMENT COURSE and DIFFERENTIAL DIAGNOSIS/MDM:   Vitals:    Vitals:    05/08/22 2250 05/09/22 0009 05/09/22 0024   BP: 117/73 104/69 98/67   Pulse: 94 83 84   Resp: 19  17   Temp: 98.2 °F (36.8 °C)     TempSrc: Oral     SpO2: 96% 99% 97%   Weight: 170 lb (77.1 kg)         MDM  Number of Diagnoses or Management Options     Amount and/or Complexity of Data Reviewed  Clinical lab tests: ordered and reviewed  Tests in the radiology section of CPT®: ordered and reviewed  Independent visualization of images, tracings, or specimens: yes      VSS, PERC neg, low suspicion of CAD trop neg, no ripping or tearing sensation no concern for dissection. Pain is reproducible on exam seems more musculoskeletal overall. Feel some anxiety contributing as well. Stable for DC and outpt follow up.     CONSULTS:  None    PROCEDURES:  Unless otherwise noted below, none     Procedures    FINAL IMPRESSION      1. Chest wall pain    2.  Atypical chest pain          DISPOSITION/PLAN   DISPOSITION Decision To Discharge 05/09/2022 12:00:28 AM      PATIENT REFERRED TO:  Katie Hernandez MD  [address] [place] [address]  [phone]    Schedule an appointment as soon as possible for a visit in 1 week        DISCHARGE MEDICATIONS:  Discharge Medication List as of 5/9/2022 12:20 AM             (Please note that portions of this note were completed with a voice recognition program.  Efforts were made to edit thedictations but occasionally words are mis-transcribed.)    Mekhi Juárez MD (electronically signed)  Attending Emergency Physician       Royal Aureliano MD  05/09/22 7538

## 2022-05-09 NOTE — TELEPHONE ENCOUNTER
Called pt to inquire on symptoms- pt reports that he was seen in the ED last night at [facility], mentioned possible blood clot. Pt still reporting chest pain and left arm pain, SOB occasionally. Pt reports these are his normal symptoms as discussed before. Advised pt to return to ED, pt states again that he was there last night and they sent him home. Pt wanted to schedule same day apt via video, no availability, pt then asked for apt tomorrow but has to report to court and unsure what time she would be able to come in for apt. Pt states that he will call after court is over tomorrow for apt. Advised pt to report to ED if symptoms worsen- pt verbalized understanding.

## 2022-05-09 NOTE — ED NOTES
RN has explained Narcotic Policy to patient. Patient understands that they are not allowed to operate a motor vehicle for a minimum of 4 hours after administration. Patient is aware and understands that law enforcement will be contacted if the patient attempts to operate a motor vehicle prior to the stated 4 hours.         Too Campos RN  05/09/22 0878

## 2022-05-09 NOTE — TELEPHONE ENCOUNTER
----- Message from Ross Platt sent at 5/9/2022  2:49 PM CDT -----  Regarding: Appointment   Anytime but can't tomorrow

## 2022-05-09 NOTE — TELEPHONE ENCOUNTER
From: Ross Platt  To: Cristy Schmidt, JANESSA  Sent: 5/9/2022 2:38 PM CDT  Subject: Appointment     I need appointment plz

## 2022-05-11 ENCOUNTER — TELEPHONE (OUTPATIENT)
Dept: FAMILY MEDICINE CLINIC | Facility: CLINIC | Age: 37
End: 2022-05-11

## 2022-05-11 ENCOUNTER — TELEMEDICINE (OUTPATIENT)
Dept: FAMILY MEDICINE CLINIC | Facility: CLINIC | Age: 37
End: 2022-05-11

## 2022-05-11 DIAGNOSIS — R07.9 CHRONIC CHEST PAIN: ICD-10-CM

## 2022-05-11 DIAGNOSIS — G89.29 CHRONIC CHEST PAIN: ICD-10-CM

## 2022-05-11 DIAGNOSIS — M54.12 CERVICAL RADICULOPATHY: Primary | ICD-10-CM

## 2022-05-11 PROCEDURE — 99213 OFFICE O/P EST LOW 20 MIN: CPT | Performed by: NURSE PRACTITIONER

## 2022-05-11 RX ORDER — TIZANIDINE HYDROCHLORIDE 4 MG/1
4 CAPSULE, GELATIN COATED ORAL 3 TIMES DAILY PRN
Qty: 90 CAPSULE | Refills: 0 | Status: SHIPPED | OUTPATIENT
Start: 2022-05-11 | End: 2022-06-08

## 2022-05-11 RX ORDER — DICLOFENAC SODIUM 75 MG/1
75 TABLET, DELAYED RELEASE ORAL 2 TIMES DAILY
Qty: 180 TABLET | Refills: 0 | Status: SHIPPED | OUTPATIENT
Start: 2022-05-11 | End: 2022-06-15

## 2022-05-11 NOTE — PROGRESS NOTES
Chief Complaint  Neck Pain and Chest Pain    Subjective          Ross Platt presents mcyhart video visit with [facility]  History of Present Illness  Patient here for continued neck pain and chest pain  He was following with cardiology, pulmonology and pain mgmt for these problems   However he has not kept follow up appts with these  Reports insurance did not cover pain mgmt or mri ordered by them so he has not followed up. Asking for me to check on this and refer to different pain clinic if not able to continue to follow with [facility] pain mgmt  He had recent er visit for this. Continued neck pain and radiating left arm. He is having chest pain that is reproducible with palpation to left chest wall.   He took tizanidine for this and it helped so he is requesting this. He is out of all medications previously prescribed.     Objective   Vital Signs:  There were no vitals taken for this visit.    BMI is within normal parameters. No follow-up required.      Physical Exam   No vital signs or bmi obtained for this encounter.      Physical exam-  · General appearance- Alert, appears comfortable. Dressed appropriately. No distress.   · HEENT- external examination of eyes, ears and nose all normal. Head is atraumatic. Hearing normal.   · Neck is symmetric, trachea midline.   · Normal respiratory effort.   · Digits and nails appear healthy. No clubbing, rashes or discolorations.   · Normal range of motion of all extremities.  · Mood appropriate. Communicates easily. Normal judgement and insight. Oriented to time, place and person. Memory appears intact.       Result Review :                 Assessment and Plan    Diagnoses and all orders for this visit:    1. Cervical radiculopathy (Primary)    2. Chronic chest pain    Other orders  -     TiZANidine (ZANAFLEX) 4 MG capsule; Take 1 capsule by mouth 3 (Three) Times a Day As Needed for Muscle Spasms.  Dispense: 90 capsule; Refill: 0  -      diclofenac (VOLTAREN) 75 MG EC tablet; Take 1 tablet by mouth 2 (Two) Times a Day.  Dispense: 180 tablet; Refill: 0      Plan:  Resume previous nsaids and muscle relaxers  Will check on pain mgmt  Encouraged follow up with pulm and cardiology         Follow Up   Return if symptoms worsen or fail to improve.  Patient was given instructions and counseling regarding his condition or for health maintenance advice. Please see specific information pulled into the AVS if appropriate.

## 2022-05-11 NOTE — TELEPHONE ENCOUNTER
PATIENT CALLED IN REQUESTING A PCP RESENDS THE   TiZANidine (ZANAFLEX) 4 MG capsule    AS TABLETS INSTEAD OF CAPSULES   PATIENT STATES HIS INSURANCE DOES NOT WANT TO COVER CAPSULES     GOOD CALL BACK   [phone]

## 2022-05-12 NOTE — TELEPHONE ENCOUNTER
Called pharmacy to give verbal to change tizanidine caps to tizanidine tablets. Spoke with Juanis to change, updated and will get filled for pt.     Called pt back to notify- verbalized understanding.

## 2022-05-17 ENCOUNTER — HOSPITAL ENCOUNTER (EMERGENCY)
Facility: HOSPITAL | Age: 37
Discharge: LEFT BEFORE DISCHARGE | End: 2022-05-17

## 2022-05-17 VITALS
BODY MASS INDEX: 21.15 KG/M2 | TEMPERATURE: 97.8 F | SYSTOLIC BLOOD PRESSURE: 107 MMHG | OXYGEN SATURATION: 98 % | HEIGHT: 77 IN | WEIGHT: 179.1 LBS | DIASTOLIC BLOOD PRESSURE: 65 MMHG | RESPIRATION RATE: 18 BRPM | HEART RATE: 99 BPM

## 2022-05-17 LAB
ALBUMIN SERPL-MCNC: 4.1 G/DL (ref 3.5–5.2)
ALBUMIN/GLOB SERPL: 1.6 G/DL
ALP SERPL-CCNC: 92 U/L (ref 39–117)
ALT SERPL W P-5'-P-CCNC: 23 U/L (ref 1–41)
ANION GAP SERPL CALCULATED.3IONS-SCNC: 10 MMOL/L (ref 5–15)
AST SERPL-CCNC: 19 U/L (ref 1–40)
BASOPHILS # BLD AUTO: 0.03 10*3/MM3 (ref 0–0.2)
BASOPHILS NFR BLD AUTO: 0.4 % (ref 0–1.5)
BILIRUB SERPL-MCNC: 0.5 MG/DL (ref 0–1.2)
BUN SERPL-MCNC: 16 MG/DL (ref 6–20)
BUN/CREAT SERPL: 14.8 (ref 7–25)
CALCIUM SPEC-SCNC: 8.5 MG/DL (ref 8.6–10.5)
CHLORIDE SERPL-SCNC: 106 MMOL/L (ref 98–107)
CO2 SERPL-SCNC: 24 MMOL/L (ref 22–29)
CREAT SERPL-MCNC: 1.08 MG/DL (ref 0.76–1.27)
DEPRECATED RDW RBC AUTO: 41.4 FL (ref 37–54)
EGFRCR SERPLBLD CKD-EPI 2021: 91.8 ML/MIN/1.73
EOSINOPHIL # BLD AUTO: 0.06 10*3/MM3 (ref 0–0.4)
EOSINOPHIL NFR BLD AUTO: 0.8 % (ref 0.3–6.2)
ERYTHROCYTE [DISTWIDTH] IN BLOOD BY AUTOMATED COUNT: 12.1 % (ref 12.3–15.4)
GLOBULIN UR ELPH-MCNC: 2.6 GM/DL
GLUCOSE SERPL-MCNC: 93 MG/DL (ref 65–99)
HCT VFR BLD AUTO: 47.1 % (ref 37.5–51)
HGB BLD-MCNC: 15.4 G/DL (ref 13–17.7)
HOLD SPECIMEN: NORMAL
HOLD SPECIMEN: NORMAL
IMM GRANULOCYTES # BLD AUTO: 0.05 10*3/MM3 (ref 0–0.05)
IMM GRANULOCYTES NFR BLD AUTO: 0.7 % (ref 0–0.5)
LIPASE SERPL-CCNC: 19 U/L (ref 13–60)
LYMPHOCYTES # BLD AUTO: 0.42 10*3/MM3 (ref 0.7–3.1)
LYMPHOCYTES NFR BLD AUTO: 5.5 % (ref 19.6–45.3)
MCH RBC QN AUTO: 30.4 PG (ref 26.6–33)
MCHC RBC AUTO-ENTMCNC: 32.7 G/DL (ref 31.5–35.7)
MCV RBC AUTO: 93.1 FL (ref 79–97)
MONOCYTES # BLD AUTO: 0.33 10*3/MM3 (ref 0.1–0.9)
MONOCYTES NFR BLD AUTO: 4.3 % (ref 5–12)
NEUTROPHILS NFR BLD AUTO: 6.7 10*3/MM3 (ref 1.7–7)
NEUTROPHILS NFR BLD AUTO: 88.3 % (ref 42.7–76)
NRBC BLD AUTO-RTO: 0 /100 WBC (ref 0–0.2)
PLATELET # BLD AUTO: 221 10*3/MM3 (ref 140–450)
PMV BLD AUTO: 9.8 FL (ref 6–12)
POTASSIUM SERPL-SCNC: 3.7 MMOL/L (ref 3.5–5.2)
PROT SERPL-MCNC: 6.7 G/DL (ref 6–8.5)
RBC # BLD AUTO: 5.06 10*6/MM3 (ref 4.14–5.8)
SODIUM SERPL-SCNC: 140 MMOL/L (ref 136–145)
TROPONIN T SERPL-MCNC: <0.01 NG/ML (ref 0–0.03)
WBC NRBC COR # BLD: 7.59 10*3/MM3 (ref 3.4–10.8)
WHOLE BLOOD HOLD COAG: NORMAL
WHOLE BLOOD HOLD SPECIMEN: NORMAL

## 2022-05-17 PROCEDURE — 83690 ASSAY OF LIPASE: CPT | Performed by: FAMILY MEDICINE

## 2022-05-17 PROCEDURE — 93010 ELECTROCARDIOGRAM REPORT: CPT | Performed by: INTERNAL MEDICINE

## 2022-05-17 PROCEDURE — 80053 COMPREHEN METABOLIC PANEL: CPT | Performed by: FAMILY MEDICINE

## 2022-05-17 PROCEDURE — 99211 OFF/OP EST MAY X REQ PHY/QHP: CPT

## 2022-05-17 PROCEDURE — 93005 ELECTROCARDIOGRAM TRACING: CPT | Performed by: FAMILY MEDICINE

## 2022-05-17 PROCEDURE — 84484 ASSAY OF TROPONIN QUANT: CPT | Performed by: FAMILY MEDICINE

## 2022-05-17 PROCEDURE — 85025 COMPLETE CBC W/AUTO DIFF WBC: CPT | Performed by: FAMILY MEDICINE

## 2022-05-17 RX ORDER — SODIUM CHLORIDE 0.9 % (FLUSH) 0.9 %
10 SYRINGE (ML) INJECTION AS NEEDED
Status: DISCONTINUED | OUTPATIENT
Start: 2022-05-17 | End: 2022-05-17 | Stop reason: HOSPADM

## 2022-05-19 LAB
QT INTERVAL: 336 MS
QTC INTERVAL: 424 MS

## 2022-06-08 RX ORDER — TIZANIDINE 4 MG/1
TABLET ORAL
Qty: 90 TABLET | Refills: 0 | Status: SHIPPED | OUTPATIENT
Start: 2022-06-08 | End: 2022-06-15

## 2022-06-08 NOTE — TELEPHONE ENCOUNTER
Rx Refill Note  Requested Prescriptions     Pending Prescriptions Disp Refills   • tiZANidine (ZANAFLEX) 4 MG tablet [Pharmacy Med Name: TIZANIDINE 4MG TABLETS] 90 tablet      Sig: TAKE 1 CAPSULE BY MOUTH THREE TIMES DAILY AS NEEDED FOR MUSCLE SPASMS      Last office visit with prescribing clinician: 11/17/2021      Next office visit with prescribing clinician: Visit date not found      Last refill: 5/11/2022   }  Sarah Daniels MA  06/08/22, 09:57 CDT

## 2022-06-13 ENCOUNTER — APPOINTMENT (OUTPATIENT)
Dept: GENERAL RADIOLOGY | Age: 37
End: 2022-06-13
Payer: MEDICAID

## 2022-06-13 ENCOUNTER — APPOINTMENT (OUTPATIENT)
Dept: CT IMAGING | Age: 37
End: 2022-06-13
Payer: MEDICAID

## 2022-06-13 ENCOUNTER — HOSPITAL ENCOUNTER (EMERGENCY)
Age: 37
Discharge: HOME | End: 2022-06-14
Payer: MEDICAID

## 2022-06-13 DIAGNOSIS — R07.89 ATYPICAL CHEST PAIN: Primary | ICD-10-CM

## 2022-06-13 LAB
ALBUMIN SERPL-MCNC: 4.2 G/DL (ref 3.5–5.2)
ALP BLD-CCNC: 97 U/L (ref 40–130)
ALT SERPL-CCNC: 16 U/L (ref 5–41)
ANION GAP SERPL CALCULATED.3IONS-SCNC: 11 MMOL/L (ref 7–19)
AST SERPL-CCNC: 23 U/L (ref 5–40)
BASOPHILS ABSOLUTE: 0 K/UL (ref 0–0.2)
BASOPHILS RELATIVE PERCENT: 0.7 % (ref 0–1)
BILIRUB SERPL-MCNC: 0.3 MG/DL (ref 0.2–1.2)
BUN BLDV-MCNC: 20 MG/DL (ref 6–20)
CALCIUM SERPL-MCNC: 9.3 MG/DL (ref 8.6–10)
CHLORIDE BLD-SCNC: 102 MMOL/L (ref 98–111)
CO2: 24 MMOL/L (ref 22–29)
CREAT SERPL-MCNC: 1.1 MG/DL (ref 0.5–1.2)
EOSINOPHILS ABSOLUTE: 0.2 K/UL (ref 0–0.6)
EOSINOPHILS RELATIVE PERCENT: 3.1 % (ref 0–5)
GFR AFRICAN AMERICAN: >59
GFR NON-AFRICAN AMERICAN: >60
GLUCOSE BLD-MCNC: 88 MG/DL (ref 74–109)
HCT VFR BLD CALC: 47.9 % (ref 42–52)
HEMOGLOBIN: 15.8 G/DL (ref 14–18)
IMMATURE GRANULOCYTES #: 0 K/UL
LYMPHOCYTES ABSOLUTE: 2 K/UL (ref 1.1–4.5)
LYMPHOCYTES RELATIVE PERCENT: 34.8 % (ref 20–40)
MCH RBC QN AUTO: 30.4 PG (ref 27–31)
MCHC RBC AUTO-ENTMCNC: 33 G/DL (ref 33–37)
MCV RBC AUTO: 92.1 FL (ref 80–94)
MONOCYTES ABSOLUTE: 0.6 K/UL (ref 0–0.9)
MONOCYTES RELATIVE PERCENT: 11 % (ref 0–10)
NEUTROPHILS ABSOLUTE: 2.9 K/UL (ref 1.5–7.5)
NEUTROPHILS RELATIVE PERCENT: 49.9 % (ref 50–65)
PDW BLD-RTO: 12.3 % (ref 11.5–14.5)
PLATELET # BLD: 194 K/UL (ref 130–400)
PMV BLD AUTO: 9.3 FL (ref 9.4–12.4)
POTASSIUM SERPL-SCNC: 3.9 MMOL/L (ref 3.5–5)
RBC # BLD: 5.2 M/UL (ref 4.7–6.1)
SODIUM BLD-SCNC: 137 MMOL/L (ref 136–145)
TOTAL PROTEIN: 6.9 G/DL (ref 6.6–8.7)
TROPONIN: <0.01 NG/ML (ref 0–0.03)
WBC # BLD: 5.8 K/UL (ref 4.8–10.8)

## 2022-06-13 PROCEDURE — 6360000004 HC RX CONTRAST MEDICATION: Performed by: PHYSICIAN ASSISTANT

## 2022-06-13 PROCEDURE — 99285 EMERGENCY DEPT VISIT HI MDM: CPT

## 2022-06-13 PROCEDURE — 84484 ASSAY OF TROPONIN QUANT: CPT

## 2022-06-13 PROCEDURE — 71275 CT ANGIOGRAPHY CHEST: CPT | Performed by: RADIOLOGY

## 2022-06-13 PROCEDURE — 80053 COMPREHEN METABOLIC PANEL: CPT

## 2022-06-13 PROCEDURE — 85025 COMPLETE CBC W/AUTO DIFF WBC: CPT

## 2022-06-13 PROCEDURE — 71045 X-RAY EXAM CHEST 1 VIEW: CPT | Performed by: RADIOLOGY

## 2022-06-13 PROCEDURE — 6370000000 HC RX 637 (ALT 250 FOR IP): Performed by: PHYSICIAN ASSISTANT

## 2022-06-13 PROCEDURE — 96374 THER/PROPH/DIAG INJ IV PUSH: CPT

## 2022-06-13 PROCEDURE — 71275 CT ANGIOGRAPHY CHEST: CPT

## 2022-06-13 PROCEDURE — 93005 ELECTROCARDIOGRAM TRACING: CPT | Performed by: EMERGENCY MEDICINE

## 2022-06-13 PROCEDURE — 6360000002 HC RX W HCPCS: Performed by: PHYSICIAN ASSISTANT

## 2022-06-13 PROCEDURE — 36415 COLL VENOUS BLD VENIPUNCTURE: CPT

## 2022-06-13 PROCEDURE — 96375 TX/PRO/DX INJ NEW DRUG ADDON: CPT

## 2022-06-13 PROCEDURE — 71045 X-RAY EXAM CHEST 1 VIEW: CPT

## 2022-06-13 RX ORDER — NITROGLYCERIN 0.4 MG/1
0.4 TABLET SUBLINGUAL EVERY 5 MIN PRN
Status: DISCONTINUED | OUTPATIENT
Start: 2022-06-13 | End: 2022-06-14 | Stop reason: HOSPADM

## 2022-06-13 RX ORDER — MORPHINE SULFATE 4 MG/ML
4 INJECTION, SOLUTION INTRAMUSCULAR; INTRAVENOUS ONCE
Status: COMPLETED | OUTPATIENT
Start: 2022-06-13 | End: 2022-06-13

## 2022-06-13 RX ORDER — ONDANSETRON 2 MG/ML
4 INJECTION INTRAMUSCULAR; INTRAVENOUS ONCE
Status: COMPLETED | OUTPATIENT
Start: 2022-06-13 | End: 2022-06-13

## 2022-06-13 RX ADMIN — MORPHINE SULFATE 4 MG: 4 INJECTION, SOLUTION INTRAMUSCULAR; INTRAVENOUS at 23:28

## 2022-06-13 RX ADMIN — NITROGLYCERIN 0.4 MG: 0.4 TABLET, ORALLY DISINTEGRATING SUBLINGUAL at 21:48

## 2022-06-13 RX ADMIN — ONDANSETRON HYDROCHLORIDE 4 MG: 2 SOLUTION INTRAMUSCULAR; INTRAVENOUS at 23:28

## 2022-06-13 RX ADMIN — IOPAMIDOL 70 ML: 755 INJECTION, SOLUTION INTRAVENOUS at 21:09

## 2022-06-13 RX ADMIN — ASPIRIN 325 MG: 325 TABLET, COATED ORAL at 20:45

## 2022-06-13 ASSESSMENT — ENCOUNTER SYMPTOMS
PHOTOPHOBIA: 0
EYE ITCHING: 0
COUGH: 0
COLOR CHANGE: 0
SHORTNESS OF BREATH: 0
EYE DISCHARGE: 0
APNEA: 0
BACK PAIN: 0

## 2022-06-13 ASSESSMENT — PAIN SCALES - GENERAL
PAINLEVEL_OUTOF10: 8
PAINLEVEL_OUTOF10: 8

## 2022-06-14 VITALS
DIASTOLIC BLOOD PRESSURE: 73 MMHG | RESPIRATION RATE: 19 BRPM | OXYGEN SATURATION: 95 % | BODY MASS INDEX: 21.84 KG/M2 | HEART RATE: 59 BPM | TEMPERATURE: 98.2 F | HEIGHT: 77 IN | SYSTOLIC BLOOD PRESSURE: 94 MMHG | WEIGHT: 185 LBS

## 2022-06-14 LAB
EKG P AXIS: 67 DEGREES
EKG P-R INTERVAL: 168 MS
EKG Q-T INTERVAL: 340 MS
EKG QRS DURATION: 80 MS
EKG QTC CALCULATION (BAZETT): 390 MS
EKG T AXIS: 77 DEGREES
TROPONIN: <0.01 NG/ML (ref 0–0.03)

## 2022-06-14 PROCEDURE — 93010 ELECTROCARDIOGRAM REPORT: CPT | Performed by: INTERNAL MEDICINE

## 2022-06-14 PROCEDURE — 36415 COLL VENOUS BLD VENIPUNCTURE: CPT

## 2022-06-14 PROCEDURE — 96376 TX/PRO/DX INJ SAME DRUG ADON: CPT

## 2022-06-14 PROCEDURE — 6360000002 HC RX W HCPCS: Performed by: PHYSICIAN ASSISTANT

## 2022-06-14 PROCEDURE — 84484 ASSAY OF TROPONIN QUANT: CPT

## 2022-06-14 RX ORDER — MORPHINE SULFATE 2 MG/ML
2 INJECTION, SOLUTION INTRAMUSCULAR; INTRAVENOUS ONCE
Status: COMPLETED | OUTPATIENT
Start: 2022-06-14 | End: 2022-06-14

## 2022-06-14 RX ADMIN — MORPHINE SULFATE 2 MG: 2 INJECTION, SOLUTION INTRAMUSCULAR; INTRAVENOUS at 00:32

## 2022-06-14 ASSESSMENT — PAIN SCALES - GENERAL: PAINLEVEL_OUTOF10: 7

## 2022-06-14 NOTE — ED PROVIDER NOTES
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 male who presents to the emergency department with complaints of chest pain started last night got worse today while working outside around noon has a history of Marfan syndrome only has his left lung prior right lung collapse had to be removed surgically. Was told by his PCP to come here for chest pain rule out he went to make sure his aorta was okay he is having some shortness of breath with exertion had a negative stress test done 6 months ago no findings on echo according to patient. He states this pain is left-sided chest aspect with involvement and tingling in his left arm and neck \"feels like someone is sitting on this area\" currently still in discomfort 3 out of 10 pain scale there is no pleuritic aspect with this. He is not on any blood thinners. HPI    Nursing Notes were reviewed and I agree. REVIEW OF SYSTEMS    (2-9 systems for level 4, 10 or more for level 5)     Review of Systems   Constitutional: Negative for activity change, appetite change, chills and fever. HENT: Negative for congestion and dental problem. Eyes: Negative for photophobia, discharge and itching. Respiratory: Negative for apnea, cough and shortness of breath. Cardiovascular: Positive for chest pain.    Musculoskeletal: Negative for arthralgias, back pain, gait problem, myalgias and neck pain. Skin: Negative for color change, pallor and rash. Neurological: Negative for dizziness, seizures and syncope. Psychiatric/Behavioral: Negative for agitation. The patient is not nervous/anxious. Except as noted above the remainder of the review of systems was reviewed and negative. PAST MEDICAL HISTORY     Past Medical History:   Diagnosis Date    [facility]ch[facility] obstructive [facility] disease) [facility]     Emphysema lung ([facility]     Marfan's disease     Pneumothorax          SURGICAL HISTORY       Past Surgical History:   Procedure Laterality Date    HERNIA REPAIR      LUNG REMOVAL, PARTIAL Right          CURRENT MEDICATIONS       Previous Medications    ALBUTEROL SULFATE  (90 BASE) MCG/ACT INHALER    Inhale 2 puffs into the lungs every 4 hours as needed    FLUTICASONE-UMECLIDIN-VILANT (TRELEGY ELLIPTA) 200-62.5-25 MCG/INH AEPB    Inhale 1 puff into the lungs Daily       ALLERGIES     Calcium channel blockers, Ciprofloxacin, and Methylprednisolone    FAMILY HISTORY     History reviewed. No pertinent family history.        SOCIAL HISTORY       Social History     Socioeconomic History    Marital status: Single     Spouse name: None    Number of children: None    Years of education: None    Highest education level: None   Occupational History    None   Tobacco Use    Smoking status: Current Every Day Smoker     Packs/day: 0.50     Years: 10.00     Pack years: 5.00     Types: Cigarettes    Smokeless tobacco: Never Used   Vaping Use    Vaping Use: Never used   Substance and Sexual Activity    Alcohol use: No    Drug use: No    Sexual activity: None   Other Topics Concern    None   Social History Narrative    None     Social Determinants of Health     Financial Resource Strain:     Difficulty of Paying Living Expenses: Not on file   Food Insecurity:     Worried About Running Out of Food in the Last Year: Not on file    Daren of Food in the Last Year: Not on file tenderness. Abdominal:      General: Bowel sounds are normal. There is no distension. Palpations: Abdomen is soft. Tenderness: There is no abdominal tenderness. Musculoskeletal:         General: Normal range of motion. Cervical back: Normal range of motion and neck supple. Skin:     General: Skin is warm and dry. Capillary Refill: Capillary refill takes less than 2 seconds. Neurological:      Mental Status: He is alert and oriented to person, place, and time. Psychiatric:         Behavior: Behavior normal.           DIAGNOSTIC RESULTS     RADIOLOGY:   Non-plain film images such as CT, Ultrasound and MRI are read by the radiologist. Plain radiographic images are visualized and preliminarilyinterpreted by No att. providers found with the below findings:      Interpretation per the Radiologist below, if available at the time of this note:    CTA PULMONARY W CONTRAST   Final Result   1. Centriacinar emphysematous changes in bilateral lung parenchyma. 2.  Lung parenchymal cyst in the right middle lobe. 3.  Linear atelectasis in left lower lobe. 4.  Unremarkable CT pulmonary angiography. Recommendation: Follow up as clinically indicated. All CT scans at this facility utilize dose modulation, iterative reconstruction, and/or weight based dosing when appropriate to reduce radiation dose to as low as reasonably achievable. Electronically Signed by Marli Dotson MD at 13-Jun-2022 11:44:46 PM               XR CHEST PORTABLE   Final Result   COPD. Interval left lower lobe subsegmental atelectasis. No pleural effusion. Recommendation: Follow up as clinically indicated.     Electronically Signed by Marli Dotson MD at 13-Jun-2022 11:04:24 PM                   LABS:  Labs Reviewed   CBC WITH AUTO DIFFERENTIAL - Abnormal; Notable for the following components:       Result Value    MPV 9.3 (*)     Neutrophils % 49.9 (*)     Monocytes % 11.0 (*)     All other components within normal limits   COMPREHENSIVE METABOLIC PANEL   TROPONIN   TROPONIN       All other labs were within normal range or notreturned as of this dictation. RE-ASSESSMENT        EMERGENCY DEPARTMENT COURSE and DIFFERENTIAL DIAGNOSIS/MDM:   Vitals:    Vitals:    06/13/22 1935 06/13/22 2120 06/13/22 2200 06/14/22 0000   BP: 114/74 125/79 97/65 94/73   Pulse: 93 84 71 59   Resp: 22 10 16 19   Temp: 98.2 °F (36.8 °C)      SpO2: 96% 96% 95% 95%   Weight: 185 lb (83.9 kg)      Height: 6' 5\" (1.956 m)        EKG normal sinus rhythm no st changes. Similar to prior. MDM  Patient's heart score is a 2; low risk. Patient is pain-free at this time. Nothing acute seen on his CTA involving aorta or anything with his lungs. Normal EKG with troponin repeats both negative. Plan will be for discharge we did discuss admission with his multiple committees he declines. Had a negative stress echo 6 months ago I have low suspicion that this is cardiac he understands to call his PCP tomorrow for follow-up closely. PROCEDURES:    Procedures      FINAL IMPRESSION      1.  Atypical chest pain          DISPOSITION/PLAN   DISPOSITION Discharge - Pending Orders Complete 06/14/2022 12:26:50 AM      PATIENT REFERRED TO:  [facility] EMERGENCY DEPT  Ankit Bunch  [phone]    If symptoms worsen    Josie Pucrell MD  [address] [place] [address] [address]  [phone]    Schedule an appointment as soon as possible for a visit   As needed      DISCHARGE MEDICATIONS:  New Prescriptions    No medications on file       (Please note that portions of this note were completed with a voice recognition program.  Efforts were made to edit the dictations but occasionallywords are mis-transcribed.)    Lulu Eaton [address] [place] [address]  06/14/22 0030

## 2022-06-14 NOTE — ED NOTES
PT reports little to no relief following administration of nitro      Nadean DEL Sol[employer]  06/13/22 2362

## 2022-06-15 ENCOUNTER — OFFICE VISIT (OUTPATIENT)
Dept: FAMILY MEDICINE CLINIC | Facility: CLINIC | Age: 37
End: 2022-06-15

## 2022-06-15 VITALS
TEMPERATURE: 97.9 F | OXYGEN SATURATION: 98 % | HEART RATE: 109 BPM | WEIGHT: 173.8 LBS | BODY MASS INDEX: 20.52 KG/M2 | DIASTOLIC BLOOD PRESSURE: 76 MMHG | RESPIRATION RATE: 18 BRPM | SYSTOLIC BLOOD PRESSURE: 109 MMHG | HEIGHT: 77 IN

## 2022-06-15 DIAGNOSIS — G89.29 CHRONIC CHEST PAIN: ICD-10-CM

## 2022-06-15 DIAGNOSIS — M54.12 CERVICAL RADICULOPATHY: Primary | ICD-10-CM

## 2022-06-15 DIAGNOSIS — M54.2 CHRONIC CERVICAL PAIN: ICD-10-CM

## 2022-06-15 DIAGNOSIS — G89.29 CHRONIC CERVICAL PAIN: ICD-10-CM

## 2022-06-15 DIAGNOSIS — J43.9 PULMONARY EMPHYSEMA, UNSPECIFIED EMPHYSEMA TYPE: ICD-10-CM

## 2022-06-15 DIAGNOSIS — G72.9 CERVICAL MYOPATHY: ICD-10-CM

## 2022-06-15 DIAGNOSIS — R07.9 CHRONIC CHEST PAIN: ICD-10-CM

## 2022-06-15 PROCEDURE — 99214 OFFICE O/P EST MOD 30 MIN: CPT | Performed by: NURSE PRACTITIONER

## 2022-06-15 PROCEDURE — 96372 THER/PROPH/DIAG INJ SC/IM: CPT | Performed by: NURSE PRACTITIONER

## 2022-06-15 RX ORDER — METHOCARBAMOL 750 MG/1
750 TABLET, FILM COATED ORAL 4 TIMES DAILY PRN
Qty: 120 TABLET | Refills: 2 | Status: SHIPPED | OUTPATIENT
Start: 2022-06-15 | End: 2022-09-12

## 2022-06-15 RX ORDER — HYDROCODONE BITARTRATE AND ACETAMINOPHEN 7.5; 325 MG/1; MG/1
1 TABLET ORAL 2 TIMES DAILY PRN
Qty: 14 TABLET | Refills: 0 | OUTPATIENT
Start: 2022-06-15 | End: 2022-08-11

## 2022-06-15 RX ORDER — DEXAMETHASONE SODIUM PHOSPHATE 10 MG/ML
10 INJECTION INTRAMUSCULAR; INTRAVENOUS ONCE
Status: COMPLETED | OUTPATIENT
Start: 2022-06-15 | End: 2022-06-15

## 2022-06-15 RX ORDER — KETOROLAC TROMETHAMINE 30 MG/ML
60 INJECTION, SOLUTION INTRAMUSCULAR; INTRAVENOUS ONCE
Status: COMPLETED | OUTPATIENT
Start: 2022-06-15 | End: 2022-06-15

## 2022-06-15 RX ORDER — NAPROXEN 500 MG/1
500 TABLET ORAL 2 TIMES DAILY WITH MEALS
Qty: 60 TABLET | Refills: 2 | Status: SHIPPED | OUTPATIENT
Start: 2022-06-15 | End: 2022-09-12 | Stop reason: ALTCHOICE

## 2022-06-15 RX ADMIN — DEXAMETHASONE SODIUM PHOSPHATE 10 MG: 10 INJECTION INTRAMUSCULAR; INTRAVENOUS at 15:56

## 2022-06-15 RX ADMIN — KETOROLAC TROMETHAMINE 60 MG: 30 INJECTION, SOLUTION INTRAMUSCULAR; INTRAVENOUS at 15:57

## 2022-06-15 NOTE — PROGRESS NOTES
"Chief Complaint  Chest Pain (Patient here for hospital follow up chest pain. )    Subjective        Ross Platt presents to [facility]  History of Present Illness     Ross Platt is a 37-year-old male who presents to the clinic for a hospital follow-up of chest pain. He is accompanied by two adult females.    The patient reports going to the emergency room on Monday night, 06/13/2022 because he was experiencing chest pain and numbness in his left arm.  He states they ran numerous tests and said he could have a blockage, \"up here close to my heart,\" that could be causing all my pain and the tingling sensation in his left arm. He notes being given \"some\" medicine and was told they were going to admit him, unless he promised to get in with his primary provider with in the next day or two. After approximately 8 hours, they gave him an injection of pain medicine and sent him home, advising him to take it easy.  He states he is tired of not being able to breathe and not being able to use his left arm very much because of the numbness. He reports that currently while sitting here, his left arm is feeling numb and he has a lot of pain in left arm, shoulder and chest area, noting it feels like someone is standing on him. He states he has not been sick in the last couple of weeks, and denies any cough or congestion, however he is extremely short of breath.     He has missed appts with cardiology and pulmonology. The patient states he has been using his ProAir rescue inhaler quite often. He reports that he ran out of his Trelegy, and goes back to see pulmonology in 08/2022.    He denies having done any physical therapy. He is fighting with his insurance to cover an MRI that his pain management doctor ordered. He notes that [facility] told they could not do anything for him until he can get his insurance to approve an MRI. He reports he is still taking his muscle relaxers, " "stating that they used to give him quite a bit of relief, but anymore he feels very little improvement. He has taken steroids in the past, except he can not remember when. He notes he can not take Medrol because it makes him very irritable.     The patient states he is trying to get his disability approved. The patient has a history of substance abuse, and he notes that next month it will be 1 year of being drug-free.        Objective   Vital Signs:  /76 (BP Location: Left arm, Patient Position: Sitting, Cuff Size: Adult)   Pulse 109   Temp 97.9 °F (36.6 °C) (Infrared)   Resp 18   Ht 195.6 cm (77\")   Wt 78.8 kg (173 lb 12.8 oz)   SpO2 98%   BMI 20.61 kg/m²   Estimated body mass index is 20.61 kg/m² as calculated from the following:    Height as of this encounter: 195.6 cm (77\").    Weight as of this encounter: 78.8 kg (173 lb 12.8 oz).    BMI is within normal parameters. No other follow-up for BMI required.      Physical Exam  Vitals and nursing note reviewed.   Constitutional:       General: He is not in acute distress.     Appearance: He is well-developed.   HENT:      Right Ear: Tympanic membrane and ear canal normal.      Left Ear: Tympanic membrane and ear canal normal.      Nose: Nose normal.      Right Sinus: No maxillary sinus tenderness or frontal sinus tenderness.      Left Sinus: No maxillary sinus tenderness or frontal sinus tenderness.      Mouth/Throat:      Mouth: Mucous membranes are moist.      Pharynx: Oropharynx is clear. Uvula midline. No uvula swelling.   Eyes:      Conjunctiva/sclera: Conjunctivae normal.   Neck:      Thyroid: No thyromegaly.      Trachea: No tracheal deviation.   Cardiovascular:      Rate and Rhythm: Normal rate and regular rhythm.      Heart sounds: Normal heart sounds.   Pulmonary:      Effort: Pulmonary effort is normal.      Breath sounds: Decreased breath sounds and wheezing present.   Chest:      Chest wall: Tenderness present.   Musculoskeletal:      Left " shoulder: Decreased range of motion.      Cervical back: Neck supple. Decreased range of motion.   Lymphadenopathy:      Cervical: No cervical adenopathy.   Skin:     General: Skin is warm and dry.   Neurological:      Mental Status: He is alert.   Psychiatric:         Behavior: Behavior normal.          Result Review :           No results were obtained today.         Assessment and Plan   Diagnoses and all orders for this visit:    1. Cervical radiculopathy (Primary).  -     ketorolac (TORADOL) injection 60 mg  -     dexamethasone (DECADRON) injection 10 mg  -     HYDROcodone-acetaminophen (NORCO) 7.5-325 MG per tablet; Take 1 tablet by mouth 2 (Two) Times a Day As Needed for Moderate Pain .  Dispense: 14 tablet; Refill: 0  -     Ambulatory Referral to Pain Management  -     MRI Cervical Spine Without Contrast; Future    2. Chronic chest pain.  - The patient is to call, and follow up with his cardiologist, Dr. Mcintyre, on a regular basis.   -     ketorolac (TORADOL) injection 60 mg  -     dexamethasone (DECADRON) injection 10 mg  -     Urine Drug Screen - Urine, Clean Catch  -     HYDROcodone-acetaminophen (NORCO) 7.5-325 MG per tablet; Take 1 tablet by mouth 2 (Two) Times a Day As Needed for Moderate Pain .  Dispense: 14 tablet; Refill: 0    3. Pulmonary emphysema, unspecified emphysema type (HCC).       -  The patient needs to get back on the Trelegy. Continue albuterol.   -encouraged to continue follow up with pulmonology    4. Chronic cervical pain.       -  I will send a muscle relaxer prescription for methocarbamo (Robaxin).       -   Ambulatory Referral to Pain Management       -   MRI Cervical Spine Without Contrast; Future    5. Cervical myopathy.       -  I will send a muscle relaxer prescription for methocarbamo (Robaxin).       -   Ambulatory Referral to Pain Management       -   MRI Cervical Spine Without Contrast; Future    6. Left arm numbness.       - The patient missed the MRI that I ordered in  01/2022. He is fighting with his insurance to cover an MRI that his pain management doctor ordered.        - I will reorder the MRI.   - I will also refer to PT       - I will send a referral for a pain management.       - I will send a muscle relaxer prescription for methocarbamo (Robaxin).      Other orders  -     Fluticasone-Umeclidin-Vilant (Trelegy Ellipta) 100-62.5-25 MCG/INH inhaler; Inhale 1 puff Daily.  Dispense: 60 each; Refill: 2  -     naproxen (Naprosyn) 500 MG tablet; Take 1 tablet by mouth 2 (Two) Times a Day With Meals.  Dispense: 60 tablet; Refill: 2  -     methocarbamol (ROBAXIN) 750 MG tablet; Take 1 tablet by mouth 4 (Four) Times a Day As Needed for Muscle Spasms.  Dispense: 120 tablet; Refill: 2    The patient will do a urine drug screen today.         Follow Up   Return in about 1 month (around 7/15/2022).  Patient was given instructions and counseling regarding his condition or for health maintenance advice. Please see specific information pulled into the AVS if appropriate.       Transcribed from ambient dictation for JANESSA Little by LEESA DAVIS.  06/15/22   18:06 CDT    Patient verbalized consent to the visit recording.

## 2022-06-16 ENCOUNTER — TELEPHONE (OUTPATIENT)
Dept: FAMILY MEDICINE CLINIC | Facility: CLINIC | Age: 37
End: 2022-06-16

## 2022-06-16 DIAGNOSIS — D22.9 SUSPICIOUS NEVUS: Primary | ICD-10-CM

## 2022-06-16 NOTE — TELEPHONE ENCOUNTER
PA approved trelegy ellipta approved- spoke with Geeta at pharmacy to notify. Pharmacy will notify pt when ready for .

## 2022-06-16 NOTE — TELEPHONE ENCOUNTER
Caller: Ross Platt    Relationship to patient: Self    Best call back number: [phone]    Patient has a black mole on his right side. He states that it is painful to the touch. He noticed that it has grown this morning, but he states it has been there for a while. He would like a referral to dermatology.

## 2022-06-17 LAB
AMPHETAMINES UR QL SCN: NEGATIVE NG/ML
BARBITURATES UR QL SCN: NEGATIVE NG/ML
BENZODIAZ UR QL SCN: NEGATIVE NG/ML
BZE UR QL SCN: NEGATIVE NG/ML
CANNABINOIDS UR QL SCN: POSITIVE NG/ML
CREAT UR-MCNC: 256.9 MG/DL (ref 20–300)
LABORATORY COMMENT REPORT: ABNORMAL
METHADONE UR QL SCN: NEGATIVE NG/ML
OPIATES UR QL SCN: POSITIVE NG/ML
OXYCODONE+OXYMORPHONE UR QL SCN: POSITIVE NG/ML
PCP UR QL: NEGATIVE NG/ML
PH UR: 5.1 [PH] (ref 4.5–8.9)
PROPOXYPH UR QL SCN: NEGATIVE NG/ML

## 2022-06-20 ENCOUNTER — TELEPHONE (OUTPATIENT)
Dept: FAMILY MEDICINE CLINIC | Facility: CLINIC | Age: 37
End: 2022-06-20

## 2022-06-20 ENCOUNTER — OFFICE VISIT (OUTPATIENT)
Dept: CARDIOLOGY | Facility: CLINIC | Age: 37
End: 2022-06-20

## 2022-06-20 VITALS
DIASTOLIC BLOOD PRESSURE: 60 MMHG | OXYGEN SATURATION: 99 % | HEIGHT: 77 IN | BODY MASS INDEX: 20.43 KG/M2 | HEART RATE: 75 BPM | WEIGHT: 173 LBS | SYSTOLIC BLOOD PRESSURE: 92 MMHG

## 2022-06-20 DIAGNOSIS — F19.20 POLYSUBSTANCE (EXCLUDING OPIOIDS) DEPENDENCE: ICD-10-CM

## 2022-06-20 DIAGNOSIS — I25.9 CHEST PAIN DUE TO MYOCARDIAL ISCHEMIA, UNSPECIFIED ISCHEMIC CHEST PAIN TYPE: Primary | ICD-10-CM

## 2022-06-20 DIAGNOSIS — Q87.40 MARFAN SYNDROME: ICD-10-CM

## 2022-06-20 DIAGNOSIS — F17.200 SMOKING: Chronic | ICD-10-CM

## 2022-06-20 PROCEDURE — 99214 OFFICE O/P EST MOD 30 MIN: CPT | Performed by: EMERGENCY MEDICINE

## 2022-06-20 RX ORDER — ISOSORBIDE MONONITRATE 30 MG/1
30 TABLET, EXTENDED RELEASE ORAL EVERY MORNING
Qty: 30 TABLET | Refills: 11 | Status: SHIPPED | OUTPATIENT
Start: 2022-06-20 | End: 2022-08-03 | Stop reason: SDDI

## 2022-06-20 NOTE — TELEPHONE ENCOUNTER
Caller: Ross Platt    Relationship: Self    Best call back number: [phone]    What is the best time to reach you: ANY    Who are you requesting to speak with (clinical staff, provider,  specific staff member): CLINICAL    What was the call regarding: PATIENT WOULD LIKE CALLBACK TO DISCUSS RECENT DRUG SCREENING.     Do you require a callback: YES

## 2022-06-21 NOTE — TELEPHONE ENCOUNTER
Called pt back to inquire. 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 Pt reports that he has copy of results of last drug screen he completed for court. Pt doesn't understand how THC showed in system. Please advise.

## 2022-06-21 NOTE — TELEPHONE ENCOUNTER
I don't have an answer for this. We go off our results. He did test positive on a drug screen at ER as well. The law is the law and I do not have any answers for him. My best recommendation is for us to continue with the referral to pain management and be sure to refrain from anything illicit or that he is not prescribed while seeing them.

## 2022-06-22 NOTE — TELEPHONE ENCOUNTER
Called pt back to notify of provider's recommendation. Pt verbalized understanding. Pt has not received a call yet from [facility].

## 2022-06-27 ENCOUNTER — APPOINTMENT (OUTPATIENT)
Dept: MRI IMAGING | Facility: HOSPITAL | Age: 37
End: 2022-06-27

## 2022-06-30 ENCOUNTER — HOSPITAL ENCOUNTER (OUTPATIENT)
Dept: MRI IMAGING | Facility: HOSPITAL | Age: 37
Discharge: HOME | End: 2022-06-30
Admitting: NURSE PRACTITIONER

## 2022-06-30 DIAGNOSIS — M54.12 CERVICAL RADICULOPATHY: ICD-10-CM

## 2022-06-30 DIAGNOSIS — G89.29 CHRONIC CERVICAL PAIN: ICD-10-CM

## 2022-06-30 DIAGNOSIS — G72.9 CERVICAL MYOPATHY: ICD-10-CM

## 2022-06-30 DIAGNOSIS — M54.2 CHRONIC CERVICAL PAIN: ICD-10-CM

## 2022-06-30 PROCEDURE — 72141 MRI NECK SPINE W/O DYE: CPT

## 2022-07-04 NOTE — PROGRESS NOTES
Subjective:     Encounter Date:06/20/2022      Patient ID: Ross Platt is a 37 y.o. male.    Chief Complaint:  History of Present Illness    Patient is a 37-year-old male with a history of Marfan syndrome who presents today for 6-month follow-up.  He underwent a stress echocardiogram earlier this year that was low risk for ischemia.  He says that he has been having continuous episodes of chest pain for the past 2 months with left arm numbness.    Review of Systems   Constitutional: Negative for diaphoresis, fever and malaise/fatigue.   HENT: Negative for congestion.    Eyes: Negative for vision loss in left eye and vision loss in right eye.   Cardiovascular: Positive for chest pain. Negative for claudication, dyspnea on exertion, irregular heartbeat, leg swelling, orthopnea, palpitations and syncope.   Respiratory: Negative for cough, shortness of breath and wheezing.    Hematologic/Lymphatic: Negative for adenopathy.   Skin: Negative for rash.   Musculoskeletal: Negative for joint pain and joint swelling.   Gastrointestinal: Negative for abdominal pain, diarrhea, nausea and vomiting.   Neurological: Negative for excessive daytime sleepiness, dizziness, focal weakness, light-headedness, numbness and weakness.   Psychiatric/Behavioral: Negative for depression. The patient does not have insomnia.            Current Outpatient Medications:   •  Fluticasone-Umeclidin-Vilant (Trelegy Ellipta) 100-62.5-25 MCG/INH inhaler, Inhale 1 puff Daily., Disp: 60 each, Rfl: 2  •  HYDROcodone-acetaminophen (NORCO) 7.5-325 MG per tablet, Take 1 tablet by mouth 2 (Two) Times a Day As Needed for Moderate Pain ., Disp: 14 tablet, Rfl: 0  •  methocarbamol (ROBAXIN) 750 MG tablet, Take 1 tablet by mouth 4 (Four) Times a Day As Needed for Muscle Spasms., Disp: 120 tablet, Rfl: 2  •  naproxen (Naprosyn) 500 MG tablet, Take 1 tablet by mouth 2 (Two) Times a Day With Meals., Disp: 60 tablet, Rfl: 2  •  nicotine (Nicoderm CQ) 21  MG/24HR patch, Place 1 patch on the skin as directed by provider Daily., Disp: 30 patch, Rfl: 0  •  ProAir  (90 Base) MCG/ACT inhaler, INHALE TWO PUFFS EVERY FOUR HOURS AS NEEDED FOR WHEEZING OR SHORTNESS OF AIR, Disp: 8.5 g, Rfl: 2  •  isosorbide mononitrate (IMDUR) 30 MG 24 hr tablet, Take 1 tablet by mouth Every Morning., Disp: 30 tablet, Rfl: 11       Objective:      Vitals:    06/20/22 0950   BP: 92/60   Pulse: 75   SpO2: 99%     Vitals and nursing note reviewed.   Constitutional:       Appearance: Normal and healthy appearance. Well-developed and not in distress.   Eyes:      Extraocular Movements: Extraocular movements intact.      Pupils: Pupils are equal, round, and reactive to light.   HENT:      Head: Normocephalic and atraumatic.    Mouth/Throat:      Pharynx: Oropharynx is clear.   Neck:      Vascular: JVD normal.      Trachea: Trachea normal.   Pulmonary:      Effort: Pulmonary effort is normal.      Breath sounds: Normal breath sounds. No wheezing. No rhonchi. No rales.   Cardiovascular:      PMI at left midclavicular line. Normal rate. Regular rhythm. Normal S1. Normal S2.      Murmurs: There is no murmur.      No gallop. No click. No rub.   Pulses:     Dorsalis pedis: 2+ bilaterally.     Posterior tibial: 2+ bilaterally.  Abdominal:      General: Bowel sounds are normal.      Palpations: Abdomen is soft.      Tenderness: There is no abdominal tenderness.   Musculoskeletal: Normal range of motion.      Cervical back: Normal range of motion and neck supple. Skin:     General: Skin is warm and dry.      Capillary Refill: Capillary refill takes less than 2 seconds.   Feet:      Right foot:      Skin integrity: Skin integrity normal.      Left foot:      Skin integrity: Skin integrity normal.   Neurological:      Mental Status: Alert and oriented to person, place and time.      Cranial Nerves: Cranial nerves are intact.      Sensory: Sensation is intact.      Motor: Motor function is intact.       Coordination: Coordination is intact.   Psychiatric:         Speech: Speech normal.         Behavior: Behavior is cooperative.         Lab Review:       Procedures      Assessment/Plan:     Problem List Items Addressed This Visit        Cardiac and Vasculature    Chest pain due to myocardial ischemia - Primary       Mental Health    Polysubstance abuse, including stimulants       Multi-system (Lupus, Sarcoid...)    Marfan syndrome       Tobacco    Smoking (Chronic)    Overview     3/4 pack a day                   Recommendations/plans:    Stress test was low risk recently, no further cardiac work-up recommended at this time  Counseled On Smoking Cessation  We Will Add Imdur 30 Mg to His Regimen to See If This Will Control His Chronic Chest Pains  Follow-up with cardiology in 3 months or sooner if necessary    Clint Mcintyre, DO  Interventional cardiology  [facility]  06/20/2022  16:59 CDT

## 2022-07-06 RX ORDER — TIZANIDINE 4 MG/1
TABLET ORAL
Qty: 90 TABLET | Refills: 0 | OUTPATIENT
Start: 2022-07-06

## 2022-07-06 NOTE — TELEPHONE ENCOUNTER
Rx Refill Note  Requested Prescriptions     Pending Prescriptions Disp Refills   • tiZANidine (ZANAFLEX) 4 MG tablet [Pharmacy Med Name: TIZANIDINE 4MG TABLETS] 90 tablet 0     Sig: TAKE 1 TABLET BY MOUTH THREE TIMES DAILY AS NEEDED FOR MUSCLE SPASMS      Last office visit with prescribing clinician: 6/15/2022      Next office visit with prescribing clinician: not schd    LRX:6/8/22-discontinued not efficacious         Liv Perkins MA  07/06/22, 09:45 CDT

## 2022-07-07 ENCOUNTER — TREATMENT (OUTPATIENT)
Dept: PHYSICAL THERAPY | Facility: CLINIC | Age: 37
End: 2022-07-07

## 2022-07-07 DIAGNOSIS — R20.2 NUMBNESS AND TINGLING IN LEFT ARM: ICD-10-CM

## 2022-07-07 DIAGNOSIS — R20.0 NUMBNESS AND TINGLING IN LEFT ARM: ICD-10-CM

## 2022-07-07 DIAGNOSIS — M54.2 CERVICAL PAIN: Primary | ICD-10-CM

## 2022-07-07 DIAGNOSIS — R07.9 CHRONIC CHEST PAIN: ICD-10-CM

## 2022-07-07 DIAGNOSIS — M54.12 CERVICAL RADICULOPATHY: ICD-10-CM

## 2022-07-07 DIAGNOSIS — G89.29 CHRONIC CHEST PAIN: ICD-10-CM

## 2022-07-07 PROCEDURE — 97162 PT EVAL MOD COMPLEX 30 MIN: CPT

## 2022-07-07 RX ORDER — HYDROCODONE BITARTRATE AND ACETAMINOPHEN 7.5; 325 MG/1; MG/1
1 TABLET ORAL 2 TIMES DAILY PRN
Qty: 14 TABLET | Refills: 0 | OUTPATIENT
Start: 2022-07-07

## 2022-07-07 NOTE — PROGRESS NOTES
Physical Therapy Initial Evaluation and Plan of Care    Patient: Ross Platt               : 1985  Visit Date: 2022  Referring practitioner: JANESSA Little  Date of Initial Visit: 2022  Patient seen for 1 sessions    Visit Diagnoses:    ICD-10-CM ICD-9-CM   1. Cervical pain  M54.2 723.1   2. Numbness and tingling in left arm  R20.0 782.0    R20.2      Past Medical History:   Diagnosis Date   • Back pain    • Chest pain    • Claustrophobia    • COPD (chronic obstructive pulmonary disease) (HCC)    • Emphysema of lung (HCC)    • Lung disease    • Marfan syndrome    • Pneumothorax    • Seizures (HCC)     when he was a child    • Seizures (HCC)    • Smoking     3/4 pack a day   • Tobacco abuse    • Underweight      Past Surgical History:   Procedure Laterality Date   • HERNIA REPAIR     • LUNG LOBECTOMY Right 2017   • LUNG REMOVAL, TOTAL Right    • THORACOSCOPY N/A 2/3/2017    Procedure: BRONCHOSCOPY, THORACOSCOPY RIGHT CHEST,  WEDGE RESECTION RIGHT UPPER AND LOWER LOBE OF LUNG, MECHANICAL PLEURODESIS;  Surgeon: Kyle Heath MD;  Location: [facility];  Service:    • TYMPANOSTOMY  2016    Recorded         SUBJECTIVE     Subjective Evaluation    History of Present Illness  Mechanism of injury: Ariel reports he has had increased pain that goes from the L ear and radiates down through his shoulder and chest. This pain started about 8 months ago with no specific BASILIO, his pain waxes and wanes in severity. He has a history of R total lung removal in , which subsequently causes SOA and has put increased stress on his heart. He turns to pulmonologist in 2022 and he reports he may be placed on supplemental oxygen.  Overall he is hoping to get relief from pain and increase his BUE strength.         Patient Occupation: Disability  Pain  Current pain ratin  At best pain rating: 3  At worst pain rating: 10  Location:  L lateral neck, LUE   Quality: pressure, sharp and knife-like  Relieving factors: medications  Aggravating factors: sleeping, lifting and overhead activity    Social Support  Lives in: one-story house    Diagnostic Tests  MRI studies: normal    Treatments  No previous or current treatments  Patient Goals  Patient goals for therapy: decreased pain and increased strength         Outcome Measure:   PT G-Codes  Outcome Measure Options: Neck Disability Index (NDI)  Neck Disability Index Score: 20    OBJECTIVE     Objective          Postural Observations    Additional Postural Observation Details  Excessively B rounded shoulders and hyperkyphotic thoracic spine   Thoracic scoliosis     Neurological Testing     Sensation   Cervical/Thoracic   Left   Intact: light touch    Right   Intact: light touch    Comments   Left light touch: Tingling throughout LUE.     Reflexes   Left   Biceps (C5/C6): trace (1+)  Brachioradialis (C6): trace (1+)  Triceps (C7): trace (1+)    Right   Biceps (C5/C6): normal (2+)  Brachioradialis (C6): normal (2+)  Triceps (C7): normal (2+)    Active Range of Motion   Cervical/Thoracic Spine   Cervical    Left rotation: 45 degrees   Right rotation: 60 degrees   Left Shoulder   Flexion: 150 degrees with pain  Abduction: 155 degrees     Right Shoulder   Normal active range of motion    Strength/Myotome Testing     Left Shoulder     Planes of Motion   Flexion: 5   Abduction: 5     Right Shoulder     Planes of Motion   Flexion: 5   Abduction: 5     Left Elbow   Flexion: 5  Extension: 5    Right Elbow   Flexion: 5  Extension: 5    Left Wrist/Hand   Wrist extension: 5  Wrist flexion: 5    Right Wrist/Hand   Wrist extension: 5  Wrist flexion: 5    Tests   Cervical     Left   Positive ULTT1 and ULTT2.   Negative cervical distraction and Spurling's sign.     Right   Negative cervical distraction and Spurling's sign.       Male  strength (pounds)  AGE Right Hand RH Norms Left Hand LH Norms   20-24  121+20.6   104+21.8   25-29  120+23.0  110+16.2   30-34  121+22.4  110+21.7   35-39 90 119+24 68 113+21.7   40-44  117+20.7  112+18.7   45-49  110+23.0  101+22.8   50-54  113+18.1  102+17   55-59  101+26.7  83+23.4   60-64  90+20.4  77+20.3   65-69  91+20.6  76.8+19.8   70-74  75+21.5  65+18.1   75+  66+21.0  55+17.0   (LANDON Cain et al; Hand Dynometer: Effects of trials and sessions.  Perpetual and Motor Skills 61:195-8, 1985)  Key  * = Dominant hand  > = Intervention    Therapy Education/Self Care [id number]   Details: Discussed PT POC and progression with PT    Date last updated: 7/7/22   Given postural retraining   Progress: New   Education provided to:  Patient   Level of understanding Verbalized   Timed Minutes        Total Timed Treatment:     0   mins  Total Time of Visit:            35   mins    ASSESSMENT/PLAN     GOALS:  Goals                                          Progress Note due by 8/6/2022                                                      Recert due by 10/5/2022   STG by: 3 weeks Comments Date Status   Patient will demonstrate 60 deg L cervical rotation AROM       Patient will demonstrate improved thoracic mobility       Patient will demonstrate ability to perform 10 consecutive chin tucks for improved stabilization       Patient will require no more than 1-2 cues for upright posture during exercises       LTG by: 6 weeks       Patient will demonstrate independence with comprehensive HEP      Patient will demonstrate WFL L shoulder elevation to perform overhead activities       Patient will report no LUE radicular symptoms for 1 week       Patient will score <10 on NDI                    Assessment & Plan     Assessment  Impairments: abnormal muscle firing, abnormal or restricted ROM, impaired physical strength, lacks appropriate home exercise program and pain with function  Functional Limitations: carrying objects, lifting, sleeping, uncomfortable because of pain and reaching overhead  Assessment details:   Giulia presented to physical therapy with a chief complaint of ongoing L sided neck and LUE pain. He had a recent cervical MRI with was normal. He also has a history of R total lung removal. Physical examination revealed hyperkyphotic thoracic spine, decreased L cervical rotation and notably guarded L sided cervical paraspinals and pec major/minor. His clinical presentation appears related to a multitude of contributing factors including thoracic outlet syndrome due to posture and L sided accessory muscle compensation due to increased work of breathing. He mentioned that he has had more trouble breathing over the last year and may be getting put on supplemental oxygen. We will work to address his muscle imbalances while promoting improved posture to reduce the strain on his cervical spine. Thank you for this referral.     Barriers to therapy: Advanced COPD, Marfan syndrome, h/o seizures   Prognosis: good    Plan  Therapy options: will be seen for skilled therapy services  Planned therapy interventions: body mechanics training, home exercise program, joint mobilization, manual therapy, neuromuscular re-education, postural training, soft tissue mobilization, spinal/joint mobilization, strengthening, stretching and therapeutic activities  Frequency: 2x week  Duration in visits: 12  Duration in weeks: 6  Treatment plan discussed with: patient  Plan details: Initially focus on cervical/thoracic mobility and soft tissue restrictions utilizing manual therapy. Progress postural strengthening as able. Bolster HEP. Monitor SpO2.         SIGNATURE: Paige Pak PT DPT, [place] License #: [id number]  Electronically Signed on 7/8/2022    Initial Certification  Certification Period: 7/8/2022 through 10/5/2022  I certify that the therapy services are furnished while this patient is under my care.  The services outlined above are required by this patient, and will be reviewed every 90 days.     PHYSICIAN: Cristy Schmidt APRN (NPI:  [id number])    Signature____________________________________________DATE: _________     Please sign and return via fax to [phone].   Thank you so much for letting us work with Ross. I appreciate your letting us work with your patients. If you have any questions or concerns, please don't hesitate to contact me.          [address] [id number]  [phone]

## 2022-07-07 NOTE — TELEPHONE ENCOUNTER
Rx Refill Note  Requested Prescriptions     Pending Prescriptions Disp Refills   • HYDROcodone-acetaminophen (NORCO) 7.5-325 MG per tablet 14 tablet 0     Sig: Take 1 tablet by mouth 2 (Two) Times a Day As Needed for Moderate Pain .      Last office visit with prescribing clinician: 6/15/2022      Next office visit with prescribing clinician: 7/25/2022            Anuradha Ramírez MA  07/07/22, 15:24 CDT

## 2022-07-07 NOTE — TELEPHONE ENCOUNTER
Caller: iGulia Ross SCAR    Relationship: Self    Best call back number: [phone]    Requested Prescriptions:   Requested Prescriptions     Pending Prescriptions Disp Refills   • HYDROcodone-acetaminophen (NORCO) 7.5-325 MG per tablet 14 tablet 0     Sig: Take 1 tablet by mouth 2 (Two) Times a Day As Needed for Moderate Pain .        [id number] [place] - [address] AT [facility] [address] - [phone]  - [phone] FX     Additional details provided by patient: PATIENT STATES PHYSICAL THERAPY TOLD PATIENT  THEY COULD NOT GIVE HIM ANY PAIN MEDICATIONS   PATIENT REQUESTING IF POSSIBLE CAN PROVIDER CAN GIVE HIM ENOUGH TO GET HIM SOME PAIN RELIEF THROUGH OUT APPOINTMENT     Does the patient have less than a 3 day supply:  [x] Yes  [] No    Lauri Pickard Rep   07/07/22 14:47 CDT

## 2022-07-18 ENCOUNTER — APPOINTMENT (OUTPATIENT)
Dept: GENERAL RADIOLOGY | Age: 37
End: 2022-07-18
Payer: MEDICAID

## 2022-07-18 ENCOUNTER — APPOINTMENT (OUTPATIENT)
Dept: CT IMAGING | Age: 37
End: 2022-07-18
Payer: MEDICAID

## 2022-07-18 ENCOUNTER — HOSPITAL ENCOUNTER (EMERGENCY)
Age: 37
Discharge: HOME | End: 2022-07-19
Payer: MEDICAID

## 2022-07-18 VITALS
HEART RATE: 70 BPM | HEIGHT: 78 IN | RESPIRATION RATE: 16 BRPM | WEIGHT: 180 LBS | OXYGEN SATURATION: 96 % | BODY MASS INDEX: 20.83 KG/M2 | DIASTOLIC BLOOD PRESSURE: 76 MMHG | TEMPERATURE: 98.1 F | SYSTOLIC BLOOD PRESSURE: 109 MMHG

## 2022-07-18 DIAGNOSIS — R07.89 ATYPICAL CHEST PAIN: Primary | ICD-10-CM

## 2022-07-18 DIAGNOSIS — R09.1 PLEURISY: ICD-10-CM

## 2022-07-18 LAB
ALBUMIN SERPL-MCNC: 4.1 G/DL (ref 3.5–5.2)
ALP BLD-CCNC: 78 U/L (ref 40–130)
ALT SERPL-CCNC: 17 U/L (ref 5–41)
ANION GAP SERPL CALCULATED.3IONS-SCNC: 10 MMOL/L (ref 7–19)
AST SERPL-CCNC: 16 U/L (ref 5–40)
BASOPHILS ABSOLUTE: 0 K/UL (ref 0–0.2)
BASOPHILS RELATIVE PERCENT: 0.6 % (ref 0–1)
BILIRUB SERPL-MCNC: <0.2 MG/DL (ref 0.2–1.2)
BUN BLDV-MCNC: 25 MG/DL (ref 6–20)
CALCIUM SERPL-MCNC: 9.3 MG/DL (ref 8.6–10)
CHLORIDE BLD-SCNC: 107 MMOL/L (ref 98–111)
CO2: 23 MMOL/L (ref 22–29)
CREAT SERPL-MCNC: 0.9 MG/DL (ref 0.5–1.2)
EOSINOPHILS ABSOLUTE: 0.1 K/UL (ref 0–0.6)
EOSINOPHILS RELATIVE PERCENT: 2 % (ref 0–5)
GFR AFRICAN AMERICAN: >59
GFR NON-AFRICAN AMERICAN: >60
GLUCOSE BLD-MCNC: 101 MG/DL (ref 74–109)
HCT VFR BLD CALC: 42.6 % (ref 42–52)
HEMOGLOBIN: 14.3 G/DL (ref 14–18)
IMMATURE GRANULOCYTES #: 0.1 K/UL
LYMPHOCYTES ABSOLUTE: 1.8 K/UL (ref 1.1–4.5)
LYMPHOCYTES RELATIVE PERCENT: 27.3 % (ref 20–40)
MCH RBC QN AUTO: 30.8 PG (ref 27–31)
MCHC RBC AUTO-ENTMCNC: 33.6 G/DL (ref 33–37)
MCV RBC AUTO: 91.8 FL (ref 80–94)
MONOCYTES ABSOLUTE: 0.7 K/UL (ref 0–0.9)
MONOCYTES RELATIVE PERCENT: 11.3 % (ref 0–10)
NEUTROPHILS ABSOLUTE: 3.8 K/UL (ref 1.5–7.5)
NEUTROPHILS RELATIVE PERCENT: 58 % (ref 50–65)
PDW BLD-RTO: 12.8 % (ref 11.5–14.5)
PLATELET # BLD: 201 K/UL (ref 130–400)
PMV BLD AUTO: 9.5 FL (ref 9.4–12.4)
POTASSIUM SERPL-SCNC: 3.8 MMOL/L (ref 3.5–5)
RBC # BLD: 4.64 M/UL (ref 4.7–6.1)
SODIUM BLD-SCNC: 140 MMOL/L (ref 136–145)
TOTAL PROTEIN: 6.5 G/DL (ref 6.6–8.7)
TROPONIN: <0.01 NG/ML (ref 0–0.03)
WBC # BLD: 6.5 K/UL (ref 4.8–10.8)

## 2022-07-18 PROCEDURE — 71045 X-RAY EXAM CHEST 1 VIEW: CPT | Performed by: RADIOLOGY

## 2022-07-18 PROCEDURE — 84484 ASSAY OF TROPONIN QUANT: CPT

## 2022-07-18 PROCEDURE — 99285 EMERGENCY DEPT VISIT HI MDM: CPT

## 2022-07-18 PROCEDURE — 36415 COLL VENOUS BLD VENIPUNCTURE: CPT

## 2022-07-18 PROCEDURE — 71275 CT ANGIOGRAPHY CHEST: CPT | Performed by: RADIOLOGY

## 2022-07-18 PROCEDURE — 85025 COMPLETE CBC W/AUTO DIFF WBC: CPT

## 2022-07-18 PROCEDURE — 71045 X-RAY EXAM CHEST 1 VIEW: CPT

## 2022-07-18 PROCEDURE — 6360000004 HC RX CONTRAST MEDICATION: Performed by: PHYSICIAN ASSISTANT

## 2022-07-18 PROCEDURE — 71275 CT ANGIOGRAPHY CHEST: CPT

## 2022-07-18 PROCEDURE — 93005 ELECTROCARDIOGRAM TRACING: CPT | Performed by: EMERGENCY MEDICINE

## 2022-07-18 PROCEDURE — 80053 COMPREHEN METABOLIC PANEL: CPT

## 2022-07-18 PROCEDURE — 6360000002 HC RX W HCPCS: Performed by: PHYSICIAN ASSISTANT

## 2022-07-18 PROCEDURE — 96374 THER/PROPH/DIAG INJ IV PUSH: CPT

## 2022-07-18 RX ORDER — PREDNISONE 10 MG/1
10 TABLET ORAL DAILY
Qty: 10 TABLET | Refills: 0 | Status: SHIPPED | OUTPATIENT
Start: 2022-07-18 | End: 2022-07-28

## 2022-07-18 RX ORDER — LORATADINE 10 MG/1
TABLET ORAL
COMMUNITY
Start: 2022-07-12

## 2022-07-18 RX ORDER — DICLOFENAC SODIUM 75 MG/1
TABLET, DELAYED RELEASE ORAL
COMMUNITY
Start: 2022-07-12

## 2022-07-18 RX ORDER — ISOSORBIDE MONONITRATE 30 MG/1
TABLET, EXTENDED RELEASE ORAL
COMMUNITY
Start: 2022-07-14

## 2022-07-18 RX ORDER — NAPROXEN 500 MG/1
TABLET ORAL
COMMUNITY
Start: 2022-07-12

## 2022-07-18 RX ORDER — MORPHINE SULFATE 4 MG/ML
4 INJECTION, SOLUTION INTRAMUSCULAR; INTRAVENOUS ONCE
Status: COMPLETED | OUTPATIENT
Start: 2022-07-18 | End: 2022-07-18

## 2022-07-18 RX ORDER — FLUTICASONE PROPIONATE 50 MCG
SPRAY, SUSPENSION (ML) NASAL
COMMUNITY
Start: 2022-07-12

## 2022-07-18 RX ORDER — GLYCOPYRROLATE AND FORMOTEROL FUMARATE 9; 4.8 UG/1; UG/1
AEROSOL, METERED RESPIRATORY (INHALATION)
COMMUNITY
Start: 2022-07-13

## 2022-07-18 RX ADMIN — IOPAMIDOL 90 ML: 755 INJECTION, SOLUTION INTRAVENOUS at 22:12

## 2022-07-18 RX ADMIN — MORPHINE SULFATE 4 MG: 4 INJECTION, SOLUTION INTRAMUSCULAR; INTRAVENOUS at 22:25

## 2022-07-18 ASSESSMENT — PAIN SCALES - GENERAL
PAINLEVEL_OUTOF10: 8
PAINLEVEL_OUTOF10: 6

## 2022-07-18 ASSESSMENT — PAIN DESCRIPTION - LOCATION: LOCATION: CHEST

## 2022-07-18 ASSESSMENT — PAIN DESCRIPTION - DESCRIPTORS: DESCRIPTORS: STABBING

## 2022-07-18 ASSESSMENT — PAIN DESCRIPTION - ORIENTATION: ORIENTATION: LEFT

## 2022-07-19 LAB
EKG P AXIS: 62 DEGREES
EKG P-R INTERVAL: 172 MS
EKG Q-T INTERVAL: 362 MS
EKG QRS DURATION: 84 MS
EKG QTC CALCULATION (BAZETT): 397 MS
EKG T AXIS: 78 DEGREES

## 2022-07-19 PROCEDURE — 93010 ELECTROCARDIOGRAM REPORT: CPT | Performed by: INTERNAL MEDICINE

## 2022-07-19 NOTE — ED PROVIDER NOTES
[facility] EMERGENCY DEPT  eMERGENCYdEPARTMENT eNCOUnter      Pt Name: Emy Felix  MRN: [id number]  [place] 1985  Date of evaluation: 7/18/2022  Provider:SACHI Phan    CHIEF COMPLAINT       Chief Complaint   Patient presents with    Chest Pain     Pt states he has a history of Marfan syndrome. He has had the right lung removed in the past.Pt not sure if it is a total or partial.   Symptoms started about noon today while he was at rest.  He states the pain is constant and stabbing. HISTORY OF PRESENT ILLNESS  (Location/Symptom, Timing/Onset, Context/Setting, Quality, Duration, Modifying Factors, Severity.)   Emy Felix is a 40 y.o. male who presents to the emergency department with hx of left sided chest pain. Part of right lung removed in past. Has marfan. Symptoms began around noon today. He feels like he is being stabbed in chest left side. He has pleurisy. No fever or chills. HPI    Nursing Notes were reviewed and I agree. REVIEW OF SYSTEMS    (2-9 systems for level 4, 10 or more for level 5)     Review of Systems   Constitutional:  Negative for activity change, appetite change, chills and fever. HENT:  Negative for congestion and dental problem. Eyes:  Negative for photophobia, discharge and itching. Respiratory:  Positive for chest tightness and shortness of breath. Negative for apnea and cough. Cardiovascular:  Positive for chest pain. Musculoskeletal:  Negative for arthralgias, back pain, gait problem, myalgias and neck pain. Skin:  Negative for color change, pallor and rash. Neurological:  Negative for dizziness, seizures and syncope. Psychiatric/Behavioral:  Negative for agitation. The patient is not nervous/anxious. Except as noted above the remainder of the review of systems was reviewed and negative.        PAST MEDICAL HISTORY     Past Medical History:   Diagnosis Date    COPD (chronic obstructive pulmonary disease) ([facility] [address]     Emphysema lung ([facility] [address] Marfan's disease     Pneumothorax          SURGICAL HISTORY       Past Surgical History:   Procedure Laterality Date    HERNIA REPAIR      LUNG REMOVAL, PARTIAL Right          CURRENT MEDICATIONS       Discharge Medication List as of 7/19/2022 12:17 AM        CONTINUE these medications which have NOT CHANGED    Details   diclofenac (VOLTAREN) 75 MG EC tablet Historical Med      fluticasone (FLONASE) 50 MCG/ACT nasal spray Historical Med      BEVESPI AEROSPHERE 9-4.8 MCG/ACT AERO INHALE 2 SPRAYS BY MOUTH 2 TIMES DAILY FOR 90 DAYS, DAWHistorical Med      isosorbide mononitrate (IMDUR) 30 MG extended release tablet TAKE 1 TABLET BY MOUTH EVERY MORNINGHistorical Med      loratadine (CLARITIN) 10 MG tablet Historical Med      naproxen (NAPROSYN) 500 MG tablet Historical Med      albuterol sulfate  (90 Base) MCG/ACT inhaler Inhale 2 puffs into the lungs every 4 hours as neededHistorical Med      Fluticasone-Umeclidin-Vilant (TRELEGY ELLIPTA) 200-62.5-25 MCG/INH AEPB Inhale 1 puff into the lungs DailyHistorical Med             ALLERGIES     Calcium channel blockers, Ciprofloxacin, and Methylprednisolone    FAMILY HISTORY     History reviewed. No pertinent family history.        SOCIAL HISTORY       Social History     Socioeconomic History    Marital status: Single     Spouse name: None    Number of children: None    Years of education: None    Highest education level: None   Tobacco Use    Smoking status: Every Day     Packs/day: 0.50     Years: 10.00     Pack years: 5.00     Types: Cigarettes    Smokeless tobacco: Never   Vaping Use    Vaping Use: Never used   Substance and Sexual Activity    Alcohol use: No    Drug use: No       SCREENINGS           PHYSICAL EXAM    (up to 7 forlevel 4, 8 or more for level 5)     ED Triage Vitals [07/18/22 2109]   BP Temp Temp Source Heart Rate Resp SpO2 Height Weight   117/68 98.1 °F (36.7 °C) Tympanic 78 16 96 % 6' 6\" (1.981 m) 180 lb (81.6 kg)       Physical Exam  Vitals and nursing note reviewed. Constitutional:       General: He is not in acute distress. Appearance: Normal appearance. He is well-developed. He is not diaphoretic. HENT:      Head: Normocephalic and atraumatic. Right Ear: External ear normal.      Left Ear: External ear normal.   Eyes:      Pupils: Pupils are equal, round, and reactive to light. Neck:      Trachea: No tracheal deviation. Cardiovascular:      Rate and Rhythm: Normal rate and regular rhythm. Pulses: Normal pulses. Heart sounds: Normal heart sounds. No murmur heard. Pulmonary:      Effort: Pulmonary effort is normal.      Breath sounds: Normal breath sounds. No stridor. No wheezing. Chest:      Chest wall: No tenderness. Abdominal:      General: Bowel sounds are normal. There is no distension. Palpations: Abdomen is soft. Tenderness: There is no abdominal tenderness. Musculoskeletal:         General: Normal range of motion. Cervical back: Normal range of motion and neck supple. Skin:     General: Skin is warm and dry. Capillary Refill: Capillary refill takes less than 2 seconds. Neurological:      General: No focal deficit present. Mental Status: He is alert and oriented to person, place, and time. Mental status is at baseline. Psychiatric:         Mood and Affect: Mood normal.         Behavior: Behavior normal.         Thought Content: Thought content normal.         Judgment: Judgment normal.         DIAGNOSTIC RESULTS     RADIOLOGY:   Non-plain film images such as CT, Ultrasound and MRI are read by the radiologist. Plain radiographic images are visualized and preliminarilyinterpreted by No att. providers found with the below findings:        Interpretation per the Radiologist below, if available at the time of this note:    CTA PULMONARY W CONTRAST   Final Result   1. Panacinar emphysematous changes in bilateral lung parenchyma.     2.  Groundglass opacity with parenchymal collapse involving left lower lobe. 3.  Emphysematous cyst involving right middle lobe. Recommendation: Follow up as clinically indicated. All CT scans at this facility utilize dose modulation, iterative reconstruction, and/or weight based dosing when appropriate to reduce radiation dose to as low as reasonably achievable. Electronically Signed by Magdalena Henry MD at 19-Jul-2022 12:03:11 AM               XR CHEST PORTABLE   Final Result   no acute cardiopulmonary process   Recommendation:  Follow up as clinically indicated. Electronically Signed by Lorraine Zepeda at 18-Jul-2022 10:54:20 PM                   LABS:  Labs Reviewed   CBC WITH AUTO DIFFERENTIAL - Abnormal; Notable for the following components:       Result Value    RBC 4.64 (*)     Monocytes % 11.3 (*)     All other components within normal limits   COMPREHENSIVE METABOLIC PANEL - Abnormal; Notable for the following components:    BUN 25 (*)     Total Protein 6.5 (*)     All other components within normal limits   TROPONIN       All other labs were within normal range or notreturned as of this dictation. RE-ASSESSMENT          EMERGENCY DEPARTMENT COURSE and DIFFERENTIAL DIAGNOSIS/MDM:   Vitals:    Vitals:    07/18/22 2109 07/18/22 2130 07/18/22 2300   BP: 117/68 (!) 102/90 109/76   Pulse: 78 78 70   Resp: 16 18 16   Temp: 98.1 °F (36.7 °C)     TempSrc: Tympanic     SpO2: 96% 94% 96%   Weight: 180 lb (81.6 kg)     Height: 6' 6\" (1.981 m)       EKG similar to prior with no st changes RRR    MDM  Plan for DC no acute findings on CTA or chest XR normal cardiac work up  including troponin and EKG findings. plan for pleurisy tx and close follow with PCP. My attending made aware of work up and findings. PROCEDURES:    Procedures      FINAL IMPRESSION      1. Atypical chest pain    2.  Pleurisy          DISPOSITION/PLAN   DISPOSITION Decision To Discharge 07/18/2022 11:59:47 PM      PATIENT REFERRED TO:  [facility] EMERGENCY DEPT  [address] [facility] [place]  [phone]    If symptoms worsen    Yarely Lopez MD  [address] [place] [address] [address]   [phone]    Call   As needed    DISCHARGE MEDICATIONS:  Discharge Medication List as of 7/19/2022 12:17 AM        START taking these medications    Details   predniSONE (DELTASONE) 10 MG tablet Take 1 tablet by mouth in the morning for 10 days.  ., Disp-10 tablet, R-0Normal             (Please note that portions of this note were completed with a voice recognition program.  Efforts were made to edit the dictations but occasionallywords are mis-transcribed.)    Ana Sarabia, [address] [place] [address]  07/19/22 [address] [place], [place]  08/30/22 1153

## 2022-08-01 ENCOUNTER — PATIENT MESSAGE (OUTPATIENT)
Dept: FAMILY MEDICINE CLINIC | Facility: CLINIC | Age: 37
End: 2022-08-01

## 2022-08-01 NOTE — TELEPHONE ENCOUNTER
From: Ross Platt  To: JANESSA Little  Sent: 8/1/2022 11:16 AM CDT  Subject: Hey    Yes can u see if maxx would give me gabapentin to help me out plz

## 2022-08-02 ENCOUNTER — LAB (OUTPATIENT)
Dept: LAB | Facility: HOSPITAL | Age: 37
End: 2022-08-02

## 2022-08-02 DIAGNOSIS — Z20.822 ENCOUNTER FOR PREOPERATIVE SCREENING LABORATORY TESTING FOR COVID-19 VIRUS: ICD-10-CM

## 2022-08-02 DIAGNOSIS — Z01.812 ENCOUNTER FOR PREOPERATIVE SCREENING LABORATORY TESTING FOR COVID-19 VIRUS: ICD-10-CM

## 2022-08-02 LAB — SARS-COV-2 ORF1AB RESP QL NAA+PROBE: NOT DETECTED

## 2022-08-02 PROCEDURE — U0004 COV-19 TEST NON-CDC HGH THRU: HCPCS

## 2022-08-02 PROCEDURE — C9803 HOPD COVID-19 SPEC COLLECT: HCPCS

## 2022-08-03 ENCOUNTER — TELEPHONE (OUTPATIENT)
Dept: FAMILY MEDICINE CLINIC | Facility: CLINIC | Age: 37
End: 2022-08-03

## 2022-08-03 ENCOUNTER — OFFICE VISIT (OUTPATIENT)
Dept: FAMILY MEDICINE CLINIC | Facility: CLINIC | Age: 37
End: 2022-08-03

## 2022-08-03 VITALS
SYSTOLIC BLOOD PRESSURE: 116 MMHG | TEMPERATURE: 98.1 F | DIASTOLIC BLOOD PRESSURE: 75 MMHG | HEIGHT: 77 IN | OXYGEN SATURATION: 97 % | HEART RATE: 96 BPM | WEIGHT: 178.13 LBS | BODY MASS INDEX: 21.03 KG/M2

## 2022-08-03 DIAGNOSIS — Z00.00 ANNUAL PHYSICAL EXAM: Primary | ICD-10-CM

## 2022-08-03 DIAGNOSIS — Z13.220 SCREENING CHOLESTEROL LEVEL: ICD-10-CM

## 2022-08-03 DIAGNOSIS — N52.9 ERECTILE DYSFUNCTION, UNSPECIFIED ERECTILE DYSFUNCTION TYPE: ICD-10-CM

## 2022-08-03 DIAGNOSIS — Z71.6 ENCOUNTER FOR SMOKING CESSATION COUNSELING: ICD-10-CM

## 2022-08-03 DIAGNOSIS — L81.9 PIGMENTED SKIN LESION SUSPICIOUS FOR MALIGNANT NEOPLASM: ICD-10-CM

## 2022-08-03 DIAGNOSIS — G72.9 CERVICAL MYOPATHY: ICD-10-CM

## 2022-08-03 PROCEDURE — 2014F MENTAL STATUS ASSESS: CPT | Performed by: NURSE PRACTITIONER

## 2022-08-03 PROCEDURE — 99395 PREV VISIT EST AGE 18-39: CPT | Performed by: NURSE PRACTITIONER

## 2022-08-03 PROCEDURE — 3008F BODY MASS INDEX DOCD: CPT | Performed by: NURSE PRACTITIONER

## 2022-08-03 RX ORDER — NICOTINE 21 MG/24HR
1 PATCH, TRANSDERMAL 24 HOURS TRANSDERMAL EVERY 24 HOURS
Qty: 30 PATCH | Refills: 0 | Status: SHIPPED | OUTPATIENT
Start: 2022-08-03

## 2022-08-03 RX ORDER — SILDENAFIL CITRATE 20 MG/1
20-40 TABLET ORAL DAILY PRN
Qty: 30 TABLET | Refills: 2 | Status: SHIPPED | OUTPATIENT
Start: 2022-08-03

## 2022-08-03 RX ORDER — ALBUTEROL SULFATE 90 UG/1
2 AEROSOL, METERED RESPIRATORY (INHALATION) EVERY 4 HOURS PRN
Qty: 18 G | Refills: 2 | Status: SHIPPED | OUTPATIENT
Start: 2022-08-03 | End: 2022-08-04 | Stop reason: SDUPTHER

## 2022-08-03 NOTE — PROGRESS NOTES
Chief Complaint  Annual Exam (Physical)    Subjective        Ross Platt presents to [facility]  History of Present Illness   Mr. Velázquez is a 37-year-old male who presents today for an annual physical.    The patient states he has a mole that he needs checked out. He denies any other new medical issues. He reports sometimes he cannot get an erection and maintain it in the bedroom. He states his father is on a medication called Sildenafil that he pays 20 dollars for and he took one before and it did work. He does not remember the dosage. He notes I did send him some last year but it cost too much because his insurance would not cover it. He states he just took a COVID test yesterday and it came back negative. He reports he needs a refill on his Trelegy inhaler. He notes that he still has his rescue inhaler. He states he has not taken isosorbide in a while but he does have it. He reports he takes them once every morning but they give him a headache. He states that he does not take them unless he has real bad pain. He reports it does help with the chest pain for about 30 minutes to an hour and then the pain comes back. He states his drug test that he took during his last visit was positive for marijuana and he did not know it would still be in his system. He reports he has smoked marijuana in the past to help with his pain and it does help. He states that he is still smoking cigarettes and is trying to cut down. He reports he has been out of his patches for 1 or 2 weeks and they have been helping a lot. He notes being around people who he becomes irritable with, he finds himself smoking a cigarette and caught himself smoking more and more. He states he is only smoking 5-6 cigarettes per day. He denies any alcohol or substance abuse. He reports he sees pain management at [facility] on tomorrow.      Objective   Vital Signs:  /75 (BP Location: Left arm, Patient Position:  "Sitting, Cuff Size: Adult)   Pulse 96   Temp 98.1 °F (36.7 °C) (Temporal)   Ht 195.6 cm (77\")   Wt 80.8 kg (178 lb 2 oz)   SpO2 97%   BMI 21.12 kg/m²   Estimated body mass index is 21.12 kg/m² as calculated from the following:    Height as of this encounter: 195.6 cm (77\").    Weight as of this encounter: 80.8 kg (178 lb 2 oz).    BMI is within normal parameters. No other follow-up for BMI required.      Physical Exam  Vitals and nursing note reviewed.   Constitutional:       General: He is not in acute distress.     Appearance: He is well-developed and normal weight.   HENT:      Right Ear: Tympanic membrane and ear canal normal.      Left Ear: Tympanic membrane and ear canal normal.      Nose: Nose normal.      Right Sinus: No maxillary sinus tenderness or frontal sinus tenderness.      Left Sinus: No maxillary sinus tenderness or frontal sinus tenderness.      Mouth/Throat:      Mouth: Mucous membranes are moist.      Pharynx: Oropharynx is clear. Uvula midline. No uvula swelling.   Eyes:      Conjunctiva/sclera: Conjunctivae normal.   Neck:      Thyroid: No thyromegaly.      Trachea: No tracheal deviation.   Cardiovascular:      Rate and Rhythm: Normal rate and regular rhythm.      Heart sounds: Normal heart sounds.   Pulmonary:      Effort: Pulmonary effort is normal.      Breath sounds: Decreased breath sounds and wheezing present.   Musculoskeletal:      Left shoulder: Decreased range of motion.      Cervical back: Neck supple. Decreased range of motion.   Lymphadenopathy:      Cervical: No cervical adenopathy.   Skin:     General: Skin is warm and dry.      Findings: Lesion present.             Comments: Dark skin lesion where indicated   Neurological:      Mental Status: He is alert.   Psychiatric:         Behavior: Behavior normal.        Result Review :                Assessment and Plan   Diagnoses and all orders for this visit:    1. Annual physical exam (Primary)    2. Erectile dysfunction, " unspecified erectile dysfunction type    3. Encounter for smoking cessation counseling  -     nicotine (Nicoderm CQ) 21 MG/24HR patch; Place 1 patch on the skin as directed by provider Daily.  Dispense: 30 patch; Refill: 0    4. Cervical myopathy    5. Pigmented skin lesion suspicious for malignant neoplasm  -     Ambulatory Referral to General Surgery    6. Screening cholesterol level  -     Lipid panel    Other orders  -     sildenafil (REVATIO) 20 MG tablet; Take 1-2 tablets by mouth Daily As Needed (prior to sexual activity).  Dispense: 30 tablet; Refill: 2  -     Discontinue: albuterol sulfate HFA (ProAir HFA) 108 (90 Base) MCG/ACT inhaler; Inhale 2 puffs Every 4 (Four) Hours As Needed for Wheezing.  Dispense: 18 g; Refill: 2    1. Erectile dysfunction  - I advised him to stop taking the isosorbide when he starts the Sildenafil because those two medications together can cause his blood pressure to bottom out dangerously. I warned the patient to not combine the two medications by taking one the week before and the other the following week because that is too close together and could cause harm to his blood pressure.    2. High cholesterol  - we will do blood work today    3. Smoking cessation  - I will send more smoking cessation patches to help him quit smoking.    Counseling/Anticipatory Guidance: encouraged healthy nutrition,  physical activity, healthy weight, injury prevention, encouraged avoidance of tobacco, alcohol and drugs, encouraged healthy sexual behavior and STDs, family planning/contraception, recommend routine dental health, mental health visits, discussed recommended immunizations, screenings via .     Ross Platt  reports that he has been smoking cigarettes. He started smoking about 25 years ago. He has a 12.50 pack-year smoking history. He has never used smokeless tobacco.. I have educated him on the risk of diseases from using tobacco products such as cancer, COPD and heart disease.      I advised him to quit and he is willing to attempt to quit smoking. He would like refill of nicotine patches.    I spent 3  minutes counseling the patient.                  Follow Up   Return in about 1 year (around 8/3/2023) for Annual physical.  Patient was given instructions and counseling regarding his condition or for health maintenance advice. Please see specific information pulled into the AVS if appropriate.     Transcribed from ambient dictation for JANESSA Little by Rossana Jeong.  08/03/22   18:23 CDT    Patient verbalized consent to the visit recording.  I have personally performed the services described in this document as transcribed by the above individual, and it is both accurate and complete.  JANESSA Little  8/12/2022  13:21 CDT

## 2022-08-03 NOTE — TELEPHONE ENCOUNTER
Patient's Self called and stated that she would like a callback from Nurse or PCP    Notes: Needs Prior Authorization for sildenafil (REVATIO) 20 MG tablet    Please advise with callback @ [phone]    Thank you,  Carlos Shoemaker, PCT

## 2022-08-04 ENCOUNTER — OFFICE VISIT (OUTPATIENT)
Dept: PULMONOLOGY | Facility: CLINIC | Age: 37
End: 2022-08-04

## 2022-08-04 VITALS
DIASTOLIC BLOOD PRESSURE: 68 MMHG | HEIGHT: 76 IN | OXYGEN SATURATION: 96 % | WEIGHT: 181 LBS | SYSTOLIC BLOOD PRESSURE: 120 MMHG | HEART RATE: 74 BPM | BODY MASS INDEX: 22.04 KG/M2

## 2022-08-04 DIAGNOSIS — J43.9 PULMONARY EMPHYSEMA, UNSPECIFIED EMPHYSEMA TYPE: ICD-10-CM

## 2022-08-04 DIAGNOSIS — Z01.812 ENCOUNTER FOR PREOPERATIVE SCREENING LABORATORY TESTING FOR COVID-19 VIRUS: Primary | ICD-10-CM

## 2022-08-04 DIAGNOSIS — F19.20 POLYSUBSTANCE (EXCLUDING OPIOIDS) DEPENDENCE: ICD-10-CM

## 2022-08-04 DIAGNOSIS — I21.4 NSTEMI (NON-ST ELEVATED MYOCARDIAL INFARCTION): ICD-10-CM

## 2022-08-04 DIAGNOSIS — R91.1 SOLITARY PULMONARY NODULE: ICD-10-CM

## 2022-08-04 DIAGNOSIS — J43.9 BULLOUS EMPHYSEMA: ICD-10-CM

## 2022-08-04 DIAGNOSIS — R63.6 UNDERWEIGHT: ICD-10-CM

## 2022-08-04 DIAGNOSIS — R09.1 PLEURISY: ICD-10-CM

## 2022-08-04 DIAGNOSIS — Q87.40 MARFAN SYNDROME: ICD-10-CM

## 2022-08-04 DIAGNOSIS — J30.89 NON-SEASONAL ALLERGIC RHINITIS, UNSPECIFIED TRIGGER: ICD-10-CM

## 2022-08-04 DIAGNOSIS — R06.02 SOB (SHORTNESS OF BREATH): ICD-10-CM

## 2022-08-04 DIAGNOSIS — Z87.09 HISTORY OF PNEUMOTHORAX: ICD-10-CM

## 2022-08-04 DIAGNOSIS — F17.200 TOBACCO USE DISORDER: ICD-10-CM

## 2022-08-04 DIAGNOSIS — J43.9 BULLOUS EMPHYSEMA: Primary | ICD-10-CM

## 2022-08-04 DIAGNOSIS — Z20.822 ENCOUNTER FOR PREOPERATIVE SCREENING LABORATORY TESTING FOR COVID-19 VIRUS: Primary | ICD-10-CM

## 2022-08-04 PROBLEM — J44.9 COPD (CHRONIC OBSTRUCTIVE PULMONARY DISEASE): Status: ACTIVE | Noted: 2017-02-02

## 2022-08-04 PROBLEM — J93.9 PNEUMOTHORAX: Status: RESOLVED | Noted: 2017-02-01 | Resolved: 2022-08-04

## 2022-08-04 LAB
CHOLEST SERPL-MCNC: 181 MG/DL (ref 0–200)
HDLC SERPL-MCNC: 40 MG/DL (ref 40–60)
LDLC SERPL CALC-MCNC: 120 MG/DL (ref 0–100)
TRIGL SERPL-MCNC: 118 MG/DL (ref 0–150)
VLDLC SERPL CALC-MCNC: 21 MG/DL (ref 5–40)

## 2022-08-04 PROCEDURE — 99214 OFFICE O/P EST MOD 30 MIN: CPT | Performed by: INTERNAL MEDICINE

## 2022-08-04 PROCEDURE — 94727 GAS DIL/WSHOT DETER LNG VOL: CPT | Performed by: INTERNAL MEDICINE

## 2022-08-04 PROCEDURE — 99406 BEHAV CHNG SMOKING 3-10 MIN: CPT | Performed by: INTERNAL MEDICINE

## 2022-08-04 PROCEDURE — 94010 BREATHING CAPACITY TEST: CPT | Performed by: INTERNAL MEDICINE

## 2022-08-04 PROCEDURE — 94729 DIFFUSING CAPACITY: CPT | Performed by: INTERNAL MEDICINE

## 2022-08-04 RX ORDER — ISOSORBIDE MONONITRATE 30 MG/1
1 TABLET, EXTENDED RELEASE ORAL EVERY MORNING
COMMUNITY
Start: 2022-07-14

## 2022-08-04 RX ORDER — FLUTICASONE PROPIONATE 50 MCG
2 SPRAY, SUSPENSION (ML) NASAL DAILY
COMMUNITY
Start: 2022-07-16 | End: 2022-08-04 | Stop reason: SDUPTHER

## 2022-08-04 RX ORDER — LORATADINE 10 MG/1
10 TABLET ORAL DAILY
Qty: 90 TABLET | Refills: 3 | Status: SHIPPED | OUTPATIENT
Start: 2022-08-04 | End: 2022-12-30

## 2022-08-04 RX ORDER — DICLOFENAC SODIUM 75 MG/1
1 TABLET, DELAYED RELEASE ORAL 2 TIMES DAILY
COMMUNITY
Start: 2022-07-12 | End: 2022-09-12

## 2022-08-04 RX ORDER — ALBUTEROL SULFATE 90 UG/1
2 AEROSOL, METERED RESPIRATORY (INHALATION) EVERY 4 HOURS PRN
Qty: 18 G | Refills: 5 | Status: SHIPPED | OUTPATIENT
Start: 2022-08-04 | End: 2023-03-29 | Stop reason: SDUPTHER

## 2022-08-04 RX ORDER — GLYCOPYRROLATE AND FORMOTEROL FUMARATE 9; 4.8 UG/1; UG/1
2 AEROSOL, METERED RESPIRATORY (INHALATION) 2 TIMES DAILY
Qty: 2 EACH | Refills: 5 | Status: SHIPPED | OUTPATIENT
Start: 2022-08-04 | End: 2023-03-29 | Stop reason: SDUPTHER

## 2022-08-04 RX ORDER — GLYCOPYRROLATE AND FORMOTEROL FUMARATE 9; 4.8 UG/1; UG/1
2 AEROSOL, METERED RESPIRATORY (INHALATION) 2 TIMES DAILY
COMMUNITY
Start: 2022-07-13 | End: 2022-08-04 | Stop reason: SDUPTHER

## 2022-08-04 RX ORDER — FLUTICASONE PROPIONATE 50 MCG
2 SPRAY, SUSPENSION (ML) NASAL DAILY
Qty: 16 G | Refills: 5 | Status: SHIPPED | OUTPATIENT
Start: 2022-08-04 | End: 2022-12-30

## 2022-08-04 RX ORDER — LORATADINE 10 MG/1
10 TABLET ORAL DAILY
COMMUNITY
Start: 2022-07-16 | End: 2022-08-04 | Stop reason: SDUPTHER

## 2022-08-04 NOTE — PROCEDURES
Walking Oximetry  Performed by: Buster Fitch MD  Authorized by: Buster Fitch MD     Supplemental Oxygen: None  Rest room air SAT %:  97  Exercise room air SAT %:  93    Reviewed walking oximetry patient did not qualify for home oxygen.    Buster Fitch MD  Pulmonologist/Intensivist  8/4/2022 12:38 CDT

## 2022-08-04 NOTE — PROGRESS NOTES
[facility] OUTPATIENT PROGRESS NOTE    Patient: Ross Platt  : 1985  Age: 37 y.o.  Date of Service: 2022    REASON FOR CLINIC VISIT:  Chief Complaint   Patient presents with   • Bullous emphysema      Pulmonary function test today;  [facility]a [facility] pushed through       Subjective:    History of Present Illness:  Ross Platt is a 37 y.o. male who presents to the office today to be seen for    Diagnosis Plan   1. Encounter for preoperative screening laboratory testing for COVID-19 virus  COVID PRE-OP / PRE-PROCEDURE SCREENING ORDER (NO ISOLATION) - Swab, Nasal Cavity   2. Bullous emphysema (HCC)  Pulmonary Function Test    Walking Oximetry    Walking Oximetry   3. Pulmonary emphysema, unspecified emphysema type (HCC)     4. Tobacco use disorder     5. Marfan syndrome     6. Polysubstance abuse, including stimulants     7. NSTEMI (non-ST elevated myocardial infarction) (CMS/HCC), Type 2, due to stimulant abuse     8. Underweight     9. Pleurisy     10. History of pneumothorax     11. Non-seasonal allergic rhinitis, unspecified trigger     12. Solitary pulmonary nodule     13. SOB (shortness of breath)  Walking Oximetry    Walking Oximetry   .  Other problems per record.  Patient is a young  gentleman who attended the pulmonary clinic with his wife for a follow-up visit.    He is an active smoker and continues to smoke half to 1 pack/day.  He also had history of substance abuse but told me he did not use any drugs lately.  He has history of Marfan syndrome and also history of coronary disease with a myocardial infarction.  He is known to have pulmonary hypertension and is on [place] you.  He has a pulmonary function test in 2017 which showed relatively normal spirometry but the pulmonary function test done today showed he had significant deterioration in the lung function and his FEV1 is 77% now which was normal earlier.  He also has no diffusion capacity at 66 and  corrected for alveolar volume at he did not refer for any supplemental oxygen on walking oximetry in the office today.  He complains of pleuritic chest pain and was seeing a primary care provider for pain medications.  He is trying to get into pain clinic for management of his chronic pleuritic chest pain.    Has nasal allergy and using fluticasone nasal spray and loratadine for nasal allergy.  He asked me if I could give him a certificate for his disability he is applying for disability.  He is unable to work due to respiratory problem cardiac issues and lung issues and ongoing pain.  For his chronic obstructive pulmonary disease he used Trelegy Ellipta in the past and sample was given but is currently using Breztri and albuterol inhaler and is requesting medication refill for all his medications.  He did not have any recent hospitalizations and ER visit an urgent care visit or any other new complaints.  He is already vaccinated for COVID according to the patient and received all the doses of COVID-vaccine.      PFT done today:  Not done today    PFT Values        Some values may be hidden. Unless noted otherwise, only the newest values recorded on each date are displayed.         Old Values PFT Results 22   No data to display.      Pre Drug PFT Results 22   FVC 89   FEV1 77   FEF 25-75% 66   FEV1/FVC 69      Post Drug PFT Results 22   No data to display.      Other Tests PFT Results 22         DLCO 63   D/VAsb 59           Results for orders placed in visit on 22    Pulmonary Function Test    Narrative  Pulmonary Function Test  Performed by: Jillian Case, RRT  Authorized by: Buster Fitch MD    Pre Drug % Predicted  FVC: 89%  FEV1: 77%  FEF 25-75%: 66%  FEV1/FVC: 69%  T%  RV: 172%  DLCO: 63%  D/VAsb: 59%    Interpretation  Overall comments:  Above test results are acceptable and possible by ATS criteria.  Analysis of the above test results the patient showed  evidence of moderate obstructive airway dysfunction.  No bronchodilator challenge was done.  There was hyperinflation and air trapping noted from increase in lung volumes.  The diffusion capacity corrected for alveolar volume is moderately to severely decreased.    In comparison with the prior test was done in 2017 there is worsening of spirometry hyperinflation air-trapping is marginally better but diffusion capacity is slightly worse than 2017 when corrected for alveolar volume.  Clinical correlation is indicated.    Buster Fitch MD  Pulmonologist/Intensivist  8/4/2022 12:39 CDT      Results for orders placed during the hospital encounter of 07/26/17    Pulmonary Function Test    [facility] - Pulmonary Function Test    [address] [place] [address]  [place]  [address]  [phone]    Patient : Ross Platt  MRN : [id number]  CSN : [id number]  Pulmonologist : Osmany Carr MD  Date : 7/26/2017    ______________________________________________________________________    Interpretation :  1.  Spirometry reveals small airways disease, otherwise is within normal limits.  2.  There is significant improvement in spirometry postbronchodilator so that postbronchodilator spirometry is within normal limits.  3.  Lung volumes reveal significant hyperinflation.  4.  There is a moderate diffusion impairment.  5.  There is slight flattening of the inspiratory limb of the flow volume loop which could be seen with a variable extrathoracic upper airway obstruction, clinical correlation is advised.  6.  Arterial blood gases are within normal limits on room air.      Osmany Carr MD    Rest/Exercise Pulse Ox Values        Some values may be hidden. Unless noted otherwise, only the newest values recorded on each date are displayed.         Rest/Exercise Pulse Ox Results 8/4/22   Rest room air SAT % 97   Exercise room air SAT % 93              Bronchodilator therapy: Breztri inhaler and albuterol rescue  inhaler.    Smoking Status:   Social History     Tobacco Use   Smoking Status Current Every Day Smoker   • Packs/day: 0.50   • Years: 25.00   • Pack years: 12.50   • Types: Cigarettes   • Start date: 1997   Smokeless Tobacco Never Used     Pulm Rehab: no  Sleep: yes    Support System: lives with their spouse    Code Status:   There are no questions and answers to display.        Review of Systems:  A complete review of systems is performed and all other systems were reviewed and negative as note above in the HPI.  Review of Systems   Constitutional: Positive for fatigue.   HENT: Positive for congestion, postnasal drip and sinus pressure.    Eyes: Negative.    Respiratory: Positive for chest tightness and shortness of breath.    Cardiovascular: Negative.    Gastrointestinal: Negative.    Endocrine: Negative.    Genitourinary: Negative.    Musculoskeletal: Positive for arthralgias and back pain.   Skin: Negative.    Allergic/Immunologic: Positive for environmental allergies.   Neurological: Negative.    Hematological: Negative.    Psychiatric/Behavioral: The patient is nervous/anxious.        CAT/ACT Score:  Not done today    Medications:  Outpatient Encounter Medications as of 8/4/2022   Medication Sig Dispense Refill   • albuterol sulfate HFA (ProAir HFA) 108 (90 Base) MCG/ACT inhaler Inhale 2 puffs Every 4 (Four) Hours As Needed for Wheezing. 18 g 5   • diclofenac (VOLTAREN) 75 MG EC tablet 1 tablet 2 (Two) Times a Day.     • fluticasone (FLONASE) 50 MCG/ACT nasal spray 2 sprays by Each Nare route Daily. 16 g 5   • Glycopyrrolate-Formoterol (Bevespi Aerosphere) 9-4.8 MCG/ACT aerosol 2 sprays by Each Nare route 2 (Two) Times a Day. 2 each 5   • HYDROcodone-acetaminophen (NORCO) 7.5-325 MG per tablet Take 1 tablet by mouth 2 (Two) Times a Day As Needed for Moderate Pain . 14 tablet 0   • isosorbide mononitrate (IMDUR) 30 MG 24 hr tablet Take 1 tablet by mouth Every Morning.     • loratadine (CLARITIN) 10 MG tablet  Take 1 tablet by mouth Daily. 90 tablet 3   • methocarbamol (ROBAXIN) 750 MG tablet Take 1 tablet by mouth 4 (Four) Times a Day As Needed for Muscle Spasms. 120 tablet 2   • naproxen (Naprosyn) 500 MG tablet Take 1 tablet by mouth 2 (Two) Times a Day With Meals. 60 tablet 2   • nicotine (Nicoderm CQ) 21 MG/24HR patch Place 1 patch on the skin as directed by provider Daily. 30 patch 0   • sildenafil (REVATIO) 20 MG tablet Take 1-2 tablets by mouth Daily As Needed (prior to sexual activity). 30 tablet 2   • [DISCONTINUED] albuterol sulfate HFA (ProAir HFA) 108 (90 Base) MCG/ACT inhaler Inhale 2 puffs Every 4 (Four) Hours As Needed for Wheezing. 18 g 2   • [DISCONTINUED] fluticasone (FLONASE) 50 MCG/ACT nasal spray 2 sprays by Each Nare route Daily.     • [DISCONTINUED] Glycopyrrolate-Formoterol (Bevespi Aerosphere) 9-4.8 MCG/ACT aerosol 2 puffs 2 (Two) Times a Day.     • [DISCONTINUED] loratadine (CLARITIN) 10 MG tablet Take 10 mg by mouth Daily.     • [DISCONTINUED] Fluticasone-Umeclidin-Vilant (Trelegy Ellipta) 100-62.5-25 MCG/INH inhaler Inhale 1 puff Daily. 60 each 2   • [DISCONTINUED] isosorbide mononitrate (IMDUR) 30 MG 24 hr tablet Take 1 tablet by mouth Every Morning. 30 tablet 11   • [DISCONTINUED] nicotine (Nicoderm CQ) 21 MG/24HR patch Place 1 patch on the skin as directed by provider Daily. 30 patch 0   • [DISCONTINUED] ProAir  (90 Base) MCG/ACT inhaler INHALE TWO PUFFS EVERY FOUR HOURS AS NEEDED FOR WHEEZING OR SHORTNESS OF AIR 8.5 g 2   • [DISCONTINUED] vardenafil (Levitra) 10 MG tablet Take 1 tablet by mouth Daily As Needed for Erectile Dysfunction (30 min prior to sexual activity). 20 tablet 0     No facility-administered encounter medications on file as of 8/4/2022.       Allergies:  Allergies   Allergen Reactions   • Calcium Channel Blockers Other (See Comments)     Do not use in marfan   • Ciprofloxacin Other (See Comments)     Do not use fluoroquinolones in marfan   • Medrol  "[Methylprednisolone] Other (See Comments)     Patient states he gets violent on this medication    • Diclofenac Irritability       Immunizations:  Immunization History   Administered Date(s) Administered   • Hepatitis B 07/10/1998, 08/12/1998, 01/26/1999   • MMR 02/25/1998   • Td 05/15/2001       Objective:    Vitals:  /68   Pulse 74   Ht 191.8 cm (75.5\") Comment: MEASURED  Wt 82.1 kg (181 lb)   SpO2 96%   BMI 22.32 kg/m²     Physical Exam:  General: Patient is a 37 y.o. tall pleasant  male. Looks stated age. Appears to be in no acute distress.  Eyes: EOMI. PERRLA. Vision intact. No scleral icterus.  Ear, Nose, Mouth and Throat: Hearing is grossly intact. No Leukoplakia, pharyngitis, stomatitis or thrush. Swollen nasal mucosa with post nasal drop.  Neck: Range of motion of neck normal. No thyromegaly or masses. Mallampati Class 3  Respiratory: Clear to auscultation bilaterally. No use of accessory muscles. Decreased breath sounds.  Cardiovascular: Normal heart sounds. Regularly regular rhythm without murmur.  Gastrointestinal: Non tender, non distended, soft. Bowel sounds positive in all four quadrants. No organomegaly.  Skin: No obvious rashes, lesions, ulcers or large amount of bruising. No edema.   Neurological: No new motor deficits. Cranial nerves appear intact.  Psychiatric: Patient is alert and oriented to person, place and time.    Chest Imaging:    CTA PULMONARY W CONTRAST  Order: [id number]    Impression    1.  Centriacinar emphysematous changes in bilateral lung parenchyma.   2.  Lung parenchymal cyst in the right middle lobe.   3.  Linear atelectasis in left lower lobe.   4.  Unremarkable CT pulmonary angiography.   Recommendation: Follow up as clinically indicated.   All CT scans at this facility utilize dose modulation, iterative reconstruction, and/or weight based dosing when appropriate to reduce radiation dose to as low as reasonably achievable.   Electronically Signed by DOUGLAS, " TIM BARBOZA at 13-Jun-2022 11:44:46 PM             Narrative    NO PRIOR REPORT AVAILABLE   Exam: CTA OF THE CHEST WITH CONTRAST   Clinical data: Prior right-sided lung collapse with removal, having chest pressure today, left side neck and arm involvement.   Technique: Axial CT angiography images through the lungs were acquired with contrast and imaged using soft tissue and lung algorithms. Coronal, sagittal, and 3D volume reconstructions were performed. Reformatted/3D-MPR images were performed. Radiation   dose: CTDIvol =35.29 mGy, DLP =477 mGy x cm.   Prior studies: Radiograph of the chest done on same day. CTA of the chest dated 11/1/2021 images.   Findings:   Lungs: Centriacinar emphysematous changes in bilateral lung parenchyma.  Linear atelectasis in left lower lobe.  Approximately 6.5 x 7.5 cm size lung parenchymal cyst in the right middle lobe.  No pulmonary infiltrate identified. No pulmonary mass   identified. No pleural effusions identified. No pneumothorax. The airway is clear.   Soft Tissues: No mediastinal, axillary or supraclavicular adenopathy isidentified.   Vascular: No filling defect within the pulmonary arteries to the segmental branch level. Unremarkable aorta. Grossly unremarkable sized heart. No definite abnormality seen on 3D reformatted images.   Bony structures: No acute or destructive abnormality.   Upper Abdomen: Limited visualization of the solid upper abdominal organs is grossly unremarkable.  Exam End: 06/13/22 22:25 Last Resulted: 06/13/22          Assessment:  1. Encounter for preoperative screening laboratory testing for COVID-19 virus    2. Bullous emphysema (HCC)    3. Pulmonary emphysema, unspecified emphysema type (HCC)    4. Tobacco use disorder    5. Marfan syndrome    6. Polysubstance abuse, including stimulants    7. NSTEMI (non-ST elevated myocardial infarction) (CMS/HCC), Type 2, due to stimulant abuse    8. Underweight    9. Pleurisy    10. History of pneumothorax    11.  Non-seasonal allergic rhinitis, unspecified trigger    12. Solitary pulmonary nodule    13. SOB (shortness of breath)        Plan/Recommendations:    1.  I reviewed the pulmonary function test and explained the results to the patient he has significant deterioration of his lung function from 2017 and he is continuing to smoke.  He also did have problem with substance abuse but currently not using any drugs according to the patient.  He has problem with chronic pain and is trying to get an appointment in the pain clinic because his primary care physician is not willing to give him any more pain medications.  2. Ross Platt  reports that he has been smoking cigarettes. He started smoking about 25 years ago. He has a 12.50 pack-year smoking history. He has never used smokeless tobacco.. I have educated him on the risk of diseases from using tobacco products such as cancer, COPD and heart disease.   I advised him to quit and he is willing to quit. We have discussed the following method/s for tobacco cessation:  Counseling.  Together we have set a quit date for 2 weeks from today.  He will follow up with me in 6 months or sooner to check on his progress.I spent 7 minutes counseling the patient.  3.  Although he has pleuritic chest pain and is getting short of breath easily and had low DLCO corrected for alveolar volume in the PFT he did not qualify for oxygen walking oximetry.  I advised him to continue his impression albuterol rescue inhaler for his emphysema and COPD and he should continue using fluticasone nasal spray and loratadine for nasal allergy.  He is not taking sildenafil for his pulmonary hypertension which will be continued.  4.  Patient should continue follow-up with the primary care provider.  He has Marfan syndrome with other medical issues and also had a cardiac issues with a non-STEMI.  He will need to follow-up in cardiology clinic as well he is advised to stay up-to-date on COVID-vaccine take  influenza pneumonia vaccine and return to pulmonary clinic for follow-up visit in 6 months time or earlier if needed.    Follow up:  6 Months    Time Spent:  30 minutes    I appreciate the opportunity of participating in this patient's care. I would like to thank the PCP for the referral.  Please feel free to contact me with any other questions.    Buster Fitch MD   Pulmonologist/Intensivist     Electronically signed by: Buster Fitch MD, 8/4/2022 12:39 CDT

## 2022-08-04 NOTE — TELEPHONE ENCOUNTER
Called pt back to advise that this medication type is not a covered benefit under pt's insurance plan. If pt wants will have to pay cash price. Pt verbalized understanding

## 2022-08-04 NOTE — PROCEDURES
Pulmonary Function Test  Performed by: Jillian Case, RRT  Authorized by: Buster Fitch MD      Pre Drug % Predicted    FVC: 89%   FEV1: 77%   FEF 25-75%: 66%   FEV1/FVC: 69%   T%   RV: 172%   DLCO: 63%   D/VAsb: 59%    Interpretation   Overall comments:   Above test results are acceptable and possible by ATS criteria.  Analysis of the above test results the patient showed evidence of moderate obstructive airway dysfunction.  No bronchodilator challenge was done.  There was hyperinflation and air trapping noted from increase in lung volumes.  The diffusion capacity corrected for alveolar volume is moderately to severely decreased.    In comparison with the prior test was done in 2017 there is worsening of spirometry hyperinflation air-trapping is marginally better but diffusion capacity is slightly worse than 2017 when corrected for alveolar volume.  Clinical correlation is indicated.    Buster Fitch MD  Pulmonologist/Intensivist  2022 12:39 CDT

## 2022-08-10 ENCOUNTER — HOSPITAL ENCOUNTER (EMERGENCY)
Facility: HOSPITAL | Age: 37
Discharge: HOME | End: 2022-08-11
Attending: STUDENT IN AN ORGANIZED HEALTH CARE EDUCATION/TRAINING PROGRAM | Admitting: STUDENT IN AN ORGANIZED HEALTH CARE EDUCATION/TRAINING PROGRAM

## 2022-08-10 ENCOUNTER — APPOINTMENT (OUTPATIENT)
Dept: ULTRASOUND IMAGING | Facility: HOSPITAL | Age: 37
End: 2022-08-10

## 2022-08-10 ENCOUNTER — APPOINTMENT (OUTPATIENT)
Dept: MRI IMAGING | Facility: HOSPITAL | Age: 37
End: 2022-08-10

## 2022-08-10 DIAGNOSIS — M54.42 ACUTE LEFT-SIDED LOW BACK PAIN WITH LEFT-SIDED SCIATICA: Primary | ICD-10-CM

## 2022-08-10 DIAGNOSIS — M48.061 FORAMINAL STENOSIS OF LUMBAR REGION: ICD-10-CM

## 2022-08-10 LAB
ALBUMIN SERPL-MCNC: 4.2 G/DL (ref 3.5–5.2)
ALBUMIN/GLOB SERPL: 1.8 G/DL
ALP SERPL-CCNC: 85 U/L (ref 39–117)
ALT SERPL W P-5'-P-CCNC: 18 U/L (ref 1–41)
ANION GAP SERPL CALCULATED.3IONS-SCNC: 10 MMOL/L (ref 5–15)
AST SERPL-CCNC: 19 U/L (ref 1–40)
BASOPHILS # BLD AUTO: 0.06 10*3/MM3 (ref 0–0.2)
BASOPHILS NFR BLD AUTO: 1.1 % (ref 0–1.5)
BILIRUB SERPL-MCNC: 0.2 MG/DL (ref 0–1.2)
BILIRUB UR QL STRIP: NEGATIVE
BUN SERPL-MCNC: 19 MG/DL (ref 6–20)
BUN/CREAT SERPL: 20.2 (ref 7–25)
CALCIUM SPEC-SCNC: 9 MG/DL (ref 8.6–10.5)
CHLORIDE SERPL-SCNC: 107 MMOL/L (ref 98–107)
CLARITY UR: CLEAR
CO2 SERPL-SCNC: 22 MMOL/L (ref 22–29)
COLOR UR: YELLOW
CREAT SERPL-MCNC: 0.94 MG/DL (ref 0.76–1.27)
DEPRECATED RDW RBC AUTO: 42.5 FL (ref 37–54)
EGFRCR SERPLBLD CKD-EPI 2021: 107.7 ML/MIN/1.73
EOSINOPHIL # BLD AUTO: 0.13 10*3/MM3 (ref 0–0.4)
EOSINOPHIL NFR BLD AUTO: 2.3 % (ref 0.3–6.2)
ERYTHROCYTE [DISTWIDTH] IN BLOOD BY AUTOMATED COUNT: 12.6 % (ref 12.3–15.4)
GLOBULIN UR ELPH-MCNC: 2.4 GM/DL
GLUCOSE SERPL-MCNC: 91 MG/DL (ref 65–99)
GLUCOSE UR STRIP-MCNC: NEGATIVE MG/DL
HCT VFR BLD AUTO: 42.2 % (ref 37.5–51)
HGB BLD-MCNC: 13.8 G/DL (ref 13–17.7)
HGB UR QL STRIP.AUTO: NEGATIVE
HOLD SPECIMEN: NORMAL
HOLD SPECIMEN: NORMAL
IMM GRANULOCYTES # BLD AUTO: 0.04 10*3/MM3 (ref 0–0.05)
IMM GRANULOCYTES NFR BLD AUTO: 0.7 % (ref 0–0.5)
KETONES UR QL STRIP: NEGATIVE
LEUKOCYTE ESTERASE UR QL STRIP.AUTO: NEGATIVE
LIPASE SERPL-CCNC: 26 U/L (ref 13–60)
LYMPHOCYTES # BLD AUTO: 1.93 10*3/MM3 (ref 0.7–3.1)
LYMPHOCYTES NFR BLD AUTO: 34.6 % (ref 19.6–45.3)
MCH RBC QN AUTO: 29.9 PG (ref 26.6–33)
MCHC RBC AUTO-ENTMCNC: 32.7 G/DL (ref 31.5–35.7)
MCV RBC AUTO: 91.5 FL (ref 79–97)
MONOCYTES # BLD AUTO: 0.59 10*3/MM3 (ref 0.1–0.9)
MONOCYTES NFR BLD AUTO: 10.6 % (ref 5–12)
NEUTROPHILS NFR BLD AUTO: 2.82 10*3/MM3 (ref 1.7–7)
NEUTROPHILS NFR BLD AUTO: 50.7 % (ref 42.7–76)
NITRITE UR QL STRIP: NEGATIVE
NRBC BLD AUTO-RTO: 0 /100 WBC (ref 0–0.2)
PH UR STRIP.AUTO: 5.5 [PH] (ref 5–8)
PLATELET # BLD AUTO: 231 10*3/MM3 (ref 140–450)
PMV BLD AUTO: 9.6 FL (ref 6–12)
POTASSIUM SERPL-SCNC: 4.1 MMOL/L (ref 3.5–5.2)
PROT SERPL-MCNC: 6.6 G/DL (ref 6–8.5)
PROT UR QL STRIP: NEGATIVE
RBC # BLD AUTO: 4.61 10*6/MM3 (ref 4.14–5.8)
SODIUM SERPL-SCNC: 139 MMOL/L (ref 136–145)
SP GR UR STRIP: 1.01 (ref 1–1.03)
UROBILINOGEN UR QL STRIP: NORMAL
WBC NRBC COR # BLD: 5.57 10*3/MM3 (ref 3.4–10.8)
WHOLE BLOOD HOLD COAG: NORMAL
WHOLE BLOOD HOLD SPECIMEN: NORMAL

## 2022-08-10 PROCEDURE — 25010000002 MORPHINE PER 10 MG: Performed by: STUDENT IN AN ORGANIZED HEALTH CARE EDUCATION/TRAINING PROGRAM

## 2022-08-10 PROCEDURE — 76870 US EXAM SCROTUM: CPT

## 2022-08-10 PROCEDURE — 85025 COMPLETE CBC W/AUTO DIFF WBC: CPT | Performed by: STUDENT IN AN ORGANIZED HEALTH CARE EDUCATION/TRAINING PROGRAM

## 2022-08-10 PROCEDURE — 96374 THER/PROPH/DIAG INJ IV PUSH: CPT

## 2022-08-10 PROCEDURE — 25010000002 ONDANSETRON PER 1 MG: Performed by: PHYSICIAN ASSISTANT

## 2022-08-10 PROCEDURE — 99284 EMERGENCY DEPT VISIT MOD MDM: CPT

## 2022-08-10 PROCEDURE — 99283 EMERGENCY DEPT VISIT LOW MDM: CPT

## 2022-08-10 PROCEDURE — 81003 URINALYSIS AUTO W/O SCOPE: CPT | Performed by: STUDENT IN AN ORGANIZED HEALTH CARE EDUCATION/TRAINING PROGRAM

## 2022-08-10 PROCEDURE — 83690 ASSAY OF LIPASE: CPT | Performed by: STUDENT IN AN ORGANIZED HEALTH CARE EDUCATION/TRAINING PROGRAM

## 2022-08-10 PROCEDURE — 96375 TX/PRO/DX INJ NEW DRUG ADDON: CPT

## 2022-08-10 PROCEDURE — 25010000002 ORPHENADRINE CITRATE PER 60 MG: Performed by: PHYSICIAN ASSISTANT

## 2022-08-10 PROCEDURE — 96372 THER/PROPH/DIAG INJ SC/IM: CPT

## 2022-08-10 PROCEDURE — 36415 COLL VENOUS BLD VENIPUNCTURE: CPT

## 2022-08-10 PROCEDURE — 72148 MRI LUMBAR SPINE W/O DYE: CPT

## 2022-08-10 PROCEDURE — 80053 COMPREHEN METABOLIC PANEL: CPT | Performed by: STUDENT IN AN ORGANIZED HEALTH CARE EDUCATION/TRAINING PROGRAM

## 2022-08-10 RX ORDER — LIDOCAINE 50 MG/G
1 PATCH TOPICAL
Status: DISCONTINUED | OUTPATIENT
Start: 2022-08-10 | End: 2022-08-11 | Stop reason: HOSPADM

## 2022-08-10 RX ORDER — ACETAMINOPHEN 500 MG
1000 TABLET ORAL ONCE
Status: COMPLETED | OUTPATIENT
Start: 2022-08-11 | End: 2022-08-11

## 2022-08-10 RX ORDER — ONDANSETRON 2 MG/ML
4 INJECTION INTRAMUSCULAR; INTRAVENOUS ONCE
Status: COMPLETED | OUTPATIENT
Start: 2022-08-10 | End: 2022-08-10

## 2022-08-10 RX ORDER — SODIUM CHLORIDE 0.9 % (FLUSH) 0.9 %
10 SYRINGE (ML) INJECTION AS NEEDED
Status: DISCONTINUED | OUTPATIENT
Start: 2022-08-10 | End: 2022-08-11 | Stop reason: HOSPADM

## 2022-08-10 RX ORDER — ORPHENADRINE CITRATE 30 MG/ML
60 INJECTION INTRAMUSCULAR; INTRAVENOUS ONCE
Status: COMPLETED | OUTPATIENT
Start: 2022-08-10 | End: 2022-08-10

## 2022-08-10 RX ADMIN — ONDANSETRON 4 MG: 2 INJECTION INTRAMUSCULAR; INTRAVENOUS at 20:41

## 2022-08-10 RX ADMIN — ORPHENADRINE CITRATE 60 MG: 30 INJECTION INTRAMUSCULAR; INTRAVENOUS at 20:43

## 2022-08-10 RX ADMIN — MORPHINE SULFATE 4 MG: 4 INJECTION, SOLUTION INTRAMUSCULAR; INTRAVENOUS at 20:42

## 2022-08-10 RX ADMIN — SODIUM CHLORIDE 1000 ML: 9 INJECTION, SOLUTION INTRAVENOUS at 20:39

## 2022-08-11 VITALS
OXYGEN SATURATION: 95 % | HEART RATE: 60 BPM | SYSTOLIC BLOOD PRESSURE: 92 MMHG | WEIGHT: 178 LBS | RESPIRATION RATE: 14 BRPM | DIASTOLIC BLOOD PRESSURE: 59 MMHG | BODY MASS INDEX: 21.68 KG/M2 | TEMPERATURE: 97.7 F | HEIGHT: 76 IN

## 2022-08-11 RX ORDER — HYDROCODONE BITARTRATE AND ACETAMINOPHEN 5; 325 MG/1; MG/1
1 TABLET ORAL 4 TIMES DAILY PRN
Qty: 9 TABLET | Refills: 0 | Status: SHIPPED | OUTPATIENT
Start: 2022-08-11 | End: 2022-08-14

## 2022-08-11 RX ORDER — LIDOCAINE 50 MG/G
1 PATCH TOPICAL EVERY 24 HOURS
Qty: 7 PATCH | Refills: 0 | Status: SHIPPED | OUTPATIENT
Start: 2022-08-11 | End: 2022-08-18

## 2022-08-11 RX ORDER — HYDROCODONE BITARTRATE AND ACETAMINOPHEN 5; 325 MG/1; MG/1
1 TABLET ORAL ONCE
Status: COMPLETED | OUTPATIENT
Start: 2022-08-11 | End: 2022-08-11

## 2022-08-11 RX ADMIN — ACETAMINOPHEN 1000 MG: 500 TABLET ORAL at 00:04

## 2022-08-11 RX ADMIN — SODIUM CHLORIDE, POTASSIUM CHLORIDE, SODIUM LACTATE AND CALCIUM CHLORIDE 1000 ML: 600; 310; 30; 20 INJECTION, SOLUTION INTRAVENOUS at 00:05

## 2022-08-11 RX ADMIN — HYDROCODONE BITARTRATE AND ACETAMINOPHEN 1 TABLET: 5; 325 TABLET ORAL at 01:31

## 2022-08-11 RX ADMIN — LIDOCAINE 1 PATCH: 50 PATCH CUTANEOUS at 00:16

## 2022-08-11 NOTE — DISCHARGE INSTRUCTIONS
It was very nice to meet you, Ross. Thank you for allowing us to take care of you today at [facility].    Your work-up today did not show any emergent findings or emergent indications for admission to the hospital. Please understand that an ER evaluation is considered to be just the start of your evaluation. We will do what we can in one visit, but we are often unable to fully figure out what is causing your symptoms from one evaluation. Thus our primary goal is to determine whether you need to be evaluated in the hospital or if it is safe for you to go home and see other doctors such as a primary care physician or a specialist on an outpatient basis. A copy of your results should be included in your paperwork.     It is VERY IMPORTANT that you follow up (call them to set up an appointment) with your primary care doctor* within the next few days or as soon as possible so that you can be re-evaluated for improvement in your symptoms or for any other questions. If you were prescribed any medications, please take them as directed or call us back with any questions.     Please return to the emergency room within 12-48 hours if you experience fever, chills, chest pain or shortness of breath, pain with inspiration/expiration, pain that travels to your arms, neck or back, nausea, vomiting, severe headache, tearing pain in your chest, dizziness, feel as though you are about to pass out, have any worsening symptoms, or any other concerns.    *IMPORTANT INFORMATION REGARDING XRAYS AND CT SCANS*  Please keep in mind that at night time we do not have an in-house radiologist and use a Tele-radiology service. This means that while they provide us with information on emergent findings or acute findings, they may not report to us all of the other small findings that may be there on your imaging studies. While we will try our best to inform you about any incidental findings or abnormal findings that require follow up,  please follow up with your primary care provider to have your imaging studies reviewed formally and have any abnormal findings addressed.

## 2022-08-11 NOTE — ED PROVIDER NOTES
Subjective   History of Present Illness    Patient is a 37-year-old male presenting to ED with Lower back pain.  PMH significant for history of seizure disorder, Marfan's, emphysema, history substance abuse, chronic neck pain.  Patient states last night around 8 PM he went to lift a heavy grill where he had to bear down and felt a sudden pain in his left lower back.  Patient reports approximately 10 minutes later the pain worsened, went into his left buttocks, and has caused numbness in his entire left leg as well as sudden numbness in his left testicle.  Patient states that he has had some difficulties urinating since this time.  Patient denies any thoracic pain, changes to his chronic cervical pain, right-sided lumbar pain, right lower extremity abnormalities, or any right testicle abnormalities.  Patient denies hematuria, dysuria, penile discharge.  Patient denies any history of similar pain.  Patient reports that around 4:30 PM he took his Norco and muscle relaxer for his neck pain however did not alleviate the lower back pain at which time he presents for further evaluation.  Patient states he is beginning to have difficulty walking due to the numbness in his leg which was concerning for him.  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  Patient denies any history of spinal injections including epidurals, lumbar punctures, or any pain/steroid medications into his spine.    Records reviewed show patient last seen in the ED on 7/18/2022 for atypical chest pain, pleurisy.    Patient last seen in the outpatient setting on 8/4/2022 at the pulmonology office for monitoring of bullous emphysema, preoperative lab work testing.    Review of Systems   Constitutional: Negative.  Negative for fever.   HENT: Negative.    Eyes: Negative.    Respiratory: Negative.    Cardiovascular: Negative.    Gastrointestinal: Negative.  Negative for abdominal pain.   Genitourinary: Positive for difficulty urinating  and testicular pain (left numbness). Negative for decreased urine volume, flank pain and hematuria.   Musculoskeletal: Positive for back pain (Left lumbar) and gait problem (Due to pain in back and numbness in left leg). Negative for neck pain (No change in chronic).   Skin: Negative.  Negative for wound.   Neurological: Positive for numbness (Left lower extremity, left testicle). Negative for weakness.   Psychiatric/Behavioral: Negative.    All other systems reviewed and are negative.      Past Medical History:   Diagnosis Date   • Back pain    • Chest pain    • Claustrophobia    • COPD (chronic obstructive pulmonary disease) (HCC)    • Emphysema of lung (HCC)    • Lung disease    • Marfan syndrome    • Pneumothorax    • Seizures (HCC)     when he was a child    • Seizures (HCC)    • Smoking     3/4 pack a day   • Tobacco abuse    • Underweight        Allergies   Allergen Reactions   • Calcium Channel Blockers Other (See Comments)     Do not use in marfan   • Ciprofloxacin Other (See Comments)     Do not use fluoroquinolones in marfan   • Medrol [Methylprednisolone] Other (See Comments)     Patient states he gets violent on this medication    • Diclofenac Irritability       Past Surgical History:   Procedure Laterality Date   • HERNIA REPAIR     • LUNG LOBECTOMY Right 02/01/2017   • LUNG REMOVAL, TOTAL Right 2015   • THORACOSCOPY N/A 2/3/2017    Procedure: BRONCHOSCOPY, THORACOSCOPY RIGHT CHEST,  WEDGE RESECTION RIGHT UPPER AND LOWER LOBE OF LUNG, MECHANICAL PLEURODESIS;  Surgeon: Kyle Heath MD;  Location: [facility];  Service:    • TYMPANOSTOMY  05/12/2016    Recorded       Family History   Problem Relation Age of Onset   • No Known Problems Mother    • Heart attack Father    • Stroke Father    • No Known Problems Sister    • No Known Problems Daughter    • Cancer Maternal Grandmother         breast   • Breast cancer Maternal Grandmother    • Prostate cancer Neg Hx    • Colon cancer Neg Hx    • Alcohol abuse  Neg Hx    • Drug abuse Neg Hx    • Diabetes Neg Hx    • Thyroid cancer Neg Hx        Social History     Socioeconomic History   • Marital status:    Tobacco Use   • Smoking status: Current Every Day Smoker     Packs/day: 0.50     Years: 25.00     Pack years: 12.50     Types: Cigarettes     Start date: 1997   • Smokeless tobacco: Never Used   Vaping Use   • Vaping Use: Never used   Substance and Sexual Activity   • Alcohol use: Not Currently   • Drug use: Not Currently     Types: Amphetamines, Methamphetamines, Benzodiazepines, Marijuana, Oxycodone   • Sexual activity: Yes     Partners: Female           Objective   Physical Exam  Vitals and nursing note reviewed.   Constitutional:       General: He is in acute distress (appears uncomfortable due to pain).      Appearance: Normal appearance. He is well-developed, well-groomed and normal weight. He is not toxic-appearing or diaphoretic.   HENT:      Head: Normocephalic.      Mouth/Throat:      Mouth: Mucous membranes are moist.      Pharynx: Oropharynx is clear.   Eyes:      Extraocular Movements: Extraocular movements intact.      Conjunctiva/sclera: Conjunctivae normal.      Pupils: Pupils are equal, round, and reactive to light.   Cardiovascular:      Rate and Rhythm: Normal rate and regular rhythm.   Pulmonary:      Effort: Pulmonary effort is normal.      Breath sounds: Normal breath sounds.   Abdominal:      General: Bowel sounds are normal.      Palpations: Abdomen is soft.      Tenderness: There is no abdominal tenderness. There is no right CVA tenderness or left CVA tenderness.   Musculoskeletal:         General: Tenderness (left sided paraspinal muscular region, left midline tendenress) present.      Cervical back: Normal range of motion and neck supple. No tenderness.      Comments: No midline tenderness or paraspinal tenderness to the thoracic region.  No right-sided paraspinal lumbar tenderness.  Positive Lasegue's sign on left side.  Negative  Lasegue's sign on right side. 5/5 symmetric strength to bilateral LE.   Skin:     General: Skin is warm and dry.      Findings: No signs of injury, rash or wound.   Neurological:      Mental Status: He is alert and oriented to person, place, and time.      Sensory: Sensory deficit (decreased sensation to touch of LLE ) present.      Gait: Gait normal.   Psychiatric:         Attention and Perception: Attention normal.         Mood and Affect: Mood normal.         Speech: Speech normal.         Behavior: Behavior normal. Behavior is cooperative.         Procedures           ED Course  ED Course as of 08/11/22 1608   Wed Aug 10, 2022   2122 Case discussed at this time with Dr. Rowan Segura who will be assuming care of patient. Pending results of MRI Dr. Segura will reevaluate and disposition accordingly. Please see Dr. Segura's note below for further ED course and final evaluation.     Working diagnosis: Back pain.  [JS]   2126 Assumed care of this patient pending ultrasound of the testicles and MRI of the lower back. [NP]   u Aug 11, 2022   0002 Patient's ultrasound reveals bilateral flow without any other obvious abnormalities. [NP]   0036 Patient's MRI per stat rad reads bilateral L5-S1 foraminal stenosis without any significant spinal canal stenosis and no acute findings.  Patient will be discharged home with pain medication and follow-up with spine surgery. [NP]      ED Course User Index  [JS] [place] PA-C  [NP] Rowan Segura MD                                           MDM  Number of Diagnoses or Management Options     Amount and/or Complexity of Data Reviewed  Clinical lab tests: ordered and reviewed  Tests in the radiology section of CPT®: reviewed and ordered  Tests in the medicine section of CPT®: reviewed and ordered  Decide to obtain previous medical records or to obtain history from someone other than the patient: yes  Review and summarize past medical records: yes  Discuss the patient with other  providers: yes (Dr. Rowan Segura (attending))        Final diagnoses:   Acute left-sided low back pain with left-sided sciatica   Foraminal stenosis of lumbar region       ED Disposition  ED Disposition     ED Disposition   Discharge    Condition   Stable    Comment   --             Cristy Schmidt APRROMAN  [address]  [facility] [id number] [phone]    Call in 1 day  As needed, If symptoms worsen         Medication List      New Prescriptions    HYDROcodone-acetaminophen 5-325 MG per tablet  Commonly known as: NORCO  Take 1 tablet by mouth 4 (Four) Times a Day As Needed for Mild Pain  for up to 3 days.  Replaces: HYDROcodone-acetaminophen 7.5-325 MG per tablet     lidocaine 5 %  Commonly known as: LIDODERM  Place 1 patch on the skin as directed by provider Daily for 7 days. Remove & Discard patch within 12 hours or as directed by MD        Stop    HYDROcodone-acetaminophen 7.5-325 MG per tablet  Commonly known as: NORCO  Replaced by: HYDROcodone-acetaminophen 5-325 MG per tablet           Where to Get Your Medications      These medications were sent to [id number] - [place], [place] - [address] AT [facility] & [address] - [phone] [facility] [phone] FX  [address], [facility] [address]    Phone: [phone]   · HYDROcodone-acetaminophen 5-325 MG per tablet  · lidocaine 5 %          Nash Mills PA-C  08/10/22 2116       Nash Mills PA-C  08/10/22 2122       Nash [place] PA-C  08/11/22 1600

## 2022-08-18 ENCOUNTER — PATIENT ROUNDING (BHMG ONLY) (OUTPATIENT)
Dept: SURGERY | Facility: CLINIC | Age: 37
End: 2022-08-18

## 2022-08-18 ENCOUNTER — OFFICE VISIT (OUTPATIENT)
Dept: SURGERY | Facility: CLINIC | Age: 37
End: 2022-08-18

## 2022-08-18 VITALS
BODY MASS INDEX: 21.43 KG/M2 | DIASTOLIC BLOOD PRESSURE: 69 MMHG | WEIGHT: 176 LBS | SYSTOLIC BLOOD PRESSURE: 121 MMHG | HEIGHT: 76 IN

## 2022-08-18 DIAGNOSIS — L98.9 SKIN LESION OF CHEST WALL: Primary | ICD-10-CM

## 2022-08-18 DIAGNOSIS — F17.210 NICOTINE DEPENDENCE, CIGARETTES, UNCOMPLICATED: ICD-10-CM

## 2022-08-18 PROCEDURE — 99204 OFFICE O/P NEW MOD 45 MIN: CPT | Performed by: STUDENT IN AN ORGANIZED HEALTH CARE EDUCATION/TRAINING PROGRAM

## 2022-08-18 NOTE — PROGRESS NOTES
Office New Patient History and Physical:     Referring Provider: Cristy Schmidt APRN    Chief Complaint   Patient presents with   • lesion     Mr. Platt is here for evaluation of a lesion.       Subjective .     History of present illness:  Ross Platt is a 37 y.o. male who presented to his PCP with a concerning mole on his right lower rib cage/upper abdomen. It has been present for many years. It has increased in size significantly. It is tender to palpation. It is black.     He is a current every day smoker at 5-6 cigarettes per day. HE is working on quitting with his PCP who prescribed nicotine patches. His BMI is 21. He is not on blood thinners. He denies a personal history of skin cancer.     History  Past Medical History:   Diagnosis Date   • Back pain    • Chest pain    • Claustrophobia    • COPD (chronic obstructive pulmonary disease) (HCC)    • Emphysema of lung (HCC)    • Lung disease    • Marfan syndrome    • Pneumothorax    • Seizures (HCC)     when he was a child    • Seizures (HCC)    • Smoking     3/4 pack a day   • Tobacco abuse    • Underweight    ,   Past Surgical History:   Procedure Laterality Date   • HERNIA REPAIR     • LUNG LOBECTOMY Right 02/01/2017   • LUNG REMOVAL, TOTAL Right 2015   • THORACOSCOPY N/A 2/3/2017    Procedure: BRONCHOSCOPY, THORACOSCOPY RIGHT CHEST,  WEDGE RESECTION RIGHT UPPER AND LOWER LOBE OF LUNG, MECHANICAL PLEURODESIS;  Surgeon: Kyle Heath MD;  Location: [facility];  Service:    • TYMPANOSTOMY  05/12/2016    Recorded   ,   Family History   Problem Relation Age of Onset   • No Known Problems Mother    • Heart attack Father    • Stroke Father    • No Known Problems Sister    • No Known Problems Daughter    • Cancer Maternal Grandmother         breast   • Breast cancer Maternal Grandmother    • Prostate cancer Neg Hx    • Colon cancer Neg Hx    • Alcohol abuse Neg Hx    • Drug abuse Neg Hx    • Diabetes Neg Hx    • Thyroid cancer Neg Hx    ,   Social  History     Tobacco Use   • Smoking status: Current Every Day Smoker     Packs/day: 0.50     Years: 25.00     Pack years: 12.50     Types: Cigarettes     Start date: 1997   • Smokeless tobacco: Never Used   Vaping Use   • Vaping Use: Never used   Substance Use Topics   • Alcohol use: Not Currently   • Drug use: Not Currently     Types: Amphetamines, Methamphetamines, Benzodiazepines, Marijuana, Oxycodone   , (Not in a hospital admission)   and Allergies:  Calcium channel blockers, Ciprofloxacin, Medrol [methylprednisolone], and Diclofenac    Current Outpatient Medications:   •  albuterol sulfate HFA (ProAir HFA) 108 (90 Base) MCG/ACT inhaler, Inhale 2 puffs Every 4 (Four) Hours As Needed for Wheezing., Disp: 18 g, Rfl: 5  •  diclofenac (VOLTAREN) 75 MG EC tablet, 1 tablet 2 (Two) Times a Day., Disp: , Rfl:   •  fluticasone (FLONASE) 50 MCG/ACT nasal spray, 2 sprays by Each Nare route Daily., Disp: 16 g, Rfl: 5  •  Glycopyrrolate-Formoterol (Bevespi Aerosphere) 9-4.8 MCG/ACT aerosol, 2 sprays by Each Nare route 2 (Two) Times a Day., Disp: 2 each, Rfl: 5  •  isosorbide mononitrate (IMDUR) 30 MG 24 hr tablet, Take 1 tablet by mouth Every Morning., Disp: , Rfl:   •  lidocaine (LIDODERM) 5 %, Place 1 patch on the skin as directed by provider Daily for 7 days. Remove & Discard patch within 12 hours or as directed by MD, Disp: 7 patch, Rfl: 0  •  loratadine (CLARITIN) 10 MG tablet, Take 1 tablet by mouth Daily., Disp: 90 tablet, Rfl: 3  •  methocarbamol (ROBAXIN) 750 MG tablet, Take 1 tablet by mouth 4 (Four) Times a Day As Needed for Muscle Spasms., Disp: 120 tablet, Rfl: 2  •  naproxen (Naprosyn) 500 MG tablet, Take 1 tablet by mouth 2 (Two) Times a Day With Meals., Disp: 60 tablet, Rfl: 2  •  nicotine (Nicoderm CQ) 21 MG/24HR patch, Place 1 patch on the skin as directed by provider Daily., Disp: 30 patch, Rfl: 0  •  sildenafil (REVATIO) 20 MG tablet, Take 1-2 tablets by mouth Daily As Needed (prior to sexual activity).,  "Disp: 30 tablet, Rfl: 2    Objective     Vital Signs   /69   Ht 193 cm (76\")   Wt 79.8 kg (176 lb)   BMI 21.42 kg/m²      Physical Exam:  General appearance - alert, well appearing, and in no distress  Mental status - alert, oriented to person, place, and time  Eyes - pupils equal and reactive, extraocular eye movements intact  Neck - supple, no significant adenopathy  Chest - no tachypnea, retractions or cyanosis  Heart - regular rate   Neurological - alert, oriented, normal speech, no focal findings or movement disorder noted  Musculoskeletal - no joint tenderness, deformity or swelling  Skin - 1.5cm black raised lesion on the right upper abdomen.     Results Review:    The following data was reviewed by: Rhiannon Cabrera MD on 08/18/2022:  Progress Notes by Cristy Schmidt APRN (08/03/2022 13:45)  CBC & Differential (08/10/2022 20:18)  Comprehensive Metabolic Panel (08/10/2022 20:18)      Assessment & Plan       Diagnoses and all orders for this visit:    1. Skin lesion of chest wall (Primary)    2. Nicotine dependence, cigarettes, uncomplicated       Mr. Platt is a 37 year old male with a concerning skin lesion on his right upper abdomen/lower chest. I have recommended excisional biopsy with a rim of normal tissue. I have offered him an in office procedure under local anesthetic vs. In the OR under MAC anesthesia. We discussed risks of surgery including bleeding, infection, and damage to surrounding structures. He is scheduled for in office procedure on Monday 8/22 at 1:00pm for excision. He is at increased risk of perioperative complications including infection and wound dehiscence 2/2 his current smoking status. This is a new undiagnosed problem with an unknown prognosis.     BMI is within normal parameters. No other follow-up for BMI required.      Rhiannon Cabrera MD  08/18/22  13:43 CDT            "

## 2022-08-18 NOTE — PATIENT INSTRUCTIONS
Smoking Tobacco Information, Adult  Smoking tobacco can be harmful to your health. Tobacco contains a poisonous (toxic), colorless chemical called nicotine. Nicotine is addictive. It changes the brain and can make it hard to stop smoking. Tobacco also has other toxic chemicals that can hurt your body and raise your risk of many cancers.  How can smoking tobacco affect me?  Smoking tobacco puts you at risk for:  Cancer. Smoking is most commonly associated with lung cancer, but can also lead to cancer in other parts of the body.  Chronic obstructive pulmonary disease (COPD). This is a long-term lung condition that makes it hard to breathe. It also gets worse over time.  High blood pressure (hypertension), heart disease, stroke, or heart attack.  Lung infections, such as pneumonia.  Cataracts. This is when the lenses in the eyes become clouded.  Digestive problems. This may include peptic ulcers, heartburn, and gastroesophageal reflux disease (GERD).  Oral health problems, such as gum disease and tooth loss.  Loss of taste and smell.  Smoking can affect your appearance by causing:  Wrinkles.  Yellow or stained teeth, fingers, and fingernails.  Smoking tobacco can also affect your social life, because:  It may be challenging to find places to smoke when away from home. Many workplaces, restaurants, hotels, and public places are tobacco-free.  Smoking is expensive. This is due to the cost of tobacco and the long-term costs of treating health problems from smoking.  Secondhand smoke may affect those around you. Secondhand smoke can cause lung cancer, breathing problems, and heart disease. Children of smokers have a higher risk for:  Sudden infant death syndrome (SIDS).  Ear infections.  Lung infections.  If you currently smoke tobacco, quitting now can help you:  Lead a longer and healthier life.  Look, smell, breathe, and feel better over time.  Save money.  Protect others from the harms of secondhand smoke.  What  actions can I take to prevent health problems?  Quit smoking    Do not start smoking. Quit if you already do.  Make a plan to quit smoking and commit to it. Look for programs to help you and ask your health care provider for recommendations and ideas.  Set a date and write down all the reasons you want to quit.  Let your friends and family know you are quitting so they can help and support you. Consider finding friends who also want to quit. It can be easier to quit with someone else, so that you can support each other.  Talk with your health care provider about using nicotine replacement medicines to help you quit, such as gum, lozenges, patches, sprays, or pills.  Do not replace cigarette smoking with electronic cigarettes, which are commonly called e-cigarettes. The safety of e-cigarettes is not known, and some may contain harmful chemicals.  If you try to quit but return to smoking, stay positive. It is common to slip up when you first quit, so take it one day at a time.  Be prepared for cravings. When you feel the urge to smoke, chew gum or suck on hard candy.    Lifestyle  Stay busy and take care of your body.  Drink enough fluid to keep your urine pale yellow.  Get plenty of exercise and eat a healthy diet. This can help prevent weight gain after quitting.  Monitor your eating habits. Quitting smoking can cause you to have a larger appetite than when you smoke.  Find ways to relax. Go out with friends or family to a movie or a restaurant where people do not smoke.  Ask your health care provider about having regular tests (screenings) to check for cancer. This may include blood tests, imaging tests, and other tests.  Find ways to manage your stress, such as meditation, yoga, or exercise.  Where to find support  To get support to quit smoking, consider:  Asking your health care provider for more information and resources.  Taking classes to learn more about quitting smoking.  Looking for local organizations  that offer resources about quitting smoking.  Joining a support group for people who want to quit smoking in your local community.  Calling the smokefree.gov counselor helpline: 1-800-Quit-Now (1-465.933.2773)  Where to find more information  You may find more information about quitting smoking from:  HelpGuide.org: www.helpguide.org  Smokefree.gov: smokefree.gov  American Lung Association: www.lung.org  Contact a health care provider if you:  Have problems breathing.  Notice that your lips, nose, or fingers turn blue.  Have chest pain.  Are coughing up blood.  Feel faint or you pass out.  Have other health changes that cause you to worry.  Summary  Smoking tobacco can negatively affect your health, the health of those around you, your finances, and your social life.  Do not start smoking. Quit if you already do. If you need help quitting, ask your health care provider.  Think about joining a support group for people who want to quit smoking in your local community. There are many effective programs that will help you to quit this behavior.  This information is not intended to replace advice given to you by your health care provider. Make sure you discuss any questions you have with your health care provider.  Document Revised: 09/11/2020 Document Reviewed: 01/02/2018  Elsevier Patient Education © 2021 [employer]

## 2022-08-18 NOTE — PROGRESS NOTES
August 18, 2022    Hello, may I speak with Ross Platt?    My name is Deborah Borja    I am  with [facility]  [facility]  [address] [facility] [address]  [facility] [address] [phone].    Before we get started may I verify your date of birth? 1985    I am calling to officially welcome you to our practice and ask about your recent visit. Is this a good time to talk? yes    Tell me about your visit with us. What things went well?  Check-in check-out everything was fine.       We're always looking for ways to make our patients' experiences even better. Do you have recommendations on ways we may improve?  no    Overall were you satisfied with your first visit to our practice? yes       I appreciate you taking the time to speak with me today. Is there anything else I can do for you? no      Thank you, and have a great day.

## 2022-08-22 ENCOUNTER — PROCEDURE VISIT (OUTPATIENT)
Dept: SURGERY | Facility: CLINIC | Age: 37
End: 2022-08-22

## 2022-08-22 VITALS — HEIGHT: 76 IN | BODY MASS INDEX: 21.43 KG/M2 | WEIGHT: 176 LBS

## 2022-08-22 DIAGNOSIS — L98.9 SKIN LESION OF CHEST WALL: Primary | ICD-10-CM

## 2022-08-22 DIAGNOSIS — F17.210 NICOTINE DEPENDENCE, CIGARETTES, UNCOMPLICATED: ICD-10-CM

## 2022-08-22 PROCEDURE — 11404 EXC TR-EXT B9+MARG 3.1-4 CM: CPT | Performed by: STUDENT IN AN ORGANIZED HEALTH CARE EDUCATION/TRAINING PROGRAM

## 2022-08-23 PROCEDURE — 88305 TISSUE EXAM BY PATHOLOGIST: CPT | Performed by: STUDENT IN AN ORGANIZED HEALTH CARE EDUCATION/TRAINING PROGRAM

## 2022-08-23 PROCEDURE — 88313 SPECIAL STAINS GROUP 2: CPT | Performed by: STUDENT IN AN ORGANIZED HEALTH CARE EDUCATION/TRAINING PROGRAM

## 2022-08-23 PROCEDURE — 88341 IMHCHEM/IMCYTCHM EA ADD ANTB: CPT | Performed by: STUDENT IN AN ORGANIZED HEALTH CARE EDUCATION/TRAINING PROGRAM

## 2022-08-23 PROCEDURE — 88342 IMHCHEM/IMCYTCHM 1ST ANTB: CPT | Performed by: STUDENT IN AN ORGANIZED HEALTH CARE EDUCATION/TRAINING PROGRAM

## 2022-08-30 ASSESSMENT — ENCOUNTER SYMPTOMS
COUGH: 0
EYE ITCHING: 0
APNEA: 0
SHORTNESS OF BREATH: 1
COLOR CHANGE: 0
BACK PAIN: 0
CHEST TIGHTNESS: 1
EYE DISCHARGE: 0
PHOTOPHOBIA: 0

## 2022-09-02 LAB
CYTO UR: NORMAL
LAB AP CASE REPORT: NORMAL
LAB AP CLINICAL INFORMATION: NORMAL
LAB AP SPECIAL STAINS: NORMAL
Lab: NORMAL
PATH REPORT.FINAL DX SPEC: NORMAL
PATH REPORT.GROSS SPEC: NORMAL

## 2022-09-05 ENCOUNTER — APPOINTMENT (OUTPATIENT)
Dept: GENERAL RADIOLOGY | Age: 37
End: 2022-09-05
Payer: MEDICAID

## 2022-09-05 ENCOUNTER — HOSPITAL ENCOUNTER (EMERGENCY)
Age: 37
Discharge: HOME | End: 2022-09-06
Attending: PEDIATRICS
Payer: MEDICAID

## 2022-09-05 DIAGNOSIS — R07.9 CHEST PAIN, UNSPECIFIED TYPE: Primary | ICD-10-CM

## 2022-09-05 LAB
ALBUMIN SERPL-MCNC: 3.6 G/DL (ref 3.5–5.2)
ALP BLD-CCNC: 86 U/L (ref 40–130)
ALT SERPL-CCNC: 18 U/L (ref 5–41)
ANION GAP SERPL CALCULATED.3IONS-SCNC: 9 MMOL/L (ref 7–19)
AST SERPL-CCNC: 19 U/L (ref 5–40)
BASOPHILS ABSOLUTE: 0.1 K/UL (ref 0–0.2)
BASOPHILS RELATIVE PERCENT: 0.7 % (ref 0–1)
BILIRUB SERPL-MCNC: <0.2 MG/DL (ref 0.2–1.2)
BUN BLDV-MCNC: 19 MG/DL (ref 6–20)
CALCIUM SERPL-MCNC: 9.4 MG/DL (ref 8.6–10)
CHLORIDE BLD-SCNC: 105 MMOL/L (ref 98–111)
CO2: 25 MMOL/L (ref 22–29)
CREAT SERPL-MCNC: 1 MG/DL (ref 0.5–1.2)
EOSINOPHILS ABSOLUTE: 0.2 K/UL (ref 0–0.6)
EOSINOPHILS RELATIVE PERCENT: 3 % (ref 0–5)
GFR AFRICAN AMERICAN: >59
GFR NON-AFRICAN AMERICAN: >60
GLUCOSE BLD-MCNC: 82 MG/DL (ref 74–109)
HCT VFR BLD CALC: 41.4 % (ref 42–52)
HEMOGLOBIN: 13.7 G/DL (ref 14–18)
IMMATURE GRANULOCYTES #: 0 K/UL
LYMPHOCYTES ABSOLUTE: 1.9 K/UL (ref 1.1–4.5)
LYMPHOCYTES RELATIVE PERCENT: 26.8 % (ref 20–40)
MCH RBC QN AUTO: 30.4 PG (ref 27–31)
MCHC RBC AUTO-ENTMCNC: 33.1 G/DL (ref 33–37)
MCV RBC AUTO: 92 FL (ref 80–94)
MONOCYTES ABSOLUTE: 0.6 K/UL (ref 0–0.9)
MONOCYTES RELATIVE PERCENT: 7.8 % (ref 0–10)
NEUTROPHILS ABSOLUTE: 4.3 K/UL (ref 1.5–7.5)
NEUTROPHILS RELATIVE PERCENT: 61.1 % (ref 50–65)
PDW BLD-RTO: 12.8 % (ref 11.5–14.5)
PLATELET # BLD: 241 K/UL (ref 130–400)
PMV BLD AUTO: 9.9 FL (ref 9.4–12.4)
POTASSIUM SERPL-SCNC: 3.9 MMOL/L (ref 3.5–5)
RBC # BLD: 4.5 M/UL (ref 4.7–6.1)
SODIUM BLD-SCNC: 139 MMOL/L (ref 136–145)
TOTAL PROTEIN: 6.4 G/DL (ref 6.6–8.7)
TROPONIN: <0.01 NG/ML (ref 0–0.03)
WBC # BLD: 7.1 K/UL (ref 4.8–10.8)

## 2022-09-05 PROCEDURE — 93005 ELECTROCARDIOGRAM TRACING: CPT | Performed by: PEDIATRICS

## 2022-09-05 PROCEDURE — 84484 ASSAY OF TROPONIN QUANT: CPT

## 2022-09-05 PROCEDURE — 85025 COMPLETE CBC W/AUTO DIFF WBC: CPT

## 2022-09-05 PROCEDURE — 36415 COLL VENOUS BLD VENIPUNCTURE: CPT

## 2022-09-05 PROCEDURE — 96374 THER/PROPH/DIAG INJ IV PUSH: CPT

## 2022-09-05 PROCEDURE — 80053 COMPREHEN METABOLIC PANEL: CPT

## 2022-09-05 PROCEDURE — 71045 X-RAY EXAM CHEST 1 VIEW: CPT

## 2022-09-05 PROCEDURE — 96375 TX/PRO/DX INJ NEW DRUG ADDON: CPT

## 2022-09-05 PROCEDURE — 6360000002 HC RX W HCPCS: Performed by: PEDIATRICS

## 2022-09-05 PROCEDURE — 99285 EMERGENCY DEPT VISIT HI MDM: CPT

## 2022-09-05 RX ORDER — NITROGLYCERIN 0.4 MG/1
0.4 TABLET SUBLINGUAL EVERY 5 MIN PRN
Status: DISCONTINUED | OUTPATIENT
Start: 2022-09-05 | End: 2022-09-06 | Stop reason: HOSPADM

## 2022-09-05 RX ORDER — HYDROMORPHONE HYDROCHLORIDE 1 MG/ML
0.5 INJECTION, SOLUTION INTRAMUSCULAR; INTRAVENOUS; SUBCUTANEOUS ONCE
Status: COMPLETED | OUTPATIENT
Start: 2022-09-05 | End: 2022-09-05

## 2022-09-05 RX ORDER — ASPIRIN 81 MG/1
324 TABLET, CHEWABLE ORAL ONCE
Status: DISCONTINUED | OUTPATIENT
Start: 2022-09-05 | End: 2022-09-06 | Stop reason: HOSPADM

## 2022-09-05 RX ADMIN — HYDROMORPHONE HYDROCHLORIDE 0.5 MG: 1 INJECTION, SOLUTION INTRAMUSCULAR; INTRAVENOUS; SUBCUTANEOUS at 23:52

## 2022-09-05 ASSESSMENT — PAIN DESCRIPTION - LOCATION: LOCATION: CHEST

## 2022-09-05 ASSESSMENT — PAIN SCALES - GENERAL
PAINLEVEL_OUTOF10: 4
PAINLEVEL_OUTOF10: 8

## 2022-09-05 ASSESSMENT — PAIN - FUNCTIONAL ASSESSMENT: PAIN_FUNCTIONAL_ASSESSMENT: 0-10

## 2022-09-06 VITALS
DIASTOLIC BLOOD PRESSURE: 62 MMHG | BODY MASS INDEX: 23.14 KG/M2 | TEMPERATURE: 97.9 F | WEIGHT: 200 LBS | RESPIRATION RATE: 16 BRPM | OXYGEN SATURATION: 97 % | HEART RATE: 74 BPM | HEIGHT: 78 IN | SYSTOLIC BLOOD PRESSURE: 110 MMHG

## 2022-09-06 LAB
EKG P AXIS: 56 DEGREES
EKG P AXIS: 67 DEGREES
EKG P-R INTERVAL: 182 MS
EKG P-R INTERVAL: 196 MS
EKG Q-T INTERVAL: 362 MS
EKG Q-T INTERVAL: 400 MS
EKG QRS DURATION: 80 MS
EKG QRS DURATION: 82 MS
EKG QTC CALCULATION (BAZETT): 388 MS
EKG QTC CALCULATION (BAZETT): 395 MS
EKG T AXIS: 71 DEGREES
EKG T AXIS: 73 DEGREES
TROPONIN: <0.01 NG/ML (ref 0–0.03)

## 2022-09-06 PROCEDURE — 93010 ELECTROCARDIOGRAM REPORT: CPT | Performed by: INTERNAL MEDICINE

## 2022-09-06 PROCEDURE — 84484 ASSAY OF TROPONIN QUANT: CPT

## 2022-09-06 PROCEDURE — 36415 COLL VENOUS BLD VENIPUNCTURE: CPT

## 2022-09-06 PROCEDURE — 6360000002 HC RX W HCPCS: Performed by: PEDIATRICS

## 2022-09-06 PROCEDURE — 93005 ELECTROCARDIOGRAM TRACING: CPT | Performed by: PEDIATRICS

## 2022-09-06 RX ORDER — KETOROLAC TROMETHAMINE 30 MG/ML
15 INJECTION, SOLUTION INTRAMUSCULAR; INTRAVENOUS ONCE
Status: COMPLETED | OUTPATIENT
Start: 2022-09-06 | End: 2022-09-06

## 2022-09-06 RX ADMIN — KETOROLAC TROMETHAMINE 15 MG: 30 INJECTION, SOLUTION INTRAMUSCULAR at 02:20

## 2022-09-06 ASSESSMENT — PAIN - FUNCTIONAL ASSESSMENT: PAIN_FUNCTIONAL_ASSESSMENT: NONE - DENIES PAIN

## 2022-09-06 ASSESSMENT — HEART SCORE: ECG: 0

## 2022-09-07 ENCOUNTER — OFFICE VISIT (OUTPATIENT)
Dept: FAMILY MEDICINE CLINIC | Facility: CLINIC | Age: 37
End: 2022-09-07

## 2022-09-07 VITALS
OXYGEN SATURATION: 100 % | HEIGHT: 76 IN | BODY MASS INDEX: 21.92 KG/M2 | TEMPERATURE: 97.1 F | DIASTOLIC BLOOD PRESSURE: 64 MMHG | SYSTOLIC BLOOD PRESSURE: 96 MMHG | RESPIRATION RATE: 16 BRPM | HEART RATE: 112 BPM | WEIGHT: 180 LBS

## 2022-09-07 DIAGNOSIS — L08.9 INFECTED PUNCTURE WOUND OF FINGER, INITIAL ENCOUNTER: Primary | ICD-10-CM

## 2022-09-07 DIAGNOSIS — S61.239A INFECTED PUNCTURE WOUND OF FINGER, INITIAL ENCOUNTER: Primary | ICD-10-CM

## 2022-09-07 DIAGNOSIS — Z91.038 HISTORY OF ANAPHYLACTIC SHOCK DUE TO INSECT STING: ICD-10-CM

## 2022-09-07 PROCEDURE — 99213 OFFICE O/P EST LOW 20 MIN: CPT | Performed by: NURSE PRACTITIONER

## 2022-09-07 RX ORDER — EPINEPHRINE 0.3 MG/.3ML
0.3 INJECTION SUBCUTANEOUS ONCE
Qty: 1 EACH | Refills: 0 | Status: SHIPPED | OUTPATIENT
Start: 2022-09-07 | End: 2022-09-08

## 2022-09-07 RX ORDER — SULFAMETHOXAZOLE AND TRIMETHOPRIM 800; 160 MG/1; MG/1
1 TABLET ORAL 2 TIMES DAILY
Qty: 14 TABLET | Refills: 0 | Status: SHIPPED | OUTPATIENT
Start: 2022-09-07 | End: 2023-01-17

## 2022-09-07 RX ORDER — PREGABALIN 100 MG/1
CAPSULE ORAL
COMMUNITY
Start: 2022-08-16

## 2022-09-07 ASSESSMENT — ENCOUNTER SYMPTOMS
COUGH: 0
COLOR CHANGE: 0
BACK PAIN: 0
NAUSEA: 0
ABDOMINAL PAIN: 0
SHORTNESS OF BREATH: 0
EYE DISCHARGE: 0
VOMITING: 0
RHINORRHEA: 0

## 2022-09-07 NOTE — PROGRESS NOTES
"Chief Complaint  Hand Problem and Hand Pain (Right pointer finger )    Subjective        Ross Platt presents to [facility]  History of Present Illness     The patient reports that on the right index finger, he had a splinter last week. He reports that his wife removed the splinter. He states his finger has been draining  yellowish or green viscous fluid, becoming red, and increasingly tender. The patient has no sign of fever or constitutional symptoms.He denies having any joint pain.       The patient also has requested a prescription for EpiPen for his wasp allergy. He reports that he is outside quite often. He has a history of anaphylaxis to wasps  according to his chart.    Objective   Vital Signs:  BP 96/64 (BP Location: Left arm)   Pulse 112   Temp 97.1 °F (36.2 °C) (Infrared)   Resp 16   Ht 193 cm (75.98\")   Wt 81.6 kg (180 lb)   SpO2 100%   BMI 21.92 kg/m²   Estimated body mass index is 21.92 kg/m² as calculated from the following:    Height as of this encounter: 193 cm (75.98\").    Weight as of this encounter: 81.6 kg (180 lb).    BMI is within normal parameters. No other follow-up for BMI required.      Physical Exam  Vitals and nursing note reviewed.   Constitutional:       Appearance: He is well-developed.   HENT:      Head: Normocephalic and atraumatic.   Eyes:      Conjunctiva/sclera: Conjunctivae normal.   Cardiovascular:      Rate and Rhythm: Normal rate and regular rhythm.   Pulmonary:      Effort: Pulmonary effort is normal.   Musculoskeletal:      Cervical back: Normal range of motion.   Skin:     Findings: Erythema and signs of injury present.             Comments: Erythema and swelling surrounding small puncture wound of right index finger. No abscess noted.   Neurological:      General: No focal deficit present.      Mental Status: He is alert and oriented to person, place, and time.   Psychiatric:         Mood and Affect: Mood normal.         " Behavior: Behavior normal.        Result Review :                Assessment and Plan   Diagnoses and all orders for this visit:    1. Infected puncture wound of finger, initial encounter (Primary)    2. 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 Every 15 min if anaphylaxis occurs  Dispense: 1 each; Refill: 0  -     sulfamethoxazole-trimethoprim (Bactrim DS) 800-160 MG per tablet; Take 1 tablet by mouth 2 (Two) Times a Day.  Dispense: 14 tablet; Refill: 0      Advised local skin care  Bactrim prescribed- renal function reviewed and stable   Advised to monitor for worsening signs of infection and rtc if occur    Refill epi pen           Follow Up   Return in about 1 week (around 9/14/2022), or if symptoms worsen or fail to improve.  Patient was given instructions and counseling regarding his condition or for health maintenance advice. Please see specific information pulled into the AVS if appropriate.     Transcribed from ambient dictation for JANESSA Little by Margo Ingram.  09/08/22   10:41 CDT    Patient verbalized consent to the visit recording.  I have personally performed the services described in this document as transcribed by the above individual, and it is both accurate and complete.  JANESSA Little  9/21/2022  10:40 CDT

## 2022-09-07 NOTE — ED PROVIDER NOTES
Marfan's disease     Pneumothorax          SURGICAL HISTORY       Past Surgical History:   Procedure Laterality Date    HERNIA REPAIR      LUNG REMOVAL, PARTIAL Right          CURRENT MEDICATIONS     Discharge Medication List as of 9/6/2022  2:33 AM        CONTINUE these medications which have NOT CHANGED    Details   diclofenac (VOLTAREN) 75 MG EC tablet Historical Med      fluticasone (FLONASE) 50 MCG/ACT nasal spray Historical Med      BEVESPI AEROSPHERE 9-4.8 MCG/ACT AERO INHALE 2 SPRAYS BY MOUTH 2 TIMES DAILY FOR 90 DAYS, DAWHistorical Med      isosorbide mononitrate (IMDUR) 30 MG extended release tablet TAKE 1 TABLET BY MOUTH EVERY MORNINGHistorical Med      loratadine (CLARITIN) 10 MG tablet Historical Med      naproxen (NAPROSYN) 500 MG tablet Historical Med      albuterol sulfate  (90 Base) MCG/ACT inhaler Inhale 2 puffs into the lungs every 4 hours as neededHistorical Med      Fluticasone-Umeclidin-Vilant (TRELEGY ELLIPTA) 200-62.5-25 MCG/INH AEPB Inhale 1 puff into the lungs DailyHistorical Med             ALLERGIES     Calcium channel blockers, Ciprofloxacin, and Methylprednisolone    FAMILY HISTORY     No family history on file. SOCIAL HISTORY       Social History     Socioeconomic History    Marital status: Single   Tobacco Use    Smoking status: Every Day     Packs/day: 0.50     Years: 10.00     Pack years: 5.00     Types: Cigarettes    Smokeless tobacco: Never   Vaping Use    Vaping Use: Never used   Substance and Sexual Activity    Alcohol use: No    Drug use: No       SCREENINGS    [place] Coma Scale  Eye Opening: Spontaneous  Best Verbal Response: Oriented  Best Motor Response: Obeys commands  Dany Coma Scale Score: 15 Heart Score for chest pain patients  History:  Moderately Suspicious  ECG: Normal  Patient Age: < 45 years  Risk Factors: > 3 Risk factors or history of atherosclerotic disease*  Troponin: < 1X normal limit  Heart Score Total: 3      PHYSICAL EXAM    (up to 7 for level 4, 8 or more for level 5)     ED Triage Vitals [09/05/22 2144]   BP Temp Temp Source Heart Rate Resp SpO2 Height Weight   120/86 97.5 °F (36.4 °C) Oral 81 18 98 % 6' 6\" (1.981 m) 200 lb (90.7 kg)       Physical Exam  Vitals and nursing note reviewed. Constitutional:       General: He is not in acute distress. Comments: Tall, thin male with appearance consistent with Marfan's. HENT:      Head: Normocephalic and atraumatic. Right Ear: External ear normal.      Left Ear: External ear normal.      Nose: Nose normal.      Mouth/Throat:      Mouth: Mucous membranes are moist.      Pharynx: Oropharynx is clear. No oropharyngeal exudate. Eyes:      General: No scleral icterus. Conjunctiva/sclera: Conjunctivae normal.      Pupils: Pupils are equal, round, and reactive to light. Cardiovascular:      Rate and Rhythm: Normal rate and regular rhythm. Pulses: Normal pulses. Heart sounds: Normal heart sounds. No murmur heard. Pulmonary:      Effort: Pulmonary effort is normal. No respiratory distress. Breath sounds: Normal breath sounds. No stridor. No wheezing, rhonchi or rales. Comments: Decreased air entry on right vs left chest c/w pneumonectomy on right in past.  Abdominal:      General: Bowel sounds are normal. There is no distension. Palpations: Abdomen is soft. Tenderness: There is no abdominal tenderness. There is no guarding or rebound. Musculoskeletal:         General: No tenderness or deformity. Cervical back: Neck supple. No rigidity. Right lower leg: No edema. Left lower leg: No edema. Skin:     General: Skin is warm and dry. Capillary Refill: Capillary refill takes less than 2 seconds. Coloration: Skin is not jaundiced. Neurological:      General: No focal deficit present. Mental Status: He is alert and oriented to person, place, and time. Mental status is at baseline. Cranial Nerves: No cranial nerve deficit. Sensory: No sensory deficit. Motor: No weakness. Coordination: Coordination normal.      Gait: Gait normal.   Psychiatric:         Mood and Affect: Mood normal.         Behavior: Behavior normal.       DIAGNOSTIC RESULTS     EKG: All EKG's areinterpreted by the Emergency Department Physician who either signs or Co-signs this chart in the absence of a cardiologist.    EKG dated 9/5/2022 at 20 1:46 PM: Normal sinus rhythm, rate 75. QRS 87, QTc 402. EKG #2 dated 9/6/2022 at 00 50 7 AM: Normal sinus rhythm, rate 57. , QRS 85, QTc 388. Low voltage in 1 and aVL. RADIOLOGY:  Non-plain film images such as CT, Ultrasound and MRI are read by the radiologist. Plain radiographic images are visualized and preliminarily interpreted bythe emergency physician with the below findings:          XR CHEST PORTABLE   Final Result   1. No acute process in the chest.              LABS:  Labs Reviewed   CBC WITH AUTO DIFFERENTIAL - Abnormal; Notable for the following components:       Result Value    RBC 4.50 (*)     Hemoglobin 13.7 (*)     Hematocrit 41.4 (*)     All other components within normal limits   COMPREHENSIVE METABOLIC PANEL - Abnormal; Notable for the following components: Total Protein 6.4 (*)     All other components within normal limits   TROPONIN   TROPONIN       All other labs were within normal range or not returned as of this dictation.     EMERGENCY DEPARTMENT COURSE and DIFFERENTIAL DIAGNOSIS/MDM:   Vitals:    Vitals:    09/05/22 2144 09/05/22 2230 09/05/22 2300 09/06/22 0243   BP: 120/86 104/71 108/70 110/62   Pulse: 81 72 73 74   Resp: 18 25 22 16   Temp: 97.5 °F (36.4 °C)   97.9 °F (36.6 °C)   TempSrc: Oral   Infrared   SpO2: 98% 96% 97%    Weight: 200 lb (90.7 kg)      Height: 6' 6\" (1.981 m)          MDM     Amount and/or Complexity of Data Reviewed  Clinical lab tests: reviewed  Tests in the radiology section of CPT®: reviewed  Decide to obtain previous medical records or to obtain history from someone other than the patient: yes    71-year-old male with history of Marfan syndrome and MI in the past presents with chest pain. Lab, EKGs, and radiology results reviewed. Serial troponins are negative. Heart score equals 3. Dr. Oly Amor, [facility] cardiology, notes from 6/20/2022 that patient has had stress echo in 2022 showing low risk for ischemia. Patient presented for continuous chest pain x2 months at that time. Dr. Lea Ramsey concludes that \"no further cardiac work-up recommended at this time. \"  Patient will be discharged to home to follow-up with Dr. Sandrine Mosley, PCP. Patient will return or seek medical attention with increasing or severe pain, difficulty breathing, or other concerns. CONSULTS:  None    PROCEDURES:  Unless otherwise noted below, none     Procedures    FINAL IMPRESSION      1.  Chest pain, unspecified type          DISPOSITION/PLAN   DISPOSITION Decision To Discharge 09/06/2022 02:18:13 AM      PATIENT REFERRED TO:  Macario Puga MD  [address] [place] [address]   [phone]    Schedule an appointment as soon as possible for a visit       Leslie Mccarthy, [facility],[address] [facility]  [phone]    Schedule an appointment as soon as possible for a visit         DISCHARGE MEDICATIONS:  Discharge Medication List as of 9/6/2022  2:33 AM             (Please note that portions of this note were completed with a voice recognition program.  Efforts were made to edit thedictations but occasionally words are mis-transcribed.)    Edu Tovar MD (electronically signed)  Attending Emergency Physician          Edu Tovar MD  09/07/22 [id number]

## 2022-09-08 ENCOUNTER — OFFICE VISIT (OUTPATIENT)
Dept: SURGERY | Facility: CLINIC | Age: 37
End: 2022-09-08

## 2022-09-08 VITALS
WEIGHT: 181.2 LBS | BODY MASS INDEX: 22.07 KG/M2 | HEART RATE: 102 BPM | OXYGEN SATURATION: 100 % | DIASTOLIC BLOOD PRESSURE: 67 MMHG | SYSTOLIC BLOOD PRESSURE: 107 MMHG | HEIGHT: 76 IN | TEMPERATURE: 97.4 F | RESPIRATION RATE: 16 BRPM

## 2022-09-08 DIAGNOSIS — D22.9 SPITZ NEVUS: Primary | ICD-10-CM

## 2022-09-08 PROCEDURE — 99024 POSTOP FOLLOW-UP VISIT: CPT | Performed by: STUDENT IN AN ORGANIZED HEALTH CARE EDUCATION/TRAINING PROGRAM

## 2022-09-08 NOTE — PROGRESS NOTES
"Patient: Ross Platt    YOB: 1985    Date: 09/08/2022    Primary Care Provider: Cristy Schmidt APRN    Vital Signs:   Vitals:    09/08/22 1302   BP: 107/67   BP Location: Left arm   Patient Position: Sitting   Cuff Size: Adult   Pulse: 102   Resp: 16   Temp: 97.4 °F (36.3 °C)   TempSrc: Temporal   SpO2: 100%   Weight: 82.2 kg (181 lb 3.2 oz)   Height: 193 cm (76\")       The patient is tolerating a regular diet and has no complaints s/p excision of skin lesion on 9/2/22. The patient denies fevers, chills, nausea, vomiting, and excessive pain. The incision is healing well.     Results Review:  I reviewed the patient's new clinical results.    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 The lesion appears completely excised.    Assessment / Plan:    Diagnoses and all orders for this visit:    1. Spitz nevus (Primary)  -     Ambulatory Referral to Dermatology      Needs dermatology referral for skin checks in follow up.     Electronically signed by Rhiannon Cabrera MD  09/08/22  13:14 CDT                    "

## 2022-09-12 RX ORDER — METHOCARBAMOL 750 MG/1
TABLET, FILM COATED ORAL
Qty: 120 TABLET | Refills: 2 | Status: SHIPPED | OUTPATIENT
Start: 2022-09-12 | End: 2023-02-02

## 2022-09-12 RX ORDER — DICLOFENAC SODIUM 75 MG/1
TABLET, DELAYED RELEASE ORAL
Qty: 180 TABLET | Refills: 0 | Status: SHIPPED | OUTPATIENT
Start: 2022-09-12 | End: 2022-10-20 | Stop reason: SINTOL

## 2022-09-12 RX ORDER — NAPROXEN 500 MG/1
TABLET ORAL
Qty: 60 TABLET | Refills: 2 | OUTPATIENT
Start: 2022-09-12

## 2022-09-12 NOTE — TELEPHONE ENCOUNTER
Rx Refill Note  Requested Prescriptions     Pending Prescriptions Disp Refills   • diclofenac (VOLTAREN) 75 MG EC tablet [Pharmacy Med Name: DICLOFENAC SODIUM 75MG DR TABLETS] 180 tablet      Sig: TAKE 1 TABLET BY MOUTH TWICE DAILY   • naproxen (NAPROSYN) 500 MG tablet [Pharmacy Med Name: NAPROXEN 500MG TABLETS] 60 tablet 2     Sig: TAKE 1 TABLET BY MOUTH TWICE DAILY WITH MEALS   • methocarbamol (ROBAXIN) 750 MG tablet [Pharmacy Med Name: METHOCARBAMOL 750MG TABLETS] 120 tablet 2     Sig: TAKE 1 TABLET BY MOUTH FOUR TIMES DAILY AS NEEDED FOR MUSCLE SPASMS      Last office visit with prescribing clinician: 9/7/2022      Next office visit with prescribing clinician: Visit date not found            Anuradha Ramírez MA  09/12/22, 08:13 CDT

## 2022-09-22 ENCOUNTER — APPOINTMENT (OUTPATIENT)
Dept: GENERAL RADIOLOGY | Facility: HOSPITAL | Age: 37
End: 2022-09-22

## 2022-09-22 ENCOUNTER — APPOINTMENT (OUTPATIENT)
Dept: CT IMAGING | Facility: HOSPITAL | Age: 37
End: 2022-09-22

## 2022-09-22 ENCOUNTER — HOSPITAL ENCOUNTER (EMERGENCY)
Facility: HOSPITAL | Age: 37
Discharge: HOME | End: 2022-09-22
Attending: STUDENT IN AN ORGANIZED HEALTH CARE EDUCATION/TRAINING PROGRAM | Admitting: FAMILY MEDICINE

## 2022-09-22 VITALS
OXYGEN SATURATION: 96 % | WEIGHT: 184 LBS | RESPIRATION RATE: 18 BRPM | HEART RATE: 83 BPM | BODY MASS INDEX: 21.73 KG/M2 | DIASTOLIC BLOOD PRESSURE: 66 MMHG | SYSTOLIC BLOOD PRESSURE: 108 MMHG | TEMPERATURE: 98.2 F | HEIGHT: 77 IN

## 2022-09-22 DIAGNOSIS — R07.89 CHEST WALL PAIN: Primary | ICD-10-CM

## 2022-09-22 LAB
ALBUMIN SERPL-MCNC: 4.4 G/DL (ref 3.5–5.2)
ALBUMIN/GLOB SERPL: 2.1 G/DL
ALP SERPL-CCNC: 81 U/L (ref 39–117)
ALT SERPL W P-5'-P-CCNC: 20 U/L (ref 1–41)
ANION GAP SERPL CALCULATED.3IONS-SCNC: 6 MMOL/L (ref 5–15)
AST SERPL-CCNC: 23 U/L (ref 1–40)
BASOPHILS # BLD AUTO: 0.04 10*3/MM3 (ref 0–0.2)
BASOPHILS NFR BLD AUTO: 0.6 % (ref 0–1.5)
BILIRUB SERPL-MCNC: 0.2 MG/DL (ref 0–1.2)
BUN SERPL-MCNC: 13 MG/DL (ref 6–20)
BUN/CREAT SERPL: 15.7 (ref 7–25)
CALCIUM SPEC-SCNC: 9.1 MG/DL (ref 8.6–10.5)
CHLORIDE SERPL-SCNC: 105 MMOL/L (ref 98–107)
CO2 SERPL-SCNC: 28 MMOL/L (ref 22–29)
CREAT SERPL-MCNC: 0.83 MG/DL (ref 0.76–1.27)
DEPRECATED RDW RBC AUTO: 41.6 FL (ref 37–54)
EGFRCR SERPLBLD CKD-EPI 2021: 116.3 ML/MIN/1.73
EOSINOPHIL # BLD AUTO: 0.11 10*3/MM3 (ref 0–0.4)
EOSINOPHIL NFR BLD AUTO: 1.8 % (ref 0.3–6.2)
ERYTHROCYTE [DISTWIDTH] IN BLOOD BY AUTOMATED COUNT: 12.6 % (ref 12.3–15.4)
GLOBULIN UR ELPH-MCNC: 2.1 GM/DL
GLUCOSE SERPL-MCNC: 102 MG/DL (ref 65–99)
HCT VFR BLD AUTO: 41.5 % (ref 37.5–51)
HGB BLD-MCNC: 14.1 G/DL (ref 13–17.7)
IMM GRANULOCYTES # BLD AUTO: 0.04 10*3/MM3 (ref 0–0.05)
IMM GRANULOCYTES NFR BLD AUTO: 0.6 % (ref 0–0.5)
LIPASE SERPL-CCNC: 34 U/L (ref 13–60)
LYMPHOCYTES # BLD AUTO: 0.98 10*3/MM3 (ref 0.7–3.1)
LYMPHOCYTES NFR BLD AUTO: 15.9 % (ref 19.6–45.3)
MCH RBC QN AUTO: 31 PG (ref 26.6–33)
MCHC RBC AUTO-ENTMCNC: 34 G/DL (ref 31.5–35.7)
MCV RBC AUTO: 91.2 FL (ref 79–97)
MONOCYTES # BLD AUTO: 0.38 10*3/MM3 (ref 0.1–0.9)
MONOCYTES NFR BLD AUTO: 6.2 % (ref 5–12)
NEUTROPHILS NFR BLD AUTO: 4.61 10*3/MM3 (ref 1.7–7)
NEUTROPHILS NFR BLD AUTO: 74.9 % (ref 42.7–76)
NRBC BLD AUTO-RTO: 0 /100 WBC (ref 0–0.2)
PLATELET # BLD AUTO: 258 10*3/MM3 (ref 140–450)
PMV BLD AUTO: 9.4 FL (ref 6–12)
POTASSIUM SERPL-SCNC: 4 MMOL/L (ref 3.5–5.2)
PROT SERPL-MCNC: 6.5 G/DL (ref 6–8.5)
RBC # BLD AUTO: 4.55 10*6/MM3 (ref 4.14–5.8)
SODIUM SERPL-SCNC: 139 MMOL/L (ref 136–145)
TROPONIN T SERPL-MCNC: <0.01 NG/ML (ref 0–0.03)
WBC NRBC COR # BLD: 6.16 10*3/MM3 (ref 3.4–10.8)

## 2022-09-22 PROCEDURE — 25010000002 MORPHINE PER 10 MG: Performed by: STUDENT IN AN ORGANIZED HEALTH CARE EDUCATION/TRAINING PROGRAM

## 2022-09-22 PROCEDURE — 96375 TX/PRO/DX INJ NEW DRUG ADDON: CPT

## 2022-09-22 PROCEDURE — 85025 COMPLETE CBC W/AUTO DIFF WBC: CPT | Performed by: STUDENT IN AN ORGANIZED HEALTH CARE EDUCATION/TRAINING PROGRAM

## 2022-09-22 PROCEDURE — 84484 ASSAY OF TROPONIN QUANT: CPT | Performed by: STUDENT IN AN ORGANIZED HEALTH CARE EDUCATION/TRAINING PROGRAM

## 2022-09-22 PROCEDURE — 74175 CTA ABDOMEN W/CONTRAST: CPT

## 2022-09-22 PROCEDURE — 93005 ELECTROCARDIOGRAM TRACING: CPT | Performed by: STUDENT IN AN ORGANIZED HEALTH CARE EDUCATION/TRAINING PROGRAM

## 2022-09-22 PROCEDURE — 80053 COMPREHEN METABOLIC PANEL: CPT | Performed by: STUDENT IN AN ORGANIZED HEALTH CARE EDUCATION/TRAINING PROGRAM

## 2022-09-22 PROCEDURE — 93005 ELECTROCARDIOGRAM TRACING: CPT

## 2022-09-22 PROCEDURE — 71045 X-RAY EXAM CHEST 1 VIEW: CPT

## 2022-09-22 PROCEDURE — 93010 ELECTROCARDIOGRAM REPORT: CPT | Performed by: INTERNAL MEDICINE

## 2022-09-22 PROCEDURE — 96374 THER/PROPH/DIAG INJ IV PUSH: CPT

## 2022-09-22 PROCEDURE — 25010000002 KETOROLAC TROMETHAMINE PER 15 MG: Performed by: FAMILY MEDICINE

## 2022-09-22 PROCEDURE — 0 IOPAMIDOL PER 1 ML: Performed by: FAMILY MEDICINE

## 2022-09-22 PROCEDURE — 99283 EMERGENCY DEPT VISIT LOW MDM: CPT

## 2022-09-22 PROCEDURE — 83690 ASSAY OF LIPASE: CPT | Performed by: STUDENT IN AN ORGANIZED HEALTH CARE EDUCATION/TRAINING PROGRAM

## 2022-09-22 PROCEDURE — 71275 CT ANGIOGRAPHY CHEST: CPT

## 2022-09-22 RX ORDER — KETOROLAC TROMETHAMINE 15 MG/ML
15 INJECTION, SOLUTION INTRAMUSCULAR; INTRAVENOUS ONCE
Status: COMPLETED | OUTPATIENT
Start: 2022-09-22 | End: 2022-09-22

## 2022-09-22 RX ORDER — MORPHINE SULFATE 10 MG/ML
6 INJECTION INTRAMUSCULAR; INTRAVENOUS; SUBCUTANEOUS ONCE
Status: COMPLETED | OUTPATIENT
Start: 2022-09-22 | End: 2022-09-22

## 2022-09-22 RX ADMIN — IOPAMIDOL 100 ML: 755 INJECTION, SOLUTION INTRAVENOUS at 17:42

## 2022-09-22 RX ADMIN — MORPHINE SULFATE 6 MG: 10 INJECTION, SOLUTION INTRAMUSCULAR; INTRAVENOUS at 17:02

## 2022-09-22 RX ADMIN — KETOROLAC TROMETHAMINE 15 MG: 15 INJECTION, SOLUTION INTRAMUSCULAR; INTRAVENOUS at 19:05

## 2022-09-22 NOTE — ED PROVIDER NOTES
I took over the care of this patient awaiting results of CT angiograms which were both negative.  The patient was discharged in stable condition.  Please see Dr. Haider's note for the full history and physical     Kyle Kaye MD  09/22/22 [id number]

## 2022-09-22 NOTE — ED PROVIDER NOTES
Subjective   History of Present Illness     Patient presents with left-sided chest pain.  Started this morning.  He was playing with his puppy when it started.  It feels like somebody is sitting on the left side of his chest.  He does not radiate to his back but he has back pain that he thinks is probably a little bit worse than usual right now.  He has a history of Marfan syndrome.  He denies ever having had a chest tube in the past.  No illness symptoms prior to this; no cough, congestion, sore throat, fevers or chills.  Feels like his breathing is a little bit labored right now and it feels like it is hard to take a deep breath because it hurts.  No syncope.    Review of Systems   Constitutional: Negative for chills and fever.   HENT: Negative for congestion and sore throat.    Eyes: Negative for pain and redness.   Respiratory: Positive for shortness of breath. Negative for cough.    Cardiovascular: Positive for chest pain. Negative for palpitations.   Gastrointestinal: Negative for abdominal pain and vomiting.   Genitourinary: Negative for difficulty urinating and dysuria.   Musculoskeletal: Negative for gait problem and joint swelling.   Skin: Negative for rash and wound.   Neurological: Negative for syncope and light-headedness.       Past Medical History:   Diagnosis Date   • Back pain    • Chest pain    • Claustrophobia    • COPD (chronic obstructive pulmonary disease) ([facility])    • Emphysema of lung ([facility])    • Lung disease    • Marfan syndrome    • Pneumothorax    • Seizures ([facility])     when he was a child    • Seizures ([facility])    • Smoking     3/4 pack a day   • Tobacco abuse    • Underweight        Allergies   Allergen Reactions   • Calcium Channel Blockers Other (See Comments)     Do not use in marfan   • Ciprofloxacin Other (See Comments)     Do not use fluoroquinolones in marfan   • Medrol [Methylprednisolone] Other (See Comments)     Patient states he gets violent on this medication    • Diclofenac  Irritability       Past Surgical History:   Procedure Laterality Date   • HERNIA REPAIR     • LUNG LOBECTOMY Right 02/01/2017   • LUNG REMOVAL, TOTAL Right 2015   • THORACOSCOPY N/A 2/3/2017    Procedure: BRONCHOSCOPY, THORACOSCOPY RIGHT CHEST,  WEDGE RESECTION RIGHT UPPER AND LOWER LOBE OF LUNG, MECHANICAL PLEURODESIS;  Surgeon: Kyle Heath MD;  Location: [facility];  Service:    • TYMPANOSTOMY  05/12/2016    Recorded       Family History   Problem Relation Age of Onset   • No Known Problems Mother    • Heart attack Father    • Stroke Father    • No Known Problems Sister    • No Known Problems Daughter    • Cancer Maternal Grandmother         breast   • Breast cancer Maternal Grandmother    • Prostate cancer Neg Hx    • Colon cancer Neg Hx    • Alcohol abuse Neg Hx    • Drug abuse Neg Hx    • Diabetes Neg Hx    • Thyroid cancer Neg Hx        Social History     Socioeconomic History   • Marital status:    Tobacco Use   • Smoking status: Current Every Day Smoker     Packs/day: 0.50     Years: 25.00     Pack years: 12.50     Types: Cigarettes     Start date: 1997   • Smokeless tobacco: Never Used   Vaping Use   • Vaping Use: Never used   Substance and Sexual Activity   • Alcohol use: Not Currently   • Drug use: Not Currently     Types: Amphetamines, Methamphetamines, Benzodiazepines, Marijuana, Oxycodone   • Sexual activity: Yes     Partners: Female           Objective   Physical Exam  Vitals reviewed.   Constitutional:       General: He is not in acute distress.     Appearance: He is not diaphoretic.   HENT:      Head: Normocephalic and atraumatic.   Eyes:      Extraocular Movements: Extraocular movements intact.      Conjunctiva/sclera: Conjunctivae normal.   Cardiovascular:      Pulses: Normal pulses.      Heart sounds: Normal heart sounds.      Comments: Radial pulses are equal bilaterally.  Pulmonary:      Effort: Pulmonary effort is normal. No respiratory distress.   Abdominal:      General: Abdomen  is flat. There is no distension.   Musculoskeletal:      Cervical back: Normal range of motion and neck supple.      Right lower leg: No edema.      Left lower leg: No edema.   Skin:     General: Skin is warm and dry.   Neurological:      General: No focal deficit present.      Mental Status: He is alert. Mental status is at baseline.   Psychiatric:         Behavior: Behavior normal.         Thought Content: Thought content normal.         Procedures           ED Course                                           MDM   Ross Platt is a 37 y.o. male with PMH above who presents to the Emergency Department with left-sided chest pain.  Given his history of Marfan's there is concern for possible dissection.  Also concern for pneumothorax; he has generally quiet breath sounds however his chest x-ray to rule this out.  Consider as well ACS and PE; CT angio studies ordered.  Will give pain medicine. HEART score 4 (previous CVA, strong FHx CAD, smokes, and describes left sided pressure with nausea/vomiting/diaphoresis).     ED Course:   -EKG reviewed by me; shows sinus rhythm, no focal ischemic changes, no arrhythmia.  -chest x-ray reviewed by me. no focal consolidations, no pulmonary edema, mediastinum not widened.  -Laboratory studies reviewed by me and are notable for no acute focal abnormality.  -CTA and repeat troponin pending at time of handoff to Dr. Kaye.  Preliminary plan is for admission for stress test if this is negative and patient agrees to this.    Final diagnosis: Chest pain    All questions answered. Patient/family was understanding and in agreement with today's assessment and plan. The patient was monitored during their stay in the ED and dispositioned without acute event.    Electronically signed by:  Evan Haider MD 9/22/2022 17:57 CDT      Note: Dragon medical dictation software was used in the creation of this note.        Final diagnoses:   None       ED Disposition  ED Disposition     None           No follow-up provider specified.       Medication List      No changes were made to your prescriptions during this visit.          Evan Haider MD  09/22/22 1371

## 2022-09-23 LAB
QT INTERVAL: 382 MS
QTC INTERVAL: 418 MS

## 2022-10-10 RX ORDER — FLUTICASONE PROPIONATE AND SALMETEROL 250; 50 UG/1; UG/1
POWDER RESPIRATORY (INHALATION)
Qty: 60 EACH | Refills: 2 | OUTPATIENT
Start: 2022-10-10

## 2022-10-10 NOTE — TELEPHONE ENCOUNTER
Medication discontinued on 11/07/2021      Rx Refill Note  Requested Prescriptions     Pending Prescriptions Disp Refills   • Wixela Inhub 250-50 MCG/ACT DISKUS [Pharmacy Med Name: WIXELA INHUB DISKUS 250/50MCG 60S] 60 each 2     Sig: INHALE 1 PUFF BY MOUTH TWICE DAILY      Last office visit with prescribing clinician: 9/7/2022      Next office visit with prescribing clinician: Visit date not found            Eveline Reich LPN  10/10/22, 15:06 CDT

## 2022-10-10 NOTE — TELEPHONE ENCOUNTER
I have called patient to gather more information he would only advise that he has seen pain mgmt once and that he is currently having issues with his insurance and they are not able to fill the pain medication.    I did call [facility] I have left a vm regarding getting an update on this situation.    No

## 2022-10-13 ENCOUNTER — TELEPHONE (OUTPATIENT)
Dept: FAMILY MEDICINE CLINIC | Facility: CLINIC | Age: 37
End: 2022-10-13

## 2022-10-13 NOTE — TELEPHONE ENCOUNTER
Called to notify pt that if he thinks that he has monkeypox and is having symptoms that we recommend going to the ER. Pt has a video visit appointment with Cristy today and this was her recommendation. Pt voiced and understanding and stated that he will head up to the ER shortly.

## 2022-10-17 ENCOUNTER — APPOINTMENT (OUTPATIENT)
Dept: GENERAL RADIOLOGY | Facility: HOSPITAL | Age: 37
End: 2022-10-17

## 2022-10-17 ENCOUNTER — HOSPITAL ENCOUNTER (EMERGENCY)
Facility: HOSPITAL | Age: 37
Discharge: HOME | End: 2022-10-17
Attending: STUDENT IN AN ORGANIZED HEALTH CARE EDUCATION/TRAINING PROGRAM | Admitting: STUDENT IN AN ORGANIZED HEALTH CARE EDUCATION/TRAINING PROGRAM

## 2022-10-17 ENCOUNTER — APPOINTMENT (OUTPATIENT)
Dept: CT IMAGING | Facility: HOSPITAL | Age: 37
End: 2022-10-17

## 2022-10-17 VITALS
OXYGEN SATURATION: 98 % | DIASTOLIC BLOOD PRESSURE: 74 MMHG | SYSTOLIC BLOOD PRESSURE: 110 MMHG | WEIGHT: 184 LBS | HEART RATE: 71 BPM | BODY MASS INDEX: 21.73 KG/M2 | RESPIRATION RATE: 16 BRPM | HEIGHT: 77 IN | TEMPERATURE: 98.1 F

## 2022-10-17 DIAGNOSIS — J98.11 ATELECTASIS: Primary | ICD-10-CM

## 2022-10-17 LAB
ANION GAP SERPL CALCULATED.3IONS-SCNC: 8 MMOL/L (ref 5–15)
BASOPHILS # BLD AUTO: 0.04 10*3/MM3 (ref 0–0.2)
BASOPHILS NFR BLD AUTO: 0.6 % (ref 0–1.5)
BUN SERPL-MCNC: 20 MG/DL (ref 6–20)
BUN/CREAT SERPL: 20.8 (ref 7–25)
CALCIUM SPEC-SCNC: 9.5 MG/DL (ref 8.6–10.5)
CHLORIDE SERPL-SCNC: 100 MMOL/L (ref 98–107)
CO2 SERPL-SCNC: 29 MMOL/L (ref 22–29)
CREAT SERPL-MCNC: 0.96 MG/DL (ref 0.76–1.27)
DEPRECATED RDW RBC AUTO: 42.3 FL (ref 37–54)
EGFRCR SERPLBLD CKD-EPI 2021: 105.1 ML/MIN/1.73
EOSINOPHIL # BLD AUTO: 0.19 10*3/MM3 (ref 0–0.4)
EOSINOPHIL NFR BLD AUTO: 3.1 % (ref 0.3–6.2)
ERYTHROCYTE [DISTWIDTH] IN BLOOD BY AUTOMATED COUNT: 12.6 % (ref 12.3–15.4)
GLUCOSE SERPL-MCNC: 95 MG/DL (ref 65–99)
HCT VFR BLD AUTO: 45 % (ref 37.5–51)
HGB BLD-MCNC: 15.4 G/DL (ref 13–17.7)
IMM GRANULOCYTES # BLD AUTO: 0.04 10*3/MM3 (ref 0–0.05)
IMM GRANULOCYTES NFR BLD AUTO: 0.6 % (ref 0–0.5)
LYMPHOCYTES # BLD AUTO: 1.84 10*3/MM3 (ref 0.7–3.1)
LYMPHOCYTES NFR BLD AUTO: 29.7 % (ref 19.6–45.3)
MCH RBC QN AUTO: 31.5 PG (ref 26.6–33)
MCHC RBC AUTO-ENTMCNC: 34.2 G/DL (ref 31.5–35.7)
MCV RBC AUTO: 92 FL (ref 79–97)
MONOCYTES # BLD AUTO: 0.63 10*3/MM3 (ref 0.1–0.9)
MONOCYTES NFR BLD AUTO: 10.2 % (ref 5–12)
NEUTROPHILS NFR BLD AUTO: 3.45 10*3/MM3 (ref 1.7–7)
NEUTROPHILS NFR BLD AUTO: 55.8 % (ref 42.7–76)
NRBC BLD AUTO-RTO: 0 /100 WBC (ref 0–0.2)
PLATELET # BLD AUTO: 246 10*3/MM3 (ref 140–450)
PMV BLD AUTO: 9.6 FL (ref 6–12)
POTASSIUM SERPL-SCNC: 4.2 MMOL/L (ref 3.5–5.2)
RBC # BLD AUTO: 4.89 10*6/MM3 (ref 4.14–5.8)
SODIUM SERPL-SCNC: 137 MMOL/L (ref 136–145)
TROPONIN T SERPL-MCNC: <0.01 NG/ML (ref 0–0.03)
WBC NRBC COR # BLD: 6.19 10*3/MM3 (ref 3.4–10.8)
WHOLE BLOOD HOLD COAG: NORMAL

## 2022-10-17 PROCEDURE — 25010000002 KETOROLAC TROMETHAMINE PER 15 MG: Performed by: STUDENT IN AN ORGANIZED HEALTH CARE EDUCATION/TRAINING PROGRAM

## 2022-10-17 PROCEDURE — 85025 COMPLETE CBC W/AUTO DIFF WBC: CPT | Performed by: STUDENT IN AN ORGANIZED HEALTH CARE EDUCATION/TRAINING PROGRAM

## 2022-10-17 PROCEDURE — 96374 THER/PROPH/DIAG INJ IV PUSH: CPT

## 2022-10-17 PROCEDURE — 71250 CT THORAX DX C-: CPT

## 2022-10-17 PROCEDURE — 80048 BASIC METABOLIC PNL TOTAL CA: CPT | Performed by: STUDENT IN AN ORGANIZED HEALTH CARE EDUCATION/TRAINING PROGRAM

## 2022-10-17 PROCEDURE — 71046 X-RAY EXAM CHEST 2 VIEWS: CPT

## 2022-10-17 PROCEDURE — 93005 ELECTROCARDIOGRAM TRACING: CPT | Performed by: STUDENT IN AN ORGANIZED HEALTH CARE EDUCATION/TRAINING PROGRAM

## 2022-10-17 PROCEDURE — 84484 ASSAY OF TROPONIN QUANT: CPT | Performed by: STUDENT IN AN ORGANIZED HEALTH CARE EDUCATION/TRAINING PROGRAM

## 2022-10-17 PROCEDURE — 99284 EMERGENCY DEPT VISIT MOD MDM: CPT

## 2022-10-17 PROCEDURE — 93005 ELECTROCARDIOGRAM TRACING: CPT

## 2022-10-17 PROCEDURE — 93010 ELECTROCARDIOGRAM REPORT: CPT | Performed by: INTERNAL MEDICINE

## 2022-10-17 RX ORDER — AZITHROMYCIN 250 MG/1
TABLET, FILM COATED ORAL
Qty: 6 TABLET | Refills: 0 | Status: SHIPPED | OUTPATIENT
Start: 2022-10-17 | End: 2022-10-20 | Stop reason: ALTCHOICE

## 2022-10-17 RX ORDER — HYDROCODONE BITARTRATE AND ACETAMINOPHEN 5; 325 MG/1; MG/1
1 TABLET ORAL ONCE
Status: COMPLETED | OUTPATIENT
Start: 2022-10-17 | End: 2022-10-17

## 2022-10-17 RX ORDER — KETOROLAC TROMETHAMINE 15 MG/ML
15 INJECTION, SOLUTION INTRAMUSCULAR; INTRAVENOUS ONCE
Status: COMPLETED | OUTPATIENT
Start: 2022-10-17 | End: 2022-10-17

## 2022-10-17 RX ADMIN — HYDROCODONE BITARTRATE AND ACETAMINOPHEN 1 TABLET: 5; 325 TABLET ORAL at 20:14

## 2022-10-17 RX ADMIN — KETOROLAC TROMETHAMINE 15 MG: 15 INJECTION, SOLUTION INTRAMUSCULAR; INTRAVENOUS at 20:14

## 2022-10-18 LAB
QT INTERVAL: 360 MS
QTC INTERVAL: 399 MS

## 2022-10-18 NOTE — DISCHARGE INSTRUCTIONS
It was very nice to meet you, Ross. Thank you for allowing us to take care of you today at [facility].    Your work-up today did not show any emergent findings or emergent indications for admission to the hospital. Please understand that an ER evaluation is considered to be just the start of your evaluation. We will do what we can in one visit, but we are often unable to fully figure out what is causing your symptoms from one evaluation. Thus our primary goal is to determine whether you need to be evaluated in the hospital or if it is safe for you to go home and see other doctors such as a primary care physician or a specialist on an outpatient basis. A copy of your results should be included in your paperwork.     It is VERY IMPORTANT that you follow up (call them to set up an appointment) with your primary care doctor* within the next few days or as soon as possible so that you can be re-evaluated for improvement in your symptoms or for any other questions. If you were prescribed any medications, please take them as directed or call us back with any questions.     Please return to the emergency room within 12-48 hours if you experience fever, chills, chest pain or shortness of breath, pain with inspiration/expiration, pain that travels to your arms, neck or back, nausea, vomiting, severe headache, tearing pain in your chest, dizziness, feel as though you are about to pass out, have any worsening symptoms, or any other concerns.

## 2022-10-19 NOTE — ED PROVIDER NOTES
Subjective   History of Present Illness  Patient states that he has a history of Marfan syndrome and he had part of his lung removed about 8 years ago.  States that earlier today started having some pain in his left side and around his ribs whenever he took a deep breath.  He states that he feels as if he has a broken rib but it does not have any injuries recently.  Denies any shortness of breath, numbness, tingling, abdominal pain, dysuria, hematuria or hemoptysis        Review of Systems   Constitutional: Negative.    HENT: Negative.    Eyes: Negative.    Respiratory: Negative.    Cardiovascular: Negative.    Gastrointestinal: Negative.    Endocrine: Negative.    Genitourinary: Negative.    Musculoskeletal: Negative.    Skin: Negative.    Allergic/Immunologic: Negative.    Neurological: Negative.    Hematological: Negative.    Psychiatric/Behavioral: Negative.    All other systems reviewed and are negative.      Past Medical History:   Diagnosis Date   • Back pain    • Chest pain    • Claustrophobia    • COPD (chronic obstructive pulmonary disease) (HCC)    • Emphysema of lung (HCC)    • Lung disease    • Marfan syndrome    • Pneumothorax    • Seizures (HCC)     when he was a child    • Seizures (HCC)    • Smoking     3/4 pack a day   • Tobacco abuse    • Underweight        Allergies   Allergen Reactions   • Calcium Channel Blockers Other (See Comments)     Do not use in marfan   • Ciprofloxacin Other (See Comments)     Do not use fluoroquinolones in marfan   • Medrol [Methylprednisolone] Other (See Comments)     Patient states he gets violent on this medication    • Diclofenac Irritability       Past Surgical History:   Procedure Laterality Date   • HERNIA REPAIR     • LUNG LOBECTOMY Right 02/01/2017   • LUNG REMOVAL, TOTAL Right 2015   • THORACOSCOPY N/A 2/3/2017    Procedure: BRONCHOSCOPY, THORACOSCOPY RIGHT CHEST,  WEDGE RESECTION RIGHT UPPER AND LOWER LOBE OF LUNG, MECHANICAL PLEURODESIS;  Surgeon: Kyle ABBOTT  MD Kumar;  Location: [facility] OR;  Service:    • TYMPANOSTOMY  05/12/2016    Recorded       Family History   Problem Relation Age of Onset   • No Known Problems Mother    • Heart attack Father    • Stroke Father    • No Known Problems Sister    • No Known Problems Daughter    • Cancer Maternal Grandmother         breast   • Breast cancer Maternal Grandmother    • Prostate cancer Neg Hx    • Colon cancer Neg Hx    • Alcohol abuse Neg Hx    • Drug abuse Neg Hx    • Diabetes Neg Hx    • Thyroid cancer Neg Hx        Social History     Socioeconomic History   • Marital status:    Tobacco Use   • Smoking status: Every Day     Packs/day: 0.50     Years: 25.00     Pack years: 12.50     Types: Cigarettes     Start date: 1997   • Smokeless tobacco: Never   Vaping Use   • Vaping Use: Never used   Substance and Sexual Activity   • Alcohol use: Not Currently   • Drug use: Not Currently     Types: Amphetamines, Methamphetamines, Benzodiazepines, Marijuana, Oxycodone   • Sexual activity: Yes     Partners: Female           Objective   Physical Exam  Vitals and nursing note reviewed.   Constitutional:       General: He is not in acute distress.     Appearance: Normal appearance. He is not toxic-appearing or diaphoretic.   HENT:      Head: Normocephalic and atraumatic.      Nose: Nose normal.   Eyes:      General:         Right eye: No discharge.         Left eye: No discharge.      Extraocular Movements: Extraocular movements intact.      Conjunctiva/sclera: Conjunctivae normal.   Cardiovascular:      Rate and Rhythm: Normal rate.   Pulmonary:      Effort: Pulmonary effort is normal. No respiratory distress.      Breath sounds: No rhonchi.   Abdominal:      General: Abdomen is flat.   Musculoskeletal:         General: Normal range of motion.      Cervical back: Normal range of motion.   Skin:     General: Skin is warm.   Neurological:      General: No focal deficit present.      Mental Status: He is alert and oriented to  person, place, and time. Mental status is at baseline.   Psychiatric:         Mood and Affect: Mood normal.         Behavior: Behavior normal.         Thought Content: Thought content normal.         Judgment: Judgment normal.         Procedures           ED Course                                           MDM  Number of Diagnoses or Management Options  Atelectasis  Diagnosis management comments: This is a 37yoM presenting with chest pain. Patient arrived hemodynamically stable and was afebrile. Patient was placed on the monitor and IV access was established.     Patient has received a total 324mg of aspirin since the onset of chest pain.     Differentials include, but are not limited to ACS, PE, musculoskeletal pain, infection. HEART score is 1. Plan to obtain cbc, cmp, ekg, control pain, and reassess. EKG was obtained and did not reveal any malignant/unstable dysrhythmia or any acute ST elevations.     Presentation not consistent with other acute, emergent causes of chest pain at this time. Low suspicion for pneumothorax as the patient is saturating well and has no radiographic evidence of a pneumothorax. Low suspicion for dissection there is no widened mediastinum, hypotension, pulse deficits, and no tearing back/abdominal pain. Low suspicion for tamponade as there is no JVD, muffled heart sounds, electrical alternans on EKG, and no hypotension. Low suspicion for pericarditis as there is no diffuse ST elevation or VT depression and the patient is afebrile. Low suspicion for myocarditis as there is no persistent tachycardia, recent viral illness, hypotension, or evidence of LVH. Low suspicion for acute PE as low risk per WELLS criteria and no evidence of DVT such as calf swelling, tenderness, palpable tortuous lower extremity vein, or dann's sign.     The workup was reviewed and found to have evidence of mild atelectasis. I reassessed the patient and discussed the findings of the work up so far. I told him that  if his symptoms worsen or change he has to come back. I answered all his questions regarding the emergency department evaluation, diagnosis, and treatment plan in plain and simple language that he was able to understand.     He voiced agreement with the plan of care so far and had no further questions. I told him that there is always some diagnostic uncertainty in the ER and that his work up, physical exam, and even his current presentation may not always reveal other underlying conditions. I also went over the fact that his condition may change or show itself after being discharged. He expressed understanding and agreed that there are reasonable limitations with the practice of emergency medicine.    I gave him return precautions and told him to return to the emergency department within 24 - 48hrs if he has any new, worsening, or concerning symptoms.     I told him that it is VERY IMPORTANT that he follows up (by calling to set up an appointment) with his primary care doctor within the next few days or as soon as reasonably possible so that he can be re-evaluated for improvement in his symptoms or for any other questions. He verbalized understanding of these instructions.     He was discharged in stable condition and was observed ambulating out of the ER.             Amount and/or Complexity of Data Reviewed  Clinical lab tests: reviewed and ordered  Tests in the radiology section of CPT®: reviewed and ordered  Tests in the medicine section of CPT®: reviewed        Final diagnoses:   Atelectasis       ED Disposition  ED Disposition     ED Disposition   Discharge    Condition   Stable    Comment   --             Cristy Schmidt, JANESSA  [address] [id number] [phone]    Call in 1 day  As needed, If symptoms worsen         Medication List      New Prescriptions    azithromycin 250 MG tablet  Commonly known as: Zithromax Z-Mariano  Take 2 tablets by mouth on day 1, then 1 tablet daily on days 2-5            Where to Get Your Medications      These medications were sent to [id number] - [place], [place] - [address] AT [facility] [address] - [phone]  - [phone] FX  [address], [place] [address]    Phone: [phone]   · azithromycin 250 MG tablet          Rowan Segura MD  10/19/22 0515

## 2022-10-20 ENCOUNTER — OFFICE VISIT (OUTPATIENT)
Dept: FAMILY MEDICINE CLINIC | Facility: CLINIC | Age: 37
End: 2022-10-20

## 2022-10-20 VITALS
BODY MASS INDEX: 21.37 KG/M2 | DIASTOLIC BLOOD PRESSURE: 66 MMHG | SYSTOLIC BLOOD PRESSURE: 96 MMHG | WEIGHT: 181 LBS | RESPIRATION RATE: 18 BRPM | HEART RATE: 84 BPM | HEIGHT: 77 IN | TEMPERATURE: 98.2 F | OXYGEN SATURATION: 99 %

## 2022-10-20 DIAGNOSIS — J98.11 ATELECTASIS, LEFT: ICD-10-CM

## 2022-10-20 DIAGNOSIS — R07.89 LEFT-SIDED CHEST WALL PAIN: Primary | ICD-10-CM

## 2022-10-20 PROCEDURE — 96372 THER/PROPH/DIAG INJ SC/IM: CPT | Performed by: NURSE PRACTITIONER

## 2022-10-20 PROCEDURE — 99213 OFFICE O/P EST LOW 20 MIN: CPT | Performed by: NURSE PRACTITIONER

## 2022-10-20 RX ORDER — NAPROXEN 500 MG/1
500 TABLET ORAL 2 TIMES DAILY PRN
Qty: 60 TABLET | Refills: 2 | Status: SHIPPED | OUTPATIENT
Start: 2022-10-20 | End: 2023-02-28

## 2022-10-20 RX ORDER — AMOXICILLIN AND CLAVULANATE POTASSIUM 875; 125 MG/1; MG/1
1 TABLET, FILM COATED ORAL 2 TIMES DAILY
Qty: 14 TABLET | Refills: 0 | Status: SHIPPED | OUTPATIENT
Start: 2022-10-20 | End: 2022-10-27

## 2022-10-20 RX ORDER — DEXAMETHASONE SODIUM PHOSPHATE 10 MG/ML
10 INJECTION INTRAMUSCULAR; INTRAVENOUS ONCE
Status: COMPLETED | OUTPATIENT
Start: 2022-10-20 | End: 2022-10-20

## 2022-10-20 RX ADMIN — DEXAMETHASONE SODIUM PHOSPHATE 10 MG: 10 INJECTION INTRAMUSCULAR; INTRAVENOUS at 14:15

## 2022-10-20 NOTE — PROGRESS NOTES
"Chief Complaint  ER follow up (Patient seen at ER on 10/17 for Atelectasis. He reports discomfort is not improving. )    Subjective        Ross Platt presents to [facility]  History of Present Illness  Here for left chest wall pain, lower antieror rib area. Reports feels like cracked rib but had imaging at er visit on 10/17/22 but no ribs were abnormal. However imaiging did shwo atelectasis in the left lung base. He was treated with azithormycin and reports this is not helping. He is still hurting. He was also sent muscle relaxers at that time.   He is concerned that incision from mole removal is infected that he had done a few weeks ago.   No fevers or worsening shortness of breath.     Objective   Vital Signs:  BP 96/66 (BP Location: Left arm, Patient Position: Sitting, Cuff Size: Large Adult)   Pulse 84   Temp 98.2 °F (36.8 °C) (Infrared)   Resp 18   Ht 195.6 cm (77\") Comment: per patient  Wt 82.1 kg (181 lb)   SpO2 99%   BMI 21.46 kg/m²   Estimated body mass index is 21.46 kg/m² as calculated from the following:    Height as of this encounter: 195.6 cm (77\").    Weight as of this encounter: 82.1 kg (181 lb).    BMI is within normal parameters. No other follow-up for BMI required.      Physical Exam  Vitals and nursing note reviewed.   Constitutional:       Appearance: He is well-developed.   HENT:      Head: Normocephalic and atraumatic.   Eyes:      Conjunctiva/sclera: Conjunctivae normal.   Cardiovascular:      Rate and Rhythm: Normal rate and regular rhythm.   Pulmonary:      Effort: Pulmonary effort is normal.      Breath sounds: Rhonchi present.   Chest:      Chest wall: Tenderness present.          Comments: Tenderness where noted  Musculoskeletal:      Cervical back: Normal range of motion.   Skin:     Findings: Wound present.             Comments: Infected appearing wound from excision. Mild erythema surrounding.    Neurological:      General: No focal " deficit present.      Mental Status: He is alert and oriented to person, place, and time.   Psychiatric:         Mood and Affect: Mood normal.         Behavior: Behavior normal.        Result Review :                Assessment and Plan   Diagnoses and all orders for this visit:    1. Left-sided chest wall pain (Primary)  -     dexamethasone (DECADRON) injection 10 mg    2. Atelectasis, left  -     XR Chest 2 View; Future    Other orders  -     amoxicillin-clavulanate (AUGMENTIN) 875-125 MG per tablet; Take 1 tablet by mouth 2 (Two) Times a Day for 7 days.  Dispense: 14 tablet; Refill: 0  -     naproxen (NAPROSYN) 500 MG tablet; Take 1 tablet by mouth 2 (Two) Times a Day As Needed for Moderate Pain.  Dispense: 60 tablet; Refill: 2      Decadron 10 mg im in office  Stop azithromycin and start augmentin  Refill naproxen  Continue muscle relaxer  No narcotics d/t previous abnormal uds  Repeat cxr in 1 month           Follow Up   Return in about 1 month (around 11/20/2022) for repeat cxr.  Patient was given instructions and counseling regarding his condition or for health maintenance advice. Please see specific information pulled into the AVS if appropriate.

## 2022-10-24 ENCOUNTER — HOSPITAL ENCOUNTER (EMERGENCY)
Age: 37
Discharge: LEFT BEFORE DISCHARGE | End: 2022-10-25
Attending: EMERGENCY MEDICINE
Payer: MEDICAID

## 2022-10-24 ENCOUNTER — APPOINTMENT (OUTPATIENT)
Dept: CT IMAGING | Age: 37
End: 2022-10-24
Payer: MEDICAID

## 2022-10-24 VITALS
RESPIRATION RATE: 18 BRPM | DIASTOLIC BLOOD PRESSURE: 75 MMHG | HEART RATE: 108 BPM | OXYGEN SATURATION: 97 % | SYSTOLIC BLOOD PRESSURE: 111 MMHG | TEMPERATURE: 97.7 F

## 2022-10-24 DIAGNOSIS — M25.562 ACUTE PAIN OF LEFT KNEE: Primary | ICD-10-CM

## 2022-10-24 LAB
ANION GAP SERPL CALCULATED.3IONS-SCNC: 9 MMOL/L (ref 7–19)
BUN BLDV-MCNC: 31 MG/DL (ref 6–20)
CALCIUM SERPL-MCNC: 8.8 MG/DL (ref 8.6–10)
CHLORIDE BLD-SCNC: 102 MMOL/L (ref 98–111)
CO2: 25 MMOL/L (ref 22–29)
CREAT SERPL-MCNC: 1.1 MG/DL (ref 0.5–1.2)
GFR SERPL CREATININE-BSD FRML MDRD: >60 ML/MIN/{1.73_M2}
GLUCOSE BLD-MCNC: 103 MG/DL (ref 74–109)
POTASSIUM SERPL-SCNC: 3.7 MMOL/L (ref 3.5–5)
SODIUM BLD-SCNC: 136 MMOL/L (ref 136–145)

## 2022-10-24 PROCEDURE — 96374 THER/PROPH/DIAG INJ IV PUSH: CPT

## 2022-10-24 PROCEDURE — 90471 IMMUNIZATION ADMIN: CPT | Performed by: PHYSICIAN ASSISTANT

## 2022-10-24 PROCEDURE — 90715 TDAP VACCINE 7 YRS/> IM: CPT | Performed by: PHYSICIAN ASSISTANT

## 2022-10-24 PROCEDURE — 99285 EMERGENCY DEPT VISIT HI MDM: CPT

## 2022-10-24 PROCEDURE — 6360000002 HC RX W HCPCS: Performed by: PHYSICIAN ASSISTANT

## 2022-10-24 PROCEDURE — 73706 CT ANGIO LWR EXTR W/O&W/DYE: CPT

## 2022-10-24 RX ORDER — MORPHINE SULFATE 4 MG/ML
4 INJECTION, SOLUTION INTRAMUSCULAR; INTRAVENOUS ONCE
Status: COMPLETED | OUTPATIENT
Start: 2022-10-24 | End: 2022-10-24

## 2022-10-24 RX ADMIN — TETANUS TOXOID, REDUCED DIPHTHERIA TOXOID AND ACELLULAR PERTUSSIS VACCINE, ADSORBED 0.5 ML: 5; 2.5; 8; 8; 2.5 SUSPENSION INTRAMUSCULAR at 23:28

## 2022-10-24 RX ADMIN — MORPHINE SULFATE 4 MG: 4 INJECTION, SOLUTION INTRAMUSCULAR; INTRAVENOUS at 23:28

## 2022-10-24 ASSESSMENT — PAIN - FUNCTIONAL ASSESSMENT: PAIN_FUNCTIONAL_ASSESSMENT: 0-10

## 2022-10-24 ASSESSMENT — PAIN SCALES - GENERAL: PAINLEVEL_OUTOF10: 8

## 2022-10-24 NOTE — LETTER
[facility]  [address]  Phone: [phone]    Patient: Scott Rodriguez  YOB: 1985  Date: 10/25/2022 Time: 1:38 AM    Leaving the [address] [place] [address] Advice    Chart #:[id number]    This will certify that I, the undersigned,    ______________________________________________________________________    A patient in the above named medical center, having requested discharge and removal from the medical center against the advice of my attending physician(s), hereby release the Emergency Department, its physicians, officers and employees, severally and individually, from any and all liability of any nature whatsoever for any injury or harm or complication of any kind that may result directly or indirectly, by reason of my terminating my stay as a patient from [facility], and hereby waive any and all rights of action I may now have or later acquire as a result of my voluntary departure from [facility] and the termination of my stay as a patient therein. This release is made with the full knowledge of the danger that may result from the action which I am taking.       Date:_______________________                         ___________________________                                                                                    Patient/Legal Representative    Witness:        ____________________________                          ___________________________  Nurse                                                                        Physician

## 2022-10-24 NOTE — LETTER
[facility]  [place] [address]  Phone: [phone]    Patient: Kaye Meléndez  YOB: 1985  Date: 10/25/2022 Time: 1:39 AM    Leaving the [address] [place] [address] Advice    Chart #:[id number]    This will certify that I, the undersigned,    ______________________________________________________________________    A patient in the above named medical center, having requested discharge and removal from the medical center against the advice of my attending physician(s), hereby release the Emergency Department, its physicians, officers and employees, severally and individually, from any and all liability of any nature whatsoever for any injury or harm or complication of any kind that may result directly or indirectly, by reason of my terminating my stay as a patient from [facility], and hereby waive any and all rights of action I may now have or later acquire as a result of my voluntary departure from [facility] and the termination of my stay as a patient therein. This release is made with the full knowledge of the danger that may result from the action which I am taking.       Date:_______________________                         ___________________________                                                                                    Patient/Legal Representative    Witness:        ____________________________                          ___________________________  Nurse                                                                        Physician

## 2022-10-25 PROCEDURE — 6360000004 HC RX CONTRAST MEDICATION: Performed by: PHYSICIAN ASSISTANT

## 2022-10-25 PROCEDURE — 36415 COLL VENOUS BLD VENIPUNCTURE: CPT

## 2022-10-25 PROCEDURE — 80048 BASIC METABOLIC PNL TOTAL CA: CPT

## 2022-10-25 RX ADMIN — IOPAMIDOL 70 ML: 755 INJECTION, SOLUTION INTRAVENOUS at 00:22

## 2022-10-25 NOTE — ED PROVIDER NOTES
Attending Supervisory Note/Shared Visit   I have personally performed a face to face diagnostic evaluation on this patient. I have reviewed the mid-levels findings and agree. D/w [place] to follow up CTA but low suspicion clinically overall of true knee dislocation probably was patella. Pt now not wanting to wait on cta read so he can leave and smoke. Wants to sign AMA. Crutches/knee immobilizer and NWB. Pt ambulated out of ER in NAD bearing full weight despite recommendations. FINAL IMPRESSION      1.  Acute pain of left knee          Zac Altamirano MD  Attending Emergency Physician       Mariann Parker MD  10/25/22 3875

## 2022-10-25 NOTE — ED NOTES
Patient states he is ready to leave due to pain and \"I need nicotine. \"   Patient complaining about his wait time for CT results. Dr. Keith Pratt updated.        Grace Nails RN  10/25/22 3830

## 2022-10-25 NOTE — ED PROVIDER NOTES
[facility]  eMERGENCY dEPARTMENT eNCOUnter      Pt Name: Dusty Hare  MRN: [id number]  [place] 1985  Date of evaluation: 10/24/2022  Provider: Bernard Nina Degree       Chief Complaint   Patient presents with    Knee Pain     Knee dislocated last night and pt fell into jennyfer barbed wire fence, TDAP not UTD, abrasions to legs and perineal area         HISTORY OF PRESENT ILLNESS   (Location/Symptom, Timing/Onset,Context/Setting, Quality, Duration, Modifying Factors, Severity)  Note limiting factors. Dusty Hare is a 40 y.o. male with history including Marfan's, pneumothorax, COPD, and NSTEMI who presents to the emergency department with complaint of left knee pain. The patient states that he was running last night and fell into a barbed wire fence. He notes that his left knee was dislocated. He states that during his fall the leg went fully sideways and then went back straight. He states at 1 point his kneecap was also displaced but returned. He notes that he has pain as well as that the leg feels unstable with weightbearing. His tetanus is not up-to-date and he is concerned as the fence was jennyfer and he has multiple superficial scratches. He states the pain from his knee does radiate into his calf as well as up to the thigh. He denies any numbness. NursingNotes were reviewed. REVIEW OF SYSTEMS    (2-9 systems for level 4, 10 or more for level 5)     Review of Systems   Musculoskeletal:  Positive for arthralgias, gait problem and joint swelling. Skin:  Positive for wound. Neurological:  Negative for numbness. All other systems reviewed and are negative.          PAST MEDICALHISTORY     Past Medical History:   Diagnosis Date    COPD (chronic obstructive pulmonary disease) ([facility]     Emphysema lung ([facility]     Marfan's disease     Pneumothorax          SURGICAL HISTORY       Past Surgical History:   Procedure Laterality Date    HERNIA REPAIR      LUNG REMOVAL, PARTIAL Right          CURRENT MEDICATIONS     Previous Medications    ALBUTEROL SULFATE  (90 BASE) MCG/ACT INHALER    Inhale 2 puffs into the lungs every 4 hours as needed    BEVESPI AEROSPHERE 9-4.8 MCG/ACT AERO    INHALE 2 SPRAYS BY MOUTH 2 TIMES DAILY FOR 90 DAYS    DICLOFENAC (VOLTAREN) 75 MG EC TABLET        FLUTICASONE (FLONASE) 50 MCG/ACT NASAL SPRAY        FLUTICASONE-UMECLIDIN-VILANT (TRELEGY ELLIPTA) 200-62.5-25 MCG/INH AEPB    Inhale 1 puff into the lungs Daily    ISOSORBIDE MONONITRATE (IMDUR) 30 MG EXTENDED RELEASE TABLET    TAKE 1 TABLET BY MOUTH EVERY MORNING    LORATADINE (CLARITIN) 10 MG TABLET        NAPROXEN (NAPROSYN) 500 MG TABLET           ALLERGIES     Calcium channel blockers, Ciprofloxacin, and Methylprednisolone    FAMILY HISTORY     History reviewed. No pertinent family history. SOCIAL HISTORY       Social History     Socioeconomic History    Marital status:      Spouse name: None    Number of children: None    Years of education: None    Highest education level: None   Tobacco Use    Smoking status: Every Day     Packs/day: 0.50     Years: 10.00     Pack years: 5.00     Types: Cigarettes    Smokeless tobacco: Never   Vaping Use    Vaping Use: Never used   Substance and Sexual Activity    Alcohol use: No    Drug use: No       SCREENINGS    Dany Coma Scale  Eye Opening: Spontaneous  Best Verbal Response: Oriented  Best Motor Response: Obeys commands  Dany Coma Scale Score: 15        PHYSICAL EXAM    (up to 7 for level 4, 8 or more for level 5)     ED Triage Vitals   BP Temp Temp Source Heart Rate Resp SpO2 Height Weight   10/24/22 2143 10/24/22 2141 10/24/22 2141 10/24/22 2141 10/24/22 2141 10/24/22 2141 -- --   111/75 97.7 °F (36.5 °C) Temporal (!) 108 18 97 %         Physical Exam  Vitals and nursing note reviewed. Constitutional:       General: He is not in acute distress. Appearance: Normal appearance. He is not ill-appearing, toxic-appearing or diaphoretic. HENT:      Head: Normocephalic and atraumatic. Cardiovascular:      Rate and Rhythm: Normal rate and regular rhythm. Pulses: Normal pulses. Pulmonary:      Effort: Pulmonary effort is normal. No respiratory distress. Musculoskeletal:      Left hip: No deformity, tenderness or bony tenderness. Normal range of motion. Normal strength. Left upper leg: Tenderness present. No swelling, deformity or bony tenderness. Left knee: Swelling, effusion and bony tenderness present. No deformity, erythema or ecchymosis. Normal range of motion. Tenderness present. Normal alignment. Normal pulse. Left lower leg: Tenderness present. No swelling, deformity or bony tenderness. Left ankle: No swelling or deformity. No tenderness. Normal range of motion. Normal pulse. Left foot: Normal range of motion and normal capillary refill. No swelling, deformity, tenderness or bony tenderness. Normal pulse. Comments: Antalgic gait, there are multiple small superficial wounds from the gabriel wire. No wounds were warrant repair. No surrounding redness. These wounds are tender to touch. No obvious foreign body noted. Skin:     General: Skin is warm and dry. Neurological:      General: No focal deficit present. Mental Status: He is alert and oriented to person, place, and time. DIAGNOSTIC RESULTS     RADIOLOGY:  Non-plain film images such as CT, Ultrasound and MRI are read by the radiologist. Plain radiographic images are visualized and preliminarily interpreted bythe emergency physician with the below findings:      CTA LOWER EXTREMITY LEFT [address]    (Results Pending)           LABS:  Labs Reviewed   BASIC METABOLIC PANEL - Abnormal; Notable for the following components:       Result Value    BUN 31 (*)     All other components within normal limits       All other labs were within normal range or not returned as of this dictation.     EMERGENCY DEPARTMENT COURSE and DIFFERENTIAL DIAGNOSIS/MDM:   Vitals:    Vitals:    10/24/22 2141 10/24/22 2143   BP:  111/75   Pulse: (!) 108    Resp: 18    Temp: 97.7 °F (36.5 °C)    TempSrc: Temporal    SpO2: 97%        MDM  Patient is a 41-year male presents the ER with complaint of left knee pain after a fall onto gabriel wire last night. He is concerned about a dislocation. The knee is currently in correct alignment at this time and does have normal but painful range of motion. There is some mild swelling as well as tenderness of the knee. Due to the concern for dislocation, CT of the knee has been obtained. It is pending radiology review. Due to shift change, the patient's care will be followed by Dr. Deanna Stein. He was brought up-to-date on his tetanus shot. On reevaluation, the patient's pain is already improved with medication. FINAL IMPRESSION      1.  Acute pain of left knee          DISPOSITION/PLAN   DISPOSITION  Pending final workup        (Please note that portions of this note were completed with a voice recognition program.  Efforts were made to edit thedictations but occasionally words are mis-transcribed.)    SACHI Garcia (electronically signed)        Jones Garcia  10/25/22 0106

## 2022-12-27 ENCOUNTER — HOSPITAL ENCOUNTER (EMERGENCY)
Facility: HOSPITAL | Age: 37
Discharge: HOME | End: 2022-12-27
Attending: STUDENT IN AN ORGANIZED HEALTH CARE EDUCATION/TRAINING PROGRAM | Admitting: STUDENT IN AN ORGANIZED HEALTH CARE EDUCATION/TRAINING PROGRAM

## 2022-12-27 ENCOUNTER — APPOINTMENT (OUTPATIENT)
Dept: CT IMAGING | Facility: HOSPITAL | Age: 37
End: 2022-12-27

## 2022-12-27 ENCOUNTER — APPOINTMENT (OUTPATIENT)
Dept: GENERAL RADIOLOGY | Facility: HOSPITAL | Age: 37
End: 2022-12-27

## 2022-12-27 VITALS
SYSTOLIC BLOOD PRESSURE: 110 MMHG | OXYGEN SATURATION: 94 % | HEART RATE: 86 BPM | RESPIRATION RATE: 20 BRPM | BODY MASS INDEX: 21.73 KG/M2 | WEIGHT: 184 LBS | HEIGHT: 77 IN | DIASTOLIC BLOOD PRESSURE: 72 MMHG | TEMPERATURE: 98 F

## 2022-12-27 DIAGNOSIS — Q87.40 MARFAN SYNDROME: ICD-10-CM

## 2022-12-27 DIAGNOSIS — R07.9 ACUTE CHEST PAIN: Primary | ICD-10-CM

## 2022-12-27 LAB
ANION GAP SERPL CALCULATED.3IONS-SCNC: 10 MMOL/L (ref 5–15)
BASOPHILS # BLD AUTO: 0.06 10*3/MM3 (ref 0–0.2)
BASOPHILS NFR BLD AUTO: 0.7 % (ref 0–1.5)
BUN SERPL-MCNC: 16 MG/DL (ref 6–20)
BUN/CREAT SERPL: 15.8 (ref 7–25)
CALCIUM SPEC-SCNC: 9.3 MG/DL (ref 8.6–10.5)
CHLORIDE SERPL-SCNC: 106 MMOL/L (ref 98–107)
CO2 SERPL-SCNC: 26 MMOL/L (ref 22–29)
CREAT SERPL-MCNC: 1.01 MG/DL (ref 0.76–1.27)
DEPRECATED RDW RBC AUTO: 42.9 FL (ref 37–54)
EGFRCR SERPLBLD CKD-EPI 2021: 98.8 ML/MIN/1.73
EOSINOPHIL # BLD AUTO: 0.2 10*3/MM3 (ref 0–0.4)
EOSINOPHIL NFR BLD AUTO: 2.3 % (ref 0.3–6.2)
ERYTHROCYTE [DISTWIDTH] IN BLOOD BY AUTOMATED COUNT: 12.6 % (ref 12.3–15.4)
GLUCOSE SERPL-MCNC: 99 MG/DL (ref 65–99)
HCT VFR BLD AUTO: 44.7 % (ref 37.5–51)
HGB BLD-MCNC: 14.7 G/DL (ref 13–17.7)
IMM GRANULOCYTES # BLD AUTO: 0.07 10*3/MM3 (ref 0–0.05)
IMM GRANULOCYTES NFR BLD AUTO: 0.8 % (ref 0–0.5)
LYMPHOCYTES # BLD AUTO: 2.06 10*3/MM3 (ref 0.7–3.1)
LYMPHOCYTES NFR BLD AUTO: 24.1 % (ref 19.6–45.3)
MCH RBC QN AUTO: 30.2 PG (ref 26.6–33)
MCHC RBC AUTO-ENTMCNC: 32.9 G/DL (ref 31.5–35.7)
MCV RBC AUTO: 92 FL (ref 79–97)
MONOCYTES # BLD AUTO: 0.8 10*3/MM3 (ref 0.1–0.9)
MONOCYTES NFR BLD AUTO: 9.4 % (ref 5–12)
NEUTROPHILS NFR BLD AUTO: 5.35 10*3/MM3 (ref 1.7–7)
NEUTROPHILS NFR BLD AUTO: 62.7 % (ref 42.7–76)
NRBC BLD AUTO-RTO: 0 /100 WBC (ref 0–0.2)
PLATELET # BLD AUTO: 248 10*3/MM3 (ref 140–450)
PMV BLD AUTO: 9.4 FL (ref 6–12)
POTASSIUM SERPL-SCNC: 3.9 MMOL/L (ref 3.5–5.2)
RBC # BLD AUTO: 4.86 10*6/MM3 (ref 4.14–5.8)
SODIUM SERPL-SCNC: 142 MMOL/L (ref 136–145)
TROPONIN T SERPL-MCNC: <0.01 NG/ML (ref 0–0.03)
WBC NRBC COR # BLD: 8.54 10*3/MM3 (ref 3.4–10.8)

## 2022-12-27 PROCEDURE — 80048 BASIC METABOLIC PNL TOTAL CA: CPT | Performed by: STUDENT IN AN ORGANIZED HEALTH CARE EDUCATION/TRAINING PROGRAM

## 2022-12-27 PROCEDURE — 99283 EMERGENCY DEPT VISIT LOW MDM: CPT

## 2022-12-27 PROCEDURE — 84484 ASSAY OF TROPONIN QUANT: CPT | Performed by: STUDENT IN AN ORGANIZED HEALTH CARE EDUCATION/TRAINING PROGRAM

## 2022-12-27 PROCEDURE — 71045 X-RAY EXAM CHEST 1 VIEW: CPT

## 2022-12-27 PROCEDURE — 93005 ELECTROCARDIOGRAM TRACING: CPT | Performed by: STUDENT IN AN ORGANIZED HEALTH CARE EDUCATION/TRAINING PROGRAM

## 2022-12-27 PROCEDURE — 93010 ELECTROCARDIOGRAM REPORT: CPT | Performed by: INTERNAL MEDICINE

## 2022-12-27 PROCEDURE — 93005 ELECTROCARDIOGRAM TRACING: CPT

## 2022-12-27 PROCEDURE — 0 IOPAMIDOL PER 1 ML: Performed by: STUDENT IN AN ORGANIZED HEALTH CARE EDUCATION/TRAINING PROGRAM

## 2022-12-27 PROCEDURE — 85025 COMPLETE CBC W/AUTO DIFF WBC: CPT | Performed by: STUDENT IN AN ORGANIZED HEALTH CARE EDUCATION/TRAINING PROGRAM

## 2022-12-27 PROCEDURE — 71275 CT ANGIOGRAPHY CHEST: CPT

## 2022-12-27 RX ORDER — SODIUM CHLORIDE 0.9 % (FLUSH) 0.9 %
10 SYRINGE (ML) INJECTION AS NEEDED
Status: DISCONTINUED | OUTPATIENT
Start: 2022-12-27 | End: 2022-12-27 | Stop reason: HOSPADM

## 2022-12-27 RX ORDER — ACETAMINOPHEN 500 MG
1000 TABLET ORAL ONCE
Status: DISCONTINUED | OUTPATIENT
Start: 2022-12-27 | End: 2022-12-27 | Stop reason: HOSPADM

## 2022-12-27 RX ADMIN — IOPAMIDOL 100 ML: 755 INJECTION, SOLUTION INTRAVENOUS at 21:07

## 2022-12-28 LAB
QT INTERVAL: 328 MS
QTC INTERVAL: 429 MS

## 2022-12-28 NOTE — DISCHARGE INSTRUCTIONS
Today you are seen for your chest pain your work-up was reassuring.  I discussed with Marfan syndrome you are always at high risk for an aortic dissection we do not have any events today.  Please follow-up with cardiology closely as an outpatient.  If your symptoms worsen prior please return the emergency department immediately.

## 2022-12-28 NOTE — ED NOTES
"Patient becomes upset because Tylenol is ordered for pain. On the phone while this RN is at bedside and he tells someone \"theyre giving me tylenol for fucking pain.  Im having a heart attack and theyre giving me tylenol. I took tylenol 45 minutes before coming here but theyre giving it to me again. I knew I should have gone to [facility].\" This RN educated patient on is EKG being negative, and his labs so far are negative and that we are waiting on his CT results to makes sure there is nothing on there but for right now, it does not look to be cardiac in nature.  I also tell him that if he took tylenol prior to arrival that we do not need to give it to him again.  I also tell him that I will let the Dr know about his pain.  Dr Vinson notified with no new orders  "

## 2022-12-28 NOTE — ED PROVIDER NOTES
EMERGENCY DEPARTMENT HISTORY AND PHYSICAL EXAM    Patient Name: Ross Platt    Chief Complaint   Patient presents with   • Chest Pain       History of Presenting Illness:  Ross Platt is a 37 y.o. male with history of Marfan syndrome who presents emergency department due to chest pain.    Patient states that about 2 hours ago he had sudden onset stabbing left-sided chest pain rating up to his neck.  He presents as he is highly concerned he may have an aortic dissection.  He has never had one in the past.  States he has had chest pain in the past.  Is otherwise had negative work-ups prior per his report.  Still endorsing moderate to severe chest pain.  Denies any associate shortness of breath.  No nausea or vomiting.  No recent fevers or chills.    Past Medical History:   Past Medical History:   Diagnosis Date   • Back pain    • Chest pain    • Claustrophobia    • COPD (chronic obstructive pulmonary disease) (HCC)    • Emphysema of lung (HCC)    • Lung disease    • Marfan syndrome    • Pneumothorax    • Seizures (HCC)     when he was a child    • Seizures (HCC)    • Smoking     3/4 pack a day   • Tobacco abuse    • Underweight        Past Surgical History:  Past Surgical History:   Procedure Laterality Date   • HERNIA REPAIR     • LUNG LOBECTOMY Right 02/01/2017   • LUNG REMOVAL, TOTAL Right 2015   • THORACOSCOPY N/A 2/3/2017    Procedure: BRONCHOSCOPY, THORACOSCOPY RIGHT CHEST,  WEDGE RESECTION RIGHT UPPER AND LOWER LOBE OF LUNG, MECHANICAL PLEURODESIS;  Surgeon: Kyle Heath MD;  Location: [facility];  Service:    • TYMPANOSTOMY  05/12/2016    Recorded       Family History:   Family History   Problem Relation Age of Onset   • No Known Problems Mother    • Heart attack Father    • Stroke Father    • No Known Problems Sister    • No Known Problems Daughter    • Cancer Maternal Grandmother         breast   • Breast cancer Maternal Grandmother    • Prostate cancer Neg Hx    • Colon cancer Neg Hx    •  Alcohol abuse Neg Hx    • Drug abuse Neg Hx    • Diabetes Neg Hx    • Thyroid cancer Neg Hx        Social History:   Daily tobacco  Denies EtOH  Denies marijuana, cocaine, or IV drugs    Allergies:   Allergies   Allergen Reactions   • Calcium Channel Blockers Other (See Comments)     Do not use in marfan   • Ciprofloxacin Other (See Comments)     Do not use fluoroquinolones in marfan   • Medrol [Methylprednisolone] Other (See Comments)     Patient states he gets violent on this medication    • Diclofenac Irritability       Medications:     Current Facility-Administered Medications:   •  acetaminophen (TYLENOL) tablet 1,000 mg, 1,000 mg, Oral, Once, Chaparro Vinson MD  •  [COMPLETED] Insert Peripheral IV, , , Once **AND** sodium chloride 0.9 % flush 10 mL, 10 mL, Intravenous, PRN, Chaparro Vinson MD    Current Outpatient Medications:   •  albuterol sulfate HFA (ProAir HFA) 108 (90 Base) MCG/ACT inhaler, Inhale 2 puffs Every 4 (Four) Hours As Needed for Wheezing., Disp: 18 g, Rfl: 5  •  fluticasone (FLONASE) 50 MCG/ACT nasal spray, 2 sprays by Each Nare route Daily., Disp: 16 g, Rfl: 5  •  Glycopyrrolate-Formoterol (Bevespi Aerosphere) 9-4.8 MCG/ACT aerosol, 2 sprays by Each Nare route 2 (Two) Times a Day., Disp: 2 each, Rfl: 5  •  isosorbide mononitrate (IMDUR) 30 MG 24 hr tablet, Take 1 tablet by mouth Every Morning., Disp: , Rfl:   •  loratadine (CLARITIN) 10 MG tablet, Take 1 tablet by mouth Daily., Disp: 90 tablet, Rfl: 3  •  methocarbamol (ROBAXIN) 750 MG tablet, TAKE 1 TABLET BY MOUTH FOUR TIMES DAILY AS NEEDED FOR MUSCLE SPASMS, Disp: 120 tablet, Rfl: 2  •  naproxen (NAPROSYN) 500 MG tablet, Take 1 tablet by mouth 2 (Two) Times a Day As Needed for Moderate Pain., Disp: 60 tablet, Rfl: 2  •  nicotine (Nicoderm CQ) 21 MG/24HR patch, Place 1 patch on the skin as directed by provider Daily., Disp: 30 patch, Rfl: 0  •  pregabalin (LYRICA) 100 MG capsule, TAKE 1 CAPSULE BY MOUTH EVERY 12 HOURS AS NEEDED, Disp: ,  "Rfl:   •  sildenafil (REVATIO) 20 MG tablet, Take 1-2 tablets by mouth Daily As Needed (prior to sexual activity)., Disp: 30 tablet, Rfl: 2  •  sulfamethoxazole-trimethoprim (Bactrim DS) 800-160 MG per tablet, Take 1 tablet by mouth 2 (Two) Times a Day., Disp: 14 tablet, Rfl: 0    Review of Systems:  A full review of systems was obtained and is negative unless otherwise stated in HPI.    Physical Exam:   VS: /75 (BP Location: Left arm, Patient Position: Sitting)   Pulse 94   Temp 98 °F (36.7 °C) (Oral)   Resp 19   Ht 195.6 cm (77\")   Wt 83.5 kg (184 lb)   SpO2 99%   BMI 21.82 kg/m²   GENERAL: Notably thin and tall young man sitting up in stretcher no acute distress; well nourished, well developed, awake, alert, no acute distress, nontoxic appearing, comfortable  EYES: PERRL, sclera anicteric, extra-occular movements grossly intact, symmetric lids  EARS, NOSE, MOUTH, THROAT: atraumatic external nose and ears, moist mucous membranes  NECK: Symmetric, trachea midline, no thyromegaly, no adenopathy, no meningismus  RESPIRATORY: Unlabored respiratory effort, clear to auscultation bilaterally, good air movement  CARDIOVASCULAR: No murmurs or gallops, peripheral pulses 2+ and equal in all extremities  GI: Soft, nontender, nondistended, bowel sounds present, no hepatosplenomegaly  LYMPHATIC: no lymphadenopathy  MUSCULOSKELETAL/EXTREMITIES: Extremities without obvious deformity, no cyanosis or clubbing  SKIN: warm and dry with no obvious rashes  NEUROLOGIC: moving all 4 extremities symmetrically, CN II-XII grossly intact  PSYCHIATRIC: alert, pleasant and cooperative. Appropriate mood and affect.      Labs:   Labs Reviewed   CBC WITH AUTO DIFFERENTIAL - Abnormal; Notable for the following components:       Result Value    Immature Grans % 0.8 (*)     Immature Grans, Absolute 0.07 (*)     All other components within normal limits   BASIC METABOLIC PANEL - Normal    Narrative:     GFR Normal >60  Chronic Kidney " Disease <60  Kidney Failure <15     TROPONIN (IN-HOUSE) - Normal    Narrative:     Troponin T Reference Range:  <= 0.03 ng/mL-   Negative for AMI  >0.03 ng/mL-     Abnormal for myocardial necrosis.  Clinicians would have to utilize clinical acumen, EKG, Troponin and serial changes to determine if it is an Acute Myocardial Infarction or myocardial injury due to an underlying chronic condition.       Results may be falsely decreased if patient taking Biotin.     CBC AND DIFFERENTIAL    Narrative:     The following orders were created for panel order CBC & Differential.  Procedure                               Abnormality         Status                     ---------                               -----------         ------                     CBC Auto Differential[[id number]]        Abnormal            Final result                 Please view results for these tests on the individual orders.         Radiology:   CT Angiogram Chest   Final Result       1.  No evidence of aortic dissection, intramural hematoma, or   penetrating atherosclerotic ulcer. Nonaneurysmal thoracic aorta.       2.  No consolidation or pleural effusion. Atelectasis in the LEFT lower   lobe and lingula. Severe emphysema.   This report was finalized on 12/27/2022 21:23 by Dr. Veto Alfaro MD.      XR Chest 1 View   Final Result       Streaky bibasilar opacities, likely atelectasis. Severe emphysema.   This report was finalized on 12/27/2022 20:13 by Dr. Veto Alfaro MD.            Medical Decision Making:  Ross Platt is a 37 y.o. male history of Marfan syndrome who presents emergency department due to acute chest pain.    Slight tachycardia to 112 on arrival otherwise reassuring vital signs.    I have reviewed and interpreted the EKG and it shows: Sinus tachycardia with a rate of 103. Normal axis and intervals. No signs of acute ischemia such as ST elevation, ST depression, or T wave inversion. No signs of Hyperkalemia, WPW, PE, Pericarditis,  LV aneurysm, Brugada, Heart Block, Atrial fibrillation or A flutter.     HEART score 2 (1 for smoking, 1 for story).     Labs were ordered and reviewed.  Negative troponin.  Normal blood count hemoglobin.  BMP is unremarkable.    Imaging was ordered and reviewed.  Chest x-ray with no acute findings.  CT angiogram the chest with no evidence aortic dissection.  Also no evidence of pulmonary embolism.    Nursing notes were reviewed.    The patient was ordered Tylenol for pain but patient refused.    Unclear exact etiology patient chest pain at this time.  Given his age and low risk factors I have low suspicion that this would be due to acute coronary syndrome.  No evidence of NSTEMI based on a troponin.  No evidence of STEMI based on EKG.  Given Marfan syndrome would have high suspicion for possible aortic dissection but no evidence on CT angiogram.  Also no evidence of pulmonary embolism pneumonia or pneumothorax.    Patient was discharged home with plan to follow with cardiology closely as an outpatient return to the emergency department immediately if symptoms worsen.      ED Diagnosis:  Acute chest pain; Marfan syndrome      Disposition: to home    Follow up plan: cardiology follow up within 3 days, return to ED immediately if symptoms worsen        Signed:  Chaparro Vinson MD  Emergency Medicine Physician    Please note that portions of this note were completed with a voice recognition program.      Chaparro Vinson MD  12/27/22 [id number]

## 2022-12-28 NOTE — ED NOTES
"Patient becomes upset when the DR tells him that he is not having a heart attack.  Patient states \"I knew I should have listened to his aunt and gone to [facility] in the first place and that is why his aunt retired from here.\" I explain that he can take tylenol or ibuprofen for pain and he should follow up with cardiology.  Patient has a bottle of water at bedside that I provided for him with his tylenol that he refused.  I tell him that he can take it with him if he would like and he states \"I dont want anything from this hospital\".  Patient ambulated out of the ER with steady gait with out assistance.   "

## 2022-12-30 RX ORDER — LORATADINE 10 MG/1
10 TABLET ORAL DAILY
Qty: 90 TABLET | Refills: 3 | Status: SHIPPED | OUTPATIENT
Start: 2022-12-30 | End: 2023-03-29 | Stop reason: SDUPTHER

## 2022-12-30 RX ORDER — FLUTICASONE PROPIONATE 50 MCG
SPRAY, SUSPENSION (ML) NASAL
Qty: 16 G | Refills: 5 | Status: SHIPPED | OUTPATIENT
Start: 2022-12-30 | End: 2023-03-29 | Stop reason: SDUPTHER

## 2023-01-04 RX ORDER — FLUTICASONE FUROATE, UMECLIDINIUM BROMIDE AND VILANTEROL TRIFENATATE 100; 62.5; 25 UG/1; UG/1; UG/1
POWDER RESPIRATORY (INHALATION)
Qty: 60 EACH | Refills: 2 | OUTPATIENT
Start: 2023-01-04

## 2023-01-04 NOTE — TELEPHONE ENCOUNTER
Discontinued on 8/4/2022. Please verify.     Rx Refill Note  Requested Prescriptions     Pending Prescriptions Disp Refills   • Trelegy Ellipta 100-62.5-25 MCG/ACT inhaler [Pharmacy Med Name: TRELEGY ELLIPTA 100-62.5MCG INH 30P] 60 each 2     Sig: INHALE 1 PUFF BY MOUTH DAILY      Last office visit with prescribing clinician: 10/20/2022   Last telemedicine visit with prescribing clinician: 1/4/2023   Next office visit with prescribing clinician: 1/4/2023      Sarah Daniels MA  01/04/23, 07:39 CST

## 2023-01-17 ENCOUNTER — HOSPITAL ENCOUNTER (EMERGENCY)
Age: 38
Discharge: LEFT BEFORE DISCHARGE | End: 2023-01-17

## 2023-01-17 ENCOUNTER — TELEMEDICINE (OUTPATIENT)
Dept: FAMILY MEDICINE CLINIC | Facility: CLINIC | Age: 38
End: 2023-01-17
Payer: COMMERCIAL

## 2023-01-17 VITALS
TEMPERATURE: 97 F | WEIGHT: 200 LBS | OXYGEN SATURATION: 97 % | SYSTOLIC BLOOD PRESSURE: 111 MMHG | BODY MASS INDEX: 23.11 KG/M2 | RESPIRATION RATE: 18 BRPM | HEART RATE: 82 BPM | DIASTOLIC BLOOD PRESSURE: 78 MMHG

## 2023-01-17 DIAGNOSIS — L08.9 INFECTED SKIN LESION: Primary | ICD-10-CM

## 2023-01-17 DIAGNOSIS — G89.29 CHRONIC CHEST PAIN: ICD-10-CM

## 2023-01-17 DIAGNOSIS — G89.29 OTHER CHRONIC PAIN: ICD-10-CM

## 2023-01-17 DIAGNOSIS — R07.9 CHRONIC CHEST PAIN: ICD-10-CM

## 2023-01-17 PROCEDURE — 99213 OFFICE O/P EST LOW 20 MIN: CPT | Performed by: NURSE PRACTITIONER

## 2023-01-17 RX ORDER — DOXYCYCLINE HYCLATE 100 MG/1
100 CAPSULE ORAL 2 TIMES DAILY
Qty: 14 CAPSULE | Refills: 0 | Status: SHIPPED | OUTPATIENT
Start: 2023-01-17 | End: 2023-01-24

## 2023-01-17 NOTE — PROGRESS NOTES
"Chief Complaint  Rash    Subjective        Ross Ku presents via telehealth video visit with [facility]  History of Present Illness  Patient location: [place], [place]   Provider location: [place], [place]    ROSS KU is a 37-year-old male who presents today for an infected skin lesion on the right side of his abdomen.    The patient believes that his right abdominal skin lesion is infected again. He confirms finishing the Augmentin that was prescribed at his last visit and reports that the antibiotic was moderately effective. He notes that his skin lesion is erythematous and sore. He denies receiving a call from the referral sent by Dr. Cabrera to dermatology as he has been incarcerated.     He complains of severe chronic chest pain and confirms that he has an appointment with pain management on 01/19/2023. He confirms that he is continuing to take the methocarbamol and naproxen with no relief for his chest pain. He confirms that cardiology advised that his pain is not cardiac related.       Objective   Vital Signs:  There were no vitals taken for this visit.  Estimated body mass index is 21.82 kg/m² as calculated from the following:    Height as of 12/27/22: 195.6 cm (77\").    Weight as of 12/27/22: 83.5 kg (184 lb).       BMI is within normal parameters. No other follow-up for BMI required.      Physical Exam  Vitals and nursing note reviewed.   Constitutional:       Appearance: He is well-developed.   HENT:      Head: Normocephalic and atraumatic.   Eyes:      Conjunctiva/sclera: Conjunctivae normal.   Cardiovascular:      Rate and Rhythm: Normal rate and regular rhythm.   Pulmonary:      Effort: Pulmonary effort is normal.   Musculoskeletal:      Cervical back: Normal range of motion.   Skin:     Findings: Erythema, lesion and rash present.      Comments: Right abdominal Spitz nevus.    Neurological:      General: No focal deficit present.      Mental Status: He is " alert and oriented to person, place, and time.   Psychiatric:         Mood and Affect: Mood normal.         Behavior: Behavior normal.        No vital signs or bmi obtained for this encounter.       Result Review :                   Assessment and Plan   Diagnoses and all orders for this visit:    1. Infected skin lesion (Primary)  -     doxycycline (VIBRAMYCIN) 100 MG capsule; Take 1 capsule by mouth 2 (Two) Times a Day for 7 days.  Dispense: 14 capsule; Refill: 0    2. Other chronic pain    3. Chronic chest pain    1. Infected skin lesion.  - We will send a course of doxycycline.  - I recommend that the patient get a hold of surgery for the referral they placed for a spitz nevus to dermatology.  - I recommend the patient pursue this and have further evaluation and management of this lesion.    2. Chronic pain/chest pain.  - I encouraged the patient to follow up with pain management and cardiology as scheduled.  - He is to continue Robaxin and naproxen as prescribed.         Follow Up   Return if symptoms worsen or fail to improve, for Next scheduled follow up.  Patient was given instructions and counseling regarding his condition or for health maintenance advice. Please see specific information pulled into the AVS if appropriate.     Transcribed from ambient dictation for JANESSA Little by Deb Blevins.  01/17/23   10:36 CST    Patient or patient representative verbalized consent to the visit recording.  I have personally performed the services described in this document as transcribed by the above individual, and it is both accurate and complete.

## 2023-01-30 RX ORDER — DICLOFENAC SODIUM 75 MG/1
TABLET, DELAYED RELEASE ORAL
Qty: 180 TABLET | Refills: 0 | OUTPATIENT
Start: 2023-01-30

## 2023-01-30 NOTE — TELEPHONE ENCOUNTER
Medication discontinued     Rx Refill Note  Requested Prescriptions     Pending Prescriptions Disp Refills   • diclofenac (VOLTAREN) 75 MG EC tablet [Pharmacy Med Name: DICLOFENAC SODIUM 75MG DR TABLETS] 180 tablet 0     Sig: TAKE 1 TABLET BY MOUTH TWICE DAILY      Last office visit with prescribing clinician: 10/20/2022   Last telemedicine visit with prescribing clinician: Visit date not found   Next office visit with prescribing clinician: Visit date not found                         Would you like a call back once the refill request has been completed: [] Yes [] No    If the office needs to give you a call back, can they leave a voicemail: [] Yes [] No    Anuradha Ramírez MA  01/30/23, 10:42 CST

## 2023-02-02 RX ORDER — FLUTICASONE FUROATE, UMECLIDINIUM BROMIDE AND VILANTEROL TRIFENATATE 100; 62.5; 25 UG/1; UG/1; UG/1
POWDER RESPIRATORY (INHALATION)
Qty: 60 EACH | Refills: 0 | OUTPATIENT
Start: 2023-02-02

## 2023-02-02 RX ORDER — METHOCARBAMOL 750 MG/1
TABLET, FILM COATED ORAL
Qty: 120 TABLET | Refills: 2 | Status: SHIPPED | OUTPATIENT
Start: 2023-02-02

## 2023-02-02 NOTE — TELEPHONE ENCOUNTER
Rx Refill Note  Requested Prescriptions     Pending Prescriptions Disp Refills   • methocarbamol (ROBAXIN) 750 MG tablet [Pharmacy Med Name: METHOCARBAMOL 750MG TABLETS] 120 tablet 2     Sig: TAKE 1 TABLET BY MOUTH FOUR TIMES DAILY AS NEEDED FOR MUSCLE SPASMS      Last office visit with prescribing clinician: 10/20/2022    Next office visit with prescribing clinician: Visit date not found     Sarah Daniels MA  02/02/23, 16:36 CST

## 2023-02-14 ENCOUNTER — TRANSCRIBE ORDERS (OUTPATIENT)
Dept: ADMINISTRATIVE | Facility: HOSPITAL | Age: 38
End: 2023-02-14
Payer: COMMERCIAL

## 2023-02-14 ENCOUNTER — HOSPITAL ENCOUNTER (OUTPATIENT)
Dept: GENERAL RADIOLOGY | Facility: HOSPITAL | Age: 38
Discharge: HOME | End: 2023-02-14
Admitting: NURSE PRACTITIONER
Payer: COMMERCIAL

## 2023-02-14 DIAGNOSIS — G89.29 OTHER CHRONIC PAIN: ICD-10-CM

## 2023-02-14 DIAGNOSIS — F17.210 CIGARETTE SMOKER: ICD-10-CM

## 2023-02-14 DIAGNOSIS — M79.7 SCAPULOHUMERAL FIBROSITIS: ICD-10-CM

## 2023-02-14 DIAGNOSIS — Q87.40 MARFAN'S SYNDROME: ICD-10-CM

## 2023-02-14 DIAGNOSIS — M54.16 LUMBAR RADICULOPATHY: Primary | ICD-10-CM

## 2023-02-14 PROCEDURE — 72110 X-RAY EXAM L-2 SPINE 4/>VWS: CPT

## 2023-02-24 ENCOUNTER — APPOINTMENT (OUTPATIENT)
Dept: CT IMAGING | Facility: HOSPITAL | Age: 38
End: 2023-02-24
Payer: COMMERCIAL

## 2023-02-24 ENCOUNTER — HOSPITAL ENCOUNTER (EMERGENCY)
Facility: HOSPITAL | Age: 38
Discharge: HOME | End: 2023-02-24
Attending: STUDENT IN AN ORGANIZED HEALTH CARE EDUCATION/TRAINING PROGRAM | Admitting: STUDENT IN AN ORGANIZED HEALTH CARE EDUCATION/TRAINING PROGRAM
Payer: COMMERCIAL

## 2023-02-24 VITALS
HEART RATE: 80 BPM | WEIGHT: 188 LBS | OXYGEN SATURATION: 96 % | DIASTOLIC BLOOD PRESSURE: 70 MMHG | RESPIRATION RATE: 16 BRPM | HEIGHT: 77 IN | BODY MASS INDEX: 22.2 KG/M2 | TEMPERATURE: 97.7 F | SYSTOLIC BLOOD PRESSURE: 107 MMHG

## 2023-02-24 DIAGNOSIS — R07.81 RIB PAIN: Primary | ICD-10-CM

## 2023-02-24 LAB
ALBUMIN SERPL-MCNC: 4.7 G/DL (ref 3.5–5.2)
ALBUMIN/GLOB SERPL: 1.7 G/DL
ALP SERPL-CCNC: 102 U/L (ref 39–117)
ALT SERPL W P-5'-P-CCNC: 24 U/L (ref 1–41)
ANION GAP SERPL CALCULATED.3IONS-SCNC: 12 MMOL/L (ref 5–15)
APTT PPP: 27.5 SECONDS (ref 24.1–35)
AST SERPL-CCNC: 17 U/L (ref 1–40)
BASOPHILS # BLD AUTO: 0.06 10*3/MM3 (ref 0–0.2)
BASOPHILS NFR BLD AUTO: 0.6 % (ref 0–1.5)
BILIRUB SERPL-MCNC: 0.3 MG/DL (ref 0–1.2)
BUN SERPL-MCNC: 27 MG/DL (ref 6–20)
BUN/CREAT SERPL: 25.7 (ref 7–25)
CALCIUM SPEC-SCNC: 9.7 MG/DL (ref 8.6–10.5)
CHLORIDE SERPL-SCNC: 102 MMOL/L (ref 98–107)
CO2 SERPL-SCNC: 27 MMOL/L (ref 22–29)
CREAT SERPL-MCNC: 1.05 MG/DL (ref 0.76–1.27)
DEPRECATED RDW RBC AUTO: 42.7 FL (ref 37–54)
EGFRCR SERPLBLD CKD-EPI 2021: 94.3 ML/MIN/1.73
EOSINOPHIL # BLD AUTO: 0.19 10*3/MM3 (ref 0–0.4)
EOSINOPHIL NFR BLD AUTO: 2.1 % (ref 0.3–6.2)
ERYTHROCYTE [DISTWIDTH] IN BLOOD BY AUTOMATED COUNT: 12.8 % (ref 12.3–15.4)
GEN 5 2HR TROPONIN T REFLEX: <6 NG/L
GLOBULIN UR ELPH-MCNC: 2.7 GM/DL
GLUCOSE SERPL-MCNC: 92 MG/DL (ref 65–99)
HCT VFR BLD AUTO: 47.7 % (ref 37.5–51)
HGB BLD-MCNC: 15.6 G/DL (ref 13–17.7)
IMM GRANULOCYTES # BLD AUTO: 0.13 10*3/MM3 (ref 0–0.05)
IMM GRANULOCYTES NFR BLD AUTO: 1.4 % (ref 0–0.5)
INR PPP: 0.98 (ref 0.91–1.09)
LIPASE SERPL-CCNC: 21 U/L (ref 13–60)
LYMPHOCYTES # BLD AUTO: 2.23 10*3/MM3 (ref 0.7–3.1)
LYMPHOCYTES NFR BLD AUTO: 24.1 % (ref 19.6–45.3)
MCH RBC QN AUTO: 29.9 PG (ref 26.6–33)
MCHC RBC AUTO-ENTMCNC: 32.7 G/DL (ref 31.5–35.7)
MCV RBC AUTO: 91.4 FL (ref 79–97)
MONOCYTES # BLD AUTO: 0.94 10*3/MM3 (ref 0.1–0.9)
MONOCYTES NFR BLD AUTO: 10.2 % (ref 5–12)
NEUTROPHILS NFR BLD AUTO: 5.69 10*3/MM3 (ref 1.7–7)
NEUTROPHILS NFR BLD AUTO: 61.6 % (ref 42.7–76)
NRBC BLD AUTO-RTO: 0 /100 WBC (ref 0–0.2)
PLATELET # BLD AUTO: 263 10*3/MM3 (ref 140–450)
PMV BLD AUTO: 9.4 FL (ref 6–12)
POTASSIUM SERPL-SCNC: 4.6 MMOL/L (ref 3.5–5.2)
PROT SERPL-MCNC: 7.4 G/DL (ref 6–8.5)
PROTHROMBIN TIME: 13.1 SECONDS (ref 11.8–14.8)
RBC # BLD AUTO: 5.22 10*6/MM3 (ref 4.14–5.8)
SODIUM SERPL-SCNC: 141 MMOL/L (ref 136–145)
TROPONIN T DELTA: NORMAL
TROPONIN T SERPL HS-MCNC: <6 NG/L
WBC NRBC COR # BLD: 9.24 10*3/MM3 (ref 3.4–10.8)

## 2023-02-24 PROCEDURE — 36415 COLL VENOUS BLD VENIPUNCTURE: CPT

## 2023-02-24 PROCEDURE — 85730 THROMBOPLASTIN TIME PARTIAL: CPT | Performed by: STUDENT IN AN ORGANIZED HEALTH CARE EDUCATION/TRAINING PROGRAM

## 2023-02-24 PROCEDURE — 96374 THER/PROPH/DIAG INJ IV PUSH: CPT

## 2023-02-24 PROCEDURE — 84484 ASSAY OF TROPONIN QUANT: CPT | Performed by: STUDENT IN AN ORGANIZED HEALTH CARE EDUCATION/TRAINING PROGRAM

## 2023-02-24 PROCEDURE — 71275 CT ANGIOGRAPHY CHEST: CPT

## 2023-02-24 PROCEDURE — 99283 EMERGENCY DEPT VISIT LOW MDM: CPT

## 2023-02-24 PROCEDURE — 85025 COMPLETE CBC W/AUTO DIFF WBC: CPT | Performed by: STUDENT IN AN ORGANIZED HEALTH CARE EDUCATION/TRAINING PROGRAM

## 2023-02-24 PROCEDURE — 25510000001 IOPAMIDOL PER 1 ML: Performed by: STUDENT IN AN ORGANIZED HEALTH CARE EDUCATION/TRAINING PROGRAM

## 2023-02-24 PROCEDURE — 25010000002 HYDROMORPHONE PER 4 MG: Performed by: STUDENT IN AN ORGANIZED HEALTH CARE EDUCATION/TRAINING PROGRAM

## 2023-02-24 PROCEDURE — 83690 ASSAY OF LIPASE: CPT | Performed by: STUDENT IN AN ORGANIZED HEALTH CARE EDUCATION/TRAINING PROGRAM

## 2023-02-24 PROCEDURE — 85610 PROTHROMBIN TIME: CPT | Performed by: STUDENT IN AN ORGANIZED HEALTH CARE EDUCATION/TRAINING PROGRAM

## 2023-02-24 PROCEDURE — 80053 COMPREHEN METABOLIC PANEL: CPT | Performed by: STUDENT IN AN ORGANIZED HEALTH CARE EDUCATION/TRAINING PROGRAM

## 2023-02-24 RX ORDER — HYDROMORPHONE HYDROCHLORIDE 1 MG/ML
0.5 INJECTION, SOLUTION INTRAMUSCULAR; INTRAVENOUS; SUBCUTANEOUS ONCE
Status: COMPLETED | OUTPATIENT
Start: 2023-02-24 | End: 2023-02-24

## 2023-02-24 RX ADMIN — HYDROMORPHONE HYDROCHLORIDE 0.5 MG: 1 INJECTION, SOLUTION INTRAMUSCULAR; INTRAVENOUS; SUBCUTANEOUS at 19:57

## 2023-02-24 RX ADMIN — IOPAMIDOL 100 ML: 755 INJECTION, SOLUTION INTRAVENOUS at 19:40

## 2023-02-28 RX ORDER — NAPROXEN 500 MG/1
TABLET ORAL
Qty: 60 TABLET | Refills: 2 | Status: SHIPPED | OUTPATIENT
Start: 2023-02-28

## 2023-02-28 NOTE — TELEPHONE ENCOUNTER
Rx Refill Note  Requested Prescriptions     Pending Prescriptions Disp Refills   • naproxen (NAPROSYN) 500 MG tablet [Pharmacy Med Name: NAPROXEN 500MG TABLETS] 60 tablet 2     Sig: TAKE 1 TABLET BY MOUTH TWICE DAILY AS NEEDED FOR MODERATE PAIN      Last office visit with prescribing clinician: 10/20/2022   Next office visit with prescribing clinician: Visit date not found                         Would you like a call back once the refill request has been completed: [] Yes [] No    If the office needs to give you a call back, can they leave a voicemail: [] Yes [] No    Anuradha Ramírez MA  02/28/23, 13:55 CST

## 2023-03-06 RX ORDER — FLUTICASONE FUROATE, UMECLIDINIUM BROMIDE AND VILANTEROL TRIFENATATE 100; 62.5; 25 UG/1; UG/1; UG/1
POWDER RESPIRATORY (INHALATION)
Qty: 60 EACH | Refills: 2 | OUTPATIENT
Start: 2023-03-06

## 2023-03-06 NOTE — TELEPHONE ENCOUNTER
Medication discontinued     Rx Refill Note  Requested Prescriptions     Pending Prescriptions Disp Refills   • Trelegy Ellipta 100-62.5-25 MCG/ACT inhaler [Pharmacy Med Name: TRELEGY ELLIPTA 100-62.5MCG INH 30P] 60 each 2     Sig: INHALE 1 PUFF BY MOUTH DAILY      Last office visit with prescribing clinician: 10/20/2022   Next office visit with prescribing clinician: Visit date not found                         Would you like a call back once the refill request has been completed: [] Yes [] No    If the office needs to give you a call back, can they leave a voicemail: [] Yes [] No    Anuradha Ramírez MA  03/06/23, 15:15 CST

## 2023-03-28 ENCOUNTER — HOSPITAL ENCOUNTER (EMERGENCY)
Facility: HOSPITAL | Age: 38
Discharge: HOME | End: 2023-03-28
Attending: EMERGENCY MEDICINE | Admitting: EMERGENCY MEDICINE
Payer: COMMERCIAL

## 2023-03-28 ENCOUNTER — APPOINTMENT (OUTPATIENT)
Dept: GENERAL RADIOLOGY | Facility: HOSPITAL | Age: 38
End: 2023-03-28
Payer: COMMERCIAL

## 2023-03-28 VITALS
RESPIRATION RATE: 19 BRPM | BODY MASS INDEX: 23.38 KG/M2 | HEIGHT: 76 IN | DIASTOLIC BLOOD PRESSURE: 77 MMHG | SYSTOLIC BLOOD PRESSURE: 103 MMHG | WEIGHT: 192 LBS | HEART RATE: 95 BPM | TEMPERATURE: 97.5 F | OXYGEN SATURATION: 98 %

## 2023-03-28 DIAGNOSIS — S40.011A CONTUSION OF RIGHT SHOULDER, INITIAL ENCOUNTER: Primary | ICD-10-CM

## 2023-03-28 PROCEDURE — 99283 EMERGENCY DEPT VISIT LOW MDM: CPT

## 2023-03-28 PROCEDURE — 96372 THER/PROPH/DIAG INJ SC/IM: CPT

## 2023-03-28 PROCEDURE — 73030 X-RAY EXAM OF SHOULDER: CPT

## 2023-03-28 PROCEDURE — 25010000002 MORPHINE PER 10 MG: Performed by: EMERGENCY MEDICINE

## 2023-03-28 RX ADMIN — MORPHINE SULFATE 6 MG: 4 INJECTION, SOLUTION INTRAMUSCULAR; INTRAVENOUS at 23:24

## 2023-03-29 ENCOUNTER — OFFICE VISIT (OUTPATIENT)
Dept: PULMONOLOGY | Facility: CLINIC | Age: 38
End: 2023-03-29
Payer: COMMERCIAL

## 2023-03-29 ENCOUNTER — PATIENT MESSAGE (OUTPATIENT)
Dept: FAMILY MEDICINE CLINIC | Facility: CLINIC | Age: 38
End: 2023-03-29
Payer: COMMERCIAL

## 2023-03-29 VITALS
SYSTOLIC BLOOD PRESSURE: 116 MMHG | HEART RATE: 81 BPM | BODY MASS INDEX: 23.28 KG/M2 | WEIGHT: 191.2 LBS | DIASTOLIC BLOOD PRESSURE: 82 MMHG | OXYGEN SATURATION: 99 % | HEIGHT: 76 IN

## 2023-03-29 DIAGNOSIS — R91.1 SOLITARY PULMONARY NODULE: ICD-10-CM

## 2023-03-29 DIAGNOSIS — Z87.09 HISTORY OF PNEUMOTHORAX: ICD-10-CM

## 2023-03-29 DIAGNOSIS — J44.1 COPD WITH ACUTE EXACERBATION: ICD-10-CM

## 2023-03-29 DIAGNOSIS — F17.200 TOBACCO USE DISORDER: ICD-10-CM

## 2023-03-29 DIAGNOSIS — J43.9 BULLOUS EMPHYSEMA: ICD-10-CM

## 2023-03-29 DIAGNOSIS — J43.9 PULMONARY EMPHYSEMA, UNSPECIFIED EMPHYSEMA TYPE: Primary | ICD-10-CM

## 2023-03-29 DIAGNOSIS — J30.89 NON-SEASONAL ALLERGIC RHINITIS, UNSPECIFIED TRIGGER: ICD-10-CM

## 2023-03-29 DIAGNOSIS — Q87.40 MARFAN SYNDROME: ICD-10-CM

## 2023-03-29 DIAGNOSIS — R09.1 PLEURISY: ICD-10-CM

## 2023-03-29 DIAGNOSIS — R06.02 SOB (SHORTNESS OF BREATH): ICD-10-CM

## 2023-03-29 PROCEDURE — 99406 BEHAV CHNG SMOKING 3-10 MIN: CPT | Performed by: INTERNAL MEDICINE

## 2023-03-29 PROCEDURE — 99214 OFFICE O/P EST MOD 30 MIN: CPT | Performed by: INTERNAL MEDICINE

## 2023-03-29 RX ORDER — ALBUTEROL SULFATE 2.5 MG/3ML
2.5 SOLUTION RESPIRATORY (INHALATION) EVERY 4 HOURS PRN
Qty: 150 ML | Refills: 5 | Status: SHIPPED | OUTPATIENT
Start: 2023-03-29

## 2023-03-29 RX ORDER — NALOXONE HYDROCHLORIDE 4 MG/.1ML
SPRAY NASAL
COMMUNITY
Start: 2022-12-30

## 2023-03-29 RX ORDER — FLUTICASONE PROPIONATE 50 MCG
2 SPRAY, SUSPENSION (ML) NASAL DAILY
Qty: 16 G | Refills: 5 | Status: SHIPPED | OUTPATIENT
Start: 2023-03-29

## 2023-03-29 RX ORDER — TIZANIDINE 4 MG/1
1 TABLET ORAL 3 TIMES DAILY
COMMUNITY
Start: 2023-02-02

## 2023-03-29 RX ORDER — ALBUTEROL SULFATE 90 UG/1
2 AEROSOL, METERED RESPIRATORY (INHALATION) EVERY 4 HOURS PRN
Qty: 18 G | Refills: 5 | Status: SHIPPED | OUTPATIENT
Start: 2023-03-29

## 2023-03-29 RX ORDER — CEFDINIR 300 MG/1
300 CAPSULE ORAL 2 TIMES DAILY
Qty: 14 CAPSULE | Refills: 0 | Status: SHIPPED | OUTPATIENT
Start: 2023-03-29 | End: 2023-04-05

## 2023-03-29 RX ORDER — CYCLOBENZAPRINE HCL 10 MG
1 TABLET ORAL 3 TIMES DAILY
COMMUNITY
Start: 2023-02-14

## 2023-03-29 RX ORDER — HYDROCODONE BITARTRATE AND ACETAMINOPHEN 7.5; 325 MG/1; MG/1
TABLET ORAL
COMMUNITY
Start: 2023-03-16

## 2023-03-29 RX ORDER — LORATADINE 10 MG/1
10 TABLET ORAL DAILY
Qty: 90 TABLET | Refills: 3 | Status: SHIPPED | OUTPATIENT
Start: 2023-03-29

## 2023-03-29 RX ORDER — PREDNISONE 10 MG/1
TABLET ORAL
Qty: 42 TABLET | Refills: 0 | Status: SHIPPED | OUTPATIENT
Start: 2023-03-29

## 2023-03-29 RX ORDER — GLYCOPYRROLATE AND FORMOTEROL FUMARATE 9; 4.8 UG/1; UG/1
2 AEROSOL, METERED RESPIRATORY (INHALATION) 2 TIMES DAILY
Qty: 2 EACH | Refills: 5 | Status: SHIPPED | OUTPATIENT
Start: 2023-03-29

## 2023-03-29 NOTE — ED NOTES
AT THE TIME OF DISCHARGE, PT PROVIDED DOCUMENTATION AND WAS IMMEDIATELY IRRITATED THAT THERE WAS NOT ANY RX FOR NARCOTIC PAIN MEDICATIONS.  THE PATIENT STATES WE LIED TO HIM ABOUT GETTING MEDICATIONS.  I SPOKE WITH THE PROVIDER WHO ADVISED HE WAS TO TAKE HIS HOME MEDICATIONS AND NO RX WAS TO BE GIVEN BEYOND MEDICINES ALREADY ADMINISTERED AND THAT A SLING COULD BE PLACED.  FAMILY CONTINUED TO SPEAK WITH HIM AND TRY TO CALM HIM AS HE MADE DEGRADING COMMENTS REGARDING THE HOSPITAL.
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
## 2023-03-31 NOTE — TELEPHONE ENCOUNTER
From: Ross Platt  To: Cristy Schmidt  Sent: 3/29/2023 2:02 PM CDT  Subject: Heart dr    Can u set a appointment up with heart . My lung dr told desean to tell u

## 2023-04-03 ENCOUNTER — APPOINTMENT (OUTPATIENT)
Dept: GENERAL RADIOLOGY | Facility: HOSPITAL | Age: 38
End: 2023-04-03
Payer: COMMERCIAL

## 2023-04-03 ENCOUNTER — HOSPITAL ENCOUNTER (EMERGENCY)
Facility: HOSPITAL | Age: 38
Discharge: HOME | End: 2023-04-03
Admitting: FAMILY MEDICINE
Payer: COMMERCIAL

## 2023-04-03 VITALS
SYSTOLIC BLOOD PRESSURE: 113 MMHG | RESPIRATION RATE: 18 BRPM | HEART RATE: 77 BPM | OXYGEN SATURATION: 100 % | DIASTOLIC BLOOD PRESSURE: 77 MMHG | BODY MASS INDEX: 21.29 KG/M2 | TEMPERATURE: 98 F | WEIGHT: 184 LBS | HEIGHT: 78 IN

## 2023-04-03 DIAGNOSIS — F14.90 COCAINE USE: ICD-10-CM

## 2023-04-03 DIAGNOSIS — R07.9 LEFT-SIDED CHEST PAIN: Primary | ICD-10-CM

## 2023-04-03 DIAGNOSIS — J43.9 PULMONARY EMPHYSEMA, UNSPECIFIED EMPHYSEMA TYPE: ICD-10-CM

## 2023-04-03 LAB
ALBUMIN SERPL-MCNC: 4.3 G/DL (ref 3.5–5.2)
ALBUMIN/GLOB SERPL: 1.5 G/DL
ALP SERPL-CCNC: 102 U/L (ref 39–117)
ALT SERPL W P-5'-P-CCNC: 27 U/L (ref 1–41)
AMPHET+METHAMPHET UR QL: NEGATIVE
AMPHETAMINES UR QL: NEGATIVE
ANION GAP SERPL CALCULATED.3IONS-SCNC: 10 MMOL/L (ref 5–15)
APTT PPP: 27.2 SECONDS (ref 24.1–35)
AST SERPL-CCNC: 19 U/L (ref 1–40)
BARBITURATES UR QL SCN: NEGATIVE
BASOPHILS # BLD AUTO: 0.07 10*3/MM3 (ref 0–0.2)
BASOPHILS NFR BLD AUTO: 0.9 % (ref 0–1.5)
BENZODIAZ UR QL SCN: NEGATIVE
BILIRUB SERPL-MCNC: 0.2 MG/DL (ref 0–1.2)
BUN SERPL-MCNC: 16 MG/DL (ref 6–20)
BUN/CREAT SERPL: 16.7 (ref 7–25)
BUPRENORPHINE SERPL-MCNC: NEGATIVE NG/ML
CALCIUM SPEC-SCNC: 9.3 MG/DL (ref 8.6–10.5)
CANNABINOIDS SERPL QL: NEGATIVE
CHLORIDE SERPL-SCNC: 107 MMOL/L (ref 98–107)
CO2 SERPL-SCNC: 25 MMOL/L (ref 22–29)
COCAINE UR QL: POSITIVE
CREAT SERPL-MCNC: 0.96 MG/DL (ref 0.76–1.27)
D DIMER PPP FEU-MCNC: 0.28 MCGFEU/ML (ref 0–0.5)
D-LACTATE SERPL-SCNC: 1 MMOL/L (ref 0.5–2)
DEPRECATED RDW RBC AUTO: 42.9 FL (ref 37–54)
EGFRCR SERPLBLD CKD-EPI 2021: 105.1 ML/MIN/1.73
EOSINOPHIL # BLD AUTO: 0.19 10*3/MM3 (ref 0–0.4)
EOSINOPHIL NFR BLD AUTO: 2.4 % (ref 0.3–6.2)
ERYTHROCYTE [DISTWIDTH] IN BLOOD BY AUTOMATED COUNT: 13 % (ref 12.3–15.4)
ETHANOL UR QL: <0.01 %
FLUAV RNA RESP QL NAA+PROBE: NOT DETECTED
FLUBV RNA RESP QL NAA+PROBE: NOT DETECTED
GEN 5 2HR TROPONIN T REFLEX: <6 NG/L
GLOBULIN UR ELPH-MCNC: 2.8 GM/DL
GLUCOSE SERPL-MCNC: 103 MG/DL (ref 65–99)
HCT VFR BLD AUTO: 44.3 % (ref 37.5–51)
HGB BLD-MCNC: 14.5 G/DL (ref 13–17.7)
IMM GRANULOCYTES # BLD AUTO: 0.17 10*3/MM3 (ref 0–0.05)
IMM GRANULOCYTES NFR BLD AUTO: 2.1 % (ref 0–0.5)
INR PPP: 0.99 (ref 0.91–1.09)
LYMPHOCYTES # BLD AUTO: 2.2 10*3/MM3 (ref 0.7–3.1)
LYMPHOCYTES NFR BLD AUTO: 27.5 % (ref 19.6–45.3)
MCH RBC QN AUTO: 29.9 PG (ref 26.6–33)
MCHC RBC AUTO-ENTMCNC: 32.7 G/DL (ref 31.5–35.7)
MCV RBC AUTO: 91.3 FL (ref 79–97)
METHADONE UR QL SCN: NEGATIVE
MONOCYTES # BLD AUTO: 0.72 10*3/MM3 (ref 0.1–0.9)
MONOCYTES NFR BLD AUTO: 9 % (ref 5–12)
NEUTROPHILS NFR BLD AUTO: 4.64 10*3/MM3 (ref 1.7–7)
NEUTROPHILS NFR BLD AUTO: 58.1 % (ref 42.7–76)
NRBC BLD AUTO-RTO: 0 /100 WBC (ref 0–0.2)
OPIATES UR QL: NEGATIVE
OXYCODONE UR QL SCN: NEGATIVE
PCP UR QL SCN: NEGATIVE
PLATELET # BLD AUTO: 286 10*3/MM3 (ref 140–450)
PMV BLD AUTO: 9.8 FL (ref 6–12)
POTASSIUM SERPL-SCNC: 3.8 MMOL/L (ref 3.5–5.2)
PROCALCITONIN SERPL-MCNC: 0.03 NG/ML (ref 0–0.25)
PROPOXYPH UR QL: NEGATIVE
PROT SERPL-MCNC: 7.1 G/DL (ref 6–8.5)
PROTHROMBIN TIME: 13.1 SECONDS (ref 11.8–14.8)
RBC # BLD AUTO: 4.85 10*6/MM3 (ref 4.14–5.8)
RSV RNA NPH QL NAA+NON-PROBE: NOT DETECTED
SARS-COV-2 RNA RESP QL NAA+PROBE: NOT DETECTED
SODIUM SERPL-SCNC: 142 MMOL/L (ref 136–145)
TRICYCLICS UR QL SCN: POSITIVE
TROPONIN T DELTA: NORMAL
TROPONIN T SERPL HS-MCNC: <6 NG/L
WBC NRBC COR # BLD: 7.99 10*3/MM3 (ref 3.4–10.8)

## 2023-04-03 PROCEDURE — 83605 ASSAY OF LACTIC ACID: CPT | Performed by: PHYSICIAN ASSISTANT

## 2023-04-03 PROCEDURE — 85610 PROTHROMBIN TIME: CPT | Performed by: PHYSICIAN ASSISTANT

## 2023-04-03 PROCEDURE — 25010000002 MORPHINE PER 10 MG: Performed by: FAMILY MEDICINE

## 2023-04-03 PROCEDURE — 25010000002 KETOROLAC TROMETHAMINE PER 15 MG: Performed by: PHYSICIAN ASSISTANT

## 2023-04-03 PROCEDURE — 25010000002 ONDANSETRON PER 1 MG: Performed by: PHYSICIAN ASSISTANT

## 2023-04-03 PROCEDURE — 96374 THER/PROPH/DIAG INJ IV PUSH: CPT

## 2023-04-03 PROCEDURE — 96375 TX/PRO/DX INJ NEW DRUG ADDON: CPT

## 2023-04-03 PROCEDURE — 93005 ELECTROCARDIOGRAM TRACING: CPT

## 2023-04-03 PROCEDURE — 85379 FIBRIN DEGRADATION QUANT: CPT | Performed by: PHYSICIAN ASSISTANT

## 2023-04-03 PROCEDURE — 99284 EMERGENCY DEPT VISIT MOD MDM: CPT

## 2023-04-03 PROCEDURE — 36415 COLL VENOUS BLD VENIPUNCTURE: CPT

## 2023-04-03 PROCEDURE — 85730 THROMBOPLASTIN TIME PARTIAL: CPT | Performed by: PHYSICIAN ASSISTANT

## 2023-04-03 PROCEDURE — 85025 COMPLETE CBC W/AUTO DIFF WBC: CPT | Performed by: PHYSICIAN ASSISTANT

## 2023-04-03 PROCEDURE — 84145 PROCALCITONIN (PCT): CPT | Performed by: PHYSICIAN ASSISTANT

## 2023-04-03 PROCEDURE — 82077 ASSAY SPEC XCP UR&BREATH IA: CPT | Performed by: PHYSICIAN ASSISTANT

## 2023-04-03 PROCEDURE — 80306 DRUG TEST PRSMV INSTRMNT: CPT | Performed by: PHYSICIAN ASSISTANT

## 2023-04-03 PROCEDURE — 93010 ELECTROCARDIOGRAM REPORT: CPT | Performed by: INTERNAL MEDICINE

## 2023-04-03 PROCEDURE — 84484 ASSAY OF TROPONIN QUANT: CPT | Performed by: PHYSICIAN ASSISTANT

## 2023-04-03 PROCEDURE — 87637 SARSCOV2&INF A&B&RSV AMP PRB: CPT | Performed by: PHYSICIAN ASSISTANT

## 2023-04-03 PROCEDURE — 71045 X-RAY EXAM CHEST 1 VIEW: CPT

## 2023-04-03 PROCEDURE — 80053 COMPREHEN METABOLIC PANEL: CPT | Performed by: PHYSICIAN ASSISTANT

## 2023-04-03 RX ORDER — LIDOCAINE 50 MG/G
1 PATCH TOPICAL ONCE
Status: DISCONTINUED | OUTPATIENT
Start: 2023-04-03 | End: 2023-04-03 | Stop reason: HOSPADM

## 2023-04-03 RX ORDER — TIZANIDINE 4 MG/1
4 TABLET ORAL EVERY 6 HOURS PRN
Qty: 12 TABLET | Refills: 0 | Status: SHIPPED | OUTPATIENT
Start: 2023-04-03

## 2023-04-03 RX ORDER — ONDANSETRON 2 MG/ML
4 INJECTION INTRAMUSCULAR; INTRAVENOUS ONCE
Status: COMPLETED | OUTPATIENT
Start: 2023-04-03 | End: 2023-04-03

## 2023-04-03 RX ORDER — NAPROXEN 500 MG/1
500 TABLET ORAL 2 TIMES DAILY PRN
Qty: 10 TABLET | Refills: 0 | Status: SHIPPED | OUTPATIENT
Start: 2023-04-03

## 2023-04-03 RX ORDER — SODIUM CHLORIDE 0.9 % (FLUSH) 0.9 %
10 SYRINGE (ML) INJECTION AS NEEDED
Status: DISCONTINUED | OUTPATIENT
Start: 2023-04-03 | End: 2023-04-03 | Stop reason: HOSPADM

## 2023-04-03 RX ORDER — KETOROLAC TROMETHAMINE 30 MG/ML
30 INJECTION, SOLUTION INTRAMUSCULAR; INTRAVENOUS ONCE
Status: COMPLETED | OUTPATIENT
Start: 2023-04-03 | End: 2023-04-03

## 2023-04-03 RX ADMIN — ONDANSETRON 4 MG: 2 INJECTION INTRAMUSCULAR; INTRAVENOUS at 19:47

## 2023-04-03 RX ADMIN — LIDOCAINE 1 PATCH: 700 PATCH TOPICAL at 19:47

## 2023-04-03 RX ADMIN — KETOROLAC TROMETHAMINE 30 MG: 30 INJECTION, SOLUTION INTRAMUSCULAR; INTRAVENOUS at 21:35

## 2023-04-03 RX ADMIN — SODIUM CHLORIDE 1000 ML: 9 INJECTION, SOLUTION INTRAVENOUS at 19:47

## 2023-04-03 RX ADMIN — MORPHINE SULFATE 4 MG: 4 INJECTION, SOLUTION INTRAMUSCULAR; INTRAVENOUS at 19:47

## 2023-04-03 NOTE — Clinical Note
[facility] EMERGENCY DEPARTMENT  [facility] [facility] [address]  Phone: [phone]    Ross Platt was seen and treated in our emergency department on 4/3/2023.  He may return to work on 04/05/2023.         Thank you for choosing [facility].    [place] PA-C

## 2023-04-03 NOTE — ED PROVIDER NOTES
Subjective   History of Present Illness    Patient is a 37-year-old male presenting to ED with shortness of breath.  PMH significant for emphysema, history of pulmonary lung nodule, Marfan, COPD, tobacco use, Seizure disorder, history of polysubstance abuse, history pneumothorax, s/p right lung lobectomy.  Patient states 2 hours prior to arrival he had sudden onset of left-sided lower rib pain which is significantly worse to the touch and causing him to be short of breath.  Patient states he is concerned because he has a history of Marfan's causing a right-sided pneumothorax which required a lobectomy.  Patient states that he is still smoking half a pack of cigarettes daily however has been 3 years clear from all drugs including marijuana.  Patient denies inhalation of any other substances and states that he was not actively inhaling a cigarette when this happened.  Patient denies coughing, hemoptysis.  Patient denies any recent fevers, known sick contact, or medication use since symptoms started.  Patient states that the pain is significantly worsened to touch of the area however denies any recent injuries.    Records reviewed show patient was last seen in the ED on 3/28/2023 for right shoulder contusion.    Patient last in the outpatient setting at the pulmonology office on 3/29/2023 for pulmonary emphysema, bullous emphysema, tobacco use, pleurisy, solitary pulmonary nodule, history pneumothorax, nonseasonal rhinitis, Marfan syndrome, shortness of breath, COPD with acute exacerbation.  Patient reported continued tobacco use at that time, did not qualify for supplemental oxygen.    Review of Systems   Constitutional: Negative.  Negative for fever.   HENT: Negative.    Eyes: Negative.    Respiratory: Positive for shortness of breath. Negative for cough and chest tightness.    Cardiovascular: Positive for chest pain (left sided). Negative for palpitations and leg swelling.   Gastrointestinal: Negative.  Negative for  nausea and vomiting.   Genitourinary: Negative.    Musculoskeletal: Negative.  Negative for back pain.   Skin: Negative.  Negative for rash and wound.   Neurological: Negative.    Psychiatric/Behavioral: Negative.    All other systems reviewed and are negative.      Past Medical History:   Diagnosis Date   • Back pain    • Chest pain    • Claustrophobia    • COPD (chronic obstructive pulmonary disease)    • Emphysema of lung    • Lung disease    • Marfan syndrome    • Pneumothorax    • Seizures     when he was a child    • Seizures    • Smoking     3/4 pack a day   • Tobacco abuse    • Underweight        Allergies   Allergen Reactions   • Calcium Channel Blockers Other (See Comments)     Do not use in marfan   • Ciprofloxacin Other (See Comments)     Do not use fluoroquinolones in marfan   • Medrol [Methylprednisolone] Other (See Comments)     Patient states he gets violent on this medication    • Wasp Venom Swelling   • Diclofenac Irritability       Past Surgical History:   Procedure Laterality Date   • HERNIA REPAIR     • LUNG LOBECTOMY Right 02/01/2017   • LUNG REMOVAL, TOTAL Right 2015   • THORACOSCOPY N/A 2/3/2017    Procedure: BRONCHOSCOPY, THORACOSCOPY RIGHT CHEST,  WEDGE RESECTION RIGHT UPPER AND LOWER LOBE OF LUNG, MECHANICAL PLEURODESIS;  Surgeon: Kyle Heath MD;  Location: [facility];  Service:    • TYMPANOSTOMY  05/12/2016    Recorded       Family History   Problem Relation Age of Onset   • No Known Problems Mother    • Heart attack Father    • Stroke Father    • No Known Problems Sister    • No Known Problems Daughter    • Cancer Maternal Grandmother         breast   • Breast cancer Maternal Grandmother    • Prostate cancer Neg Hx    • Colon cancer Neg Hx    • Alcohol abuse Neg Hx    • Drug abuse Neg Hx    • Diabetes Neg Hx    • Thyroid cancer Neg Hx        Social History     Socioeconomic History   • Marital status:    Tobacco Use   • Smoking status: Every Day     Packs/day: 0.50     Years:  25.00     Pack years: 12.50     Types: Cigarettes     Start date: 1997   • Smokeless tobacco: Never   Vaping Use   • Vaping Use: Never used   Substance and Sexual Activity   • Alcohol use: Not Currently   • Drug use: Not Currently     Types: Amphetamines, Methamphetamines, Benzodiazepines, Marijuana, Oxycodone, Hydrocodone   • Sexual activity: Yes     Partners: Female           Objective   Physical Exam  Vitals and nursing note reviewed.   Constitutional:       General: He is in acute distress.      Appearance: Normal appearance. He is well-developed, well-groomed and overweight. He is not toxic-appearing or diaphoretic.   HENT:      Head: Normocephalic.      Mouth/Throat:      Mouth: Mucous membranes are moist.      Pharynx: Oropharynx is clear.   Eyes:      Conjunctiva/sclera: Conjunctivae normal.      Pupils: Pupils are equal, round, and reactive to light.   Cardiovascular:      Rate and Rhythm: Regular rhythm. Tachycardia present.   Pulmonary:      Effort: Tachypnea present. No accessory muscle usage, prolonged expiration or respiratory distress.      Breath sounds: No stridor. No wheezing or rales.      Comments: No overlying skin changes including evidence of injuries or infection  Chest:      Chest wall: Tenderness (Left lower lateral chest wall) present.   Abdominal:      General: Bowel sounds are normal.      Palpations: Abdomen is soft.      Tenderness: There is no abdominal tenderness. There is no guarding.   Musculoskeletal:         General: Normal range of motion.      Cervical back: Normal range of motion and neck supple.   Skin:     General: Skin is warm and dry.      Findings: No signs of injury, rash or wound.   Neurological:      Mental Status: He is alert and oriented to person, place, and time.      Gait: Gait normal.   Psychiatric:         Mood and Affect: Mood is anxious.         Speech: Speech normal.         Behavior: Behavior normal. Behavior is cooperative.         Procedures           ED  Course    HEART SCORE: 1 (risk factors - tobacco use)                                             Medical Decision Making  Amount and/or Complexity of Data Reviewed  External Data Reviewed: labs, radiology and notes.  Labs: ordered. Decision-making details documented in ED Course.  Radiology: ordered. Decision-making details documented in ED Course.  ECG/medicine tests: ordered. Decision-making details documented in ED Course.          Patient is a 37-year-old male presenting to ED with shortness of breath.  PMH significant for emphysema, history of pulmonary lung nodule, Marfan, COPD, tobacco use, Seizure disorder, history of polysubstance abuse, history pneumothorax, s/p right lung lobectomy.  Work-up revealed UDS positive for cocaine as well as TCAs.  Patient adamantly denied use of either substance stating he was perplexes how these could get in his system. Alcohol negative.  Lab work otherwise showed no CBC abnormalities including normal blood cell count, stable H&H.  CMP with no electrolyte disturbances, normal renal and hepatic function.  Low concern for systemic or ischemic process with normal lactic acid.  Low concern for bacterial systemic infection with normal procalcitonin.  PT/INR WNL D-dimer WNL at 0.28.  High-sensitivity troponins less than 6 twice with no EKG abnormalities. Chest x-ray showed: Emphysematous lungs, linear opacities in both lung bases likely due to atelectasis or scarring.  Throughout evaluation patient had normal vital signs including resolution of initial tachycardia and tachypneic state upon resting in bed.  Patient was given lidocaine patch, Zofran, morphine, Toradol and a fluid bolus for which he rested comfortably and in no acute distress.  Patient oxygenated well at 97 to 100% on room air both with rest as well as exertion.  Discussed with patient significant importance of abstaining from use of all recreational substances due to his other underlying risk factors to include  Marfan syndrome, tobacco use.  Advised PCP follow-up within the next 48 hours for close reevaluation.  Discussed strict return precautions and need for immediate return to ED should he develop any new or worsening symptoms.  Patient is appreciative with no further questions, concerns, or needs at this time and is stable for discharge.    Differential diagnosis: Spontaneous pneumothorax, rib fracture, pneumonia, bronchitis, pleurisy, abnormal troponin, COVID-19, systemic infection, substance abuse, viral URI.    Final diagnoses:   Left-sided chest pain   Cocaine use   Pulmonary emphysema, unspecified emphysema type       ED Disposition  ED Disposition     ED Disposition   Discharge    Condition   Stable    Comment   --             Cristy Schmidt, APRN  [address]  [facility] [address]  [phone]    Schedule an appointment as soon as possible for a visit in 2 days      [facility] Emergency Department  [address] [place] [id number] [phone]    As needed         Medication List      Changed    * naproxen 500 MG tablet  Commonly known as: NAPROSYN  TAKE 1 TABLET BY MOUTH TWICE DAILY AS NEEDED FOR MODERATE PAIN  What changed: Another medication with the same name was added. Make sure you understand how and when to take each.     * naproxen 500 MG tablet  Commonly known as: NAPROSYN  Take 1 tablet by mouth 2 (Two) Times a Day As Needed for Mild Pain.  What changed: You were already taking a medication with the same name, and this prescription was added. Make sure you understand how and when to take each.     * tiZANidine 4 MG tablet  Commonly known as: ZANAFLEX  What changed: Another medication with the same name was added. Make sure you understand how and when to take each.     * tiZANidine 4 MG tablet  Commonly known as: Zanaflex  Take 1 tablet by mouth Every 6 (Six) Hours As Needed for Muscle Spasms.  What changed: You were already taking a medication with the same name,  and this prescription was added. Make sure you understand how and when to take each.         * This list has 4 medication(s) that are the same as other medications prescribed for you. Read the directions carefully, and ask your doctor or other care provider to review them with you.               Where to Get Your Medications      These medications were sent to [facility] #[id number] - [place], [place] - [address] AT [facility] OF [facility] [address] - [phone]  - [phone] FX  [address], [facility] [address]    Phone: [phone]   · naproxen 500 MG tablet  · tiZANidine 4 MG tablet          [place] PA-C  04/03/23 5269

## 2023-04-04 NOTE — DISCHARGE INSTRUCTIONS
It is important that you stop use of all tobacco products as well as recreational substances.  Please continue to follow-up with your cardiologist tomorrow as previously scheduled as well as your primary care provider within the next 48 hours.  Should you develop any new or worsening symptoms please return to the ER for further evaluation.

## 2023-04-05 LAB
QT INTERVAL: 328 MS
QTC INTERVAL: 423 MS

## 2023-04-18 NOTE — ED PROVIDER NOTES
Subjective   HPI Comments: Patient is 32-year-old white male presents with right knee pain.  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  He denies any other injury.    Patient is a 32 y.o. male presenting with lower extremity pain.   History provided by:  Patient   used: No    Lower Extremity Issue       Review of Systems   Constitutional: Negative.    HENT: Negative.    Eyes: Negative.    Respiratory: Negative.    Cardiovascular: Negative.    Gastrointestinal: Negative.    Endocrine: Negative.    Genitourinary: Negative.    Musculoskeletal:        Patient is 32-year-old white male presents with right knee pain.  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  He denies any other injury.     Skin: Negative.    Allergic/Immunologic: Negative.    Neurological: Negative.    Hematological: Negative.    Psychiatric/Behavioral: Negative.    All other systems reviewed and are negative.      Past Medical History:   Diagnosis Date   • Back pain    • Chest pain    • Claustrophobia    • COPD (chronic obstructive pulmonary disease)    • Lung disease    • Marfan syndrome    • Pneumothorax    • Seizures     when he was a child    • Smoking     3/4 pack a day   • Tobacco abuse    • Underweight        Allergies   Allergen Reactions   • Medrol [Methylprednisolone] Other (See Comments)     Patient states he gets violent on this medication        Past Surgical History:   Procedure Laterality Date   • LUNG LOBECTOMY Right 02/01/2017   • THORACOSCOPY N/A 2/3/2017    Procedure: BRONCHOSCOPY, THORACOSCOPY RIGHT CHEST,  WEDGE RESECTION RIGHT UPPER AND LOWER LOBE OF LUNG, MECHANICAL PLEURODESIS;  Surgeon: Kyle Heath MD;  Location: [facility];  Service:    • TYMPANOSTOMY  05/12/2016    Recorded       Family  "History   Problem Relation Age of Onset   • Heart attack Father    • Stroke Father    • No Known Problems Mother    • Cancer Maternal Grandmother      breast   • Breast cancer Maternal Grandmother    • No Known Problems Daughter    • Prostate cancer Neg Hx    • Colon cancer Neg Hx    • Alcohol abuse Neg Hx    • Drug abuse Neg Hx    • Diabetes Neg Hx    • Thyroid cancer Neg Hx        Social History     Social History   • Marital status: Single     Spouse name: N/A   • Number of children: N/A   • Years of education: N/A     Social History Main Topics   • Smoking status: Current Every Day Smoker     Packs/day: 0.50     Years: 17.00     Types: Cigarettes   • Smokeless tobacco: Former User     Types: Chew   • Alcohol use No   • Drug use: No   • Sexual activity: Yes     Partners: Female     Other Topics Concern   • None     Social History Narrative    Trying for disability. single       Prior to Admission medications    Medication Sig Start Date End Date Taking? Authorizing Provider   naproxen (NAPROSYN) 500 MG tablet Take 1 tablet by mouth 2 (Two) Times a Day As Needed for Mild Pain  for up to 5 days. 2/17/18 2/22/18 Yes JANESSA Graf   albuterol (PROVENTIL HFA;VENTOLIN HFA) 108 (90 BASE) MCG/ACT inhaler Inhale 1 puff Every 4 (Four) Hours As Needed for Wheezing. 6/20/17   Dar Proctor MD   cyclobenzaprine (FLEXERIL) 10 MG tablet Take 1 tablet by mouth 3 (Three) Times a Day As Needed for Muscle Spasms. 10/30/17   JANESSA Lyles   diclofenac (VOLTAREN) 75 MG EC tablet Take 1 tablet by mouth 2 (Two) Times a Day. 10/30/17   JANESSA Lyles   diclofenac (VOLTAREN) 75 MG EC tablet Take 1 tablet by mouth 2 (Two) Times a Day. 11/17/17   Travis Cordova PA-C       /79  Pulse 103  Temp 97.8 °F (36.6 °C)  Resp 16  Ht 193 cm (76\")  Wt 68 kg (150 lb)  SpO2 100%  BMI 18.26 kg/m2    Objective   Physical Exam   Constitutional: He is oriented to person, place, and time. He appears " well-developed and well-nourished.   HENT:   Head: Normocephalic and atraumatic.   Eyes: Conjunctivae and EOM are normal. Pupils are equal, round, and reactive to light.   Neck: Normal range of motion. Neck supple.   Cardiovascular: Normal rate, regular rhythm, normal heart sounds and intact distal pulses.    Pulmonary/Chest: Effort normal and breath sounds normal.   Abdominal: Soft. Bowel sounds are normal.   Musculoskeletal:   Right knee: no soft tissue swelling noted. Tenderness on palpation of lateral aspect of right knee. Increased pain with extension. dtrs intact. Pedal pulses palp   Neurological: He is alert and oriented to person, place, and time. He has normal reflexes.   Skin: Skin is warm and dry.   Psychiatric: He has a normal mood and affect. His behavior is normal. Judgment and thought content normal.   Nursing note and vitals reviewed.      Procedures         Lab Results (last 24 hours)     ** No results found for the last 24 hours. **          XR Knee 3 View Right   ED Interpretation   Negative for anything acute.      Final Result          ED Course  ED Course   Comment By Time   Pt failed to reveal that he was seen in the er for same symptoms 2 days ago and had xrays and was placed in knee brace, which he does not have on at this time. Will prescribe mobic and will have follow up with orthop for further Linda Montoya, JANESSA 02/20 1310          MDM  Number of Diagnoses or Management Options  Knee strain, right, sequela: minor     Amount and/or Complexity of Data Reviewed  Tests in the radiology section of CPT®: ordered and reviewed  Independent visualization of images, tracings, or specimens: yes    Patient Progress  Patient progress: stable      Final diagnoses:   Knee strain, right, sequela          JANESSA Willingham  02/20/18 1404     Home

## 2023-05-03 RX ORDER — METHOCARBAMOL 750 MG/1
TABLET, FILM COATED ORAL
Qty: 30 TABLET | Refills: 0 | Status: SHIPPED | OUTPATIENT
Start: 2023-05-03

## 2023-05-03 NOTE — TELEPHONE ENCOUNTER
Pt needs an appointment     Rx Refill Note  Requested Prescriptions     Pending Prescriptions Disp Refills   • methocarbamol (ROBAXIN) 750 MG tablet [Pharmacy Med Name: METHOCARBAMOL 750MG TABLETS] 120 tablet 2     Sig: TAKE 1 TABLET BY MOUTH FOUR TIMES DAILY AS NEEDED FOR MUSCLE SPASMS      Last office visit with prescribing clinician: 10/20/2022    Next office visit with prescribing clinician: Visit date not found                         Would you like a call back once the refill request has been completed: [] Yes [] No    If the office needs to give you a call back, can they leave a voicemail: [] Yes [] No    Anuradha Ramírez MA  05/03/23, 16:15 CDT

## 2023-05-22 ENCOUNTER — OFFICE VISIT (OUTPATIENT)
Dept: FAMILY MEDICINE CLINIC | Facility: CLINIC | Age: 38
End: 2023-05-22

## 2023-05-22 ENCOUNTER — HOSPITAL ENCOUNTER (EMERGENCY)
Age: 38
Discharge: LEFT BEFORE DISCHARGE | End: 2023-05-22
Payer: MEDICAID

## 2023-05-22 VITALS
RESPIRATION RATE: 18 BRPM | DIASTOLIC BLOOD PRESSURE: 73 MMHG | HEIGHT: 78 IN | HEART RATE: 103 BPM | OXYGEN SATURATION: 99 % | WEIGHT: 188 LBS | BODY MASS INDEX: 21.75 KG/M2 | SYSTOLIC BLOOD PRESSURE: 106 MMHG | TEMPERATURE: 98.6 F

## 2023-05-22 VITALS
HEART RATE: 101 BPM | TEMPERATURE: 98 F | DIASTOLIC BLOOD PRESSURE: 84 MMHG | WEIGHT: 188 LBS | RESPIRATION RATE: 18 BRPM | OXYGEN SATURATION: 98 % | BODY MASS INDEX: 21.73 KG/M2 | SYSTOLIC BLOOD PRESSURE: 108 MMHG

## 2023-05-22 DIAGNOSIS — E86.0 DEHYDRATION: ICD-10-CM

## 2023-05-22 DIAGNOSIS — R10.84 GENERALIZED ABDOMINAL PAIN: ICD-10-CM

## 2023-05-22 DIAGNOSIS — Z71.6 ENCOUNTER FOR SMOKING CESSATION COUNSELING: ICD-10-CM

## 2023-05-22 DIAGNOSIS — R07.9 CHEST PAIN, UNSPECIFIED TYPE: Primary | ICD-10-CM

## 2023-05-22 PROCEDURE — 99214 OFFICE O/P EST MOD 30 MIN: CPT | Performed by: NURSE PRACTITIONER

## 2023-05-22 PROCEDURE — 93005 ELECTROCARDIOGRAM TRACING: CPT | Performed by: EMERGENCY MEDICINE

## 2023-05-22 PROCEDURE — 93000 ELECTROCARDIOGRAM COMPLETE: CPT | Performed by: NURSE PRACTITIONER

## 2023-05-22 RX ORDER — NICOTINE 21 MG/24HR
1 PATCH, TRANSDERMAL 24 HOURS TRANSDERMAL EVERY 24 HOURS
Qty: 30 PATCH | Refills: 0 | Status: SHIPPED | OUTPATIENT
Start: 2023-05-22
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
## 2023-05-23 LAB
EKG P AXIS: 65 DEGREES
EKG P-R INTERVAL: 180 MS
EKG Q-T INTERVAL: 336 MS
EKG QRS DURATION: 80 MS
EKG QTC CALCULATION (BAZETT): 392 MS
EKG T AXIS: 76 DEGREES

## 2023-05-23 PROCEDURE — 93010 ELECTROCARDIOGRAM REPORT: CPT | Performed by: INTERNAL MEDICINE

## 2023-06-02 ENCOUNTER — HOSPITAL ENCOUNTER (EMERGENCY)
Facility: HOSPITAL | Age: 38
Discharge: HOME | End: 2023-06-02
Attending: FAMILY MEDICINE
Payer: COMMERCIAL

## 2023-06-02 ENCOUNTER — APPOINTMENT (OUTPATIENT)
Dept: GENERAL RADIOLOGY | Facility: HOSPITAL | Age: 38
End: 2023-06-02
Payer: COMMERCIAL

## 2023-06-02 VITALS
SYSTOLIC BLOOD PRESSURE: 110 MMHG | BODY MASS INDEX: 22.2 KG/M2 | DIASTOLIC BLOOD PRESSURE: 78 MMHG | TEMPERATURE: 98.4 F | WEIGHT: 188 LBS | OXYGEN SATURATION: 100 % | HEART RATE: 65 BPM | RESPIRATION RATE: 16 BRPM | HEIGHT: 77 IN

## 2023-06-02 DIAGNOSIS — R07.9 CHEST PAIN, UNSPECIFIED TYPE: Primary | ICD-10-CM

## 2023-06-02 LAB
ALBUMIN SERPL-MCNC: 4.3 G/DL (ref 3.5–5.2)
ALBUMIN/GLOB SERPL: 1.5 G/DL
ALP SERPL-CCNC: 104 U/L (ref 39–117)
ALT SERPL W P-5'-P-CCNC: 18 U/L (ref 1–41)
AMPHET+METHAMPHET UR QL: NEGATIVE
AMPHETAMINES UR QL: NEGATIVE
ANION GAP SERPL CALCULATED.3IONS-SCNC: 8 MMOL/L (ref 5–15)
AST SERPL-CCNC: 17 U/L (ref 1–40)
BARBITURATES UR QL SCN: NEGATIVE
BASOPHILS # BLD AUTO: 0.04 10*3/MM3 (ref 0–0.2)
BASOPHILS NFR BLD AUTO: 0.6 % (ref 0–1.5)
BENZODIAZ UR QL SCN: NEGATIVE
BILIRUB SERPL-MCNC: 0.3 MG/DL (ref 0–1.2)
BUN SERPL-MCNC: 22 MG/DL (ref 6–20)
BUN/CREAT SERPL: 20.6 (ref 7–25)
BUPRENORPHINE SERPL-MCNC: NEGATIVE NG/ML
CALCIUM SPEC-SCNC: 9.2 MG/DL (ref 8.6–10.5)
CANNABINOIDS SERPL QL: NEGATIVE
CHLORIDE SERPL-SCNC: 108 MMOL/L (ref 98–107)
CO2 SERPL-SCNC: 26 MMOL/L (ref 22–29)
COCAINE UR QL: NEGATIVE
CREAT SERPL-MCNC: 1.07 MG/DL (ref 0.76–1.27)
D DIMER PPP FEU-MCNC: 0.29 MCGFEU/ML (ref 0–0.5)
DEPRECATED RDW RBC AUTO: 42.7 FL (ref 37–54)
EGFRCR SERPLBLD CKD-EPI 2021: 91.7 ML/MIN/1.73
EOSINOPHIL # BLD AUTO: 0.14 10*3/MM3 (ref 0–0.4)
EOSINOPHIL NFR BLD AUTO: 1.9 % (ref 0.3–6.2)
ERYTHROCYTE [DISTWIDTH] IN BLOOD BY AUTOMATED COUNT: 12.4 % (ref 12.3–15.4)
FENTANYL UR-MCNC: NEGATIVE NG/ML
GEN 5 2HR TROPONIN T REFLEX: <6 NG/L
GLOBULIN UR ELPH-MCNC: 2.8 GM/DL
GLUCOSE SERPL-MCNC: 103 MG/DL (ref 65–99)
HCT VFR BLD AUTO: 45.4 % (ref 37.5–51)
HGB BLD-MCNC: 14.9 G/DL (ref 13–17.7)
HOLD SPECIMEN: NORMAL
HOLD SPECIMEN: NORMAL
IMM GRANULOCYTES # BLD AUTO: 0.04 10*3/MM3 (ref 0–0.05)
IMM GRANULOCYTES NFR BLD AUTO: 0.6 % (ref 0–0.5)
LYMPHOCYTES # BLD AUTO: 1.57 10*3/MM3 (ref 0.7–3.1)
LYMPHOCYTES NFR BLD AUTO: 21.7 % (ref 19.6–45.3)
MCH RBC QN AUTO: 30.5 PG (ref 26.6–33)
MCHC RBC AUTO-ENTMCNC: 32.8 G/DL (ref 31.5–35.7)
MCV RBC AUTO: 92.8 FL (ref 79–97)
METHADONE UR QL SCN: NEGATIVE
MONOCYTES # BLD AUTO: 0.56 10*3/MM3 (ref 0.1–0.9)
MONOCYTES NFR BLD AUTO: 7.7 % (ref 5–12)
NEUTROPHILS NFR BLD AUTO: 4.9 10*3/MM3 (ref 1.7–7)
NEUTROPHILS NFR BLD AUTO: 67.5 % (ref 42.7–76)
NRBC BLD AUTO-RTO: 0 /100 WBC (ref 0–0.2)
OPIATES UR QL: NEGATIVE
OXYCODONE UR QL SCN: NEGATIVE
PCP UR QL SCN: NEGATIVE
PLATELET # BLD AUTO: 227 10*3/MM3 (ref 140–450)
PMV BLD AUTO: 10 FL (ref 6–12)
POTASSIUM SERPL-SCNC: 3.8 MMOL/L (ref 3.5–5.2)
PROPOXYPH UR QL: NEGATIVE
PROT SERPL-MCNC: 7.1 G/DL (ref 6–8.5)
RBC # BLD AUTO: 4.89 10*6/MM3 (ref 4.14–5.8)
SODIUM SERPL-SCNC: 142 MMOL/L (ref 136–145)
TRICYCLICS UR QL SCN: NEGATIVE
TROPONIN T DELTA: NORMAL
TROPONIN T SERPL HS-MCNC: 8 NG/L
WBC NRBC COR # BLD: 7.25 10*3/MM3 (ref 3.4–10.8)
WHOLE BLOOD HOLD COAG: NORMAL
WHOLE BLOOD HOLD SPECIMEN: NORMAL

## 2023-06-02 PROCEDURE — 93005 ELECTROCARDIOGRAM TRACING: CPT | Performed by: FAMILY MEDICINE

## 2023-06-02 PROCEDURE — 71045 X-RAY EXAM CHEST 1 VIEW: CPT

## 2023-06-02 PROCEDURE — 99285 EMERGENCY DEPT VISIT HI MDM: CPT

## 2023-06-02 PROCEDURE — 84484 ASSAY OF TROPONIN QUANT: CPT

## 2023-06-02 PROCEDURE — 93005 ELECTROCARDIOGRAM TRACING: CPT

## 2023-06-02 PROCEDURE — 80307 DRUG TEST PRSMV CHEM ANLYZR: CPT | Performed by: FAMILY MEDICINE

## 2023-06-02 PROCEDURE — 85379 FIBRIN DEGRADATION QUANT: CPT | Performed by: FAMILY MEDICINE

## 2023-06-02 PROCEDURE — 36415 COLL VENOUS BLD VENIPUNCTURE: CPT

## 2023-06-02 PROCEDURE — 99284 EMERGENCY DEPT VISIT MOD MDM: CPT

## 2023-06-02 PROCEDURE — 84484 ASSAY OF TROPONIN QUANT: CPT | Performed by: FAMILY MEDICINE

## 2023-06-02 PROCEDURE — 80053 COMPREHEN METABOLIC PANEL: CPT

## 2023-06-02 PROCEDURE — 85025 COMPLETE CBC W/AUTO DIFF WBC: CPT

## 2023-06-02 RX ORDER — NAPROXEN 500 MG/1
TABLET ORAL
Qty: 60 TABLET | Refills: 2 | Status: SHIPPED | OUTPATIENT
Start: 2023-06-02

## 2023-06-02 RX ORDER — SODIUM CHLORIDE 0.9 % (FLUSH) 0.9 %
10 SYRINGE (ML) INJECTION AS NEEDED
Status: DISCONTINUED | OUTPATIENT
Start: 2023-06-02 | End: 2023-06-02 | Stop reason: HOSPADM

## 2023-06-02 RX ORDER — ASPIRIN 81 MG/1
324 TABLET, CHEWABLE ORAL ONCE
Status: COMPLETED | OUTPATIENT
Start: 2023-06-02 | End: 2023-06-02

## 2023-06-02 RX ADMIN — ASPIRIN 324 MG: 81 TABLET, CHEWABLE ORAL at 15:33

## 2023-06-02 NOTE — ED PROVIDER NOTES
Subjective   History of Present Illness  38-year-old male presents emergency room with complaints of left-sided neck pain rating down to his chest.  Patient states that sharp stabbing type pain.  Patient states this also can feel little short of breath.  Patient has no fevers or chills.  Patient has no cough.  Patient states that he has no nausea or vomiting.  Patient denies any headache or dizziness.  Patient states that he did have some problems with his speech is having trouble getting his words out while he was in the waiting room.  Patient states that that has resolved at this time.    Review of Systems   Cardiovascular:  Positive for chest pain.   All other systems reviewed and are negative.    Past Medical History:   Diagnosis Date    Back pain     Chest pain     Claustrophobia     COPD (chronic obstructive pulmonary disease)     Emphysema of lung     Lung disease     Marfan syndrome     Pneumothorax     Seizures     when he was a child     Seizures     Smoking     3/4 pack a day    Tobacco abuse     Underweight        Allergies   Allergen Reactions    Calcium Channel Blockers Other (See Comments)     Do not use in marfan    Ciprofloxacin Other (See Comments)     Do not use fluoroquinolones in marfan    Medrol [Methylprednisolone] Other (See Comments)     Patient states he gets violent on this medication     Wasp Venom Swelling    Diclofenac Irritability       Past Surgical History:   Procedure Laterality Date    HERNIA REPAIR      LUNG LOBECTOMY Right 02/01/2017    LUNG REMOVAL, TOTAL Right 2015    THORACOSCOPY N/A 2/3/2017    Procedure: BRONCHOSCOPY, THORACOSCOPY RIGHT CHEST,  WEDGE RESECTION RIGHT UPPER AND LOWER LOBE OF LUNG, MECHANICAL PLEURODESIS;  Surgeon: Kyle Heath MD;  Location: [facility];  Service:     TYMPANOSTOMY  05/12/2016    Recorded       Family History   Problem Relation Age of Onset    No Known Problems Mother     Heart attack Father     Stroke Father     No Known Problems Sister      No Known Problems Daughter     Cancer Maternal Grandmother         breast    Breast cancer Maternal Grandmother     Prostate cancer Neg Hx     Colon cancer Neg Hx     Alcohol abuse Neg Hx     Drug abuse Neg Hx     Diabetes Neg Hx     Thyroid cancer Neg Hx        Social History     Socioeconomic History    Marital status:    Tobacco Use    Smoking status: Every Day     Packs/day: 0.50     Years: 25.00     Pack years: 12.50     Types: Cigarettes     Start date: 1997    Smokeless tobacco: Never   Vaping Use    Vaping Use: Never used   Substance and Sexual Activity    Alcohol use: Not Currently    Drug use: Not Currently     Types: Amphetamines, Methamphetamines, Benzodiazepines, Marijuana, Oxycodone, Hydrocodone    Sexual activity: Yes     Partners: Female           Objective   Physical Exam  Vitals and nursing note reviewed.   Constitutional:       Appearance: He is well-developed.   HENT:      Head: Normocephalic and atraumatic.   Eyes:      Extraocular Movements: Extraocular movements intact.      Pupils: Pupils are equal, round, and reactive to light.   Cardiovascular:      Rate and Rhythm: Normal rate and regular rhythm.      Heart sounds: Normal heart sounds.   Pulmonary:      Effort: Pulmonary effort is normal.      Breath sounds: Normal breath sounds.   Chest:      Chest wall: Tenderness present.   Musculoskeletal:      Cervical back: Normal range of motion and neck supple. Muscular tenderness present.      Right lower leg: No tenderness. No edema.      Left lower leg: No tenderness. No edema.   Skin:     General: Skin is warm and dry.   Neurological:      General: No focal deficit present.      Mental Status: He is alert and oriented to person, place, and time.      Cranial Nerves: No cranial nerve deficit.      Motor: No weakness.   Psychiatric:         Mood and Affect: Mood normal.         Behavior: Behavior normal.       Procedures           ED Course  ED Course as of 06/02/23 1917 Fri Jun 02, 2023    1727 EKG rate 71  Normal sinus rhythm  No STEMI [RP]      ED Course User Index  [RP] Tim Marie MD                                         Lab Results (last 24 hours)       Procedure Component Value Units Date/Time    CBC & Differential [[id number]]  (Abnormal) Collected: 06/02/23 1415    Specimen: Blood Updated: 06/02/23 1424    Narrative:      The following orders were created for panel order CBC & Differential.  Procedure                               Abnormality         Status                     ---------                               -----------         ------                     CBC Auto Differential[[id number]]        Abnormal            Final result                 Please view results for these tests on the individual orders.    Comprehensive Metabolic Panel [[id number]]  (Abnormal) Collected: 06/02/23 1415    Specimen: Blood Updated: 06/02/23 1446     Glucose 103 mg/dL      BUN 22 mg/dL      Creatinine 1.07 mg/dL      Sodium 142 mmol/L      Potassium 3.8 mmol/L      Chloride 108 mmol/L      CO2 26.0 mmol/L      Calcium 9.2 mg/dL      Total Protein 7.1 g/dL      Albumin 4.3 g/dL      ALT (SGPT) 18 U/L      AST (SGOT) 17 U/L      Alkaline Phosphatase 104 U/L      Total Bilirubin 0.3 mg/dL      Globulin 2.8 gm/dL      A/G Ratio 1.5 g/dL      BUN/Creatinine Ratio 20.6     Anion Gap 8.0 mmol/L      eGFR 91.7 mL/min/1.73     Narrative:      GFR Normal >60  Chronic Kidney Disease <60  Kidney Failure <15      High Sensitivity Troponin T [[id number]]  (Normal) Collected: 06/02/23 1415    Specimen: Blood Updated: 06/02/23 1443     HS Troponin T 8 ng/L     Narrative:      High Sensitive Troponin T Reference Range:  <10.0 ng/L- Negative Female for AMI  <15.0 ng/L- Negative Male for AMI  >=10 - Abnormal Female indicating possible myocardial injury.  >=15 - Abnormal Male indicating possible myocardial injury.   Clinicians would have to utilize clinical acumen, EKG, Troponin, and serial changes to determine if it is an  "Acute Myocardial Infarction or myocardial injury due to an underlying chronic condition.         CBC Auto Differential [[id number]]  (Abnormal) Collected: 06/02/23 1415    Specimen: Blood Updated: 06/02/23 1424     WBC 7.25 10*3/mm3      RBC 4.89 10*6/mm3      Hemoglobin 14.9 g/dL      Hematocrit 45.4 %      MCV 92.8 fL      MCH 30.5 pg      MCHC 32.8 g/dL      RDW 12.4 %      RDW-SD 42.7 fl      MPV 10.0 fL      Platelets 227 10*3/mm3      Neutrophil % 67.5 %      Lymphocyte % 21.7 %      Monocyte % 7.7 %      Eosinophil % 1.9 %      Basophil % 0.6 %      Immature Grans % 0.6 %      Neutrophils, Absolute 4.90 10*3/mm3      Lymphocytes, Absolute 1.57 10*3/mm3      Monocytes, Absolute 0.56 10*3/mm3      Eosinophils, Absolute 0.14 10*3/mm3      Basophils, Absolute 0.04 10*3/mm3      Immature Grans, Absolute 0.04 10*3/mm3      nRBC 0.0 /100 WBC     D-dimer, Quantitative [[id number]]  (Normal) Collected: 06/02/23 1415    Specimen: Blood Updated: 06/02/23 1550     D-Dimer, Quantitative 0.29 MCGFEU/mL     Narrative:      According to the assay 's published package insert, a normal (<0.50 MCGFEU/mL) D-dimer result in conjunction with a non-high clinical probability assessment, excludes deep vein thrombosis (DVT) and pulmonary embolism (PE) with high sensitivity.    D-dimer values increase with age and this can make VTE exclusion of an older population difficult. To address this, the [employer], based on best available evidence and recent guidelines, recommends that clinicians use age-adjusted D-dimer thresholds in patients greater than 50 years of age with: a) a low probability of PE who do not meet all Pulmonary Embolism Rule Out Criteria, or b) in those with intermediate probability of PE.   The formula for an age-adjusted D-dimer cut-off is \"age/100\".  For example, a 60 year old patient would have an age-adjusted cut-off of 0.60 MCGFEU/mL and an 80 year old 0.80 MCGFEU/mL.    Urine Drug " Screen - Urine, Clean Catch [[id number]]  (Normal) Collected: 06/02/23 1618    Specimen: Urine, Clean Catch Updated: 06/02/23 1636     THC, Screen, Urine Negative     Phencyclidine (PCP), Urine Negative     Cocaine Screen, Urine Negative     Methamphetamine, Ur Negative     Opiate Screen Negative     Amphetamine Screen, Urine Negative     Benzodiazepine Screen, Urine Negative     Tricyclic Antidepressants Screen Negative     Methadone Screen, Urine Negative     Barbiturates Screen, Urine Negative     Oxycodone Screen, Urine Negative     Propoxyphene Screen Negative     Buprenorphine, Screen, Urine Negative    Narrative:      Cutoff For Drugs Screened:    Amphetamines               500 ng/ml  Barbiturates               200 ng/ml  Benzodiazepines            150 ng/ml  Cocaine                    150 ng/ml  Methadone                  200 ng/ml  Opiates                    100 ng/ml  Phencyclidine               25 ng/ml  THC                            50 ng/ml  Methamphetamine            500 ng/ml  Tricyclic Antidepressants  300 ng/ml  Oxycodone                  100 ng/ml  Propoxyphene               300 ng/ml  Buprenorphine               10 ng/ml    The normal value for all drugs tested is negative. This report includes unconfirmed screening results, with the cutoff values listed, to be used for medical treatment purposes only.  Unconfirmed results must not be used for non-medical purposes such as employment or legal testing.  Clinical consideration should be applied to any drug of abuse test, particularly when unconfirmed results are used.      Fentanyl, Urine - Urine, Clean Catch [[id number]]  (Normal) Collected: 06/02/23 1618    Specimen: Urine, Clean Catch Updated: 06/02/23 1652     Fentanyl, Urine Negative    Narrative:      Negative Threshold:      Fentanyl 5 ng/mL     The normal value for the drug tested is negative. This report includes final unconfirmed screening results to be used for medical treatment purposes  only. Unconfirmed results must not be used for non-medical purposes such as employment or legal testing. Clinical consideration should be applied to any drug of abuse test, particularly when unconfirmed results are used.           High Sensitivity Troponin T 2Hr [[id number]] Collected: 06/02/23 1621    Specimen: Blood Updated: 06/02/23 1700     HS Troponin T <6 ng/L      Troponin T Delta --     Comment: Unable to calculate.       Narrative:      High Sensitive Troponin T Reference Range:  <10.0 ng/L- Negative Female for AMI  <15.0 ng/L- Negative Male for AMI  >=10 - Abnormal Female indicating possible myocardial injury.  >=15 - Abnormal Male indicating possible myocardial injury.   Clinicians would have to utilize clinical acumen, EKG, Troponin, and serial changes to determine if it is an Acute Myocardial Infarction or myocardial injury due to an underlying chronic condition.                 XR Chest 1 View   Final Result   1. Similar streaky LEFT basilar opacity, better characterized on prior   CT to 20 04/20/2023.   2. Similar chronic lung disease.   This report was finalized on 06/02/2023 15:05 by Dr Alicia Cheng MD.          Medications   sodium chloride 0.9 % flush 10 mL (has no administration in time range)   aspirin chewable tablet 324 mg (324 mg Oral Given 6/2/23 1533)       Medical Decision Making  I had a discussion with the patient/family regarding diagnosis, diagnostic results, treatment plan, and medications.  The patient/family indicated understanding of these instructions.  I spent adequate time at the bedside prior to discharge necessary to discuss the aftercare instructions, giving patient education, providing explanations of the results of our evaluations/findings, and my decision making to assure that the patient/family understand the plan of care.  Time was allotted to answer questions at that time and throughout the ED course.  Emphasis was placed on timely follow-up after discharge.  I also  discussed the potential for the development of an acute emergent condition requiring further evaluation, return to the ER, admission, or even surgical intervention. I discussed that we found nothing during the visit today indicating the need for further ER workup at this time, admission to the hospital, or the presence of an acute unstable medical condition.  I encouraged the patient to return to the emergency department immediately for ANY concerns, worsening, new complaints, or if symptoms persist and unable to seek follow-up in a timely fashion.  The patient/family expressed understanding and agreement with this plan.      EMR Dragon/translation disclaimer: Much of this encounter note is an electronic transcription/translation of spoken language to printed text. The electronic translation of spoken language may be erroneous, or at times, nonsensical words or phrases may be inadvertently transcribed. Although I have reviewed the note for such errors, some may still exist.       Problems Addressed:  Chest pain, unspecified type: complicated acute illness or injury    Amount and/or Complexity of Data Reviewed  Labs: ordered. Decision-making details documented in ED Course.  Radiology: ordered. Decision-making details documented in ED Course.  ECG/medicine tests: ordered. Decision-making details documented in ED Course.    Risk  OTC drugs.  Prescription drug management.        Final diagnoses:   Chest pain, unspecified type       ED Disposition  ED Disposition       ED Disposition   Discharge    Condition   Stable    Comment   --               Cristy Schmidt, APRN  [address] [address] [phone]    Schedule an appointment as soon as possible for a visit       [facility] Emergency Department  [address] [place] [id number] [phone]    As needed, If symptoms worsen         Medication List      No changes were made to your prescriptions during this visit.             Tim Marie MD  06/02/23 1914

## 2023-06-02 NOTE — Clinical Note
[facility] EMERGENCY DEPARTMENT  [facility] [place][note 2][address] [facility] [address]  Phone: [phone]    Ross Platt was seen and treated in our emergency department on 6/2/2023.  He may return to work on 06/04/2023.         Thank you for choosing [facility].    Tim Marie MD

## 2023-06-02 NOTE — TELEPHONE ENCOUNTER
Rx Refill Note  Requested Prescriptions     Pending Prescriptions Disp Refills    naproxen (NAPROSYN) 500 MG tablet [Pharmacy Med Name: NAPROXEN 500MG TABLETS] 60 tablet 2     Sig: TAKE 1 TABLET BY MOUTH TWICE DAILY AS NEEDED FOR MODERATE PAIN      Last office visit with prescribing clinician: 10/20/2022   Last telemedicine visit with prescribing clinician: 1/17/2023   Next office visit with prescribing clinician: Visit date not found                         Would you like a call back once the refill request has been completed: [] Yes [] No    If the office needs to give you a call back, can they leave a voicemail: [] Yes [] No    Eveline Reich LPN  06/02/23, 11:21 CDT

## 2023-06-03 LAB
QT INTERVAL: 358 MS
QT INTERVAL: 370 MS
QTC INTERVAL: 402 MS
QTC INTERVAL: 440 MS

## 2023-06-28 ENCOUNTER — HOSPITAL ENCOUNTER (EMERGENCY)
Age: 38
Discharge: HOME | End: 2023-06-28
Attending: EMERGENCY MEDICINE
Payer: MEDICAID

## 2023-06-28 VITALS
RESPIRATION RATE: 16 BRPM | WEIGHT: 200 LBS | TEMPERATURE: 98.1 F | DIASTOLIC BLOOD PRESSURE: 75 MMHG | SYSTOLIC BLOOD PRESSURE: 142 MMHG | OXYGEN SATURATION: 99 % | HEART RATE: 77 BPM | HEIGHT: 76 IN | BODY MASS INDEX: 24.36 KG/M2

## 2023-06-28 DIAGNOSIS — S81.811A LACERATION OF RIGHT LOWER EXTREMITY, INITIAL ENCOUNTER: Primary | ICD-10-CM

## 2023-06-28 PROCEDURE — 90471 IMMUNIZATION ADMIN: CPT | Performed by: EMERGENCY MEDICINE

## 2023-06-28 PROCEDURE — 90715 TDAP VACCINE 7 YRS/> IM: CPT | Performed by: EMERGENCY MEDICINE

## 2023-06-28 PROCEDURE — 99284 EMERGENCY DEPT VISIT MOD MDM: CPT

## 2023-06-28 PROCEDURE — 2500000003 HC RX 250 WO HCPCS: Performed by: EMERGENCY MEDICINE

## 2023-06-28 PROCEDURE — 12002 RPR S/N/AX/GEN/TRNK2.6-7.5CM: CPT

## 2023-06-28 PROCEDURE — 6360000002 HC RX W HCPCS: Performed by: EMERGENCY MEDICINE

## 2023-06-28 RX ORDER — LIDOCAINE HYDROCHLORIDE AND EPINEPHRINE 10; 10 MG/ML; UG/ML
20 INJECTION, SOLUTION INFILTRATION; PERINEURAL ONCE
Status: COMPLETED | OUTPATIENT
Start: 2023-06-28 | End: 2023-06-28

## 2023-06-28 RX ADMIN — LIDOCAINE HYDROCHLORIDE,EPINEPHRINE BITARTRATE 20 ML: 10; .01 INJECTION, SOLUTION INFILTRATION; PERINEURAL at 20:56

## 2023-06-28 RX ADMIN — TETANUS TOXOID, REDUCED DIPHTHERIA TOXOID AND ACELLULAR PERTUSSIS VACCINE, ADSORBED 0.5 ML: 5; 2.5; 8; 8; 2.5 SUSPENSION INTRAMUSCULAR at 20:56

## 2023-06-28 ASSESSMENT — ENCOUNTER SYMPTOMS
RESPIRATORY NEGATIVE: 1
EYES NEGATIVE: 1
GASTROINTESTINAL NEGATIVE: 1

## 2023-06-28 ASSESSMENT — PAIN - FUNCTIONAL ASSESSMENT
PAIN_FUNCTIONAL_ASSESSMENT: NONE - DENIES PAIN
PAIN_FUNCTIONAL_ASSESSMENT: NONE - DENIES PAIN

## 2023-06-28 ASSESSMENT — PAIN DESCRIPTION - ORIENTATION: ORIENTATION: RIGHT

## 2023-06-28 ASSESSMENT — PAIN SCALES - GENERAL
PAINLEVEL_OUTOF10: 8
PAINLEVEL_OUTOF10: 0

## 2023-06-28 ASSESSMENT — PAIN DESCRIPTION - LOCATION: LOCATION: LEG

## 2023-06-28 ASSESSMENT — PAIN DESCRIPTION - DESCRIPTORS: DESCRIPTORS: ACHING

## 2023-07-27 ENCOUNTER — OFFICE VISIT (OUTPATIENT)
Dept: CARDIOLOGY | Facility: CLINIC | Age: 38
End: 2023-07-27
Payer: COMMERCIAL

## 2023-07-27 VITALS
RESPIRATION RATE: 18 BRPM | HEART RATE: 110 BPM | SYSTOLIC BLOOD PRESSURE: 102 MMHG | OXYGEN SATURATION: 98 % | HEIGHT: 77 IN | WEIGHT: 188 LBS | DIASTOLIC BLOOD PRESSURE: 65 MMHG | BODY MASS INDEX: 22.2 KG/M2

## 2023-07-27 DIAGNOSIS — I25.9 CHEST PAIN DUE TO MYOCARDIAL ISCHEMIA, UNSPECIFIED ISCHEMIC CHEST PAIN TYPE: ICD-10-CM

## 2023-07-27 DIAGNOSIS — F17.200 TOBACCO USE DISORDER: ICD-10-CM

## 2023-07-27 DIAGNOSIS — F17.200 SMOKING: Chronic | ICD-10-CM

## 2023-07-27 DIAGNOSIS — J43.9 PULMONARY EMPHYSEMA, UNSPECIFIED EMPHYSEMA TYPE: ICD-10-CM

## 2023-07-27 DIAGNOSIS — Z87.09 HISTORY OF PNEUMOTHORAX: ICD-10-CM

## 2023-07-27 DIAGNOSIS — R09.1 PLEURISY: ICD-10-CM

## 2023-07-27 DIAGNOSIS — Q87.40 MARFAN SYNDROME: ICD-10-CM

## 2023-07-27 DIAGNOSIS — R07.89 CHEST PAIN, ATYPICAL: Primary | ICD-10-CM

## 2023-07-27 PROCEDURE — 99214 OFFICE O/P EST MOD 30 MIN: CPT | Performed by: EMERGENCY MEDICINE

## 2023-07-27 RX ORDER — NITROGLYCERIN 0.4 MG/1
TABLET SUBLINGUAL
Qty: 100 TABLET | Refills: 11 | Status: SHIPPED | OUTPATIENT
Start: 2023-07-27

## 2023-07-27 RX ORDER — COLCHICINE 0.6 MG/1
0.6 TABLET ORAL DAILY
OUTPATIENT
Start: 2023-07-27

## 2023-07-27 RX ORDER — METOPROLOL SUCCINATE 25 MG/1
25 TABLET, EXTENDED RELEASE ORAL DAILY
Qty: 90 TABLET | Refills: 3 | Status: SHIPPED | OUTPATIENT
Start: 2023-07-27

## 2023-07-27 RX ORDER — IBUPROFEN 800 MG/1
800 TABLET ORAL EVERY 6 HOURS PRN
Qty: 90 TABLET | Refills: 0 | Status: SHIPPED | OUTPATIENT
Start: 2023-07-27

## 2023-07-27 RX ORDER — COLCHICINE 0.6 MG/1
0.6 TABLET ORAL DAILY
Qty: 30 TABLET | Refills: 0 | Status: SHIPPED | OUTPATIENT
Start: 2023-07-27

## 2023-07-27 NOTE — PROGRESS NOTES
Subjective:     Encounter Date:07/27/2023      Patient ID: Ross Platt is a 38 y.o. male.    Chief Complaint:  History of Present Illness  Ross Platt is a 38-year-old male who presents today for a follow up appointment.    The patient reports he has not been feeling too well. He states he has still been experiencing worsening chest pains. He reports the medication he was prescribed to help treat it was causing headaches. He notes he has not taken it in about 2 weeks. He states his chest was hurting so bad last night that it bothered him all the way up until he ate dinner. He reports it made his left arm and leg go numb. He states he feels like he is going to die. He reports his left arm is completely numb right now. He has had a stress test performed and wore a Holter monitor. He has never had a coronary CT done. He reports the chest pain feels like a tight, stabbing pain. He notes he is also feeling palpitations. He states vision in his left eye also gets blurry when his arm goes numb. He currently has a chest tube due to having pneumothorax. He reports it does feel like he still has pleurisy. He has never been on any medications for it. He states he has discontinued taking isosorbide because they were not helping. He reports when he gets the chest pain, he also experiences shortness of breath. He states it does subside sometimes but not all the way. He notes when he takes a deep breath, the pain is worse. He reports he was advised not to  over 50 pounds and he has picked up 12 bags of 60 pound concrete.    Past medical history includes Marfan syndrome, pneumothorax, and pleurisy.    Regarding family history, his father had a heart attack, his grandfather passed from a heart attack, and his other grandfather passed from a heart attack.    He is a current smoker. He denies any illicit drug use. He is 3 years clean on drug use.    He is allergic to calcium channel blockers, penicillin's, Medrol and  diclofenac.      Review of Systems   Constitutional: Positive for malaise/fatigue. Negative for fever.   HENT:  Negative for congestion.    Eyes:  Negative for vision loss in left eye and vision loss in right eye.   Cardiovascular:  Positive for chest pain, dyspnea on exertion and palpitations. Negative for claudication, irregular heartbeat, leg swelling, orthopnea and syncope.   Respiratory:  Negative for cough, shortness of breath and wheezing.    Hematologic/Lymphatic: Negative for adenopathy.   Skin:  Negative for rash.   Musculoskeletal:  Negative for joint pain and joint swelling.   Gastrointestinal:  Negative for abdominal pain, diarrhea, nausea and vomiting.   Neurological:  Negative for excessive daytime sleepiness, dizziness, focal weakness, light-headedness, numbness and weakness.   Psychiatric/Behavioral:  Negative for depression. The patient does not have insomnia.          Current Outpatient Medications:     albuterol (PROVENTIL) (2.5 MG/3ML) 0.083% nebulizer solution, Take 2.5 mg by nebulization Every 4 (Four) Hours As Needed for Wheezing., Disp: 150 mL, Rfl: 5    albuterol sulfate HFA (ProAir HFA) 108 (90 Base) MCG/ACT inhaler, Inhale 2 puffs Every 4 (Four) Hours As Needed for Wheezing., Disp: 18 g, Rfl: 5    fluticasone (FLONASE) 50 MCG/ACT nasal spray, 2 sprays by Each Nare route Daily., Disp: 16 g, Rfl: 5    Glycopyrrolate-Formoterol (Bevespi Aerosphere) 9-4.8 MCG/ACT aerosol, 2 sprays by Each Nare route 2 (Two) Times a Day., Disp: 2 each, Rfl: 5    HYDROcodone-acetaminophen (NORCO) 7.5-325 MG per tablet, TAKE 1 TABLET EVERY 6-8 HOURS AS NEEDED FOR PAIN, Disp: , Rfl:     loratadine (CLARITIN) 10 MG tablet, Take 1 tablet by mouth Daily., Disp: 90 tablet, Rfl: 3    methocarbamol (ROBAXIN) 750 MG tablet, TAKE 1 TABLET BY MOUTH FOUR TIMES DAILY AS NEEDED FOR MUSCLE SPASMS, Disp: 30 tablet, Rfl: 0    naloxone (NARCAN) 4 MG/0.1ML nasal spray, CALL 911. SPR CONTENTS OF ONE SPRAYER (0.1ML) INTO ONE  NOSTRIL. REPEAT IN 2-3 MIN IF SYMPTOMS OF OPIOID EMERGENCY PERSIST, ALTERNATE NOSTRILS, Disp: , Rfl:     naproxen (NAPROSYN) 500 MG tablet, TAKE 1 TABLET BY MOUTH TWICE DAILY AS NEEDED FOR MODERATE PAIN, Disp: 60 tablet, Rfl: 2    nicotine (Nicoderm CQ) 21 MG/24HR patch, Place 1 patch on the skin as directed by provider Daily., Disp: 30 patch, Rfl: 0    pregabalin (LYRICA) 100 MG capsule, TAKE 1 CAPSULE BY MOUTH EVERY 12 HOURS AS NEEDED, Disp: , Rfl:     sildenafil (REVATIO) 20 MG tablet, Take 1-2 tablets by mouth Daily As Needed (prior to sexual activity)., Disp: 30 tablet, Rfl: 2    colchicine 0.6 MG tablet, Take 1 tablet by mouth Daily., Disp: 30 tablet, Rfl: 0    ibuprofen (ADVIL,MOTRIN) 800 MG tablet, Take 1 tablet by mouth Every 6 (Six) Hours As Needed for Mild Pain., Disp: 90 tablet, Rfl: 0    metoprolol succinate XL (TOPROL-XL) 25 MG 24 hr tablet, Take 1 tablet by mouth Daily., Disp: 90 tablet, Rfl: 3    nitroglycerin (NITROSTAT) 0.4 MG SL tablet, 1 under the tongue as needed for angina, may repeat q5mins for up three doses, Disp: 100 tablet, Rfl: 11       Objective:      Vitals:    07/27/23 1359   BP: 102/65   Pulse: 110   Resp: 18   SpO2: 98%     Vitals and nursing note reviewed.   Constitutional:       Appearance: Normal and healthy appearance. Well-developed and not in distress.   Eyes:      Extraocular Movements: Extraocular movements intact.      Pupils: Pupils are equal, round, and reactive to light.   HENT:      Head: Normocephalic and atraumatic.    Mouth/Throat:      Pharynx: Oropharynx is clear.   Neck:      Vascular: JVD normal.      Trachea: Trachea normal.   Pulmonary:      Effort: Pulmonary effort is normal.      Breath sounds: Normal breath sounds. No wheezing. No rhonchi. No rales.   Cardiovascular:      PMI at left midclavicular line. Normal rate. Regular rhythm. Normal S1. Normal S2.       Murmurs: There is a grade 2/6 systolic murmur.      No gallop.  No click. No rub.   Pulses:      Dorsalis pedis: 2+ bilaterally.     Posterior tibial: 2+ bilaterally.  Abdominal:      General: Bowel sounds are normal.      Palpations: Abdomen is soft.      Tenderness: There is no abdominal tenderness.   Musculoskeletal: Normal range of motion.      Cervical back: Normal range of motion and neck supple. Skin:     General: Skin is warm and dry.      Capillary Refill: Capillary refill takes less than 2 seconds.   Feet:      Right foot:      Skin integrity: Skin integrity normal.      Left foot:      Skin integrity: Skin integrity normal.   Neurological:      Mental Status: Alert and oriented to person, place and time.      Cranial Nerves: Cranial nerves are intact.      Sensory: Sensation is intact.      Motor: Motor function is intact.      Coordination: Coordination is intact.   Psychiatric:         Speech: Speech normal.         Behavior: Behavior is cooperative.       Lab Review:         ECG 12 Lead    Date/Time: 7/30/2023 8:03 PM  Performed by: Clint Mcintyre DO  Authorized by: Clint Mcintyre DO   Comparison: compared with previous ECG   Similar to previous ECG  Rhythm: sinus tachycardia  Rate: tachycardic  Conduction: conduction normal  QRS axis: right    Clinical impression: abnormal EKG          Assessment/Plan:     Problem List Items Addressed This Visit          Cardiac and Vasculature    Chest pain due to myocardial ischemia       Multi-system (Lupus, Sarcoid...)    Marfan syndrome       Pulmonary and Pneumonias    COPD (chronic obstructive pulmonary disease)    Pleurisy    History of pneumothorax       Symptoms and Signs    Chest pain, atypical - Primary    Relevant Orders    CT Angiogram Coronary       Tobacco    Smoking (Chronic)    Overview     3/4 pack a day         Tobacco use disorder     ASSESSMENT  1. Chest pain  2. Palpitations    PLAN  The patient presents today with complaints of tight, sharp chest pains. He was prescribed isosorbide to help treat the chest pain but  it was causing headaches so he discontinued it. A prescription for nitroglycerin 0.4 MG has been sent to the patient's pharmacy. A prescription for metoprolol 25 MG has been sent to the patient's pharmacy. Ibuprofen 800 MG has been sent tp the patient's pharmacy. A prescription for colchicine 0.6 MG has been sent to the patient's pharmacy. He will continue taking all other current medications. An order for a CT angiogram has been placed.    Recommendations/plans: The patient will follow up in 1 month.    Transcribed from ambient dictation for Clint Mcintyre DO by Rossana Jeong.  07/27/23   15:43 CDT    Patient or patient representative verbalized consent to the visit recording.  I have personally performed the services described in this document as transcribed by the above individual, and it is both accurate and complete.  Clint Mcintyre DO  7/30/2023  20:03 CDT

## 2023-07-28 RX ORDER — ISOSORBIDE MONONITRATE 30 MG/1
TABLET, EXTENDED RELEASE ORAL EVERY MORNING
Refills: 0 | OUTPATIENT
Start: 2023-07-28

## 2023-07-30 PROBLEM — R07.89 CHEST PAIN, ATYPICAL: Status: ACTIVE | Noted: 2023-07-30

## 2023-07-30 PROCEDURE — 93000 ELECTROCARDIOGRAM COMPLETE: CPT | Performed by: EMERGENCY MEDICINE

## 2023-07-31 RX ORDER — FLUTICASONE PROPIONATE 50 MCG
SPRAY, SUSPENSION (ML) NASAL
Qty: 16 G | Refills: 5 | Status: SHIPPED | OUTPATIENT
Start: 2023-07-31

## 2023-08-21 ENCOUNTER — OFFICE VISIT (OUTPATIENT)
Dept: PULMONOLOGY | Facility: CLINIC | Age: 38
End: 2023-08-21
Payer: COMMERCIAL

## 2023-08-21 VITALS
SYSTOLIC BLOOD PRESSURE: 122 MMHG | HEART RATE: 76 BPM | DIASTOLIC BLOOD PRESSURE: 62 MMHG | HEIGHT: 77 IN | WEIGHT: 189 LBS | RESPIRATION RATE: 16 BRPM | OXYGEN SATURATION: 97 % | BODY MASS INDEX: 22.32 KG/M2

## 2023-08-21 DIAGNOSIS — R63.6 UNDERWEIGHT: ICD-10-CM

## 2023-08-21 DIAGNOSIS — Z87.09 HISTORY OF PNEUMOTHORAX: ICD-10-CM

## 2023-08-21 DIAGNOSIS — R91.1 SOLITARY PULMONARY NODULE: ICD-10-CM

## 2023-08-21 DIAGNOSIS — Z71.6 ENCOUNTER FOR SMOKING CESSATION COUNSELING: ICD-10-CM

## 2023-08-21 DIAGNOSIS — Q87.40 MARFAN SYNDROME: ICD-10-CM

## 2023-08-21 DIAGNOSIS — R09.1 PLEURISY: ICD-10-CM

## 2023-08-21 DIAGNOSIS — Z23 NEED FOR VACCINATION: Primary | ICD-10-CM

## 2023-08-21 DIAGNOSIS — F17.200 TOBACCO USE DISORDER: ICD-10-CM

## 2023-08-21 DIAGNOSIS — R06.02 SOB (SHORTNESS OF BREATH): ICD-10-CM

## 2023-08-21 DIAGNOSIS — J43.9 PULMONARY EMPHYSEMA, UNSPECIFIED EMPHYSEMA TYPE: ICD-10-CM

## 2023-08-21 DIAGNOSIS — J30.89 NON-SEASONAL ALLERGIC RHINITIS, UNSPECIFIED TRIGGER: ICD-10-CM

## 2023-08-21 PROCEDURE — 99406 BEHAV CHNG SMOKING 3-10 MIN: CPT | Performed by: INTERNAL MEDICINE

## 2023-08-21 PROCEDURE — 90471 IMMUNIZATION ADMIN: CPT | Performed by: INTERNAL MEDICINE

## 2023-08-21 PROCEDURE — 99214 OFFICE O/P EST MOD 30 MIN: CPT | Performed by: INTERNAL MEDICINE

## 2023-08-21 PROCEDURE — 90677 PCV20 VACCINE IM: CPT | Performed by: INTERNAL MEDICINE

## 2023-08-21 RX ORDER — FLUTICASONE FUROATE, UMECLIDINIUM BROMIDE AND VILANTEROL TRIFENATATE 100; 62.5; 25 UG/1; UG/1; UG/1
1 POWDER RESPIRATORY (INHALATION)
Qty: 1 EACH | Refills: 0 | COMMUNITY
Start: 2023-08-21 | End: 2023-08-22

## 2023-08-21 RX ORDER — NICOTINE 21 MG/24HR
1 PATCH, TRANSDERMAL 24 HOURS TRANSDERMAL EVERY 24 HOURS
Qty: 30 PATCH | Refills: 5 | Status: SHIPPED | OUTPATIENT
Start: 2023-08-21

## 2023-08-21 RX ORDER — FLUTICASONE PROPIONATE 50 MCG
2 SPRAY, SUSPENSION (ML) NASAL DAILY
Qty: 16 G | Refills: 5 | Status: SHIPPED | OUTPATIENT
Start: 2023-08-21

## 2023-08-21 RX ORDER — ALBUTEROL SULFATE 90 UG/1
2 AEROSOL, METERED RESPIRATORY (INHALATION) EVERY 4 HOURS PRN
Qty: 18 G | Refills: 5 | Status: SHIPPED | OUTPATIENT
Start: 2023-08-21

## 2023-08-21 RX ORDER — ALBUTEROL SULFATE 2.5 MG/3ML
2.5 SOLUTION RESPIRATORY (INHALATION) EVERY 4 HOURS PRN
Qty: 150 ML | Refills: 5 | Status: SHIPPED | OUTPATIENT
Start: 2023-08-21

## 2023-08-21 RX ORDER — LORATADINE 10 MG/1
10 TABLET ORAL DAILY
Qty: 90 TABLET | Refills: 3 | Status: SHIPPED | OUTPATIENT
Start: 2023-08-21
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
## 2023-08-22 ENCOUNTER — TELEPHONE (OUTPATIENT)
Dept: PULMONOLOGY | Facility: CLINIC | Age: 38
End: 2023-08-22
Payer: COMMERCIAL

## 2023-08-23 NOTE — TELEPHONE ENCOUNTER
Buster Fitch MD  You17 hours ago (5:48 PM)     SS  Please let him know I changed the Trelegy Ellipta to Anoro Ellipta.  He can  the medicine from the pharmacy.  I will see him back in the office as scheduled.  Thank you.     Patient is aware to finish the Trelegy sample and then start on the Anoro.

## 2023-09-05 ENCOUNTER — APPOINTMENT (OUTPATIENT)
Dept: CT IMAGING | Facility: HOSPITAL | Age: 38
End: 2023-09-05
Payer: COMMERCIAL

## 2023-09-05 ENCOUNTER — HOSPITAL ENCOUNTER (EMERGENCY)
Facility: HOSPITAL | Age: 38
Discharge: HOME | End: 2023-09-05
Attending: EMERGENCY MEDICINE
Payer: COMMERCIAL

## 2023-09-05 VITALS
WEIGHT: 194 LBS | TEMPERATURE: 97.3 F | OXYGEN SATURATION: 97 % | DIASTOLIC BLOOD PRESSURE: 63 MMHG | SYSTOLIC BLOOD PRESSURE: 103 MMHG | BODY MASS INDEX: 22.45 KG/M2 | RESPIRATION RATE: 16 BRPM | HEIGHT: 78 IN | HEART RATE: 60 BPM

## 2023-09-05 DIAGNOSIS — R51.9 NONINTRACTABLE HEADACHE, UNSPECIFIED CHRONICITY PATTERN, UNSPECIFIED HEADACHE TYPE: Primary | ICD-10-CM

## 2023-09-05 PROCEDURE — 99284 EMERGENCY DEPT VISIT MOD MDM: CPT

## 2023-09-05 PROCEDURE — 96375 TX/PRO/DX INJ NEW DRUG ADDON: CPT

## 2023-09-05 PROCEDURE — 70450 CT HEAD/BRAIN W/O DYE: CPT

## 2023-09-05 PROCEDURE — 96361 HYDRATE IV INFUSION ADD-ON: CPT

## 2023-09-05 PROCEDURE — 96374 THER/PROPH/DIAG INJ IV PUSH: CPT

## 2023-09-05 PROCEDURE — 25010000002 DIPHENHYDRAMINE PER 50 MG: Performed by: EMERGENCY MEDICINE

## 2023-09-05 PROCEDURE — 25010000002 METOCLOPRAMIDE PER 10 MG: Performed by: EMERGENCY MEDICINE

## 2023-09-05 PROCEDURE — 25010000002 KETOROLAC TROMETHAMINE PER 15 MG: Performed by: EMERGENCY MEDICINE

## 2023-09-05 RX ORDER — SODIUM CHLORIDE 0.9 % (FLUSH) 0.9 %
10 SYRINGE (ML) INJECTION AS NEEDED
Status: DISCONTINUED | OUTPATIENT
Start: 2023-09-05 | End: 2023-09-05 | Stop reason: HOSPADM

## 2023-09-05 RX ORDER — METOCLOPRAMIDE HYDROCHLORIDE 5 MG/ML
10 INJECTION INTRAMUSCULAR; INTRAVENOUS ONCE
Status: COMPLETED | OUTPATIENT
Start: 2023-09-05 | End: 2023-09-05

## 2023-09-05 RX ORDER — DIPHENHYDRAMINE HYDROCHLORIDE 50 MG/ML
25 INJECTION INTRAMUSCULAR; INTRAVENOUS ONCE
Status: COMPLETED | OUTPATIENT
Start: 2023-09-05 | End: 2023-09-05

## 2023-09-05 RX ORDER — SODIUM CHLORIDE 9 MG/ML
125 INJECTION, SOLUTION INTRAVENOUS CONTINUOUS
Status: DISCONTINUED | OUTPATIENT
Start: 2023-09-05 | End: 2023-09-05 | Stop reason: HOSPADM

## 2023-09-05 RX ORDER — KETOROLAC TROMETHAMINE 30 MG/ML
30 INJECTION, SOLUTION INTRAMUSCULAR; INTRAVENOUS ONCE
Status: COMPLETED | OUTPATIENT
Start: 2023-09-05 | End: 2023-09-05

## 2023-09-05 RX ADMIN — METOCLOPRAMIDE HYDROCHLORIDE 10 MG: 5 INJECTION INTRAMUSCULAR; INTRAVENOUS at 08:30

## 2023-09-05 RX ADMIN — SODIUM CHLORIDE 125 ML/HR: 9 INJECTION, SOLUTION INTRAVENOUS at 08:30

## 2023-09-05 RX ADMIN — DIPHENHYDRAMINE HYDROCHLORIDE 25 MG: 50 INJECTION, SOLUTION INTRAMUSCULAR; INTRAVENOUS at 08:31

## 2023-09-05 RX ADMIN — SODIUM CHLORIDE 1000 ML: 9 INJECTION, SOLUTION INTRAVENOUS at 08:29

## 2023-09-05 RX ADMIN — KETOROLAC TROMETHAMINE 30 MG: 30 INJECTION, SOLUTION INTRAMUSCULAR; INTRAVENOUS at 08:31

## 2023-09-05 NOTE — ED PROVIDER NOTES
Subjective   History of Present Illness  38 years old who is got history of headaches came to the ER complaining of a headache this is like his usual headache the bit worse intensity and severity came to the ER for evaluation.    Headache  Pain location:  Generalized  Quality:  Dull  Radiates to:  Does not radiate  Severity currently:  6/10  Severity at highest:  6/10  Onset quality:  Gradual  Duration:  2 days  Timing:  Constant  Progression:  Waxing and waning  Chronicity:  Recurrent  Context: not activity, not exposure to bright light, not caffeine, not eating, not stress and not loud noise    Relieved by:  Nothing  Worsened by:  Nothing  Ineffective treatments:  None tried  Associated symptoms: no abdominal pain, no back pain, no blurred vision, no congestion, no cough, no diarrhea, no dizziness, no facial pain, no fatigue, no focal weakness, no hearing loss, no loss of balance, no nausea, no neck pain, no neck stiffness, no numbness, no paresthesias, no photophobia, no seizures, no sore throat, no swollen glands, no syncope, no visual change, no vomiting and no weakness    Risk factors: no anger      Review of Systems   Constitutional: Negative.  Negative for fatigue.   HENT:  Negative for congestion, hearing loss and sore throat.    Eyes:  Negative for blurred vision and photophobia.   Respiratory:  Negative for cough.    Cardiovascular:  Negative for syncope.   Gastrointestinal: Negative.  Negative for abdominal distention, abdominal pain, diarrhea, nausea and vomiting.   Endocrine: Negative.    Genitourinary: Negative.    Musculoskeletal: Negative.  Negative for back pain, neck pain and neck stiffness.   Skin:  Negative for color change and pallor.   Neurological:  Positive for headaches. Negative for dizziness, focal weakness, seizures, syncope, weakness, light-headedness, numbness, paresthesias and loss of balance.   Hematological: Negative.  Does not bruise/bleed easily.   All other systems reviewed and  "are negative.    Past Medical History:   Diagnosis Date    Back pain     Chest pain     Claustrophobia     COPD (chronic obstructive pulmonary disease)     Emphysema of lung     Lung disease     Marfan syndrome     Pneumothorax     Seizures     when he was a child     Seizures     Smoking     3/4 pack a day    Tobacco abuse     Underweight        Allergies   Allergen Reactions    Calcium Channel Blockers Other (See Comments)     Do not use in marfan    Ciprofloxacin Other (See Comments)     Do not use fluoroquinolones in marfan    Medrol [Methylprednisolone] Other (See Comments)     Patient states he gets violent on this medication     Penicillins Other (See Comments)     Pt states his mother said \"his father fell over dead from taking one\"    Wasp Venom Swelling    Diclofenac Irritability       Past Surgical History:   Procedure Laterality Date    HERNIA REPAIR      LUNG LOBECTOMY Right 02/01/2017    LUNG REMOVAL, TOTAL Right 2015    THORACOSCOPY N/A 2/3/2017    Procedure: BRONCHOSCOPY, THORACOSCOPY RIGHT CHEST,  WEDGE RESECTION RIGHT UPPER AND LOWER LOBE OF LUNG, MECHANICAL PLEURODESIS;  Surgeon: Kyle Heath MD;  Location: [facility];  Service:     TYMPANOSTOMY  05/12/2016    Recorded       Family History   Problem Relation Age of Onset    No Known Problems Mother     Heart attack Father     Stroke Father     No Known Problems Sister     No Known Problems Daughter     Cancer Maternal Grandmother         breast    Breast cancer Maternal Grandmother     Prostate cancer Neg Hx     Colon cancer Neg Hx     Alcohol abuse Neg Hx     Drug abuse Neg Hx     Diabetes Neg Hx     Thyroid cancer Neg Hx        Social History     Socioeconomic History    Marital status:    Tobacco Use    Smoking status: Every Day     Packs/day: 0.50     Years: 26.00     Pack years: 13.00     Types: Cigarettes     Start date: 1997     Passive exposure: Current    Smokeless tobacco: Never   Vaping Use    Vaping Use: Never used "   Substance and Sexual Activity    Alcohol use: Not Currently    Drug use: Not Currently     Types: Amphetamines, Methamphetamines, Benzodiazepines, Marijuana, Oxycodone, Hydrocodone    Sexual activity: Yes     Partners: Female           Objective   Physical Exam  Vitals and nursing note reviewed. Exam conducted with a chaperone present.   Constitutional:       General: He is not in acute distress.     Appearance: Normal appearance. He is not ill-appearing or diaphoretic.   HENT:      Head: Normocephalic and atraumatic.      Nose: Nose normal.      Mouth/Throat:      Mouth: Mucous membranes are moist.      Pharynx: Oropharynx is clear.   Eyes:      General: No visual field deficit or scleral icterus.     Extraocular Movements: Extraocular movements intact.      Conjunctiva/sclera: Conjunctivae normal.      Pupils: Pupils are equal, round, and reactive to light.   Neck:      Vascular: No carotid bruit.      Meningeal: Brudzinski's sign and Kernig's sign absent.   Cardiovascular:      Rate and Rhythm: Normal rate and regular rhythm.      Pulses: Normal pulses.      Heart sounds: No murmur heard.    No friction rub.   Pulmonary:      Effort: Pulmonary effort is normal. No respiratory distress.      Breath sounds: Normal breath sounds. No stridor.   Abdominal:      General: Abdomen is flat. Bowel sounds are normal. There is no distension.      Palpations: There is no mass.      Tenderness: There is no abdominal tenderness.   Musculoskeletal:         General: No swelling or tenderness. Normal range of motion.      Cervical back: Normal range of motion and neck supple. No rigidity. No muscular tenderness.   Lymphadenopathy:      Cervical: No cervical adenopathy.   Skin:     General: Skin is warm.      Capillary Refill: Capillary refill takes less than 2 seconds.      Coloration: Skin is not jaundiced or pale.      Findings: No bruising or rash.   Neurological:      General: No focal deficit present.      Mental Status: He  is alert and oriented to person, place, and time. Mental status is at baseline.      GCS: GCS eye subscore is 4. GCS verbal subscore is 5. GCS motor subscore is 6.      Cranial Nerves: Cranial nerves 2-12 are intact. No cranial nerve deficit, dysarthria or facial asymmetry.      Sensory: Sensation is intact.      Motor: Motor function is intact. No weakness, abnormal muscle tone, seizure activity or pronator drift.      Coordination: Coordination is intact. Finger-Nose-Finger Test and Heel to Shin Test normal.      Gait: Gait is intact.      Deep Tendon Reflexes: Reflexes are normal and symmetric. Babinski sign absent on the right side. Babinski sign absent on the left side.      Reflex Scores:       Bicep reflexes are 2+ on the right side and 2+ on the left side.       Patellar reflexes are 2+ on the right side and 2+ on the left side.  Psychiatric:         Attention and Perception: Attention normal.         Mood and Affect: Mood and affect normal.         Speech: Speech normal.         Behavior: Behavior normal.       Procedures           ED Course                                           Medical Decision Making  Differential Diagnosis:  I considered vascular etiology, migraine, cluster headache, ischemic stroke, subarachnoid hemorrhage, intracranial bleed, vasculitis, temporal arteritis, malignant hypertension, infectious etiology, toxic-metabolic etiology, carbon monoxide exposure, hypoglycemia, neuralgia, musculoskeletal etiology, muscle tension, temporomandibular joint disease, pseudo-tumor cerebri, intracranial neoplasm and other etiology as a possible cause of headache in this patient. This is a partial list of diagnoses considered.           Amount and/or Complexity of Data Reviewed  Radiology: ordered.     Details: CT of the head is negative    Risk  Prescription drug management.  Risk Details: Patient appears to be in no distress.  He is sleeping at this time headache is much better.  Is can be  discharged home with a follow-up with the primary MD    Patient does not want a spinal tap to be performed today.    This patient presented with gradual onset of headache patient arrived hemodynamically stable and neurological exam was without any focal deficits. Patient was placed on a monitor and IV access established. Presentation not consistent with other acute emergent cause of headache at this time. No red flags for subarachnoid hemorrhage and headache is not the worst headache of the patient's life. No neck stiffness or overlying skin changes. Low suspicion for acute angle-closure glaucoma at this time given lack of pupillary findings. Low suspicion for temporal arteritis as there is no bulging temporal artery, pain is not localized to temporal area, and the patient does not have any visual loss or history of vasculitis. Low suspicion for CRAO/CRVO as the patient did not have any painless visual loss. Low suspicion for ACS as the patient does not have any associated chest pain or shortness of breath and has a nonsuspicious history of present illness. No gait disturbance no diplopia no dysarthria or dysphagia on exam. There is low suspicion for meningitis encephalitis as there is no fever no nuchal rigidity and no confusion and negative inflammatory markers and normal lab work-up.  Based on the patient's history and physical there is very low clinical suspicion for significant intracranial pathology. The headache was NOT sudden onset, NOT maximal at onset, there are NO neurologic findings, the patient does NOT have a fever, the patient does NOT have any jaw claudication, the patient does NOT endorse a clotting disorder, patient DENIES any trauma or eye pain and the headache is NOT associated with dizziness or ataxia.         Final diagnoses:   Nonintractable headache, unspecified chronicity pattern, unspecified headache type       ED Disposition  ED Disposition       ED Disposition   Discharge    Condition    Stable    Comment   --               Cristy Schmidt, APRN  [address]  [facility] [address]  [phone]    Schedule an appointment as soon as possible for a visit            Medication List      No changes were made to your prescriptions during this visit.            Patel Leonard MD  09/05/23 0915

## 2023-11-14 ENCOUNTER — TELEPHONE (OUTPATIENT)
Dept: PULMONOLOGY | Facility: CLINIC | Age: 38
End: 2023-11-14
Payer: COMMERCIAL

## 2023-11-14 NOTE — TELEPHONE ENCOUNTER
Called patient because he was on Dr. Barnes's schedule next week but he is not due to come back until February. Patient states that he is feeling fine and he would like to push his appointment back to February as previously discussed.

## 2023-12-10 ENCOUNTER — APPOINTMENT (OUTPATIENT)
Dept: GENERAL RADIOLOGY | Facility: HOSPITAL | Age: 38
End: 2023-12-10
Payer: COMMERCIAL

## 2023-12-10 ENCOUNTER — HOSPITAL ENCOUNTER (EMERGENCY)
Facility: HOSPITAL | Age: 38
Discharge: HOME | End: 2023-12-10
Admitting: STUDENT IN AN ORGANIZED HEALTH CARE EDUCATION/TRAINING PROGRAM
Payer: COMMERCIAL

## 2023-12-10 VITALS
TEMPERATURE: 98.2 F | SYSTOLIC BLOOD PRESSURE: 116 MMHG | WEIGHT: 178.5 LBS | BODY MASS INDEX: 21.08 KG/M2 | HEART RATE: 88 BPM | OXYGEN SATURATION: 100 % | HEIGHT: 77 IN | RESPIRATION RATE: 20 BRPM | DIASTOLIC BLOOD PRESSURE: 73 MMHG

## 2023-12-10 DIAGNOSIS — M79.642 LEFT HAND PAIN: Primary | ICD-10-CM

## 2023-12-10 DIAGNOSIS — M25.532 LEFT WRIST PAIN: ICD-10-CM

## 2023-12-10 PROCEDURE — 99283 EMERGENCY DEPT VISIT LOW MDM: CPT

## 2023-12-10 PROCEDURE — 73130 X-RAY EXAM OF HAND: CPT

## 2023-12-10 PROCEDURE — 73090 X-RAY EXAM OF FOREARM: CPT

## 2023-12-10 PROCEDURE — 73110 X-RAY EXAM OF WRIST: CPT

## 2023-12-10 NOTE — ED PROVIDER NOTES
"Subjective   History of Present Illness  Patient is a 38-year-old male that presents to the emergency department for left hand and wrist pain.  Patient reports he had a ground-level fall 2 days ago when he was working outside and tripped over something in the yard. States the pain is located mainly from the left mid forearm to hand. Patient states the pain is located mainly in the lateral left wrist and palmar side of the left forearm.  Patient denies hitting his head or any loss of consciousness.  He denies any other injury at this time.  He states he has no pain from the elbow up to shoulder.        Review of Systems   Musculoskeletal:  Positive for arthralgias and myalgias.        Pain to the left upper extremity due to a ground-level fall 2 days ago   All other systems reviewed and are negative.      Past Medical History:   Diagnosis Date    Back pain     Chest pain     Claustrophobia     COPD (chronic obstructive pulmonary disease)     Emphysema of lung     Lung disease     Marfan syndrome     Pneumothorax     Seizures     when he was a child     Seizures     Smoking     3/4 pack a day    Tobacco abuse     Underweight        Allergies   Allergen Reactions    Calcium Channel Blockers Other (See Comments)     Do not use in marfan    Ciprofloxacin Other (See Comments)     Do not use fluoroquinolones in marfan    Medrol [Methylprednisolone] Other (See Comments)     Patient states he gets violent on this medication     Penicillins Other (See Comments)     Pt states his mother said \"his father fell over dead from taking one\"    Wasp Venom Swelling    Diclofenac Irritability       Past Surgical History:   Procedure Laterality Date    HERNIA REPAIR      LUNG LOBECTOMY Right 02/01/2017    LUNG REMOVAL, TOTAL Right 2015    THORACOSCOPY N/A 2/3/2017    Procedure: BRONCHOSCOPY, THORACOSCOPY RIGHT CHEST,  WEDGE RESECTION RIGHT UPPER AND LOWER LOBE OF LUNG, MECHANICAL PLEURODESIS;  Surgeon: Kyle Heath MD;  Location: "  [facility];  Service:     TYMPANOSTOMY  05/12/2016    Recorded       Family History   Problem Relation Age of Onset    No Known Problems Mother     Heart attack Father     Stroke Father     No Known Problems Sister     No Known Problems Daughter     Cancer Maternal Grandmother         breast    Breast cancer Maternal Grandmother     Prostate cancer Neg Hx     Colon cancer Neg Hx     Alcohol abuse Neg Hx     Drug abuse Neg Hx     Diabetes Neg Hx     Thyroid cancer Neg Hx        Social History     Socioeconomic History    Marital status:    Tobacco Use    Smoking status: Every Day     Packs/day: 0.50     Years: 26.00     Additional pack years: 0.00     Total pack years: 13.00     Types: Cigarettes     Start date: 1997     Passive exposure: Current    Smokeless tobacco: Never   Vaping Use    Vaping Use: Never used   Substance and Sexual Activity    Alcohol use: Not Currently    Drug use: Not Currently     Types: Amphetamines, Methamphetamines, Benzodiazepines, Marijuana, Oxycodone, Hydrocodone    Sexual activity: Yes     Partners: Female           Objective   Physical Exam  Vitals and nursing note reviewed.   Constitutional:       Appearance: Normal appearance.      Comments: Nontoxic appearing. In no acute distress.    HENT:      Head: Normocephalic and atraumatic.      Right Ear: External ear normal.      Left Ear: External ear normal.      Nose: Nose normal.      Mouth/Throat:      Mouth: Mucous membranes are moist.      Pharynx: Oropharynx is clear.   Eyes:      Extraocular Movements: Extraocular movements intact.      Conjunctiva/sclera: Conjunctivae normal.      Pupils: Pupils are equal, round, and reactive to light.   Cardiovascular:      Rate and Rhythm: Normal rate and regular rhythm.      Pulses: Normal pulses.      Heart sounds: Normal heart sounds.   Pulmonary:      Effort: Pulmonary effort is normal. No respiratory distress.      Breath sounds: Normal breath sounds. No wheezing.   Chest:      Chest  wall: No tenderness.   Abdominal:      General: Abdomen is flat. Bowel sounds are normal. There is no distension.      Palpations: Abdomen is soft.      Tenderness: There is no abdominal tenderness. There is no right CVA tenderness, left CVA tenderness, guarding or rebound.   Musculoskeletal:         General: Tenderness and signs of injury present. No deformity. Normal range of motion.      Cervical back: Normal range of motion and neck supple.      Right lower leg: No edema.      Left lower leg: No edema.      Comments: Patient reports tenderness to the entirety of left wrist.  He is able to flex and extend the wrist and move all 5 digits on the left hand without difficulty.  Also reporting tenderness to the palmar aspect of the left forearm from most distal into mid forearm.  Radial pulses are palpable, strong and equal bilaterally.  Patient is neurovascularly intact.   Skin:     General: Skin is warm and dry.      Capillary Refill: Capillary refill takes less than 2 seconds.   Neurological:      General: No focal deficit present.      Mental Status: He is alert and oriented to person, place, and time. Mental status is at baseline.   Psychiatric:         Mood and Affect: Mood normal.         Behavior: Behavior normal.         Thought Content: Thought content normal.         Judgment: Judgment normal.       XR Hand 3+ View Left   Final Result   1. No acute bony abnormality is seen.           This report was signed and finalized on 12/10/2023 4:49 PM by Dr. Rob Sen MD.          XR Wrist 3+ View Left   Final Result      XR Forearm 2 View Left   Final Result   1. Normal radiographs of the left forearm.           This report was signed and finalized on 12/10/2023 4:44 PM by Dr. Rob Sen MD.               Procedures           ED Course                                             Medical Decision Making  Ross Platt is a 38 y.o. male who presents to the ED for left hand and wrist pain.  Patient reports  he had a ground-level fall 2 days ago when he was working outside and tripped over something in the yard. States the pain is located mainly from the left mid forearm to hand. Patient states the pain is located mainly in the lateral left wrist and palmar side of the left forearm.  Patient denies hitting his head or any loss of consciousness.  He denies any other injury at this time.  He states he has no pain from the elbow up to shoulder.      Patient was non-toxic appearing on arrival. No acute distress was noted.  Vital signs stable.    Past medical history, surgical history, and medication regimen reviewed.     Patient's presentation raises suspicion for differentials including, but not limited to, fracture, dislocation, tendon injury, ligamentous injury.    Please refer to above section of note for imaging results that were reviewed and interpreted by radiology as well as attending physician.     Given findings described above, patient's presentation is likely consistent with left wrist pain as a result from a ground-level fall. I have a low suspicion for any acute bony abnormality at this point in their ED course.       Patient was placed in a wrist brace for support and provided with educational materials regarding musculoskeletal injuries. Patient was informed of all imaging results completed during this ED encounter today.  I answered all the questions regarding the emergency department evaluation, diagnosis, and treatment plan in plain and simple language that was understandable. We discussed that due to always having some diagnostic uncertainty while in the ER, there is always a chance that symptoms may change or new symptoms may reveal themselves after being discharged. Because of this, I stressed the importance of Ross following up with their primary care provider. Patient informed that appointment will need to be done by calling their office to set up an appointment within the next few days or as soon  as reasonably possible so that the symptoms can be re-evaluated for improvement or for any other questions. I also gave Ross common sense return precautions and prompted patient to return to the emergency department within 24 - 48hrs if there are any new, worsening, or concerning symptoms. The patient verbalized understanding of the discharge instructions and agreed with them. Ross was discharged in stable condition.     Dragon disclaimer:  Parts of this note may be an electronic transcription/translation of spoken language to printed text using the Dragon dictation system.    Problems Addressed:  Left hand pain: complicated acute illness or injury  Left wrist pain: complicated acute illness or injury    Amount and/or Complexity of Data Reviewed  Radiology: ordered.        Final diagnoses:   Left hand pain   Left wrist pain       ED Disposition  ED Disposition       ED Disposition   Discharge    Condition   Stable    Comment   --               Cristy Schmidt, APRN  [address] [address] [phone]    Schedule an appointment as soon as possible for a visit       [facility] EMERGENCY DEPARTMENT  [address] [place] [id number] [phone]    If symptoms worsen         Medication List      No changes were made to your prescriptions during this visit.            Mari Laureano, APRN  12/12/23 9905

## 2023-12-10 NOTE — DISCHARGE INSTRUCTIONS
It was very nice to meet you, Ross. Thank you for allowing us to take care of you today at [facility].    Today you were seen in the emergency department for your symptoms. Please understand that an ER evaluation is just the start of your evaluation. We do the best we can, but we are often unable to fully find what is causing your symptoms from one evaluation.  Because of this, the goal is to determine whether you need to be evaluated in the hospital or if it is safe for you to go home and see other doctors provided such as primary care physicians or specialist on an outpatient basis.     Like we discussed, I strongly urge that you follow up with your primary care doctor. Please call their office to set up an appointment as soon as possible so that you can be re-evaluated for improvement in your symptoms or for any other questions.  I have provided the information needed, including phone number, to call to set up an appointment below in these discharge papers.     Educational material has also been provided in the following pages regarding what we have discussed today.     Please return to the emergency room within 12-48 hours if you experience symptoms such as the following:   Fever, chills, chest pain or shortness of breath, pain with inspiration/expiration, pain that travels to your arms, neck or back, nausea, vomiting, severe headache, tearing pain in your chest, dizziness, feel as though you are about to pass out, OR if you have any worsening symptoms, or any other concerns.

## 2023-12-10 NOTE — Clinical Note
[facility] EMERGENCY DEPARTMENT  [address]  [facility] [address]  Phone: [phone]    Ross Platt was seen and treated in our emergency department on 12/10/2023.  He may return to work on 12/12/2023.         Thank you for choosing [facility].    Mari Laureano, APRN

## 2024-02-23 ENCOUNTER — TELEPHONE (OUTPATIENT)
Dept: PULMONOLOGY | Facility: CLINIC | Age: 39
End: 2024-02-23
Payer: COMMERCIAL

## 2024-02-23 NOTE — TELEPHONE ENCOUNTER
Tried to reach patient to see if he planned on having his CT done before his appointment with Dr. Fitch next week, someone answered but after I introduced myself they hung up.

## 2024-02-28 ENCOUNTER — APPOINTMENT (OUTPATIENT)
Dept: CT IMAGING | Facility: HOSPITAL | Age: 39
End: 2024-02-28
Payer: COMMERCIAL

## 2024-02-28 ENCOUNTER — HOSPITAL ENCOUNTER (EMERGENCY)
Facility: HOSPITAL | Age: 39
Discharge: HOME | End: 2024-02-28
Admitting: EMERGENCY MEDICINE
Payer: COMMERCIAL

## 2024-02-28 VITALS
SYSTOLIC BLOOD PRESSURE: 104 MMHG | HEIGHT: 76 IN | DIASTOLIC BLOOD PRESSURE: 56 MMHG | BODY MASS INDEX: 22.1 KG/M2 | HEART RATE: 79 BPM | OXYGEN SATURATION: 100 % | TEMPERATURE: 97.3 F | RESPIRATION RATE: 18 BRPM | WEIGHT: 181.5 LBS

## 2024-02-28 DIAGNOSIS — R10.9 FLANK PAIN: Primary | ICD-10-CM

## 2024-02-28 LAB
ALBUMIN SERPL-MCNC: 4.5 G/DL (ref 3.5–5.2)
ALBUMIN/GLOB SERPL: 1.6 G/DL
ALP SERPL-CCNC: 102 U/L (ref 39–117)
ALT SERPL W P-5'-P-CCNC: 25 U/L (ref 1–41)
ANION GAP SERPL CALCULATED.3IONS-SCNC: 10 MMOL/L (ref 5–15)
AST SERPL-CCNC: 19 U/L (ref 1–40)
BASOPHILS # BLD AUTO: 0.05 10*3/MM3 (ref 0–0.2)
BASOPHILS NFR BLD AUTO: 0.8 % (ref 0–1.5)
BILIRUB SERPL-MCNC: 0.4 MG/DL (ref 0–1.2)
BILIRUB UR QL STRIP: NEGATIVE
BUN SERPL-MCNC: 16 MG/DL (ref 6–20)
BUN/CREAT SERPL: 18 (ref 7–25)
CALCIUM SPEC-SCNC: 9.6 MG/DL (ref 8.6–10.5)
CHLORIDE SERPL-SCNC: 103 MMOL/L (ref 98–107)
CLARITY UR: CLEAR
CO2 SERPL-SCNC: 27 MMOL/L (ref 22–29)
COLOR UR: YELLOW
CREAT SERPL-MCNC: 0.89 MG/DL (ref 0.76–1.27)
D-LACTATE SERPL-SCNC: 1.2 MMOL/L (ref 0.5–2)
DEPRECATED RDW RBC AUTO: 43 FL (ref 37–54)
EGFRCR SERPLBLD CKD-EPI 2021: 113.2 ML/MIN/1.73
EOSINOPHIL # BLD AUTO: 0.13 10*3/MM3 (ref 0–0.4)
EOSINOPHIL NFR BLD AUTO: 2 % (ref 0.3–6.2)
ERYTHROCYTE [DISTWIDTH] IN BLOOD BY AUTOMATED COUNT: 13.2 % (ref 12.3–15.4)
GLOBULIN UR ELPH-MCNC: 2.9 GM/DL
GLUCOSE SERPL-MCNC: 89 MG/DL (ref 65–99)
GLUCOSE UR STRIP-MCNC: NEGATIVE MG/DL
HCT VFR BLD AUTO: 45 % (ref 37.5–51)
HGB BLD-MCNC: 15.4 G/DL (ref 13–17.7)
HGB UR QL STRIP.AUTO: NEGATIVE
IMM GRANULOCYTES # BLD AUTO: 0.06 10*3/MM3 (ref 0–0.05)
IMM GRANULOCYTES NFR BLD AUTO: 0.9 % (ref 0–0.5)
KETONES UR QL STRIP: NEGATIVE
LEUKOCYTE ESTERASE UR QL STRIP.AUTO: NEGATIVE
LIPASE SERPL-CCNC: 24 U/L (ref 13–60)
LYMPHOCYTES # BLD AUTO: 1.77 10*3/MM3 (ref 0.7–3.1)
LYMPHOCYTES NFR BLD AUTO: 27.7 % (ref 19.6–45.3)
MCH RBC QN AUTO: 30.8 PG (ref 26.6–33)
MCHC RBC AUTO-ENTMCNC: 34.2 G/DL (ref 31.5–35.7)
MCV RBC AUTO: 90 FL (ref 79–97)
MONOCYTES # BLD AUTO: 0.62 10*3/MM3 (ref 0.1–0.9)
MONOCYTES NFR BLD AUTO: 9.7 % (ref 5–12)
NEUTROPHILS NFR BLD AUTO: 3.77 10*3/MM3 (ref 1.7–7)
NEUTROPHILS NFR BLD AUTO: 58.9 % (ref 42.7–76)
NITRITE UR QL STRIP: NEGATIVE
NRBC BLD AUTO-RTO: 0 /100 WBC (ref 0–0.2)
PH UR STRIP.AUTO: 7 [PH] (ref 5–8)
PLATELET # BLD AUTO: 321 10*3/MM3 (ref 140–450)
PMV BLD AUTO: 9.4 FL (ref 6–12)
POTASSIUM SERPL-SCNC: 4.1 MMOL/L (ref 3.5–5.2)
PROT SERPL-MCNC: 7.4 G/DL (ref 6–8.5)
PROT UR QL STRIP: NEGATIVE
RBC # BLD AUTO: 5 10*6/MM3 (ref 4.14–5.8)
SODIUM SERPL-SCNC: 140 MMOL/L (ref 136–145)
SP GR UR STRIP: 1.01 (ref 1–1.03)
UROBILINOGEN UR QL STRIP: NORMAL
WBC NRBC COR # BLD AUTO: 6.4 10*3/MM3 (ref 3.4–10.8)

## 2024-02-28 PROCEDURE — 96375 TX/PRO/DX INJ NEW DRUG ADDON: CPT

## 2024-02-28 PROCEDURE — 83690 ASSAY OF LIPASE: CPT

## 2024-02-28 PROCEDURE — 25010000002 ONDANSETRON PER 1 MG

## 2024-02-28 PROCEDURE — 85025 COMPLETE CBC W/AUTO DIFF WBC: CPT

## 2024-02-28 PROCEDURE — 99285 EMERGENCY DEPT VISIT HI MDM: CPT

## 2024-02-28 PROCEDURE — 83605 ASSAY OF LACTIC ACID: CPT

## 2024-02-28 PROCEDURE — 25510000001 IOPAMIDOL 61 % SOLUTION

## 2024-02-28 PROCEDURE — 25010000002 KETOROLAC TROMETHAMINE PER 15 MG

## 2024-02-28 PROCEDURE — 74177 CT ABD & PELVIS W/CONTRAST: CPT

## 2024-02-28 PROCEDURE — 96374 THER/PROPH/DIAG INJ IV PUSH: CPT

## 2024-02-28 PROCEDURE — 81003 URINALYSIS AUTO W/O SCOPE: CPT

## 2024-02-28 PROCEDURE — 80053 COMPREHEN METABOLIC PANEL: CPT

## 2024-02-28 PROCEDURE — 25810000003 SODIUM CHLORIDE 0.9 % SOLUTION

## 2024-02-28 RX ORDER — SODIUM CHLORIDE 0.9 % (FLUSH) 0.9 %
10 SYRINGE (ML) INJECTION AS NEEDED
Status: DISCONTINUED | OUTPATIENT
Start: 2024-02-28 | End: 2024-02-28 | Stop reason: HOSPADM

## 2024-02-28 RX ORDER — ONDANSETRON 2 MG/ML
4 INJECTION INTRAMUSCULAR; INTRAVENOUS ONCE
Status: COMPLETED | OUTPATIENT
Start: 2024-02-28 | End: 2024-02-28

## 2024-02-28 RX ORDER — KETOROLAC TROMETHAMINE 15 MG/ML
15 INJECTION, SOLUTION INTRAMUSCULAR; INTRAVENOUS ONCE
Status: COMPLETED | OUTPATIENT
Start: 2024-02-28 | End: 2024-02-28

## 2024-02-28 RX ADMIN — SODIUM CHLORIDE 1000 ML: 9 INJECTION, SOLUTION INTRAVENOUS at 15:03

## 2024-02-28 RX ADMIN — ONDANSETRON 4 MG: 2 INJECTION INTRAMUSCULAR; INTRAVENOUS at 15:04

## 2024-02-28 RX ADMIN — IOPAMIDOL 100 ML: 612 INJECTION, SOLUTION INTRAVENOUS at 15:57

## 2024-02-28 RX ADMIN — KETOROLAC TROMETHAMINE 15 MG: 15 INJECTION, SOLUTION INTRAMUSCULAR; INTRAVENOUS at 15:51

## 2024-02-28 NOTE — ED PROVIDER NOTES
"Subjective   History of Present Illness  Patient is a 38-year-old male who presents emergency department with complaints of right-sided flank pain.  Patient reports that his spouse was just diagnosed with a kidney infection and he is having similar symptoms.  Patient states that his symptoms started yesterday.  He is having right-sided flank pain that radiates some to his right lower quadrant.  States that he is having frequent urination and a burning sensation when he is urinating.  Patient denies any blood in his stool or urine.  States that he had diarrhea 2 days ago.  Patient reports that he is having some nausea and has had a couple of vomiting episodes.  Patient denies any concern for STD.  He is unsure of fevers, but states that he has had cold chills and hot flashes.        Review of Systems   Constitutional:  Positive for chills.   Gastrointestinal:  Positive for abdominal pain, diarrhea, nausea and vomiting.   Genitourinary:  Positive for flank pain.   All other systems reviewed and are negative.      Past Medical History:   Diagnosis Date    Back pain     Chest pain     Claustrophobia     COPD (chronic obstructive pulmonary disease)     Emphysema of lung     Lung disease     Marfan syndrome     Pneumothorax     Seizures     when he was a child     Seizures     Smoking     3/4 pack a day    Tobacco abuse     Underweight        Allergies   Allergen Reactions    Calcium Channel Blockers Other (See Comments)     Do not use in marfan    Ciprofloxacin Other (See Comments)     Do not use fluoroquinolones in marfan    Medrol [Methylprednisolone] Other (See Comments)     Patient states he gets violent on this medication     Penicillins Other (See Comments)     Pt states his mother said \"his father fell over dead from taking one\"    Wasp Venom Swelling    Diclofenac Irritability       Past Surgical History:   Procedure Laterality Date    HERNIA REPAIR      LUNG LOBECTOMY Right 02/01/2017    LUNG REMOVAL, TOTAL " Right 2015    THORACOSCOPY N/A 2/3/2017    Procedure: BRONCHOSCOPY, THORACOSCOPY RIGHT CHEST,  WEDGE RESECTION RIGHT UPPER AND LOWER LOBE OF LUNG, MECHANICAL PLEURODESIS;  Surgeon: Kyle Heath MD;  Location: [facility] OR;  Service:     TYMPANOSTOMY  05/12/2016    Recorded       Family History   Problem Relation Age of Onset    No Known Problems Mother     Heart attack Father     Stroke Father     No Known Problems Sister     No Known Problems Daughter     Cancer Maternal Grandmother         breast    Breast cancer Maternal Grandmother     Prostate cancer Neg Hx     Colon cancer Neg Hx     Alcohol abuse Neg Hx     Drug abuse Neg Hx     Diabetes Neg Hx     Thyroid cancer Neg Hx        Social History     Socioeconomic History    Marital status:    Tobacco Use    Smoking status: Every Day     Packs/day: 0.50     Years: 26.00     Additional pack years: 0.00     Total pack years: 13.00     Types: Cigarettes     Start date: 1997     Passive exposure: Current    Smokeless tobacco: Never   Vaping Use    Vaping Use: Never used   Substance and Sexual Activity    Alcohol use: Not Currently    Drug use: Not Currently     Types: Amphetamines, Methamphetamines, Benzodiazepines, Marijuana, Oxycodone, Hydrocodone    Sexual activity: Yes     Partners: Female           Objective   Physical Exam  Vitals and nursing note reviewed.   Constitutional:       General: He is not in acute distress.     Appearance: Normal appearance. He is normal weight. He is not ill-appearing or toxic-appearing.   HENT:      Head: Normocephalic.   Cardiovascular:      Rate and Rhythm: Normal rate and regular rhythm.      Pulses: Normal pulses.      Heart sounds: Normal heart sounds.   Pulmonary:      Effort: Pulmonary effort is normal.      Breath sounds: Normal breath sounds.      Comments: No breath sounds on right, hx lung removal.  Abdominal:      General: Abdomen is flat. Bowel sounds are normal. There is no distension.      Palpations: Abdomen  is soft.      Tenderness: There is no abdominal tenderness. There is right CVA tenderness. There is no left CVA tenderness.   Musculoskeletal:         General: Normal range of motion.      Cervical back: Normal range of motion and neck supple.   Skin:     General: Skin is warm and dry.   Neurological:      General: No focal deficit present.      Mental Status: He is alert and oriented to person, place, and time. Mental status is at baseline.   Psychiatric:         Mood and Affect: Mood normal.         Behavior: Behavior normal.         Thought Content: Thought content normal.         Judgment: Judgment normal.         Procedures           ED Course                                             Medical Decision Making  Ross Platt is a very pleasant 38 y.o. male who presents to the emergency department for flank pain.     Patient was non-toxic and not-ill appearing on arrival. Vital signs stable.     Patient's presentation raises suspicion for differentials including, but not limited to, kidney stone, UTI, pyelonephritis, muscle strain.     External (non-ED) record review: None    Given this, Ross was placed on the monitor. Laboratory studies and imaging studies were ordered including CBC, CMP, lipase, lactic acid, urinalysis, CT abdomen pelvis with contrast.     Ross was given IV fluids, IV Zofran, IV Toradol for symptomatic relief.    Imaging was reviewed by urologist.  CT abdomen pelvis revealed No acute abnormality identified. Number no renal or ureteral calculi or hydronephrosis identified. Appendix is normal. Mild stool in the colon.    Labs were reviewed and are unremarkable.  Urinalysis not indicative of infection.  On re-evaluation, patient remained hemodynamically stable and appeared to be comfortable and in no acute distress.  Feel that symptoms could likely be from a muscle strain.  Low suspicion for kidney stone based on CT scan.  Low suspicion for UTI or pyelonephritis based on unremarkable  urinalysis.    I discussed all of the lab and imaging results with the patient during this visit in the emergency department. I answered all the questions regarding the emergency department evaluation, diagnosis, and treatment plan. We talked about how crucial it is for the patient to follow up by calling their primary care provider as soon as possible to schedule an appointment for within the next few days or as soon as possible so that the symptoms can be reassessed to see if they have improved or to answer any additional questions. I also provided the patient with advice on returning safely and urged the patient to visit the emergency department right away if any worsening or new symptoms appeared. The patient verbalized understanding of the discharge instructions and agreed with them. Ross was discharged in stable condition.    Signed by:   JANESSA Eugene 2/28/2024 17:02 CST     Dragon disclaimer:  Part of this note may be an electronic transcription/translation of spoken language to printed text using the Dragon Dictation System.    Problems Addressed:  Flank pain: complicated acute illness or injury    Amount and/or Complexity of Data Reviewed  Labs: ordered.  Radiology: ordered.    Risk  Prescription drug management.        Final diagnoses:   Flank pain       ED Disposition  ED Disposition       ED Disposition   Discharge    Condition   Stable    Comment   --               Cristy Schmidt APRN  [address] [address]  [phone]    Schedule an appointment as soon as possible for a visit in 1 day      [facility] EMERGENCY DEPARTMENT  [address] [place] [id number] [phone]  Go to   If symptoms worsen         Medication List      No changes were made to your prescriptions during this visit.            Aracely Coats APRN  02/28/24 [id number]

## 2024-02-28 NOTE — DISCHARGE INSTRUCTIONS
Please follow-up with primary care provider soon as possible to reassess symptoms.  Please return the ER for any new or worsening symptoms.

## 2024-03-05 ENCOUNTER — OFFICE VISIT (OUTPATIENT)
Dept: PULMONOLOGY | Facility: CLINIC | Age: 39
End: 2024-03-05
Payer: COMMERCIAL

## 2024-03-05 ENCOUNTER — HOSPITAL ENCOUNTER (OUTPATIENT)
Dept: CT IMAGING | Facility: HOSPITAL | Age: 39
Discharge: HOME | End: 2024-03-05
Admitting: INTERNAL MEDICINE
Payer: COMMERCIAL

## 2024-03-05 VITALS
DIASTOLIC BLOOD PRESSURE: 68 MMHG | HEIGHT: 76 IN | WEIGHT: 188.4 LBS | OXYGEN SATURATION: 98 % | BODY MASS INDEX: 22.94 KG/M2 | HEART RATE: 84 BPM | SYSTOLIC BLOOD PRESSURE: 98 MMHG

## 2024-03-05 DIAGNOSIS — Q87.40 MARFAN SYNDROME: ICD-10-CM

## 2024-03-05 DIAGNOSIS — J30.89 NON-SEASONAL ALLERGIC RHINITIS, UNSPECIFIED TRIGGER: ICD-10-CM

## 2024-03-05 DIAGNOSIS — R91.1 SOLITARY PULMONARY NODULE: ICD-10-CM

## 2024-03-05 DIAGNOSIS — Z71.6 ENCOUNTER FOR SMOKING CESSATION COUNSELING: ICD-10-CM

## 2024-03-05 DIAGNOSIS — J44.9 CHRONIC OBSTRUCTIVE PULMONARY DISEASE, UNSPECIFIED COPD TYPE: Primary | ICD-10-CM

## 2024-03-05 DIAGNOSIS — Z87.09 HISTORY OF PNEUMOTHORAX: ICD-10-CM

## 2024-03-05 DIAGNOSIS — F19.20 POLYSUBSTANCE (EXCLUDING OPIOIDS) DEPENDENCE: ICD-10-CM

## 2024-03-05 DIAGNOSIS — F17.200 TOBACCO USE DISORDER: ICD-10-CM

## 2024-03-05 DIAGNOSIS — R07.89 CHEST PAIN, ATYPICAL: ICD-10-CM

## 2024-03-05 DIAGNOSIS — R06.02 SOB (SHORTNESS OF BREATH): ICD-10-CM

## 2024-03-05 DIAGNOSIS — R63.6 UNDERWEIGHT: ICD-10-CM

## 2024-03-05 PROBLEM — J43.9 BULLOUS EMPHYSEMA: Status: RESOLVED | Noted: 2017-09-07 | Resolved: 2024-03-05

## 2024-03-05 PROCEDURE — 71250 CT THORAX DX C-: CPT

## 2024-03-05 RX ORDER — ALBUTEROL SULFATE 90 UG/1
2 AEROSOL, METERED RESPIRATORY (INHALATION) EVERY 4 HOURS PRN
Qty: 18 G | Refills: 5 | Status: SHIPPED | OUTPATIENT
Start: 2024-03-05

## 2024-03-05 RX ORDER — LORATADINE 10 MG/1
10 TABLET ORAL DAILY
Qty: 90 TABLET | Refills: 3 | Status: SHIPPED | OUTPATIENT
Start: 2024-03-05

## 2024-03-05 RX ORDER — NICOTINE 21 MG/24HR
1 PATCH, TRANSDERMAL 24 HOURS TRANSDERMAL EVERY 24 HOURS
Qty: 30 PATCH | Refills: 5 | Status: SHIPPED | OUTPATIENT
Start: 2024-03-05

## 2024-03-05 RX ORDER — ALBUTEROL SULFATE 2.5 MG/3ML
2.5 SOLUTION RESPIRATORY (INHALATION) EVERY 4 HOURS PRN
Qty: 150 ML | Refills: 5 | Status: SHIPPED | OUTPATIENT
Start: 2024-03-05

## 2024-03-05 RX ORDER — SILDENAFIL CITRATE 20 MG/1
20-40 TABLET ORAL DAILY PRN
Qty: 90 TABLET | Refills: 5 | Status: SHIPPED | OUTPATIENT
Start: 2024-03-05 | End: 2024-03-05

## 2024-03-05 NOTE — PROGRESS NOTES
[facility] OUTPATIENT PROGRESS NOTE    Patient: Ross Platt  : 1985  Age: 38 y.o.  Date of Service: 2024    REASON FOR CLINIC VISIT:  Chief Complaint   Patient presents with    Emphysema       Subjective:    History of Present Illness:  Ross Platt is a 38 y.o. male who presents to the office today to be seen for    Diagnosis Plan   1. Chronic obstructive pulmonary disease, unspecified COPD type        2. SOB (shortness of breath)        3. Solitary pulmonary nodule        4. History of pneumothorax        5. Tobacco use disorder        6. Marfan syndrome        7. Polysubstance abuse, including stimulants        8. Underweight        9. Non-seasonal allergic rhinitis, unspecified trigger        10. Chest pain, atypical        .  Other problems per record.    History:    Patient is a young  gentleman returns the pulmonary clinic for a follow-up visit.  He attended the clinic with his wife.    Patient is an active smoker and continues to smoke.  He has Marfan syndrome and has chronic obstructive pulmonary send he also had history of pneumothorax in the past requiring chest tube placement.  He had history of polysubstance abuse and he reported having pleuritic chest pain from the pneumothorax and is asking for some pain medications but he has problem with the chronic pain.  He also history of lung nodules in the past. Patient reported that his wife had pneumonia and was hospitalized but currently discharge.    Patient is currently using Anoro Ellipta and albuterol nebulizer and rescue inhaler and also uses fluticasone nasal spray and loratadine for nasal allergy.  He was on Revatio which is been stopped.  He uses nitroglycerin for the chest pain sometimes.  He also has history of myocardial infarction non-STEMI in the past.  He is very tall and has low body weight.  His last PFT done 2 years ago shows moderate obstructive airway dysfunction.    He had a CT scan of the  chest done which shows severe emphysematous changes and scarring of the lung but no other acute findings noted.      PFT done today:  Not done today    PFT Values          2022    10:45   Pre Drug PFT Results   FVC 89   FEV1 77   FEF 25-75% 66   FEV1/FVC 69   Other Tests PFT Results         DLCO 63   D/VAsb 59     Results for orders placed in visit on 22    Pulmonary Function Test    Narrative  Pulmonary Function Test  Performed by: Jillian Case, RRT  Authorized by: Buster Fitch MD    Pre Drug % Predicted  FVC: 89%  FEV1: 77%  FEF 25-75%: 66%  FEV1/FVC: 69%  T%  RV: 172%  DLCO: 63%  D/VAsb: 59%    Interpretation  Overall comments:  Above test results are acceptable and possible by ATS criteria.  Analysis of the above test results the patient showed evidence of moderate obstructive airway dysfunction.  No bronchodilator challenge was done.  There was hyperinflation and air trapping noted from increase in lung volumes.  The diffusion capacity corrected for alveolar volume is moderately to severely decreased.    In comparison with the prior test was done in 2017 there is worsening of spirometry hyperinflation air-trapping is marginally better but diffusion capacity is slightly worse than 2017 when corrected for alveolar volume.  Clinical correlation is indicated.    Busetr Fitch MD  Pulmonologist/Intensivist  2022 12:39 CDT      Results for orders placed during the hospital encounter of 17    Pulmonary Function Test    Narrative  [facility] - Pulmonary Function Test    [address] [facility]  [address]  [phone]    Patient : Ross Platt  MRN : [id number]  CSN : [id number]  Pulmonologist : Osmany Carr MD  Date : 2017    ______________________________________________________________________    Interpretation :  1.  Spirometry reveals small airways disease, otherwise is within normal limits.  2.  There is significant improvement  in spirometry postbronchodilator so that postbronchodilator spirometry is within normal limits.  3.  Lung volumes reveal significant hyperinflation.  4.  There is a moderate diffusion impairment.  5.  There is slight flattening of the inspiratory limb of the flow volume loop which could be seen with a variable extrathoracic upper airway obstruction, clinical correlation is advised.  6.  Arterial blood gases are within normal limits on room air.      Osmany Carr MD    Rest/Exercise Pulse Ox Values          8/4/2022    10:45   Rest/Exercise Pulse Ox Results   Rest room air SAT % 97   Exercise room air SAT % 93        Bronchodilator therapy: Anoro Ellipta and Albuterol Nebulizer and rescue inhaler    Smoking Status:   Social History     Tobacco Use   Smoking Status Every Day    Current packs/day: 0.50    Average packs/day: 0.5 packs/day for 27.2 years (13.6 ttl pk-yrs)    Types: Cigarettes    Start date: 1997    Passive exposure: Current   Smokeless Tobacco Never   Tobacco Comments    Smokes 0.50 or 0.75 pack a day 3/5     Pulm Rehab: no  Sleep: yes    Support System: lives with their spouse    Code Status:   There are no questions and answers to display.        Review of Systems:  A complete review of systems is performed and all other systems were reviewed and negative as note above in the HPI.  Review of Systems   Constitutional:  Positive for fatigue.   HENT:  Positive for congestion, postnasal drip and sinus pressure.    Eyes: Negative.    Respiratory:  Positive for cough, chest tightness and shortness of breath.    Cardiovascular:         Pleuritic chest pain   Gastrointestinal: Negative.    Endocrine: Negative.    Genitourinary: Negative.    Musculoskeletal:  Positive for arthralgias and back pain.   Skin: Negative.    Allergic/Immunologic: Positive for environmental allergies.   Neurological: Negative.    Hematological: Negative.    Psychiatric/Behavioral: Negative.         CAT/ACT Score:  Not done  today    Medications:  Outpatient Encounter Medications as of 3/5/2024   Medication Sig Dispense Refill    albuterol (PROVENTIL) (2.5 MG/3ML) 0.083% nebulizer solution Take 2.5 mg by nebulization Every 4 (Four) Hours As Needed for Wheezing. 150 mL 5    albuterol sulfate HFA (ProAir HFA) 108 (90 Base) MCG/ACT inhaler Inhale 2 puffs Every 4 (Four) Hours As Needed for Wheezing. 18 g 5    colchicine 0.6 MG tablet Take 1 tablet by mouth Daily. 30 tablet 0    fluticasone (FLONASE) 50 MCG/ACT nasal spray 2 sprays into the nostril(s) as directed by provider Daily. 16 g 5    ibuprofen (ADVIL,MOTRIN) 800 MG tablet Take 1 tablet by mouth Every 6 (Six) Hours As Needed for Mild Pain. 90 tablet 0    loratadine (CLARITIN) 10 MG tablet Take 1 tablet by mouth Daily. 90 tablet 3    methocarbamol (ROBAXIN) 750 MG tablet TAKE 1 TABLET BY MOUTH FOUR TIMES DAILY AS NEEDED FOR MUSCLE SPASMS 30 tablet 0    metoprolol succinate XL (TOPROL-XL) 25 MG 24 hr tablet Take 1 tablet by mouth Daily. 90 tablet 3    naloxone (NARCAN) 4 MG/0.1ML nasal spray CALL 911. SPR CONTENTS OF ONE SPRAYER (0.1ML) INTO ONE NOSTRIL. REPEAT IN 2-3 MIN IF SYMPTOMS OF OPIOID EMERGENCY PERSIST, ALTERNATE NOSTRILS      naproxen (NAPROSYN) 500 MG tablet TAKE 1 TABLET BY MOUTH TWICE DAILY AS NEEDED FOR MODERATE PAIN 60 tablet 2    nitroglycerin (NITROSTAT) 0.4 MG SL tablet 1 under the tongue as needed for angina, may repeat q5mins for up three doses 100 tablet 11    pregabalin (LYRICA) 100 MG capsule TAKE 1 CAPSULE BY MOUTH EVERY 12 HOURS AS NEEDED      Umeclidinium-Vilanterol (ANORO ELLIPTA) 62.5-25 MCG/ACT aerosol powder  inhaler Inhale 1 puff Daily for 90 days. 1 each 5    HYDROcodone-acetaminophen (NORCO) 7.5-325 MG per tablet TAKE 1 TABLET EVERY 6-8 HOURS AS NEEDED FOR PAIN (Patient not taking: Reported on 3/5/2024)      nicotine (Nicoderm CQ) 21 MG/24HR patch Place 1 patch on the skin as directed by provider Daily. (Patient not taking: Reported on 3/5/2024) 30 patch  "5    sildenafil (REVATIO) 20 MG tablet Take 1-2 tablets by mouth Daily As Needed (prior to sexual activity). (Patient not taking: Reported on 3/5/2024) 30 tablet 2    [DISCONTINUED] vardenafil (Levitra) 10 MG tablet Take 1 tablet by mouth Daily As Needed for Erectile Dysfunction (30 min prior to sexual activity). 20 tablet 0     No facility-administered encounter medications on file as of 3/5/2024.       Allergies:  Allergies   Allergen Reactions    Calcium Channel Blockers Other (See Comments)     Do not use in marfan    Ciprofloxacin Other (See Comments)     Do not use fluoroquinolones in marfan    Medrol [Methylprednisolone] Other (See Comments)     Patient states he gets violent on this medication     Penicillins Other (See Comments)     Pt states his mother said \"his father fell over dead from taking one\"    Wasp Venom Swelling    Diclofenac Irritability       Immunizations:  Immunization History   Administered Date(s) Administered    Hepatitis B Adolescent High Risk Infant 07/10/1998, 08/12/1998, 01/26/1999    Hepatitis B Adult/Adolescent IM 07/10/1998, 08/12/1998, 01/26/1999    Influenza, Unspecified 10/14/2022    MMR 02/25/1998    Pneumococcal Conjugate 20-Valent (PCV20) 08/21/2023    Td (TDVAX) 05/15/2001    Tdap 10/24/2022, 06/26/2023, 06/28/2023       Objective:    Vitals:  BP 98/68   Pulse 84   Ht 193 cm (76\")   Wt 85.5 kg (188 lb 6.4 oz)   SpO2 98% Comment: RA  BMI 22.93 kg/m²     Physical Exam:  General: Patient is a 38 y.o.  male. Looks stated age. Appears to be in no acute distress.  Eyes: EOMI. PERRLA. Vision intact. No scleral icterus.  Ear, Nose, Mouth and Throat: Hearing is grossly intact. No Leukoplakia, pharyngitis, stomatitis or thrush. Swollen nasal mucosa with post nasal drop.  Neck: Range of motion of neck normal. No thyromegaly or masses. Mallampati Class 3  Respiratory: Clear to auscultation bilaterally. No use of accessory muscles. Decreased breath sounds.  Cardiovascular: " Normal heart sounds. Regularly regular rhythm without murmur.  Gastrointestinal: Non tender, non distended, soft. Bowel sounds positive in all four quadrants. No organomegaly.  Skin: No obvious rashes, lesions, ulcers or large amount of bruising. No edema.   Neurological: No new motor deficits. Cranial nerves appear intact.  Psychiatric: Patient is alert and oriented to person, place and time.    Chest Imaging:    Study Result    Narrative & Impression   CT CHEST WO CONTRAST DIAGNOSTIC- 3/5/2024 7:55 AM     HISTORY: Lung nodule, 6-8mm; R91.1-Solitary pulmonary nodule     COMPARISON: 2/24/2023     DOSE LENGTH PRODUCT: 243.2 mGy.cm . Automated exposure control was also  utilized to decrease patient radiation dose.     TECHNIQUE: Serial helical tomographic images of the chest were acquired  without contrast. Multiplanar reformatted images were provided for  review.     FINDINGS:     Visualized neck base: The imaged portion of the base of the neck appears  unremarkable.     Lungs: There are severe changes of centrilobular and paraseptal  emphysema with bullous disease in the right middle lobe. There is  irregular nodular parenchymal scarring within the inferior lingular  segment of the left upper lobe with associated air bronchograms. Overall  dimensions of this area of irregular nodularity are 2.2 x 1.4 cm on the  current exam previously measured at 2.5 x 2.1 cm. This shows mild  interval improvement. There is also atelectasis and/or scarring within  the left lower lobe with associated nodularity abutting the major  fissure stable from the previous exam. No new nodularity or  consolidative pneumonia is present.. No pleural effusion is present. The  airways are clear.     Heart: The heart is normal in size. There is no pericardial effusion. No  coronary artery calcifications are present.     Vasculature: Thoracic aorta is normal in caliber. The pulmonary arteries  are normal in caliber.     Mediastinum and lymph nodes:  There is an enlarged AP window node as well  as prominent right paratracheal nodes. The AP window node measures 10 mm  in short axis..     Skeletal and soft tissues: Chest wall soft tissues are unremarkable. No  acute bony abnormality. Mild thoracic spine degenerative change.     Upper abdomen: The imaged portion of the upper abdomen demonstrates no  acute process.        IMPRESSION:  1. There is scarring within the inferior lingular segment of the left  upper lobe as well as within the left lower lobe. The irregularity  associated with the scarring involving the inferior lingular segment of  the upper lobe is less conspicuous than on the previous exam. The  scarring in the left lower lobe is stable. No new nodules or mass  lesions are present. There are severe changes of centrilobular and  paraseptal emphysema with associated bullous disease. Continued  follow-up suggested.       This report was signed and finalized on 3/5/2024 9:36 AM by Dr. Nelson Hyde MD.          Assessment:  1. Chronic obstructive pulmonary disease, unspecified COPD type    2. SOB (shortness of breath)    3. Solitary pulmonary nodule    4. History of pneumothorax    5. Tobacco use disorder    6. Marfan syndrome    7. Polysubstance abuse, including stimulants    8. Underweight    9. Non-seasonal allergic rhinitis, unspecified trigger    10. Chest pain, atypical        Plan/Recommendations:    1.  Patient still continues to have shortness of breath and told me Anoro Ellipta is not helping and it was changed to Trelegy Ellipta and will continue using albuterol nebulizer and rescue inhaler.  Prescription refills were done.   2. Ross Platt  reports that he has been smoking cigarettes. He started smoking about 27 years ago. He has a 13.6 pack-year smoking history. He has been exposed to tobacco smoke. He has never used smokeless tobacco.. I have educated him on the risk of diseases from using tobacco products such as cancer, COPD, and  heart disease.   I advised him to quit and he is willing to quit. We have discussed the following method/s for tobacco cessation:  Counseling.  Together we have set a quit date for 2 weeks from today.  He will follow up with me in 6 months or sooner to check on his progress.  On his request I have given him nicotine patch for 3 months.I spent 7 minutes counseling the patient.  3.  Patient has nasal allergy and will continue fluticasone nasal spray and loratadine.  He has NSTEMI and cardiac issues and will continue follow-up with cardiology.  He has pulmonary hypertension and we tried to keep him related to but he also takes nitroglycerin which will be a contraindication to for using gravity and it has not been started back again.  4.  He will continue follow-up with other physicians and return to pulmonary clinic for follow-up visit in 6 months time or earlier if needed     Follow up:  6 Months    Time Spent:  30 minutes    I appreciate the opportunity of participating in this patient's care. I would like to thank the PCP for the referral.  Please feel free to contact me with any other questions.    Buster Fitch MD   Pulmonologist/Intensivist     Electronically signed by: Buster Fitch MD, 3/5/2024 11:50 CST

## 2024-03-06 ENCOUNTER — OFFICE VISIT (OUTPATIENT)
Dept: FAMILY MEDICINE CLINIC | Facility: CLINIC | Age: 39
End: 2024-03-06
Payer: COMMERCIAL

## 2024-03-06 VITALS
OXYGEN SATURATION: 95 % | HEART RATE: 73 BPM | SYSTOLIC BLOOD PRESSURE: 109 MMHG | WEIGHT: 190.6 LBS | BODY MASS INDEX: 23.21 KG/M2 | RESPIRATION RATE: 20 BRPM | HEIGHT: 76 IN | TEMPERATURE: 98.7 F | DIASTOLIC BLOOD PRESSURE: 73 MMHG

## 2024-03-06 DIAGNOSIS — J43.9 PULMONARY EMPHYSEMA, UNSPECIFIED EMPHYSEMA TYPE: ICD-10-CM

## 2024-03-06 DIAGNOSIS — Z72.0 TOBACCO ABUSE: ICD-10-CM

## 2024-03-06 DIAGNOSIS — I25.9 CHEST PAIN DUE TO MYOCARDIAL ISCHEMIA, UNSPECIFIED ISCHEMIC CHEST PAIN TYPE: ICD-10-CM

## 2024-03-06 DIAGNOSIS — Z00.00 ANNUAL PHYSICAL EXAM: Primary | ICD-10-CM

## 2024-03-06 DIAGNOSIS — M54.12 CERVICAL RADICULOPATHY: ICD-10-CM

## 2024-03-06 DIAGNOSIS — G89.29 CHRONIC CHEST PAIN: ICD-10-CM

## 2024-03-06 DIAGNOSIS — Z13.220 SCREENING FOR HYPERLIPIDEMIA: ICD-10-CM

## 2024-03-06 DIAGNOSIS — S69.91XA INJURY OF RIGHT WRIST, INITIAL ENCOUNTER: ICD-10-CM

## 2024-03-06 DIAGNOSIS — Z71.6 TOBACCO ABUSE COUNSELING: ICD-10-CM

## 2024-03-06 DIAGNOSIS — R07.9 CHRONIC CHEST PAIN: ICD-10-CM

## 2024-03-06 RX ORDER — METHOCARBAMOL 750 MG/1
750 TABLET, FILM COATED ORAL 4 TIMES DAILY PRN
Qty: 120 TABLET | Refills: 1 | Status: SHIPPED | OUTPATIENT
Start: 2024-03-06

## 2024-03-06 RX ORDER — IBUPROFEN 800 MG/1
800 TABLET ORAL EVERY 6 HOURS PRN
Qty: 90 TABLET | Refills: 2 | Status: SHIPPED | OUTPATIENT
Start: 2024-03-06

## 2024-03-06 RX ORDER — METOPROLOL SUCCINATE 25 MG/1
25 TABLET, EXTENDED RELEASE ORAL DAILY
Qty: 90 TABLET | Refills: 1 | Status: SHIPPED | OUTPATIENT
Start: 2024-03-06

## 2024-03-06 NOTE — PROGRESS NOTES
"Chief Complaint  Annual Exam and Wrist Pain (Pt states that he fell today and hurt his right wrist. 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    Fell off rv earlier today- landed on right arm, right wrist     No longer going cardiology- doesn't want to go, doesn't want to know about it   Goes to pulmonology regularly- got refills and will continue to go     No drug use, no alcohol   Smokes 1 ppd- interested in quitting but nothing has helped so far    Needs new referral to pain mgmt- [facility]- reports accidnetly took wrong medicine and they dismissed    Objective   Vital Signs:  /73 (BP Location: Left arm, Patient Position: Sitting, Cuff Size: Adult)   Pulse 73   Temp 98.7 °F (37.1 °C) (Infrared)   Resp 20   Ht 193 cm (76\")   Wt 86.5 kg (190 lb 9.6 oz)   SpO2 95%   BMI 23.20 kg/m²   Estimated body mass index is 23.2 kg/m² as calculated from the following:    Height as of this encounter: 193 cm (76\").    Weight as of this encounter: 86.5 kg (190 lb 9.6 oz).       BMI is within normal parameters. No other follow-up for BMI required.      Physical Exam  Vitals and nursing note reviewed.   Constitutional:       General: He is not in acute distress.     Appearance: He is well-developed.   HENT:      Right Ear: Tympanic membrane and ear canal normal.      Left Ear: Tympanic membrane and ear canal normal.      Nose: Nose normal.      Right Sinus: No maxillary sinus tenderness or frontal sinus tenderness.      Left Sinus: No maxillary sinus tenderness or frontal sinus tenderness.      Mouth/Throat:      Mouth: Mucous membranes are moist.      Pharynx: Oropharynx is clear. Uvula midline. No uvula swelling.   Eyes:      Conjunctiva/sclera: Conjunctivae normal.   Neck:      " Thyroid: No thyromegaly.      Trachea: No tracheal deviation.   Cardiovascular:      Rate and Rhythm: Normal rate and regular rhythm.      Heart sounds: Normal heart sounds.   Pulmonary:      Effort: Pulmonary effort is normal.      Breath sounds: Decreased breath sounds present.   Chest:      Chest wall: Tenderness present.   Abdominal:      General: Bowel sounds are normal.      Palpations: Abdomen is soft. There is no mass.      Tenderness: There is no abdominal tenderness. There is no right CVA tenderness or left CVA tenderness.   Musculoskeletal:      Left shoulder: Decreased range of motion.      Right wrist: Swelling and tenderness present. Decreased range of motion.      Cervical back: Neck supple. Tenderness present. Decreased range of motion.   Lymphadenopathy:      Cervical: No cervical adenopathy.   Skin:     General: Skin is warm and dry.   Neurological:      Mental Status: He is alert.   Psychiatric:         Mood and Affect: Mood normal.         Behavior: Behavior normal.        Result Review :                     Assessment and Plan     Diagnoses and all orders for this visit:    1. Annual physical exam (Primary)  -     Lipid panel    2. Injury of right wrist, initial encounter  -     XR Wrist 3+ View Right    3. Chronic chest pain  -     Ambulatory Referral to Pain Management    4. Cervical radiculopathy  -     Ambulatory Referral to Pain Management    5. Screening for hyperlipidemia  -     Lipid panel    6. Pulmonary emphysema, unspecified emphysema type    7. Tobacco abuse    8. Tobacco abuse counseling    Other orders  -     ibuprofen (ADVIL,MOTRIN) 800 MG tablet; Take 1 tablet by mouth Every 6 (Six) Hours As Needed for Mild Pain.  Dispense: 90 tablet; Refill: 2  -     methocarbamol (ROBAXIN) 750 MG tablet; Take 1 tablet by mouth 4 (Four) Times a Day As Needed for Muscle Spasms.  Dispense: 120 tablet; Refill: 1  -     metoprolol succinate XL (TOPROL-XL) 25 MG 24 hr tablet; Take 1 tablet by mouth  Daily.  Dispense: 90 tablet; Refill: 1      Plan:  Refer to pain mgmt for chronic neck/chest pain, cervical radiculopathy   Ibuprofen and robaxin for now for this   Refill and continue toprol xl   Check cholesterol today   Xray right wrist and ace wrap provided- if fractured will need referral to ortho- recommend rest extremity, ice for 20 minutes at every 2 hours, compress with bandage or splint, and elevate as often as possible to reduce swelling and minimize pain.     Continue follow up with pulmonology   Recommend to keep following with cardiology     Counseling/Anticipatory Guidance: encouraged healthy nutrition, physical activity, healthy weight, injury prevention, continue avoidance of misuse of tobacco, alcohol and drugs, discussed healthy sexual behavior and STDs, encouraged routine dental health, mental health visits, discussed recommended immunizations, screenings via .     Ross Platt  reports that he has been smoking cigarettes. He started smoking about 27 years ago. He has a 13.6 pack-year smoking history. He has been exposed to tobacco smoke. He has never used smokeless tobacco. I have educated him on the risk of diseases from using tobacco products such as cancer, COPD, and heart disease.     I advised him to quit and he is not willing to quit.    I spent 3  minutes counseling the patient.                 Follow Up     Return in about 6 months (around 9/6/2024) for Recheck (+ 1 year annual).  Patient was given instructions and counseling regarding his condition or for health maintenance advice. Please see specific information pulled into the AVS if appropriate.

## 2024-03-07 LAB
CHOLEST SERPL-MCNC: 166 MG/DL (ref 0–200)
HDLC SERPL-MCNC: 42 MG/DL (ref 40–60)
LDLC SERPL CALC-MCNC: 101 MG/DL (ref 0–100)
TRIGL SERPL-MCNC: 130 MG/DL (ref 0–150)
VLDLC SERPL CALC-MCNC: 23 MG/DL (ref 5–40)

## 2024-03-20 ENCOUNTER — TELEPHONE (OUTPATIENT)
Dept: FAMILY MEDICINE CLINIC | Facility: CLINIC | Age: 39
End: 2024-03-20
Payer: COMMERCIAL

## 2024-03-20 NOTE — TELEPHONE ENCOUNTER
Called to let the patient know that I faxed his referral [facility]. I also wanted to make sure he received his Letter of Discharge from [facility]. No answer. ALBA

## 2024-05-02 RX ORDER — UMECLIDINIUM BROMIDE AND VILANTEROL TRIFENATATE 62.5; 25 UG/1; UG/1
1 POWDER RESPIRATORY (INHALATION) DAILY
Qty: 60 EACH | OUTPATIENT
Start: 2024-05-02

## 2024-05-02 NOTE — PROGRESS NOTES
He is cleared  Intermediate risk pt  Intermediate risk surgery  Ok to hold asa 7 days prior to surgery  Hold xarelto 2 days prior to surgery and resume both asap after when ok by his surgeon   Selin Khan presents to the clinic today requesting COVID-19 testing. He complains of cough and loss of taste. He has had positive exposure. On exam, patient appears in no acute distress. Speech is clear. Breathing is unlabored. Moves all extremities and is ambulatory. We will order a COVID test today to be performed in clinic. The patient and appropriate authorities will be informed of the results. He should quarantine at home until results are returned. If he tests positive, he should quarantine for a total of 10 days after symptoms began and 24 hours after fever resolves without fever reducing medications per CDC guidelines. Patient was advised that even if asymptomatic, after a positive result he should quarantine for 10 days per [facility] guidelines. Patient left in stable condition. Total of 20 minutes spent, which includes face to face with patient, record review, evaluation, planning, and education as well as coordination of his care.

## 2024-05-02 NOTE — TELEPHONE ENCOUNTER
Anoro was discontinued for Trelegy at patient's last visit.        Rx Refill Note  Requested Prescriptions     Pending Prescriptions Disp Refills    Anoro Ellipta 62.5-25 MCG/ACT aerosol powder  inhaler [Pharmacy Med Name: ANORO ELLIPTA 62.5-25 ORAL INH(30S)] 60 each      Sig: INHALE 1 PUFF BY MOUTH DAILY      Last office visit with prescribing clinician: 3/5/2024   Last telemedicine visit with prescribing clinician: Visit date not found   Next office visit with prescribing clinician: 9/13/2024                         Would you like a call back once the refill request has been completed: [] Yes [] No    If the office needs to give you a call back, can they leave a voicemail: [] Yes [] No    Nichole Escalera MA  05/02/24, 09:10 CDT

## 2024-09-20 ENCOUNTER — TELEPHONE (OUTPATIENT)
Dept: PULMONOLOGY | Facility: CLINIC | Age: 39
End: 2024-09-20
Payer: COMMERCIAL

## 2024-10-23 RX ORDER — METOPROLOL SUCCINATE 25 MG/1
25 TABLET, EXTENDED RELEASE ORAL DAILY
OUTPATIENT
Start: 2024-10-23

## 2025-01-31 RX ORDER — SIMVASTATIN 5 MG
5 TABLET ORAL NIGHTLY
Qty: 90 TABLET | Refills: 3 | Status: SHIPPED | OUTPATIENT
Start: 2025-01-31

## 2025-06-24 ENCOUNTER — HOSPITAL ENCOUNTER (EMERGENCY)
Facility: HOSPITAL | Age: 40
Discharge: HOME | End: 2025-06-24
Admitting: FAMILY MEDICINE
Payer: COMMERCIAL

## 2025-06-24 ENCOUNTER — APPOINTMENT (OUTPATIENT)
Dept: CT IMAGING | Facility: HOSPITAL | Age: 40
End: 2025-06-24
Payer: COMMERCIAL

## 2025-06-24 VITALS
OXYGEN SATURATION: 96 % | BODY MASS INDEX: 20.9 KG/M2 | WEIGHT: 177 LBS | TEMPERATURE: 98.2 F | RESPIRATION RATE: 17 BRPM | SYSTOLIC BLOOD PRESSURE: 115 MMHG | DIASTOLIC BLOOD PRESSURE: 61 MMHG | HEART RATE: 97 BPM | HEIGHT: 77 IN
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
## 2025-06-24 DIAGNOSIS — S30.0XXA LUMBAR CONTUSION, INITIAL ENCOUNTER: ICD-10-CM

## 2025-06-24 PROCEDURE — 99284 EMERGENCY DEPT VISIT MOD MDM: CPT

## 2025-06-24 PROCEDURE — 96372 THER/PROPH/DIAG INJ SC/IM: CPT

## 2025-06-24 PROCEDURE — 72131 CT LUMBAR SPINE W/O DYE: CPT

## 2025-06-24 PROCEDURE — 25010000002 KETOROLAC TROMETHAMINE PER 15 MG: Performed by: STUDENT IN AN ORGANIZED HEALTH CARE EDUCATION/TRAINING PROGRAM

## 2025-06-24 RX ORDER — TRAMADOL HYDROCHLORIDE 50 MG/1
50 TABLET ORAL ONCE
Status: COMPLETED | OUTPATIENT
Start: 2025-06-24 | End: 2025-06-24

## 2025-06-24 RX ORDER — METHYLPREDNISOLONE 4 MG/1
1 TABLET ORAL DAILY
Qty: 21 TABLET | Refills: 0 | Status: SHIPPED | OUTPATIENT
Start: 2025-06-24

## 2025-06-24 RX ORDER — KETOROLAC TROMETHAMINE 30 MG/ML
30 INJECTION, SOLUTION INTRAMUSCULAR; INTRAVENOUS ONCE
Status: COMPLETED | OUTPATIENT
Start: 2025-06-24 | End: 2025-06-24

## 2025-06-24 RX ORDER — LIDOCAINE 50 MG/G
1 PATCH TOPICAL EVERY 24 HOURS
Qty: 30 PATCH | Refills: 0 | Status: SHIPPED | OUTPATIENT
Start: 2025-06-24

## 2025-06-24 RX ADMIN — KETOROLAC TROMETHAMINE 30 MG: 30 INJECTION, SOLUTION INTRAMUSCULAR; INTRAVENOUS at 19:54

## 2025-06-24 RX ADMIN — TRAMADOL HYDROCHLORIDE 50 MG: 50 TABLET, COATED ORAL at 20:35

## 2025-06-24 NOTE — Clinical Note
[facility] EMERGENCY DEPARTMENT  [address]  [facility] [address]  Phone: [phone]    Ross Platt was seen and treated in our emergency department on 6/24/2025.  He may return to work on 06/26/2025.         Thank you for choosing [facility].    Aung Wilson PA-C

## 2025-06-25 NOTE — DISCHARGE INSTRUCTIONS
Please take the above prescribed medication as directed.  Please follow-up with your primary care provider within the next week to ensure that you are healing properly.  Please return to the emergency department if you have any new or worsening symptoms.

## 2025-06-25 NOTE — ED PROVIDER NOTES
"Subjective   History of Present Illness  Patient states that 2 hours ago he was working on a albert when he fell off of the step and landed on a 2.4 that was laying in the grass.  He states he fell about 2 feet.  He did not injure any other part of his body, denies hitting his head or loss of consciousness.  He locates the pain diffusely in the lumbar spine, and states pain radiates down the right lower extremity to the foot.  He denies any numbness, tingling, or weakness in the right lower extremity, and states he is able to ambulate without any difficulty.  He denies any pain in the groin, saddle anesthesia, or urinary/bowel incontinence.  He denies surgical history of the back.  Bending and twisting exacerbates the pain, while remaining still temporarily alleviates it.  Within 30 minutes of the injury patient took Tylenol, but did not alleviate his symptoms.    Past medical history is positive for Marfan syndrome, smoking, COPD.        Review of Systems   Musculoskeletal:  Positive for back pain.   All other systems reviewed and are negative.      Past Medical History:   Diagnosis Date    Back pain     Chest pain     Claustrophobia     COPD (chronic obstructive pulmonary disease)     Emphysema of lung     Lung disease     Marfan syndrome     Pneumothorax     Seizures     when he was a child     Seizures     Smoking     3/4 pack a day    Tobacco abuse     Underweight        Allergies   Allergen Reactions    Calcium Channel Blockers Other (See Comments)     Do not use in marfan    Ciprofloxacin Other (See Comments)     Do not use fluoroquinolones in marfan    Medrol [Methylprednisolone] Other (See Comments)     Patient states he gets violent on this medication     Penicillins Other (See Comments)     Pt states his mother said \"his father fell over dead from taking one\"    Wasp Venom Swelling    Diclofenac Irritability       Past Surgical History:   Procedure Laterality Date    HERNIA REPAIR      LUNG LOBECTOMY Right " 02/01/2017    LUNG REMOVAL, TOTAL Right 2015    THORACOSCOPY N/A 2/3/2017    Procedure: BRONCHOSCOPY, THORACOSCOPY RIGHT CHEST,  WEDGE RESECTION RIGHT UPPER AND LOWER LOBE OF LUNG, MECHANICAL PLEURODESIS;  Surgeon: Kyle Heath MD;  Location: [facility];  Service:     TYMPANOSTOMY  05/12/2016    Recorded       Family History   Problem Relation Age of Onset    No Known Problems Mother     Heart attack Father     Stroke Father     No Known Problems Sister     No Known Problems Daughter     Cancer Maternal Grandmother         breast    Breast cancer Maternal Grandmother     Prostate cancer Neg Hx     Colon cancer Neg Hx     Alcohol abuse Neg Hx     Drug abuse Neg Hx     Diabetes Neg Hx     Thyroid cancer Neg Hx        Social History     Socioeconomic History    Marital status:    Tobacco Use    Smoking status: Every Day     Current packs/day: 0.50     Average packs/day: 0.5 packs/day for 28.5 years (14.2 ttl pk-yrs)     Types: Cigarettes     Start date: 1997     Passive exposure: Current    Smokeless tobacco: Never    Tobacco comments:     Smokes 0.50 or 0.75 pack a day 3/5   Vaping Use    Vaping status: Former    Substances: Nicotine, THC, Flavoring    Devices: Disposable   Substance and Sexual Activity    Alcohol use: Not Currently    Drug use: Not Currently     Types: Amphetamines, Methamphetamines, Benzodiazepines, Marijuana, Oxycodone, Hydrocodone    Sexual activity: Yes     Partners: Female           Objective   Physical Exam  Vitals and nursing note reviewed.   Constitutional:       General: He is not in acute distress.     Appearance: Normal appearance. He is normal weight. He is not ill-appearing, toxic-appearing or diaphoretic.   HENT:      Head: Normocephalic and atraumatic.      Mouth/Throat:      Mouth: Mucous membranes are moist.   Eyes:      Conjunctiva/sclera: Conjunctivae normal.   Musculoskeletal:         General: Normal range of motion.      Cervical back: Normal.      Thoracic back:  Normal.      Lumbar back: Tenderness present. No swelling, edema, deformity or lacerations.      Comments: Diffuse tenderness palpation of the lumbar midline, worst from L4-S1.  No step-offs deformities palpated.   Skin:     General: Skin is warm and dry.   Neurological:      Mental Status: He is alert. Mental status is at baseline.   Psychiatric:         Mood and Affect: Mood normal.         Behavior: Behavior normal.         Thought Content: Thought content normal.         Judgment: Judgment normal.         Procedures           ED Course                                                       Medical Decision Making  Patient states that 2 hours ago he was working on a albert when he fell off of the step and landed on a 2.4 that was laying in the grass.  He states he fell about 2 feet.  He did not injure any other part of his body, denies hitting his head or loss of consciousness.  He locates the pain diffusely in the lumbar spine, and states pain radiates down the right lower extremity to the foot.  He denies any numbness, tingling, or weakness in the right lower extremity, and states he is able to ambulate without any difficulty.  He denies any pain in the groin, saddle anesthesia, or urinary/bowel incontinence.  He denies surgical history of the back.  Bending and twisting exacerbates the pain, while remaining still temporarily alleviates it.  Within 30 minutes of the injury patient took Tylenol, but did not alleviate his symptoms.    Past medical history is positive for Marfan syndrome, smoking, COPD.    DDX: Vertebral fracture, lumbar disc herniation, lumbar contusion, muscle strain, sciatica.    CT Lumbar Spine Without Contrast   Final Result         No acute fracture or subluxation. No acute findings.              This report was signed and finalized on 6/24/2025 8:08 PM by Dr. Veto Alfaro MD.          I discussed radiographic findings with patient in the room.  Patient is negative for any acute  fractures, dislocation, or disc herniations.  Patient states that he is still in pain, request something additional before he is discharged.  We will discharge with a diagnosis of lumbar contusion and lumbar radiculopathy.  We will send in a steroid pack and lidocaine patches to be used to further alleviate his pain.  He is pleased with plan overall.  He will return to the ED if he has any new or worsening symptoms.    Amount and/or Complexity of Data Reviewed  Radiology: ordered.    Risk  Prescription drug management.        Final diagnoses:   Fall, initial encounter   Lumbar contusion, initial encounter       ED Disposition  ED Disposition       ED Disposition   Discharge    Condition   Stable    Comment   --               Esteban Schmidttray, APRROMAN  [address]  [place] [id number] [phone]    In 1 week  As needed         Medication List        New Prescriptions      lidocaine 5 %  Commonly known as: LIDODERM  Place 1 patch on the skin as directed by provider Daily. Remove & Discard patch within 12 hours or as directed by MD     methylPREDNISolone 4 MG dose pack  Commonly known as: MEDROL  Take 1 tablet by mouth Daily. Use as directed by package instructions               Where to Get Your Medications        These medications were sent to [id number] - [place], [place] - [address] AT [facility] OF [facility] [address] - [phone]  - [phone] FX  [address], [facility] [address]      Phone: [phone]   lidocaine 5 %  methylPREDNISolone 4 MG dose pack            Aung Wilson PA-C  06/24/25 2026

## 2025-06-30 ENCOUNTER — OFFICE VISIT (OUTPATIENT)
Dept: FAMILY MEDICINE CLINIC | Facility: CLINIC | Age: 40
End: 2025-06-30
Payer: COMMERCIAL

## 2025-06-30 VITALS
DIASTOLIC BLOOD PRESSURE: 61 MMHG | BODY MASS INDEX: 21.49 KG/M2 | HEART RATE: 83 BPM | RESPIRATION RATE: 20 BRPM | OXYGEN SATURATION: 95 % | TEMPERATURE: 98.4 F | WEIGHT: 182 LBS | SYSTOLIC BLOOD PRESSURE: 112 MMHG | HEIGHT: 77 IN

## 2025-06-30 DIAGNOSIS — Z13.1 SCREENING FOR DIABETES MELLITUS: ICD-10-CM

## 2025-06-30 DIAGNOSIS — J43.9 PULMONARY EMPHYSEMA, UNSPECIFIED EMPHYSEMA TYPE: ICD-10-CM

## 2025-06-30 DIAGNOSIS — M54.12 CERVICAL RADICULOPATHY: ICD-10-CM

## 2025-06-30 DIAGNOSIS — Z13.220 SCREENING FOR HYPERLIPIDEMIA: ICD-10-CM

## 2025-06-30 DIAGNOSIS — M54.2 CHRONIC NECK AND BACK PAIN: ICD-10-CM

## 2025-06-30 DIAGNOSIS — M54.9 CHRONIC NECK AND BACK PAIN: ICD-10-CM

## 2025-06-30 DIAGNOSIS — Z00.00 ANNUAL PHYSICAL EXAM: Primary | ICD-10-CM

## 2025-06-30 DIAGNOSIS — G89.29 CHRONIC NECK AND BACK PAIN: ICD-10-CM

## 2025-06-30 DIAGNOSIS — F43.10 PTSD (POST-TRAUMATIC STRESS DISORDER): ICD-10-CM

## 2025-06-30 DIAGNOSIS — F41.9 ANXIETY: ICD-10-CM

## 2025-06-30 RX ORDER — DEXAMETHASONE SODIUM PHOSPHATE 10 MG/ML
10 INJECTION, SOLUTION INTRA-ARTICULAR; INTRALESIONAL; INTRAMUSCULAR; INTRAVENOUS; SOFT TISSUE ONCE
Status: COMPLETED | OUTPATIENT
Start: 2025-06-30 | End: 2025-06-30

## 2025-06-30 RX ORDER — VARENICLINE TARTRATE 0.5 (11)-1
KIT ORAL
Qty: 1 EACH | Refills: 0 | Status: SHIPPED | OUTPATIENT
Start: 2025-06-30 | End: 2025-07-28

## 2025-06-30 RX ORDER — IBUPROFEN 800 MG/1
800 TABLET, FILM COATED ORAL EVERY 6 HOURS PRN
Qty: 60 TABLET | Refills: 2 | Status: SHIPPED | OUTPATIENT
Start: 2025-06-30 | End: 2025-07-07

## 2025-06-30 RX ORDER — NICOTINE 21 MG/24HR
1 PATCH, TRANSDERMAL 24 HOURS TRANSDERMAL EVERY 24 HOURS
Qty: 28 EACH | Refills: 0 | Status: SHIPPED | OUTPATIENT
Start: 2025-06-30

## 2025-06-30 RX ORDER — ALBUTEROL SULFATE 90 UG/1
2 INHALANT RESPIRATORY (INHALATION) EVERY 4 HOURS PRN
Qty: 18 G | Refills: 5 | Status: SHIPPED | OUTPATIENT
Start: 2025-06-30

## 2025-06-30 RX ORDER — UMECLIDINIUM BROMIDE AND VILANTEROL TRIFENATATE 62.5; 25 UG/1; UG/1
1 POWDER RESPIRATORY (INHALATION)
Qty: 60 EACH | Refills: 5 | Status: SHIPPED | OUTPATIENT
Start: 2025-06-30

## 2025-06-30 RX ORDER — METHOCARBAMOL 500 MG/1
500 TABLET, FILM COATED ORAL 4 TIMES DAILY
Qty: 120 TABLET | Refills: 1 | Status: SHIPPED | OUTPATIENT
Start: 2025-06-30

## 2025-06-30 RX ORDER — VARENICLINE TARTRATE 1 MG/1
1 TABLET, FILM COATED ORAL 2 TIMES DAILY
Qty: 56 TABLET | Refills: 4 | Status: SHIPPED | OUTPATIENT
Start: 2025-07-28 | End: 2025-12-15

## 2025-06-30 RX ADMIN — DEXAMETHASONE SODIUM PHOSPHATE 10 MG: 10 INJECTION, SOLUTION INTRA-ARTICULAR; INTRALESIONAL; INTRAMUSCULAR; INTRAVENOUS; SOFT TISSUE at 09:49

## 2025-07-01 LAB
ALBUMIN SERPL-MCNC: 4.4 G/DL (ref 3.5–5.2)
ALBUMIN/GLOB SERPL: 1.8 G/DL
ALP SERPL-CCNC: 110 U/L (ref 39–117)
ALT SERPL-CCNC: 20 U/L (ref 1–41)
AST SERPL-CCNC: 21 U/L (ref 1–40)
BASOPHILS # BLD AUTO: 0.05 10*3/MM3 (ref 0–0.2)
BASOPHILS NFR BLD AUTO: 0.7 % (ref 0–1.5)
BILIRUB SERPL-MCNC: 0.4 MG/DL (ref 0–1.2)
BUN SERPL-MCNC: 15 MG/DL (ref 6–20)
BUN/CREAT SERPL: 15.6 (ref 7–25)
CALCIUM SERPL-MCNC: 9.6 MG/DL (ref 8.6–10.5)
CHLORIDE SERPL-SCNC: 104 MMOL/L (ref 98–107)
CHOLEST SERPL-MCNC: 148 MG/DL (ref 0–200)
CO2 SERPL-SCNC: 26.8 MMOL/L (ref 22–29)
CREAT SERPL-MCNC: 0.96 MG/DL (ref 0.76–1.27)
EGFRCR SERPLBLD CKD-EPI 2021: 103.1 ML/MIN/1.73
EOSINOPHIL # BLD AUTO: 0.13 10*3/MM3 (ref 0–0.4)
EOSINOPHIL NFR BLD AUTO: 1.9 % (ref 0.3–6.2)
ERYTHROCYTE [DISTWIDTH] IN BLOOD BY AUTOMATED COUNT: 13.2 % (ref 12.3–15.4)
GLOBULIN SER CALC-MCNC: 2.4 GM/DL
GLUCOSE SERPL-MCNC: 88 MG/DL (ref 65–99)
HBA1C MFR BLD: 5.5 % (ref 4.8–5.6)
HCT VFR BLD AUTO: 45.7 % (ref 37.5–51)
HDLC SERPL-MCNC: 40 MG/DL (ref 40–60)
HGB BLD-MCNC: 14.8 G/DL (ref 13–17.7)
IMM GRANULOCYTES # BLD AUTO: 0.05 10*3/MM3 (ref 0–0.05)
IMM GRANULOCYTES NFR BLD AUTO: 0.7 % (ref 0–0.5)
LDLC SERPL CALC-MCNC: 90 MG/DL (ref 0–100)
LYMPHOCYTES # BLD AUTO: 1.3 10*3/MM3 (ref 0.7–3.1)
LYMPHOCYTES NFR BLD AUTO: 19.3 % (ref 19.6–45.3)
MCH RBC QN AUTO: 30.7 PG (ref 26.6–33)
MCHC RBC AUTO-ENTMCNC: 32.4 G/DL (ref 31.5–35.7)
MCV RBC AUTO: 94.8 FL (ref 79–97)
MONOCYTES # BLD AUTO: 0.72 10*3/MM3 (ref 0.1–0.9)
MONOCYTES NFR BLD AUTO: 10.7 % (ref 5–12)
NEUTROPHILS # BLD AUTO: 4.5 10*3/MM3 (ref 1.7–7)
NEUTROPHILS NFR BLD AUTO: 66.7 % (ref 42.7–76)
NRBC BLD AUTO-RTO: 0 /100 WBC (ref 0–0.2)
PLATELET # BLD AUTO: 238 10*3/MM3 (ref 140–450)
POTASSIUM SERPL-SCNC: 4.6 MMOL/L (ref 3.5–5.2)
PROT SERPL-MCNC: 6.8 G/DL (ref 6–8.5)
RBC # BLD AUTO: 4.82 10*6/MM3 (ref 4.14–5.8)
SODIUM SERPL-SCNC: 140 MMOL/L (ref 136–145)
TRIGL SERPL-MCNC: 94 MG/DL (ref 0–150)
TSH SERPL DL<=0.005 MIU/L-ACNC: 1.75 UIU/ML (ref 0.27–4.2)
VLDLC SERPL CALC-MCNC: 18 MG/DL (ref 5–40)
WBC # BLD AUTO: 6.75 10*3/MM3 (ref 3.4–10.8)

## 2025-07-03 NOTE — PROGRESS NOTES
"Chief Complaint  Annual Exam    Subjective        Ross Platt presents to [facility]  History of Present Illness  The patient is a 40-year-old male who presents today for an annual physical.    He reports persistent neck and back discomfort, which he has been managing with Lyrica. He expresses interest in continuing this medication. He mentions that he has previously found relief from muscle relaxants but cannot recall their specific names. He is open to undergoing blood work during this visit. He is interested in trying medical marijuana for his chronic pain and anxiety, as he found it beneficial in the past.    His respiratory health has been suboptimal this year, with several severe exacerbations. He continues to smoke cigarettes and is seeking assistance to quit. He recalls using patches in the past, which provided some relief.    He is requesting refills of his rescue inhaler and Lyrica.    SOCIAL HISTORY  He admits to smoking. He does not endorse drug use or alcohol consumption.    Objective   Vital Signs:  /61 (BP Location: Left arm, Patient Position: Sitting, Cuff Size: Adult)   Pulse 83   Temp 98.4 °F (36.9 °C) (Infrared)   Resp 20   Ht 195.6 cm (77\")   Wt 82.6 kg (182 lb)   SpO2 95%   BMI 21.58 kg/m²   Estimated body mass index is 21.58 kg/m² as calculated from the following:    Height as of this encounter: 195.6 cm (77\").    Weight as of this encounter: 82.6 kg (182 lb).       BMI is within normal parameters. No other follow-up for BMI required.      Physical Exam  Vitals and nursing note reviewed.   Constitutional:       General: He is not in acute distress.     Appearance: He is well-developed.   HENT:      Head: Normocephalic and atraumatic.      Right Ear: Tympanic membrane and ear canal normal.      Left Ear: Tympanic membrane and ear canal normal.      Nose: Nose normal.      Right Sinus: No maxillary sinus tenderness or frontal sinus tenderness.    "   Left Sinus: No maxillary sinus tenderness or frontal sinus tenderness.      Mouth/Throat:      Mouth: Mucous membranes are moist.      Pharynx: Oropharynx is clear. Uvula midline. No uvula swelling.   Eyes:      Conjunctiva/sclera: Conjunctivae normal.   Neck:      Thyroid: No thyromegaly.      Trachea: No tracheal deviation.   Cardiovascular:      Rate and Rhythm: Normal rate and regular rhythm.      Heart sounds: Normal heart sounds.   Pulmonary:      Effort: Pulmonary effort is normal.      Breath sounds: Normal breath sounds.   Abdominal:      General: Bowel sounds are normal.      Palpations: Abdomen is soft. There is no mass.      Tenderness: There is no abdominal tenderness.   Musculoskeletal:      Cervical back: Neck supple. Decreased range of motion.      Lumbar back: Decreased range of motion.   Lymphadenopathy:      Cervical: No cervical adenopathy.   Skin:     General: Skin is warm and dry.   Neurological:      General: No focal deficit present.      Mental Status: He is alert and oriented to person, place, and time.   Psychiatric:         Mood and Affect: Mood normal.         Behavior: Behavior normal.        Physical Exam      Result Review :          Results             Assessment and Plan     Diagnoses and all orders for this visit:    1. Annual physical exam (Primary)  -     TSH  -     CBC & Differential  -     Comprehensive Metabolic Panel    2. Chronic neck and back pain  -     Ambulatory Referral to Pain Management    3. Cervical radiculopathy  -     Ambulatory Referral to Pain Management    4. Pulmonary emphysema, unspecified emphysema type  -     dexAMETHasone (DECADRON) injection 10 mg    5. Screening for hyperlipidemia  -     Lipid Panel    6. Screening for diabetes mellitus  -     Hemoglobin A1c    7. Anxiety    8. PTSD (post-traumatic stress disorder)    Other orders  -     albuterol sulfate HFA (ProAir HFA) 108 (90 Base) MCG/ACT inhaler; Inhale 2 puffs Every 4 (Four) Hours As Needed for  Wheezing.  Dispense: 18 g; Refill: 5  -     Umeclidinium-Vilanterol (Anoro Ellipta) 62.5-25 MCG/ACT aerosol powder  inhaler; Inhale 1 puff Daily.  Dispense: 60 each; Refill: 5  -     Varenicline Tartrate, Starter, 0.5 MG X 11 & 1 MG X 42 tablet therapy pack; Take 0.5 mg by mouth Daily for 3 days, THEN 0.5 mg 2 (Two) Times a Day for 4 days, THEN 1 mg 2 (Two) Times a Day for 21 days. Take 0.5 mg po daily x 3 days, then 0.5 mg po bid x 4 days, then 1 mg po bid  Dispense: 1 each; Refill: 0  -     varenicline (CHANTIX) 1 MG tablet; Take 1 tablet by mouth 2 (Two) Times a Day for 140 days.  Dispense: 56 tablet; Refill: 4  -     nicotine (NICODERM CQ) 21 MG/24HR patch; Place 1 patch on the skin as directed by provider Daily.  Dispense: 28 each; Refill: 0  -     methocarbamol (ROBAXIN) 500 MG tablet; Take 1 tablet by mouth 4 (Four) Times a Day.  Dispense: 120 tablet; Refill: 1  -     ibuprofen (ADVIL,MOTRIN) 800 MG tablet; Take 1 tablet by mouth Every 6 (Six) Hours As Needed for Moderate Pain.  Dispense: 60 tablet; Refill: 2      Assessment & Plan  1. Chronic neck and back pain/ radiculopathy: Chronic.  - Referral to pain management for further evaluation and treatment.  - Administer steroid injection, decadron 10 mg, today to manage pain until the pain management appointment.  -  refill robaxin and iburpofen     2. Pulmonary emphysema  - encouraged smoking cessation, Provided patches and pills to aid in smoking cessation.  - Prescription for Anoro will be sent to the pharmacy.  - refill albuterol    3. Annual exam  - labs today  Counseling/Anticipatory Guidance: encouraged healthy Nutrition, physical activity, healthy weight, injury prevention, misuse of tobacco, alcohol and drugs, sexual behavior and STDs, contraception, dental health, mental health, immunizations, screenings  Screening Services: Cholesterol, diabetes, colorectal cancer beginning at 50 years, HIV      Follow-up  - Pain management will contact when ready  for appointment.           Follow Up     Return in about 6 months (around 12/30/2025) for Recheck.  Patient was given instructions and counseling regarding his condition or for health maintenance advice. Please see specific information pulled into the AVS if appropriate.       Patient or patient representative verbalized consent for the use of Ambient Listening during the visit with  JANESSA Little for chart documentation. 7/3/2025  15:25 CDT

## 2025-07-06 ENCOUNTER — HOSPITAL ENCOUNTER (EMERGENCY)
Facility: HOSPITAL | Age: 40
Discharge: LEFT BEFORE DISCHARGE | End: 2025-07-06
Payer: COMMERCIAL

## 2025-07-06 VITALS
HEART RATE: 85 BPM | WEIGHT: 182 LBS | BODY MASS INDEX: 22.16 KG/M2 | HEIGHT: 76 IN | OXYGEN SATURATION: 95 % | TEMPERATURE: 98 F | RESPIRATION RATE: 18 BRPM

## 2025-07-06 PROCEDURE — 99211 OFF/OP EST MAY X REQ PHY/QHP: CPT

## 2025-07-07 ENCOUNTER — APPOINTMENT (OUTPATIENT)
Dept: GENERAL RADIOLOGY | Facility: HOSPITAL | Age: 40
End: 2025-07-07
Payer: COMMERCIAL

## 2025-07-07 ENCOUNTER — HOSPITAL ENCOUNTER (EMERGENCY)
Facility: HOSPITAL | Age: 40
Discharge: HOME | End: 2025-07-07
Attending: EMERGENCY MEDICINE | Admitting: EMERGENCY MEDICINE
Payer: COMMERCIAL

## 2025-07-07 ENCOUNTER — APPOINTMENT (OUTPATIENT)
Dept: CT IMAGING | Facility: HOSPITAL | Age: 40
End: 2025-07-07
Payer: COMMERCIAL

## 2025-07-07 VITALS
HEIGHT: 76 IN | DIASTOLIC BLOOD PRESSURE: 72 MMHG | SYSTOLIC BLOOD PRESSURE: 112 MMHG | WEIGHT: 182.1 LBS | TEMPERATURE: 97.9 F | OXYGEN SATURATION: 96 % | RESPIRATION RATE: 17 BRPM | HEART RATE: 82 BPM | BODY MASS INDEX: 22.18 KG/M2

## 2025-07-07 DIAGNOSIS — J18.9 PNEUMONIA DUE TO INFECTIOUS ORGANISM, UNSPECIFIED LATERALITY, UNSPECIFIED PART OF LUNG: ICD-10-CM

## 2025-07-07 DIAGNOSIS — R07.9 CHEST PAIN, UNSPECIFIED TYPE: Primary | ICD-10-CM

## 2025-07-07 DIAGNOSIS — Q87.40 MARFAN SYNDROME: ICD-10-CM

## 2025-07-07 DIAGNOSIS — R74.8 ALKALINE PHOSPHATASE ELEVATION: ICD-10-CM

## 2025-07-07 LAB
ALBUMIN SERPL-MCNC: 4.2 G/DL (ref 3.5–5.2)
ALBUMIN/GLOB SERPL: 1.4 G/DL
ALP SERPL-CCNC: 121 U/L (ref 39–117)
ALT SERPL W P-5'-P-CCNC: 19 U/L (ref 1–41)
AMPHET+METHAMPHET UR QL: NEGATIVE
AMPHETAMINES UR QL: NEGATIVE
ANION GAP SERPL CALCULATED.3IONS-SCNC: 14 MMOL/L (ref 5–15)
AST SERPL-CCNC: 24 U/L (ref 1–40)
BARBITURATES UR QL SCN: NEGATIVE
BASOPHILS # BLD AUTO: 0.03 10*3/MM3 (ref 0–0.2)
BASOPHILS NFR BLD AUTO: 0.2 % (ref 0–1.5)
BENZODIAZ UR QL SCN: NEGATIVE
BILIRUB SERPL-MCNC: 0.7 MG/DL (ref 0–1.2)
BUN SERPL-MCNC: 14.4 MG/DL (ref 6–20)
BUN/CREAT SERPL: 16.7 (ref 7–25)
BUPRENORPHINE SERPL-MCNC: NEGATIVE NG/ML
CALCIUM SPEC-SCNC: 9.7 MG/DL (ref 8.6–10.5)
CANNABINOIDS SERPL QL: POSITIVE
CHLORIDE SERPL-SCNC: 100 MMOL/L (ref 98–107)
CO2 SERPL-SCNC: 21 MMOL/L (ref 22–29)
COCAINE UR QL: NEGATIVE
CREAT SERPL-MCNC: 0.86 MG/DL (ref 0.76–1.27)
DEPRECATED RDW RBC AUTO: 41.8 FL (ref 37–54)
EGFRCR SERPLBLD CKD-EPI 2021: 113 ML/MIN/1.73
EOSINOPHIL # BLD AUTO: 0.17 10*3/MM3 (ref 0–0.4)
EOSINOPHIL NFR BLD AUTO: 1.3 % (ref 0.3–6.2)
ERYTHROCYTE [DISTWIDTH] IN BLOOD BY AUTOMATED COUNT: 12.8 % (ref 12.3–15.4)
ETHANOL UR QL: <0.01 %
FENTANYL UR-MCNC: NEGATIVE NG/ML
GEN 5 1HR TROPONIN T REFLEX: <6 NG/L
GLOBULIN UR ELPH-MCNC: 3 GM/DL
GLUCOSE SERPL-MCNC: 118 MG/DL (ref 65–99)
HCT VFR BLD AUTO: 43.7 % (ref 37.5–51)
HGB BLD-MCNC: 15.1 G/DL (ref 13–17.7)
HOLD SPECIMEN: NORMAL
IMM GRANULOCYTES # BLD AUTO: 0.08 10*3/MM3 (ref 0–0.05)
IMM GRANULOCYTES NFR BLD AUTO: 0.6 % (ref 0–0.5)
LYMPHOCYTES # BLD AUTO: 1.04 10*3/MM3 (ref 0.7–3.1)
LYMPHOCYTES NFR BLD AUTO: 7.9 % (ref 19.6–45.3)
MCH RBC QN AUTO: 30.8 PG (ref 26.6–33)
MCHC RBC AUTO-ENTMCNC: 34.6 G/DL (ref 31.5–35.7)
MCV RBC AUTO: 89.2 FL (ref 79–97)
METHADONE UR QL SCN: NEGATIVE
MONOCYTES # BLD AUTO: 0.96 10*3/MM3 (ref 0.1–0.9)
MONOCYTES NFR BLD AUTO: 7.3 % (ref 5–12)
NEUTROPHILS NFR BLD AUTO: 10.92 10*3/MM3 (ref 1.7–7)
NEUTROPHILS NFR BLD AUTO: 82.7 % (ref 42.7–76)
NRBC BLD AUTO-RTO: 0 /100 WBC (ref 0–0.2)
OPIATES UR QL: NEGATIVE
OXYCODONE UR QL SCN: NEGATIVE
PCP UR QL SCN: NEGATIVE
PLATELET # BLD AUTO: 260 10*3/MM3 (ref 140–450)
PMV BLD AUTO: 9.2 FL (ref 6–12)
POTASSIUM SERPL-SCNC: 3.9 MMOL/L (ref 3.5–5.2)
PROT SERPL-MCNC: 7.2 G/DL (ref 6–8.5)
RBC # BLD AUTO: 4.9 10*6/MM3 (ref 4.14–5.8)
S PYO AG THROAT QL: NEGATIVE
SODIUM SERPL-SCNC: 135 MMOL/L (ref 136–145)
TRICYCLICS UR QL SCN: NEGATIVE
TROPONIN T NUMERIC DELTA: NORMAL
TROPONIN T SERPL HS-MCNC: <6 NG/L
WBC NRBC COR # BLD AUTO: 13.2 10*3/MM3 (ref 3.4–10.8)
WHOLE BLOOD HOLD COAG: NORMAL
WHOLE BLOOD HOLD SPECIMEN: NORMAL

## 2025-07-07 PROCEDURE — 99285 EMERGENCY DEPT VISIT HI MDM: CPT | Performed by: EMERGENCY MEDICINE

## 2025-07-07 PROCEDURE — 87081 CULTURE SCREEN ONLY: CPT | Performed by: EMERGENCY MEDICINE

## 2025-07-07 PROCEDURE — 87040 BLOOD CULTURE FOR BACTERIA: CPT | Performed by: EMERGENCY MEDICINE

## 2025-07-07 PROCEDURE — 85025 COMPLETE CBC W/AUTO DIFF WBC: CPT | Performed by: EMERGENCY MEDICINE

## 2025-07-07 PROCEDURE — 93005 ELECTROCARDIOGRAM TRACING: CPT | Performed by: EMERGENCY MEDICINE

## 2025-07-07 PROCEDURE — 80053 COMPREHEN METABOLIC PANEL: CPT | Performed by: EMERGENCY MEDICINE

## 2025-07-07 PROCEDURE — 96375 TX/PRO/DX INJ NEW DRUG ADDON: CPT

## 2025-07-07 PROCEDURE — 96367 TX/PROPH/DG ADDL SEQ IV INF: CPT

## 2025-07-07 PROCEDURE — 82077 ASSAY SPEC XCP UR&BREATH IA: CPT | Performed by: EMERGENCY MEDICINE

## 2025-07-07 PROCEDURE — 25010000002 CEFTRIAXONE PER 250 MG: Performed by: EMERGENCY MEDICINE

## 2025-07-07 PROCEDURE — 96365 THER/PROPH/DIAG IV INF INIT: CPT

## 2025-07-07 PROCEDURE — 80307 DRUG TEST PRSMV CHEM ANLYZR: CPT | Performed by: EMERGENCY MEDICINE

## 2025-07-07 PROCEDURE — 71045 X-RAY EXAM CHEST 1 VIEW: CPT

## 2025-07-07 PROCEDURE — 25010000002 KETOROLAC TROMETHAMINE PER 15 MG: Performed by: EMERGENCY MEDICINE

## 2025-07-07 PROCEDURE — 84484 ASSAY OF TROPONIN QUANT: CPT | Performed by: EMERGENCY MEDICINE

## 2025-07-07 PROCEDURE — 25510000001 IOPAMIDOL PER 1 ML: Performed by: EMERGENCY MEDICINE

## 2025-07-07 PROCEDURE — 25010000002 DOXYCYCLINE 100 MG RECONSTITUTED SOLUTION 1 EACH VIAL: Performed by: EMERGENCY MEDICINE

## 2025-07-07 PROCEDURE — 87880 STREP A ASSAY W/OPTIC: CPT | Performed by: EMERGENCY MEDICINE

## 2025-07-07 PROCEDURE — 71275 CT ANGIOGRAPHY CHEST: CPT

## 2025-07-07 PROCEDURE — 93005 ELECTROCARDIOGRAM TRACING: CPT

## 2025-07-07 RX ORDER — CEFDINIR 300 MG/1
300 CAPSULE ORAL 2 TIMES DAILY
Qty: 14 CAPSULE | Refills: 0 | Status: SHIPPED | OUTPATIENT
Start: 2025-07-07 | End: 2025-07-14

## 2025-07-07 RX ORDER — SODIUM CHLORIDE 0.9 % (FLUSH) 0.9 %
10 SYRINGE (ML) INJECTION AS NEEDED
Status: DISCONTINUED | OUTPATIENT
Start: 2025-07-07 | End: 2025-07-07 | Stop reason: HOSPADM

## 2025-07-07 RX ORDER — KETOROLAC TROMETHAMINE 10 MG/1
10 TABLET, FILM COATED ORAL EVERY 6 HOURS PRN
Qty: 10 TABLET | Refills: 0 | Status: SHIPPED | OUTPATIENT
Start: 2025-07-07

## 2025-07-07 RX ORDER — KETOROLAC TROMETHAMINE 30 MG/ML
30 INJECTION, SOLUTION INTRAMUSCULAR; INTRAVENOUS ONCE
Status: COMPLETED | OUTPATIENT
Start: 2025-07-07 | End: 2025-07-07

## 2025-07-07 RX ORDER — DOXYCYCLINE 100 MG/1
100 CAPSULE ORAL 2 TIMES DAILY
Qty: 14 CAPSULE | Refills: 0 | Status: SHIPPED | OUTPATIENT
Start: 2025-07-07 | End: 2025-07-14

## 2025-07-07 RX ORDER — IOPAMIDOL 755 MG/ML
100 INJECTION, SOLUTION INTRAVASCULAR
Status: COMPLETED | OUTPATIENT
Start: 2025-07-07 | End: 2025-07-07

## 2025-07-07 RX ORDER — ALBUTEROL SULFATE 90 UG/1
2 INHALANT RESPIRATORY (INHALATION) EVERY 4 HOURS PRN
Qty: 1 G | Refills: 0 | Status: SHIPPED | OUTPATIENT
Start: 2025-07-07

## 2025-07-07 RX ADMIN — IOPAMIDOL 100 ML: 755 INJECTION, SOLUTION INTRAVENOUS at 12:01

## 2025-07-07 RX ADMIN — DOXYCYCLINE 100 MG: 100 INJECTION, POWDER, LYOPHILIZED, FOR SOLUTION INTRAVENOUS at 14:52

## 2025-07-07 RX ADMIN — CEFTRIAXONE SODIUM 1000 MG: 1 INJECTION, POWDER, FOR SOLUTION INTRAMUSCULAR; INTRAVENOUS at 14:22

## 2025-07-07 RX ADMIN — KETOROLAC TROMETHAMINE 30 MG: 30 INJECTION, SOLUTION INTRAMUSCULAR; INTRAVENOUS at 13:52

## 2025-07-07 NOTE — ED PROVIDER NOTES
Subjective   History of Present Illness  Patient is a 40-year-old who came to the ER complaining of chest pain and sore throat.  He has got history of Marfan syndrome.    Chest Pain  Pain location:  L chest  Pain quality: stabbing, throbbing and tightness    Pain quality: not aching and not burning    Pain radiates to:  Does not radiate  Pain severity:  Moderate  Onset quality:  Sudden  Timing:  Constant  Progression:  Worsening  Chronicity:  New  Context: breathing    Context: not drug use, not eating, not intercourse, not lifting, not movement, not raising an arm, not at rest, not stress and not trauma    Relieved by:  Certain positions  Worsened by:  Coughing and certain positions  Ineffective treatments:  None tried  Associated symptoms: no abdominal pain, no altered mental status, no anorexia, no back pain, no claudication, no cough, no dizziness, no dysphagia, no fatigue, no fever, no headache, no lower extremity edema, no nausea, no orthopnea, no palpitations, no shortness of breath, no syncope, no vomiting and no weakness    Risk factors: male sex and Marfan's syndrome    Risk factors: no aortic disease, no coronary artery disease, no diabetes mellitus, no hypertension and no smoking    Sore Throat  Location:  Generalized  Quality:  Aching  Severity:  Moderate  Onset quality:  Gradual  Timing:  Intermittent  Chronicity:  New  Relieved by:  Nothing  Worsened by:  Nothing  Ineffective treatments:  None tried  Associated symptoms: chest pain    Associated symptoms: no abdominal pain, no chills, no cough, no fever, no headaches, no shortness of breath, no stridor and no trouble swallowing        Review of Systems   Constitutional: Negative.  Negative for chills, fatigue and fever.   HENT:  Positive for sore throat. Negative for congestion and trouble swallowing.    Respiratory: Negative.  Negative for cough, chest tightness, shortness of breath and stridor.    Cardiovascular:  Positive for chest pain. Negative  "for palpitations, orthopnea, claudication and syncope.   Gastrointestinal: Negative.  Negative for abdominal distention, abdominal pain, anorexia, nausea and vomiting.   Endocrine: Negative.    Genitourinary: Negative.  Negative for difficulty urinating and flank pain.   Musculoskeletal: Negative.  Negative for back pain.   Skin: Negative.  Negative for color change.   Neurological: Negative.  Negative for dizziness, weakness and headaches.   All other systems reviewed and are negative.      Past Medical History:   Diagnosis Date    Back pain     Chest pain     Claustrophobia     COPD (chronic obstructive pulmonary disease)     Emphysema of lung     Headache     Low back pain     Lung disease     Marfan syndrome     Pneumothorax     Seizures     when he was a child     Seizures     Smoking     3/4 pack a day    Tobacco abuse     Underweight        Allergies   Allergen Reactions    Calcium Channel Blockers Other (See Comments)     Do not use in marfan    Ciprofloxacin Other (See Comments)     Do not use fluoroquinolones in marfan    Medrol [Methylprednisolone] Other (See Comments)     Patient states he gets violent on this medication     Penicillins Other (See Comments)     Pt states his mother said \"his father fell over dead from taking one\"    Wasp Venom Swelling    Diclofenac Irritability       Past Surgical History:   Procedure Laterality Date    HERNIA REPAIR      LUNG LOBECTOMY Right 02/01/2017    LUNG REMOVAL, TOTAL Right 2015    THORACOSCOPY N/A 2/3/2017    Procedure: BRONCHOSCOPY, THORACOSCOPY RIGHT CHEST,  WEDGE RESECTION RIGHT UPPER AND LOWER LOBE OF LUNG, MECHANICAL PLEURODESIS;  Surgeon: Kyle Heath MD;  Location: [facility] OR;  Service:     TYMPANOSTOMY  05/12/2016    Recorded       Family History   Problem Relation Age of Onset    No Known Problems Mother     Heart attack Father     Stroke Father     Heart disease Father     No Known Problems Sister     No Known Problems Daughter     Cancer Maternal " Grandmother         breast    Breast cancer Maternal Grandmother     Prostate cancer Neg Hx     Colon cancer Neg Hx     Alcohol abuse Neg Hx     Drug abuse Neg Hx     Diabetes Neg Hx     Thyroid cancer Neg Hx        Social History     Socioeconomic History    Marital status:    Tobacco Use    Smoking status: Every Day     Current packs/day: 0.50     Average packs/day: 0.5 packs/day for 28.5 years (14.3 ttl pk-yrs)     Types: Cigarettes     Start date: 1997     Passive exposure: Current    Smokeless tobacco: Never    Tobacco comments:     Smokes 0.50 or 0.75 pack a day 3/5   Vaping Use    Vaping status: Former    Substances: Nicotine, THC, Flavoring    Devices: Disposable   Substance and Sexual Activity    Alcohol use: Not Currently    Drug use: Not Currently     Types: Amphetamines, Methamphetamines, Benzodiazepines, Marijuana, Oxycodone, Hydrocodone    Sexual activity: Yes     Partners: Female           Objective   Physical Exam  Vitals and nursing note reviewed. Exam conducted with a chaperone present.   Constitutional:       General: He is not in acute distress.     Appearance: Normal appearance. He is well-developed. He is not toxic-appearing.   HENT:      Head: Normocephalic and atraumatic.      Nose: Nose normal.      Mouth/Throat:      Mouth: Mucous membranes are moist.      Pharynx: Uvula midline. Posterior oropharyngeal erythema present. No pharyngeal swelling, oropharyngeal exudate, uvula swelling or postnasal drip.      Comments: Has got dentures  Eyes:      General: Lids are normal. Lids are everted, no foreign bodies appreciated.      Conjunctiva/sclera: Conjunctivae normal.      Pupils: Pupils are equal, round, and reactive to light.   Neck:      Vascular: Normal carotid pulses. No carotid bruit or JVD.      Trachea: Trachea and phonation normal. No tracheal deviation.   Cardiovascular:      Rate and Rhythm: Normal rate and regular rhythm.      Chest Wall: PMI is not displaced.      Pulses:  Normal pulses.      Heart sounds: Normal heart sounds.      No systolic murmur is present.      No diastolic murmur is present.      No gallop.   Pulmonary:      Effort: Pulmonary effort is normal. No tachypnea, accessory muscle usage or respiratory distress.      Breath sounds: Normal breath sounds. No stridor. No decreased breath sounds, wheezing, rhonchi or rales.   Abdominal:      General: Bowel sounds are normal. There is no distension.      Palpations: Abdomen is soft. There is no hepatomegaly.      Tenderness: There is no abdominal tenderness.   Musculoskeletal:         General: No swelling. Normal range of motion.      Cervical back: Full passive range of motion without pain, normal range of motion and neck supple. No rigidity.      Comments: Lower extremity exam bilaterally is unremarkable.  There is no right or left calf tenderness .  There is no palpable venous cord.  No obvious difference in the size of the legs.  No pitting edema.  The dorsalis pedis and posterior tibial femoral and popliteal pulses are palpable and +2 bilaterally.  Homans sign is negative   Skin:     General: Skin is warm and dry.      Capillary Refill: Capillary refill takes less than 2 seconds.      Coloration: Skin is not jaundiced or pale.      Nails: There is no clubbing.   Neurological:      General: No focal deficit present.      Mental Status: He is alert and oriented to person, place, and time.      GCS: GCS eye subscore is 4. GCS verbal subscore is 5. GCS motor subscore is 6.      Cranial Nerves: No cranial nerve deficit.      Motor: Motor function is intact.      Gait: Gait normal.      Deep Tendon Reflexes: Reflexes are normal and symmetric. Reflexes normal.   Psychiatric:         Speech: Speech normal.         Behavior: Behavior normal.         Procedures           ED Course  ED Course as of 07/07/25 1423   Mon Jul 07, 2025   1131 nsr [TS]   1244 . No evidence of aortic aneurysm or dissection.  2. No evidence of pulmonary  embolism.  3. Chronic emphysematous lung changes as detailed above.  4. An area of consolidation in the left lower lung which is moderately  more progressive since the previous study. This may represent an area of  atelectasis. Superimposed infiltrate not excluded.  5. Groundglass opacities in the right lower lung may represent an  evolving inflammatory/infectious process. A follow-up examination in 1  month may be obtained to ensure resolution.   6. A mild mediastinal lymphadenopathy. This may represent reactive  adenopathy to the inflammatory process in the lungs?.        These findings were discussed with the patient [TS]   1340 Patient has been seen in the past with different complaints also.  Came into the ER with complaint of chest tightness and no pressure.  CT shows chronic emphysematous lung changes and area of consolidation in the left lower lung which probably is what is causing the pain because the pain gets worse with deep breath on certain kind of movements his EKG shows normal sinus rhythm without any evidence of acute abnormity pathology.  And cardiac markers are negative.  Do not believe that this is a STEMI.  His CT is negative pulmonary embolus.  Urine drug standpoint for marijuana.  Chemistries are within normal limits with the RTA and mild elevation alkaline phosphatase. [TS]   1341 I have discussed this case with the patient at length.  Patient will be given IV antibiotics and something for the pain and discharged home with a follow-up patient is agreeable with this plan of care.  Strep screen is negative. [TS]   1343  No evidence of aortic aneurysm or dissection.  2. No evidence of pulmonary embolism.  3. Chronic emphysematous lung changes as detailed above.  4. An area of consolidation in the left lower lung which is moderately  more progressive since the previous study. This may represent an area of  atelectasis. Superimposed infiltrate not excluded.  5. Groundglass opacities in the right  lower lung may represent an  evolving inflammatory/infectious process. A follow-up examination in 1  month may be obtained to ensure resolution.   6. A mild mediastinal lymphadenopathy. This may represent reactive  adenopathy to the inflammatory process in the lungs?.     The CT finding we discussed the patient. [TS]   1406 Patient came with chest pain which appears to be more pleuritic in nature.  CT chest is negative for PE or aortic dissection looks like he has got pneumonia for which the patient was given ceftriaxone and doxycycline in the ED.  Toradol was given for pain control.  Lab work essentially unremarkable.  Alkaline phosphatase slightly elevated he has got a mild RTA.  Throat swabs are negative.  The patient's bedside markers are negative and is going be discharged home with a follow-up with the primary MD and to return if any worsening symptoms. [TS]   1422 Heart score 2 [TS]      ED Course User Index  [TS] Patel Leonard MD                                                       Medical Decision Making  Differential Diagnosis:  I considered chest wall pain, muscle strain, costochondritis, pleurisy, rib fracture, herpes zoster, cardiovascular etiology, myocardial infarction, intermediate coronary syndrome, unstable angina, angina, aortic dissection, pericarditis, pulmonary etiology, pulmonary embolism, pneumonia, pneumothorax, lung cancer, gastroesophageal reflux disease, esophagitis, esophageal spasm and gastrointestinal etiology as a possible cause of chest pain in this patient. This is a partial list of diagnoses considered.    Patient came in with chest pain CT of the chest obtained rule out aortic dissection which is negative patchy infiltrates in the lung in the right and the left lung are noted.  I have discussed this case Kristin with the patient and recommended follow-up as an outpatient repeat CT.  IV antibiotics have been given the patient for this.  Patient also was having some sore throat but  the throat examination is negative.  Strep screen is negative.  I have offered the patient a stress test the patient does not want to do a stress test at this time.  He needs to follow-up with cardiology as an outpatient for reevaluation reassessment.  Meanwhile we will discharge him home on antibiotics and follow-up and to return here for any worsening symptoms.    Problems Addressed:  Alkaline phosphatase elevation: undiagnosed new problem with uncertain prognosis  Chest pain, unspecified type: acute illness or injury  Marfan syndrome: chronic illness or injury  Pneumonia due to infectious organism, unspecified laterality, unspecified part of lung: acute illness or injury    Amount and/or Complexity of Data Reviewed  Labs: ordered.     Details: Labs reviewed  Radiology: ordered.     Details: The CAT scan including lymphadenopathy and the lesions in the lung were discussed at length with the patient and he was advised follow-up with the primary MD for repeat CT and.  ECG/medicine tests: ordered.    Risk  Prescription drug management.  Risk Details: This patient presents with chest pain , patient arrived hemodynamically stable was placed on the monitor and IV access obtained EKG was obtained and did not reveal any malignant/unstable dysrhythmias any acute ST elevation, no evidence of Brugada, or significantly prolonged QT .  Presentation not consistent with other acute, emergent cause of chest pain at this time.  Low suspicion for acute PE is low risk per Wells and Years criteria and no evidence of DVT such as calf swelling, tenderness, palpable tortuous lower extremity vein, or Homans' sign.  Low suspicion for pneumothorax as the patient is saturating well and has no radiographic evidence of a pneumothorax.  Low suspicion for dissection there is no widened mediastinum, hypotension, pulses deficit, and no tearing back/abdominal pain.  Low suspicion for tamponade as there is no JVD, muffled heart sounds, electrical  alternans on EKG, and no hypotension.  Low suspicion for pericarditis as there is no diffuse ST elevation or VT depression and the patient is afebrile.  No evidence of GI bleed per patient's history.  Low suspicion for CHF exacerbation as there is no evidence of volume overload and no evidence of severely elevated BNP.          Final diagnoses:   Chest pain, unspecified type   Pneumonia due to infectious organism, unspecified laterality, unspecified part of lung   Marfan syndrome   Alkaline phosphatase elevation       ED Disposition  ED Disposition       ED Disposition   Discharge    Condition   Stable    Comment   --               Cristy Schmidt, APRROMAN  [address]  [facility] [id number] [phone]    Schedule an appointment as soon as possible for a visit in 2 days      Tavo Phoenix MD  [address] [facility] [address]  [facility] [id number] [phone]    Schedule an appointment as soon as possible for a visit            Medication List        New Prescriptions      cefdinir 300 MG capsule  Commonly known as: OMNICEF  Take 1 capsule by mouth 2 (Two) Times a Day for 7 days.     * doxycycline 100 MG capsule  Commonly known as: MONODOX  Take 1 capsule by mouth 2 (Two) Times a Day for 7 days.     * doxycycline 100 MG capsule  Commonly known as: MONODOX  Take 1 capsule by mouth 2 (Two) Times a Day for 7 days.     ketorolac 10 MG tablet  Commonly known as: TORADOL  Take 1 tablet by mouth Every 6 (Six) Hours As Needed for Mild Pain.           * This list has 2 medication(s) that are the same as other medications prescribed for you. Read the directions carefully, and ask your doctor or other care provider to review them with you.                Changed      * albuterol sulfate  (90 Base) MCG/ACT inhaler  Commonly known as: ProAir HFA  Inhale 2 puffs Every 4 (Four) Hours As Needed for Wheezing.  What changed: Another medication with the same name was added. Make sure you understand how and when to take each.     *  albuterol sulfate  (90 Base) MCG/ACT inhaler  Commonly known as: PROVENTIL HFA;VENTOLIN HFA;PROAIR HFA  Inhale 2 puffs Every 4 (Four) Hours As Needed for Wheezing.  What changed: You were already taking a medication with the same name, and this prescription was added. Make sure you understand how and when to take each.           * This list has 2 medication(s) that are the same as other medications prescribed for you. Read the directions carefully, and ask your doctor or other care provider to review them with you.                Stop      ibuprofen 800 MG tablet  Commonly known as: ADVIL,MOTRIN               Where to Get Your Medications        These medications were sent to [id number] - [place], [place] - [address] AT [facility] [address] - [phone]  - [phone] FX  [address], [facility] [address]      Phone: [phone]   albuterol sulfate  (90 Base) MCG/ACT inhaler  cefdinir 300 MG capsule  doxycycline 100 MG capsule  doxycycline 100 MG capsule  ketorolac 10 MG tablet            Patel Leonard MD  07/07/25 1034       Patel Leonard MD  07/07/25 4981

## 2025-07-07 NOTE — DISCHARGE INSTRUCTIONS
It was very nice to meet you, Ross. Thank you for allowing us to take care of you today at [facility].     Today you were seen in the emergency department for your symptoms. Please understand that an ER evaluation is just the start of your evaluation. We do the best we can, but we are often unable to fully find what is causing your symptoms from one evaluation.  Because of this, the goal is to determine whether you need to be evaluated in the hospital or if it is safe for you to go home and see other doctors provided such as primary care physicians or specialist on an outpatient basis.      Like we discussed, I strongly urge that you follow up with your primary care doctor. Please call their office to set up an appointment as soon as possible so that you can be re-evaluated for improvement in your symptoms or for any other questions.  I have provided the information needed, including phone number, to call to set up an appointment below in these discharge papers.      Educational material has also been provided in the following pages regarding what we have discussed today.      Please return to the emergency room within 12-48 hours if you experience symptoms such as the following:   Fever, chills, chest pain or shortness of breath, pain with inspiration/expiration, pain that travels to your arms, neck or back, nausea, vomiting, severe headache, tearing pain in your chest, dizziness, feel as though you are about to pass out, OR if you have any worsening symptoms, or any other concerns.

## 2025-07-08 LAB
QT INTERVAL: 358 MS
QT INTERVAL: 360 MS
QTC INTERVAL: 418 MS
QTC INTERVAL: 420 MS

## 2025-07-09 LAB — BACTERIA SPEC AEROBE CULT: NORMAL

## 2025-07-12 LAB
BACTERIA SPEC AEROBE CULT: NORMAL
BACTERIA SPEC AEROBE CULT: NORMAL

## 2025-07-18 ENCOUNTER — TELEPHONE (OUTPATIENT)
Dept: FAMILY MEDICINE CLINIC | Facility: CLINIC | Age: 40
End: 2025-07-18
Payer: COMMERCIAL

## 2025-07-18 NOTE — TELEPHONE ENCOUNTER
I called patient and let him know the referral was sent on 6/30/25 to [facility]. Pt stated he was going to call and schedule and appt.

## 2025-07-18 NOTE — TELEPHONE ENCOUNTER
Caller: Ross Platt    Relationship: Self    Best call back number: [phone]     What is the best time to reach you: ANYTIME    Who are you requesting to speak with (clinical staff, provider,  specific staff member): CLINICAL    Do you know the name of the person who called: ROSS    What was the call regarding: PATIENT IS CHECKING ON THE STATUS OF A REFERRAL SENT FOR PAIN MANAGEMENT AND WOULD LIKE A CALL BACK.    Is it okay if the provider responds through MyChart: NO

## 2025-07-26 ENCOUNTER — APPOINTMENT (OUTPATIENT)
Dept: CT IMAGING | Age: 40
End: 2025-07-26
Payer: MEDICAID

## 2025-07-26 ENCOUNTER — HOSPITAL ENCOUNTER (EMERGENCY)
Age: 40
Discharge: HOME | End: 2025-07-26
Attending: EMERGENCY MEDICINE
Payer: MEDICAID

## 2025-07-26 ENCOUNTER — APPOINTMENT (OUTPATIENT)
Dept: ULTRASOUND IMAGING | Age: 40
End: 2025-07-26
Payer: MEDICAID

## 2025-07-26 VITALS
BODY MASS INDEX: 20.83 KG/M2 | HEART RATE: 102 BPM | TEMPERATURE: 97.6 F | OXYGEN SATURATION: 97 % | SYSTOLIC BLOOD PRESSURE: 121 MMHG | DIASTOLIC BLOOD PRESSURE: 68 MMHG | WEIGHT: 180 LBS | HEIGHT: 78 IN | RESPIRATION RATE: 19 BRPM

## 2025-07-26 DIAGNOSIS — N50.811 RIGHT TESTICULAR PAIN: ICD-10-CM

## 2025-07-26 DIAGNOSIS — R10.31 ABDOMINAL PAIN, RIGHT LOWER QUADRANT: Primary | ICD-10-CM

## 2025-07-26 LAB
ALBUMIN SERPL-MCNC: 4.5 G/DL (ref 3.5–5.2)
ALP SERPL-CCNC: 104 U/L (ref 40–129)
ALT SERPL-CCNC: 14 U/L (ref 10–50)
ANION GAP SERPL CALCULATED.3IONS-SCNC: 14 MMOL/L (ref 8–16)
AST SERPL-CCNC: 20 U/L (ref 10–50)
BASOPHILS # BLD: 0.1 K/UL (ref 0–0.2)
BASOPHILS NFR BLD: 0.6 % (ref 0–1)
BILIRUB SERPL-MCNC: 0.6 MG/DL (ref 0.2–1.2)
BILIRUB UR QL STRIP: NEGATIVE
BUN SERPL-MCNC: 15 MG/DL (ref 6–20)
CALCIUM SERPL-MCNC: 9.6 MG/DL (ref 8.6–10)
CHLORIDE SERPL-SCNC: 105 MMOL/L (ref 98–107)
CLARITY UR: CLEAR
CO2 SERPL-SCNC: 20 MMOL/L (ref 22–29)
COLOR UR: YELLOW
CREAT SERPL-MCNC: 1.3 MG/DL (ref 0.7–1.2)
EOSINOPHIL # BLD: 0.2 K/UL (ref 0–0.6)
EOSINOPHIL NFR BLD: 1.8 % (ref 0–5)
ERYTHROCYTE [DISTWIDTH] IN BLOOD BY AUTOMATED COUNT: 12.4 % (ref 11.5–14.5)
GLUCOSE SERPL-MCNC: 119 MG/DL (ref 70–99)
GLUCOSE UR STRIP.AUTO-MCNC: NEGATIVE MG/DL
HCT VFR BLD AUTO: 43 % (ref 42–52)
HGB BLD-MCNC: 15.4 G/DL (ref 14–18)
HGB UR STRIP.AUTO-MCNC: NEGATIVE MG/L
IMM GRANULOCYTES # BLD: 0.1 K/UL
KETONES UR STRIP.AUTO-MCNC: NEGATIVE MG/DL
LEUKOCYTE ESTERASE UR QL STRIP.AUTO: NEGATIVE
LYMPHOCYTES # BLD: 2.3 K/UL (ref 1.1–4.5)
LYMPHOCYTES NFR BLD: 24.6 % (ref 20–40)
MCH RBC QN AUTO: 31.1 PG (ref 27–31)
MCHC RBC AUTO-ENTMCNC: 35.8 G/DL (ref 33–37)
MCV RBC AUTO: 86.9 FL (ref 80–94)
MONOCYTES # BLD: 0.7 K/UL (ref 0–0.9)
MONOCYTES NFR BLD: 7.7 % (ref 0–10)
NEUTROPHILS # BLD: 6.1 K/UL (ref 1.5–7.5)
NEUTS SEG NFR BLD: 64.8 % (ref 50–65)
NITRITE UR QL STRIP.AUTO: NEGATIVE
PH UR STRIP.AUTO: 5 [PH] (ref 5–8)
PLATELET # BLD AUTO: 285 K/UL (ref 130–400)
PMV BLD AUTO: 9.5 FL (ref 9.4–12.4)
POTASSIUM SERPL-SCNC: 3.6 MMOL/L (ref 3.5–5.1)
PROT SERPL-MCNC: 7.4 G/DL (ref 6.4–8.3)
PROT UR STRIP.AUTO-MCNC: NEGATIVE MG/DL
RBC # BLD AUTO: 4.95 M/UL (ref 4.7–6.1)
SODIUM SERPL-SCNC: 139 MMOL/L (ref 136–145)
SP GR UR STRIP.AUTO: >=1.045 (ref 1–1.03)
UROBILINOGEN UR STRIP.AUTO-MCNC: 1 E.U./DL
WBC # BLD AUTO: 9.4 K/UL (ref 4.8–10.8)

## 2025-07-26 PROCEDURE — 99285 EMERGENCY DEPT VISIT HI MDM: CPT

## 2025-07-26 PROCEDURE — 87661 TRICHOMONAS VAGINALIS AMPLIF: CPT

## 2025-07-26 PROCEDURE — 96376 TX/PRO/DX INJ SAME DRUG ADON: CPT

## 2025-07-26 PROCEDURE — 74177 CT ABD & PELVIS W/CONTRAST: CPT

## 2025-07-26 PROCEDURE — 76870 US EXAM SCROTUM: CPT

## 2025-07-26 PROCEDURE — 6360000004 HC RX CONTRAST MEDICATION: Performed by: EMERGENCY MEDICINE

## 2025-07-26 PROCEDURE — 96375 TX/PRO/DX INJ NEW DRUG ADDON: CPT

## 2025-07-26 PROCEDURE — 87591 N.GONORRHOEAE DNA AMP PROB: CPT

## 2025-07-26 PROCEDURE — 6360000002 HC RX W HCPCS: Performed by: EMERGENCY MEDICINE

## 2025-07-26 PROCEDURE — 81003 URINALYSIS AUTO W/O SCOPE: CPT

## 2025-07-26 PROCEDURE — 96374 THER/PROPH/DIAG INJ IV PUSH: CPT

## 2025-07-26 PROCEDURE — 36415 COLL VENOUS BLD VENIPUNCTURE: CPT

## 2025-07-26 PROCEDURE — 80053 COMPREHEN METABOLIC PANEL: CPT

## 2025-07-26 PROCEDURE — 87491 CHLMYD TRACH DNA AMP PROBE: CPT

## 2025-07-26 PROCEDURE — 85025 COMPLETE CBC W/AUTO DIFF WBC: CPT

## 2025-07-26 RX ORDER — ONDANSETRON 2 MG/ML
4 INJECTION INTRAMUSCULAR; INTRAVENOUS ONCE
Status: COMPLETED | OUTPATIENT
Start: 2025-07-26 | End: 2025-07-26

## 2025-07-26 RX ORDER — MORPHINE SULFATE 2 MG/ML
2 INJECTION, SOLUTION INTRAMUSCULAR; INTRAVENOUS ONCE
Refills: 0 | Status: COMPLETED | OUTPATIENT
Start: 2025-07-26 | End: 2025-07-26

## 2025-07-26 RX ORDER — MORPHINE SULFATE 4 MG/ML
4 INJECTION, SOLUTION INTRAMUSCULAR; INTRAVENOUS ONCE
Status: COMPLETED | OUTPATIENT
Start: 2025-07-26 | End: 2025-07-26

## 2025-07-26 RX ORDER — KETOROLAC TROMETHAMINE 30 MG/ML
15 INJECTION, SOLUTION INTRAMUSCULAR; INTRAVENOUS ONCE
Status: COMPLETED | OUTPATIENT
Start: 2025-07-26 | End: 2025-07-26

## 2025-07-26 RX ORDER — IOPAMIDOL 755 MG/ML
90 INJECTION, SOLUTION INTRAVASCULAR
Status: COMPLETED | OUTPATIENT
Start: 2025-07-26 | End: 2025-07-26

## 2025-07-26 RX ADMIN — MORPHINE SULFATE 4 MG: 4 INJECTION, SOLUTION INTRAMUSCULAR; INTRAVENOUS at 17:35

## 2025-07-26 RX ADMIN — IOPAMIDOL 90 ML: 755 INJECTION, SOLUTION INTRAVENOUS at 18:39

## 2025-07-26 RX ADMIN — KETOROLAC TROMETHAMINE 15 MG: 30 INJECTION, SOLUTION INTRAMUSCULAR at 19:44

## 2025-07-26 RX ADMIN — ONDANSETRON 4 MG: 2 INJECTION, SOLUTION INTRAMUSCULAR; INTRAVENOUS at 17:35

## 2025-07-26 RX ADMIN — MORPHINE SULFATE 2 MG: 2 INJECTION, SOLUTION INTRAMUSCULAR; INTRAVENOUS at 19:44

## 2025-07-26 ASSESSMENT — ENCOUNTER SYMPTOMS
ABDOMINAL PAIN: 1
EYES NEGATIVE: 1
RESPIRATORY NEGATIVE: 1

## 2025-07-27 LAB
C TRACH DNA UR QL NAA+PROBE: NOT DETECTED
N GONORRHOEA DNA UR QL NAA+PROBE: NOT DETECTED
T VAGINALIS DNA UR QL NAA+PROBE: NOT DETECTED

## 2025-07-27 NOTE — ED NOTES
Pt encouraged to provide urine sample. Pt requesting something to drink. Per Dr. Newby pt can drink. Pt given ice water and informed a urine sample was the last test needed. Pt verbalized understanding.

## 2025-07-27 NOTE — ED PROVIDER NOTES
following components:       Result Value    MCH 31.1 (*)     All other components within normal limits   COMPREHENSIVE METABOLIC PANEL - Abnormal; Notable for the following components:    CO2 20 (*)     Glucose 119 (*)     Creatinine 1.3 (*)     All other components within normal limits   CHLAMYDIA/N. GONORRHOEAE/T. VAGINALIS, AMPLIFIED PROBE(UR)   URINALYSIS       All other labs were within normal range or not returned as of this dictation.    Medications   morphine sulfate (PF) injection 4 mg (4 mg IntraVENous Given 7/26/25 1735)   ondansetron (ZOFRAN) injection 4 mg (4 mg IntraVENous Given 7/26/25 1735)   iopamidol (ISOVUE-370) 76 % injection 90 mL (90 mLs IntraVENous Given 7/26/25 1839)   ketorolac (TORADOL) injection 15 mg (15 mg IntraVENous Given 7/26/25 1944)   morphine (PF) injection 2 mg (2 mg IntraVENous Given 7/26/25 1944)       EMERGENCY DEPARTMENT COURSE and DIFFERENTIALDIAGNOSIS/MDM:   Vitals:    Vitals:    07/26/25 1715   BP: 121/68   Pulse: (!) 102   Resp: 19   Temp: 97.6 °F (36.4 °C)   TempSrc: Temporal   SpO2: 97%   Weight: 81.6 kg (180 lb)   Height: 1.981 m (6' 6\")         PROCEDURES:  Unless otherwise notedbelow, none  Procedures    EKG: All EKG's are interpreted by the Emergency Department Physician who either signs or Co-signs this chart in the absence of a cardiologist.      MDM      ED Course as of 07/26/25 2029   Sat Jul 26, 2025 2008 Nitrite, Urine: Negative [ASHA]   2008 Leukocyte Esterase, Urine: Negative [ASHA]   2008 On physical exam there is right lower quadrant abdominal tenderness along with right testicular tenderness.  Normal testicular lie with no swelling or overlying skin changes.  Laboratory evaluation overall unremarkable with normal white blood cell count.  Scrotal ultrasound with no evidence of torsion, orchitis, epididymitis.  Question of possible varicocele, likely unrelated to presentation.  CT abdomen pelvis shows no evidence of appendicitis, inguinal hernia, or other

## 2025-08-06 ENCOUNTER — TELEPHONE (OUTPATIENT)
Dept: FAMILY MEDICINE CLINIC | Facility: CLINIC | Age: 40
End: 2025-08-06
Payer: COMMERCIAL

## 2025-08-07 ENCOUNTER — OFFICE VISIT (OUTPATIENT)
Dept: FAMILY MEDICINE CLINIC | Facility: CLINIC | Age: 40
End: 2025-08-07
Payer: COMMERCIAL

## 2025-08-07 VITALS
WEIGHT: 184 LBS | HEIGHT: 76 IN | RESPIRATION RATE: 20 BRPM | TEMPERATURE: 99.5 F | SYSTOLIC BLOOD PRESSURE: 95 MMHG | BODY MASS INDEX: 22.41 KG/M2 | OXYGEN SATURATION: 96 % | HEART RATE: 101 BPM | DIASTOLIC BLOOD PRESSURE: 56 MMHG

## 2025-08-07 DIAGNOSIS — R16.0 LIVER MASS, RIGHT LOBE: Primary | ICD-10-CM

## 2025-08-07 PROCEDURE — 1125F AMNT PAIN NOTED PAIN PRSNT: CPT | Performed by: NURSE PRACTITIONER

## 2025-08-07 PROCEDURE — 99213 OFFICE O/P EST LOW 20 MIN: CPT | Performed by: NURSE PRACTITIONER

## 2025-08-07 PROCEDURE — 1159F MED LIST DOCD IN RCRD: CPT | Performed by: NURSE PRACTITIONER

## 2025-08-07 PROCEDURE — 1160F RVW MEDS BY RX/DR IN RCRD: CPT | Performed by: NURSE PRACTITIONER

## (undated) DEVICE — ANTIBACTERIAL UNDYED BRAIDED (POLYGLACTIN 910), SYNTHETIC ABSORBABLE SUTURE: Brand: COATED VICRYL

## (undated) DEVICE — PK TURNOVER RM ADV

## (undated) DEVICE — CONN REDUCING CANN/PUMP 1/2X3/8X3/8

## (undated) DEVICE — SKIN AFFIX SURG ADHESIVE 72/CS 0.55ML: Brand: MEDLINE

## (undated) DEVICE — CVR HNDL LIGHT RIGID

## (undated) DEVICE — SUT PROLN 4/0 RB1 D/A 36IN 8557H

## (undated) DEVICE — BANDAGE,GAUZE,BULKEE II,4.5"X4.1YD,STRL: Brand: MEDLINE

## (undated) DEVICE — TOTAL TRAY, URNMTR, SILV, LF, 16FR 10ML: Brand: MEDLINE

## (undated) DEVICE — DISSCT ENDO

## (undated) DEVICE — CONTAINER,SPECIMEN,OR STERILE,4OZ: Brand: MEDLINE

## (undated) DEVICE — COVER,MAYO STAND,STERILE: Brand: MEDLINE

## (undated) DEVICE — KT NDL GUIDE STRL 18GA

## (undated) DEVICE — DRSNG TELFA PAD NONADH STR 1S 3X8IN

## (undated) DEVICE — APPL CHLORAPREP W/TINT 26ML ORNG

## (undated) DEVICE — PAD MAJOR: Brand: MEDLINE INDUSTRIES, INC.

## (undated) DEVICE — GLV SURG BIOGEL LTX PF 6 1/2

## (undated) DEVICE — OASIS DRAIN, SINGLE, INLINE & ATS COMPATIBLE: Brand: OASIS

## (undated) DEVICE — 28 FR RIGHT ANGLE – SOFT PVC CATHETER: Brand: PVC THORACIC CATHETERS

## (undated) DEVICE — DRAPE,UTILITY,TAPE,15X26,STERILE: Brand: MEDLINE

## (undated) DEVICE — SPONGE,LAP,12"X12",XR,ST,5/PK,40PK/CS: Brand: MEDLINE

## (undated) DEVICE — SUT VIC 2/0 UR6 27IN VCP602H

## (undated) DEVICE — SUT SILK 3/0 SUTUPAK TIES 24IN SA74H

## (undated) DEVICE — 3M™ IOBAN™ 2 ANTIMICROBIAL INCISE DRAPE 6650EZ: Brand: IOBAN™ 2

## (undated) DEVICE — SUT MNCRYL 4/0 PS2 27IN UD MCP426H

## (undated) DEVICE — 24 FR STRAIGHT – SOFT PVC CATHETER: Brand: PVC THORACIC CATHETERS

## (undated) DEVICE — SUT SILK 4/0 SUTUPAK TIES 24IN SA73H

## (undated) DEVICE — ELECTRD BLD EXT EDGE/INSUL 6IN

## (undated) DEVICE — BLAKE SILICONE DRAINS CARDIO CONNECTOR 2:1: Brand: BLAKE

## (undated) DEVICE — SUT SILK 2/0 SUTUPAK TIES 24IN SA75H

## (undated) DEVICE — ECHELON FLEX POWERED PLUS ARTICULATING ENDOSCOPIC LINEAR CUTTER , 60MM: Brand: ECHELON FLEX

## (undated) DEVICE — WIPE THERAWASH SLV SPEC CARE 2PK

## (undated) DEVICE — ELECTRODE,ECG,STRESS,FOAM,50PK: Brand: MEDLINE

## (undated) DEVICE — TOWEL,OR,DSP,ST,BLUE,DLX,10/PK,8PK/CS: Brand: MEDLINE

## (undated) DEVICE — UTILITY MARKER W/MED LABELS: Brand: MEDLINE

## (undated) DEVICE — DEFOGGER!" ANTI FOG KIT: Brand: DEROYAL

## (undated) DEVICE — SUT SILK 0 SUTUPAK TIES 24IN SA76G

## (undated) DEVICE — ELECTRD BLD EXT EDGE 1P COAT 6.5IN STRL

## (undated) DEVICE — GLV SURG BIOGEL LTX PF 7 1/2

## (undated) DEVICE — 3M™ IOBAN™ 2 ANTIMICROBIAL INCISE DRAPE 6651EZ: Brand: IOBAN™ 2

## (undated) DEVICE — SUT SILK 2/0 FS BLK 18IN 685G